# Patient Record
Sex: MALE | Race: WHITE | NOT HISPANIC OR LATINO | Employment: OTHER | URBAN - METROPOLITAN AREA
[De-identification: names, ages, dates, MRNs, and addresses within clinical notes are randomized per-mention and may not be internally consistent; named-entity substitution may affect disease eponyms.]

---

## 2017-02-08 DIAGNOSIS — I25.5 ISCHEMIC CARDIOMYOPATHY: Chronic | ICD-10-CM

## 2017-02-15 ENCOUNTER — HOSPITAL ENCOUNTER (OUTPATIENT)
Dept: LAB | Facility: MEDICAL CENTER | Age: 77
End: 2017-02-15
Attending: NURSE PRACTITIONER
Payer: MEDICARE

## 2017-02-15 ENCOUNTER — HOSPITAL ENCOUNTER (OUTPATIENT)
Facility: MEDICAL CENTER | Age: 77
End: 2017-02-16
Attending: EMERGENCY MEDICINE | Admitting: HOSPITALIST
Payer: MEDICARE

## 2017-02-15 ENCOUNTER — RESOLUTE PROFESSIONAL BILLING HOSPITAL PROF FEE (OUTPATIENT)
Dept: HOSPITALIST | Facility: MEDICAL CENTER | Age: 77
End: 2017-02-15
Payer: MEDICARE

## 2017-02-15 ENCOUNTER — TELEPHONE (OUTPATIENT)
Dept: CARDIOLOGY | Facility: MEDICAL CENTER | Age: 77
End: 2017-02-15

## 2017-02-15 DIAGNOSIS — E87.6 HYPOKALEMIA: ICD-10-CM

## 2017-02-15 DIAGNOSIS — I25.5 ISCHEMIC CARDIOMYOPATHY: Chronic | ICD-10-CM

## 2017-02-15 DIAGNOSIS — N28.9 RENAL INSUFFICIENCY: ICD-10-CM

## 2017-02-15 DIAGNOSIS — Z95.810 BIVENTRICULAR IMPLANTABLE CARDIOVERTER-DEFIBRILLATOR IN SITU: ICD-10-CM

## 2017-02-15 LAB
ALBUMIN SERPL BCP-MCNC: 4.1 G/DL (ref 3.2–4.9)
ALBUMIN SERPL BCP-MCNC: 4.2 G/DL (ref 3.2–4.9)
ALBUMIN/GLOB SERPL: 1.3 G/DL
ALBUMIN/GLOB SERPL: 1.5 G/DL
ALP SERPL-CCNC: 94 U/L (ref 30–99)
ALP SERPL-CCNC: 95 U/L (ref 30–99)
ALT SERPL-CCNC: 22 U/L (ref 2–50)
ALT SERPL-CCNC: 22 U/L (ref 2–50)
ANION GAP SERPL CALC-SCNC: 11 MMOL/L (ref 0–11.9)
ANION GAP SERPL CALC-SCNC: 12 MMOL/L (ref 0–11.9)
AST SERPL-CCNC: 25 U/L (ref 12–45)
AST SERPL-CCNC: 28 U/L (ref 12–45)
BASOPHILS # BLD AUTO: 0.5 % (ref 0–1.8)
BASOPHILS # BLD: 0.04 K/UL (ref 0–0.12)
BILIRUB SERPL-MCNC: 1 MG/DL (ref 0.1–1.5)
BILIRUB SERPL-MCNC: 1.1 MG/DL (ref 0.1–1.5)
BNP SERPL-MCNC: 145 PG/ML (ref 0–100)
BUN SERPL-MCNC: 45 MG/DL (ref 8–22)
BUN SERPL-MCNC: 50 MG/DL (ref 8–22)
CALCIUM SERPL-MCNC: 9.1 MG/DL (ref 8.4–10.2)
CALCIUM SERPL-MCNC: 9.4 MG/DL (ref 8.4–10.2)
CHLORIDE SERPL-SCNC: 88 MMOL/L (ref 96–112)
CHLORIDE SERPL-SCNC: 90 MMOL/L (ref 96–112)
CO2 SERPL-SCNC: 30 MMOL/L (ref 20–33)
CO2 SERPL-SCNC: 32 MMOL/L (ref 20–33)
CREAT SERPL-MCNC: 1.71 MG/DL (ref 0.5–1.4)
CREAT SERPL-MCNC: 1.77 MG/DL (ref 0.5–1.4)
EOSINOPHIL # BLD AUTO: 0.18 K/UL (ref 0–0.51)
EOSINOPHIL NFR BLD: 2.4 % (ref 0–6.9)
ERYTHROCYTE [DISTWIDTH] IN BLOOD BY AUTOMATED COUNT: 52.6 FL (ref 35.9–50)
GFR SERPL CREATININE-BSD FRML MDRD: 38 ML/MIN/1.73 M 2
GFR SERPL CREATININE-BSD FRML MDRD: 39 ML/MIN/1.73 M 2
GLOBULIN SER CALC-MCNC: 2.8 G/DL (ref 1.9–3.5)
GLOBULIN SER CALC-MCNC: 3.3 G/DL (ref 1.9–3.5)
GLUCOSE SERPL-MCNC: 103 MG/DL (ref 65–99)
GLUCOSE SERPL-MCNC: 111 MG/DL (ref 65–99)
HCT VFR BLD AUTO: 38.7 % (ref 42–52)
HGB BLD-MCNC: 13.7 G/DL (ref 14–18)
IMM GRANULOCYTES # BLD AUTO: 0.02 K/UL (ref 0–0.11)
IMM GRANULOCYTES NFR BLD AUTO: 0.3 % (ref 0–0.9)
LYMPHOCYTES # BLD AUTO: 1.87 K/UL (ref 1–4.8)
LYMPHOCYTES NFR BLD: 25.4 % (ref 22–41)
MAGNESIUM SERPL-MCNC: 2 MG/DL (ref 1.5–2.5)
MCH RBC QN AUTO: 33.5 PG (ref 27–33)
MCHC RBC AUTO-ENTMCNC: 35.4 G/DL (ref 33.7–35.3)
MCV RBC AUTO: 94.6 FL (ref 81.4–97.8)
MONOCYTES # BLD AUTO: 0.7 K/UL (ref 0–0.85)
MONOCYTES NFR BLD AUTO: 9.5 % (ref 0–13.4)
NEUTROPHILS # BLD AUTO: 4.54 K/UL (ref 1.82–7.42)
NEUTROPHILS NFR BLD: 61.9 % (ref 44–72)
NRBC # BLD AUTO: 0 K/UL
NRBC BLD AUTO-RTO: 0 /100 WBC
PLATELET # BLD AUTO: 176 K/UL (ref 164–446)
PMV BLD AUTO: 10.2 FL (ref 9–12.9)
POTASSIUM SERPL-SCNC: 2.8 MMOL/L (ref 3.6–5.5)
POTASSIUM SERPL-SCNC: 2.8 MMOL/L (ref 3.6–5.5)
PROT SERPL-MCNC: 6.9 G/DL (ref 6–8.2)
PROT SERPL-MCNC: 7.5 G/DL (ref 6–8.2)
RBC # BLD AUTO: 4.09 M/UL (ref 4.7–6.1)
SODIUM SERPL-SCNC: 131 MMOL/L (ref 135–145)
SODIUM SERPL-SCNC: 132 MMOL/L (ref 135–145)
WBC # BLD AUTO: 7.4 K/UL (ref 4.8–10.8)

## 2017-02-15 PROCEDURE — 700102 HCHG RX REV CODE 250 W/ 637 OVERRIDE(OP): Performed by: EMERGENCY MEDICINE

## 2017-02-15 PROCEDURE — 700101 HCHG RX REV CODE 250: Performed by: EMERGENCY MEDICINE

## 2017-02-15 PROCEDURE — 96365 THER/PROPH/DIAG IV INF INIT: CPT

## 2017-02-15 PROCEDURE — 99220 PR INITIAL OBSERVATION CARE,LEVL III: CPT | Performed by: HOSPITALIST

## 2017-02-15 PROCEDURE — 99285 EMERGENCY DEPT VISIT HI MDM: CPT

## 2017-02-15 PROCEDURE — 80053 COMPREHEN METABOLIC PANEL: CPT | Mod: 91

## 2017-02-15 PROCEDURE — 96375 TX/PRO/DX INJ NEW DRUG ADDON: CPT

## 2017-02-15 PROCEDURE — A9270 NON-COVERED ITEM OR SERVICE: HCPCS | Performed by: EMERGENCY MEDICINE

## 2017-02-15 PROCEDURE — 93005 ELECTROCARDIOGRAM TRACING: CPT | Performed by: EMERGENCY MEDICINE

## 2017-02-15 PROCEDURE — A9270 NON-COVERED ITEM OR SERVICE: HCPCS | Performed by: HOSPITALIST

## 2017-02-15 PROCEDURE — 700102 HCHG RX REV CODE 250 W/ 637 OVERRIDE(OP): Performed by: HOSPITALIST

## 2017-02-15 PROCEDURE — 36415 COLL VENOUS BLD VENIPUNCTURE: CPT

## 2017-02-15 PROCEDURE — 700111 HCHG RX REV CODE 636 W/ 250 OVERRIDE (IP): Performed by: HOSPITALIST

## 2017-02-15 PROCEDURE — 700111 HCHG RX REV CODE 636 W/ 250 OVERRIDE (IP)

## 2017-02-15 PROCEDURE — G0378 HOSPITAL OBSERVATION PER HR: HCPCS

## 2017-02-15 PROCEDURE — 83735 ASSAY OF MAGNESIUM: CPT

## 2017-02-15 PROCEDURE — 85025 COMPLETE CBC W/AUTO DIFF WBC: CPT

## 2017-02-15 RX ORDER — POTASSIUM CHLORIDE 20 MEQ/1
40 TABLET, EXTENDED RELEASE ORAL DAILY
Status: ON HOLD | COMMUNITY
End: 2017-02-16

## 2017-02-15 RX ORDER — SODIUM CHLORIDE AND POTASSIUM CHLORIDE 300; 900 MG/100ML; MG/100ML
INJECTION, SOLUTION INTRAVENOUS ONCE
Status: COMPLETED | OUTPATIENT
Start: 2017-02-15 | End: 2017-02-16

## 2017-02-15 RX ORDER — SODIUM CHLORIDE AND POTASSIUM CHLORIDE 300; 900 MG/100ML; MG/100ML
INJECTION, SOLUTION INTRAVENOUS ONCE
Status: COMPLETED | OUTPATIENT
Start: 2017-02-15 | End: 2017-02-15

## 2017-02-15 RX ORDER — ASPIRIN 81 MG/1
81 TABLET, CHEWABLE ORAL DAILY
Status: DISCONTINUED | OUTPATIENT
Start: 2017-02-15 | End: 2017-02-16

## 2017-02-15 RX ORDER — POTASSIUM CHLORIDE 20 MEQ/1
40 TABLET, EXTENDED RELEASE ORAL ONCE
Status: COMPLETED | OUTPATIENT
Start: 2017-02-15 | End: 2017-02-15

## 2017-02-15 RX ORDER — FUROSEMIDE 40 MG/1
40 TABLET ORAL DAILY
COMMUNITY
End: 2017-05-17 | Stop reason: SDUPTHER

## 2017-02-15 RX ORDER — METOLAZONE 5 MG/1
5 TABLET ORAL PRN
COMMUNITY
End: 2018-03-12 | Stop reason: SDUPTHER

## 2017-02-15 RX ORDER — POTASSIUM CHLORIDE 7.45 MG/ML
10 INJECTION INTRAVENOUS
Status: COMPLETED | OUTPATIENT
Start: 2017-02-15 | End: 2017-02-16

## 2017-02-15 RX ORDER — CARVEDILOL 6.25 MG/1
6.25 TABLET ORAL 2 TIMES DAILY WITH MEALS
COMMUNITY
End: 2017-08-21

## 2017-02-15 RX ORDER — POTASSIUM CHLORIDE 20 MEQ/1
40 TABLET, EXTENDED RELEASE ORAL DAILY
Status: DISCONTINUED | OUTPATIENT
Start: 2017-02-16 | End: 2017-02-16 | Stop reason: HOSPADM

## 2017-02-15 RX ORDER — POTASSIUM CHLORIDE 7.45 MG/ML
INJECTION INTRAVENOUS
Status: COMPLETED
Start: 2017-02-15 | End: 2017-02-15

## 2017-02-15 RX ORDER — DOCUSATE SODIUM 100 MG/1
100 CAPSULE, LIQUID FILLED ORAL 2 TIMES DAILY
Status: DISCONTINUED | OUTPATIENT
Start: 2017-02-15 | End: 2017-02-16 | Stop reason: HOSPADM

## 2017-02-15 RX ORDER — CARVEDILOL 6.25 MG/1
6.25 TABLET ORAL 2 TIMES DAILY WITH MEALS
Status: DISCONTINUED | OUTPATIENT
Start: 2017-02-15 | End: 2017-02-16 | Stop reason: HOSPADM

## 2017-02-15 RX ORDER — FUROSEMIDE 40 MG/1
40 TABLET ORAL DAILY
Status: DISCONTINUED | OUTPATIENT
Start: 2017-02-16 | End: 2017-02-16 | Stop reason: HOSPADM

## 2017-02-15 RX ADMIN — POTASSIUM CHLORIDE 10 MEQ: 10 INJECTION, SOLUTION INTRAVENOUS at 22:00

## 2017-02-15 RX ADMIN — POTASSIUM CHLORIDE AND SODIUM CHLORIDE: 900; 300 INJECTION, SOLUTION INTRAVENOUS at 18:52

## 2017-02-15 RX ADMIN — CARVEDILOL 6.25 MG: 6.25 TABLET, FILM COATED ORAL at 20:17

## 2017-02-15 RX ADMIN — POTASSIUM CHLORIDE AND SODIUM CHLORIDE 1000 ML: 900; 300 INJECTION, SOLUTION INTRAVENOUS at 21:34

## 2017-02-15 RX ADMIN — POTASSIUM CHLORIDE 40 MEQ: 1500 TABLET, EXTENDED RELEASE ORAL at 17:02

## 2017-02-15 RX ADMIN — ASPIRIN 81 MG CHEWABLE TABLET 81 MG: 81 TABLET CHEWABLE at 20:17

## 2017-02-15 RX ADMIN — POTASSIUM CHLORIDE 10 MEQ: 10 INJECTION, SOLUTION INTRAVENOUS at 19:18

## 2017-02-15 RX ADMIN — POTASSIUM CHLORIDE 10 MEQ: 7.46 INJECTION, SOLUTION INTRAVENOUS at 19:18

## 2017-02-15 ASSESSMENT — LIFESTYLE VARIABLES
HAVE YOU EVER FELT YOU SHOULD CUT DOWN ON YOUR DRINKING: NO
CONSUMPTION TOTAL: POSITIVE
TOTAL SCORE: 0
TOTAL SCORE: 0
HOW MANY TIMES IN THE PAST YEAR HAVE YOU HAD 5 OR MORE DRINKS IN A DAY: 0
EVER_SMOKED: YES
EVER HAD A DRINK FIRST THING IN THE MORNING TO STEADY YOUR NERVES TO GET RID OF A HANGOVER: NO
EVER FELT BAD OR GUILTY ABOUT YOUR DRINKING: NO
HAVE PEOPLE ANNOYED YOU BY CRITICIZING YOUR DRINKING: NO
DO YOU DRINK ALCOHOL: YES
AVERAGE NUMBER OF DAYS PER WEEK YOU HAVE A DRINK CONTAINING ALCOHOL: 14
ON A TYPICAL DAY WHEN YOU DRINK ALCOHOL HOW MANY DRINKS DO YOU HAVE: 2
TOTAL SCORE: 0

## 2017-02-15 ASSESSMENT — PAIN SCALES - GENERAL
PAINLEVEL_OUTOF10: 0
PAINLEVEL_OUTOF10: 5

## 2017-02-15 NOTE — IP AVS SNAPSHOT
Topio Access Code: Activation code not generated  Current Topio Status: Active    Pruffihart  A secure, online tool to manage your health information     [facility]’s Topio® is a secure, online tool that connects you to your personalized health information from the privacy of your home -- day or night - making it very easy for you to manage your healthcare. Once the activation process is completed, you can even access your medical information using the Topio bear, which is available for free in the Apple Bear store or Google Play store.     Topio provides the following levels of access (as shown below):   My Chart Features   [facility] Primary Care Doctor [facility]  Specialists [facility]  Urgent  Care Non-[facility]  Primary Care  Doctor   Email your healthcare team securely and privately 24/7 X X X X   Manage appointments: schedule your next appointment; view details of past/upcoming appointments X      Request prescription refills. X      View recent personal medical records, including lab and immunizations X X X X   View health record, including health history, allergies, medications X X X X   Read reports about your outpatient visits, procedures, consult and ER notes X X X X   See your discharge summary, which is a recap of your hospital and/or ER visit that includes your diagnosis, lab results, and care plan. X X       How to register for Topio:  1. Go to  [email or web address].  2. Click on the Sign Up Now box, which takes you to the New Member Sign Up page. You will need to provide the following information:  a. Enter your Topio Access Code exactly as it appears at the top of this page. (You will not need to use this code after you’ve completed the sign-up process. If you do not sign up before the expiration date, you must request a new code.)   b. Enter your date of birth.   c. Enter your home email address.   d. Click Submit, and follow the next screen’s instructions.  3. Create a Topio ID. This will  be your SGB login ID and cannot be changed, so think of one that is secure and easy to remember.  4. Create a SGB password. You can change your password at any time.  5. Enter your Password Reset Question and Answer. This can be used at a later time if you forget your password.   6. Enter your e-mail address. This allows you to receive e-mail notifications when new information is available in SGB.  7. Click Sign Up. You can now view your health information.    For assistance activating your SGB account, call [phone]

## 2017-02-15 NOTE — IP AVS SNAPSHOT
" <p align=\"LEFT\"><IMG SRC=\"//[email or web address]\" alt=\"Image\" WIDTH=\"50%\" HEIGHT=\"200\" BORDER=\"\"></p>                   Name:Isai Fuentes  Medical Record Number:[id number]  CSN: [id number]    YOB: 1940   Age: 76 y.o.  Sex: male  HT:1.803 m (5' 11\") WT: 96.5 kg (212 lb 11.9 oz)          Admit Date: 2/15/2017     Discharge Date:   Today's Date: 2/16/2017  Attending Doctor:  Toy Dan M.D.                  Allergies:  Plavix; Statins; and Ticlid          Your appointments     Mar 08, 2017  3:20 PM   FOLLOW UP with Edd Billings M.D.   [facility] (--)    [address], [address]  [place] [id number] [phone]            Mar 14, 2017 10:00 AM   ADVANCED DIRECTIVE CLASS with JOHN AT [facility]   Events (--)    Please Check Your Invitation   [phone]           Conference Room 25 [place] [address]            Mar 27, 2017 11:20 AM   Established Patient Pul with Ruby Thakur M.D.   [facility] (--)    [address]  [address]  [place] [id number] [phone]              Follow-up Information     1. Follow up with Prashant Shankar M.D.. Schedule an appointment as soon as possible for a visit in 1 week.    Specialty:  Family Medicine    Why:  [facility]ULE [facility] FOLLOW UP APPOINTMENT. THANK YOU    Contact information    [address]  [place] [address] [phone]           Medication List      Take these Medications        Instructions    aspirin 81 MG tablet    Take 81 mg by mouth every day.   Dose:  81 mg       carvedilol 6.25 MG Tabs   Commonly known as:  COREG    Take 6.25 mg by mouth 2 times a day, with meals.   Dose:  6.25 mg       CO Q-10 PO    Take 1 Cap by mouth every day.   Dose:  1 Cap       cyanocobalamin 500 MCG Tabs   Commonly known as:  VITAMIN B-12    Take 1,000 mcg by mouth every day.   Dose:  1000 mcg       ENTRESTO 24-26 MG Tabs tablet   Generic " drug:  sacubitril-valsartan    Take 0.5 Tabs by mouth 2 Times a Day.   Dose:  0.5 Tab       fish oil 1000 MG Caps capsule    Take 1,000 mg by mouth every day.   Dose:  1000 mg       furosemide 40 MG Tabs   Commonly known as:  LASIX    Take 40 mg by mouth every day.   Dose:  40 mg       Methotrexate (PF) 10 MG/0.2ML Soaj    Inject 10 mg as instructed every 7 days. On Mon   Indications: Psoriasis   Dose:  10 mg       metolazone 5 MG Tabs   Commonly known as:  ZAROXOLYN    Take 5 mg by mouth as needed. Indications: Edema   Dose:  5 mg       OCUVITE Tabs    Take 1 Tab by mouth every day.   Dose:  1 Tab       potassium chloride SA 20 MEQ Tbcr   Commonly known as:  Kdur    Take 2 Tabs by mouth every day.   Dose:  40 mEq       spironolactone 25 MG Tabs   Commonly known as:  ALDACTONE    Take 6.25 mg by mouth every day.   Dose:  6.25 mg

## 2017-02-15 NOTE — IP AVS SNAPSHOT
" Home Care Instructions                                                                                                                  Name:Isai Fuentes  Medical Record Number:[id number]  CSN: [id number]    YOB: 1940   Age: 76 y.o.  Sex: male  HT:1.803 m (5' 11\") WT: 96.5 kg (212 lb 11.9 oz)          Admit Date: 2/15/2017     Discharge Date:   Today's Date: 2/16/2017  Attending Doctor:  Toy Dan M.D.                  Allergies:  Plavix; Statins; and Ticlid            Discharge Instructions       Discharge Instructions    Discharged to home by car with relative. Discharged via wheelchair, hospital escort: Yes.  Special equipment needed: Not Applicable    Be sure to schedule a follow-up appointment with your primary care doctor or any specialists as instructed.     Discharge Plan:   Diet Plan: Discussed  Activity Level: Discussed  Confirmed Follow up Appointment: Patient to Call and Schedule Appointment  Confirmed Symptoms Management: Discussed  Medication Reconciliation Updated: Yes  Influenza Vaccine Indication: Not indicated: Previously immunized this influenza season and > 8 years of age    I understand that a diet low in cholesterol, fat, and sodium is recommended for good health. Unless I have been given specific instructions below for another diet, I accept this instruction as my diet prescription.   Other diet: Keep yourself hydrated.      Special Instructions:   Hypokalemia  Hypokalemia means that the amount of potassium in the blood is lower than normal. Potassium is a chemical, called an electrolyte, that helps regulate the amount of fluid in the body. It also stimulates muscle contraction and helps nerves function properly. Most of the body's potassium is inside of cells, and only a very small amount is in the blood. Because the amount in the blood is so small, minor changes can be life-threatening.  CAUSES  · Antibiotics.  · Diarrhea or vomiting.  · Using laxatives too much, which can " cause diarrhea.  · Chronic kidney disease.  · Water pills (diuretics).  · Eating disorders (bulimia).  · Low magnesium level.  · Sweating a lot.  SIGNS AND SYMPTOMS  · Weakness.  · Constipation.  · Fatigue.  · Muscle cramps.  · Mental confusion.  · Skipped heartbeats or irregular heartbeat (palpitations).  · Tingling or numbness.  DIAGNOSIS   Your health care provider can diagnose hypokalemia with blood tests. In addition to checking your potassium level, your health care provider may also check other lab tests.  TREATMENT  Hypokalemia can be treated with potassium supplements taken by mouth or adjustments in your current medicines. If your potassium level is very low, you may need to get potassium through a vein (IV) and be monitored in the hospital. A diet high in potassium is also helpful. Foods high in potassium are:  · Nuts, such as peanuts and pistachios.  · Seeds, such as sunflower seeds and pumpkin seeds.  · Peas, lentils, and lima beans.  · Whole grain and bran cereals and breads.  · Fresh fruit and vegetables, such as apricots, avocado, bananas, cantaloupe, kiwi, oranges, tomatoes, asparagus, and potatoes.  · Orange and tomato juices.  · Red meats.  · Fruit yogurt.  HOME CARE INSTRUCTIONS  · Take all medicines as prescribed by your health care provider.  · Maintain a healthy diet by including nutritious food, such as fruits, vegetables, nuts, whole grains, and lean meats.  · If you are taking a laxative, be sure to follow the directions on the label.  SEEK MEDICAL CARE IF:  · Your weakness gets worse.  · You feel your heart pounding or racing.  · You are vomiting or having diarrhea.  · You are diabetic and having trouble keeping your blood glucose in the normal range.  SEEK IMMEDIATE MEDICAL CARE IF:  · You have chest pain, shortness of breath, or dizziness.  · You are vomiting or having diarrhea for more than 2 days.  · You faint.  MAKE SURE YOU:   · Understand these instructions.  · Will watch your  condition.  · Will get help right away if you are not doing well or get worse.     This information is not intended to replace advice given to you by your health care provider. Make sure you discuss any questions you have with your health care provider.     Document Released: 12/18/2006 Document Revised: 01/08/2016 Document Reviewed: 06/20/2014  [employer] Interactive Patient Education ©2016 [employer]      · Is patient discharged on Warfarin / Coumadin?   No     · Is patient Post Blood Transfusion?  No    Depression / Suicide Risk    As you are discharged from this [facility], it is important to learn how to keep safe from harming yourself.    Recognize the warning signs:  · Abrupt changes in personality, positive or negative- including increase in energy   · Giving away possessions  · Change in eating patterns- significant weight changes-  positive or negative  · Change in sleeping patterns- unable to sleep or sleeping all the time   · Unwillingness or inability to communicate  · Depression  · Unusual sadness, discouragement and loneliness  · Talk of wanting to die  · Neglect of personal appearance   · Rebelliousness- reckless behavior  · Withdrawal from people/activities they love  · Confusion- inability to concentrate     If you or a loved one observes any of these behaviors or has concerns about self-harm, here's what you can do:  · Talk about it- your feelings and reasons for harming yourself  · Remove any means that you might use to hurt yourself (examples: pills, rope, extension cords, firearm)  · Get professional help from the community (Mental Health, Substance Abuse, psychological counseling)  · Do not be alone:Call your Safe Contact- someone whom you trust who will be there for you.  · Call your local CRISIS HOTLINE 396-0739 or 583-274-7870  · Call your local Children's Mobile Crisis Response Team [place] (042) 799-9247 or www.[employer] medic  · Call the toll free National Suicide  Prevention Hotlines   · National Suicide Prevention Lifeline 828-096-TDFK (6968)  · National Hope Line Network 800-SUICIDE (838-0334)        Your appointments     Mar 08, 2017  3:20 PM   FOLLOW UP with Edd Billings M.D.   [facility] (--)    [address], [address]  [place] [address] [phone]            Mar 14, 2017 10:00 AM   ADVANCED DIRECTIVE CLASS with JOHN AT [address] [facility]   Events (--)    Please Check Your Invitation   [phone]           Conference Room [address] [place] [address]            Mar 27, 2017 11:20 AM   Established Patient Pul with Ruby Thakur M.D.   [facility] (--)    [address]  [address]  [place] [id number] [phone]              Follow-up Information     1. Follow up with Prashant Shankar M.D.. Schedule an appointment as soon as possible for a visit in 1 week.    Specialty:  Family Medicine    Why:  [facility]ULE A FOLLOW UP APPOINTMENT. THANK YOU    Contact information    [address] [place] [address]  [phone]           Discharge Medication Instructions:    Below are the medications your physician expects you to take upon discharge:    Review all your home medications and newly ordered medications with your doctor and/or pharmacist. Follow medication instructions as directed by your doctor and/or pharmacist.    Please keep your medication list with you and share with your physician.               Medication List      CONTINUE taking these medications        Instructions    aspirin 81 MG tablet    Take 81 mg by mouth every day.   Dose:  81 mg       carvedilol 6.25 MG Tabs   Last time this was given:  6.25 mg on 2/16/2017 10:03 AM   Commonly known as:  COREG    Take 6.25 mg by mouth 2 times a day, with meals.   Dose:  6.25 mg       CO Q-10 PO    Take 1 Cap by mouth every day.   Dose:  1 Cap       cyanocobalamin 500 MCG Tabs   Commonly known as:  VITAMIN  B-12    Take 1,000 mcg by mouth every day.   Dose:  1000 mcg       ENTRESTO 24-26 MG Tabs tablet   Generic drug:  sacubitril-valsartan    Take 0.5 Tabs by mouth 2 Times a Day.   Dose:  0.5 Tab       fish oil 1000 MG Caps capsule    Take 1,000 mg by mouth every day.   Dose:  1000 mg       furosemide 40 MG Tabs   Last time this was given:  40 mg on 2/16/2017 10:02 AM   Commonly known as:  LASIX    Take 40 mg by mouth every day.   Dose:  40 mg       Methotrexate (PF) 10 MG/0.2ML Soaj    Inject 10 mg as instructed every 7 days. On Mon   Indications: Psoriasis   Dose:  10 mg       metolazone 5 MG Tabs   Commonly known as:  ZAROXOLYN    Take 5 mg by mouth as needed. Indications: Edema   Dose:  5 mg       OCUVITE Tabs    Take 1 Tab by mouth every day.   Dose:  1 Tab       potassium chloride SA 20 MEQ Tbcr   Last time this was given:  40 mEq on 2/16/2017 10:02 AM   Commonly known as:  Kdur    Take 2 Tabs by mouth every day.   Dose:  40 mEq       spironolactone 25 MG Tabs   Commonly known as:  ALDACTONE    Take 6.25 mg by mouth every day.   Dose:  6.25 mg               Instructions           Diet / Nutrition:    Follow any diet instructions given to you by your doctor or the dietician, including how much salt (sodium) you are allowed each day.    If you are overweight, talk to your doctor about a weight reduction plan.    Activity:    Remain physically active following your doctor's instructions about exercise and activity.    Rest often.     Any time you become even a little tired or short of breath, SIT DOWN and rest.    Worsening Symptoms:    Report any of the following signs and symptoms to the doctor's office immediately:    *Pain of jaw, arm, or neck  *Chest pain not relieved by medication                               *Dizziness or loss of consciousness  *Difficulty breathing even when at rest   *More tired than usual                                       *Bleeding drainage or swelling of surgical site  *Swelling of  feet, ankles, legs or stomach                 *Fever (>100ºF)  *Pink or blood tinged sputum  *Weight gain (3lbs/day or 5lbs /week)           *Shock from internal defibrillator (if applicable)  *Palpitations or irregular heartbeats                *Cool and/or numb extremities    Stroke Awareness    Common Risk Factors for Stroke include:    Age  Atrial Fibrillation  Carotid Artery Stenosis  Diabetes Mellitus  Excessive alcohol consumption  High blood pressure  Overweight   Physical inactivity  Smoking    Warning signs and symptoms of a stroke include:    *Sudden numbness or weakness of the face, arm or leg (especially on one side of the body).  *Sudden confusion, trouble speaking or understanding.  *Sudden trouble seeing in one or both eyes.  *Sudden trouble walking, dizziness, loss of balance or coordination.Sudden severe headache with no known cause.    It is very important to get treatment quickly when a stroke occurs. If you experience any of the above warning signs, call 911 immediately.                   Disclaimer         Quit Smoking / Tobacco Use:    I understand the use of any tobacco products increases my chance of suffering from future heart disease or stroke and could cause other illnesses which may shorten my life. Quitting the use of tobacco products is the single most important thing I can do to improve my health. For further information on smoking / tobacco cessation call a Toll Free Quit Line at 1-992.157.9949 (*National [employer] [place]) or 1-101.401.7976 (American Lung Association) or you can access the web based program at www.lungusa.org.    [place] Tobacco Users Help Line:  (916) 723-5444       Toll Free: 1-740.994.6784  Quit Tobacco Program [facility] (566)162-2699    Crisis Hotline:    National Crisis Hotline:  4-202-YFJTTAE or 1-571.127.4829    [place] Crisis Hotline:    1-299.124.3900 or 118-806-9057    Discharge Survey:   [facility] We hope  we did everything we could to make your hospital stay a pleasant one. You may be receiving a phone survey and we would appreciate your time and participation in answering the questions. Your input is very valuable to us in our efforts to improve our service to our patients and their families.        My signature on this form indicates that:    1. I have reviewed and understand the above information.  2. My questions regarding this information have been answered to my satisfaction.  3. I have formulated a plan with my discharge nurse to obtain my prescribed medications for home.                  Disclaimer         __________________________________                     __________       ________                       Patient Signature                                                 Date                    Time

## 2017-02-15 NOTE — IP AVS SNAPSHOT
2/16/2017          Isai Fuentes  [address] [place] [address]    Dear Isai:    [facility] wants to ensure your discharge home is safe and you or your loved ones have had all your questions answered regarding your care after you leave the hospital.    You may receive a telephone call within two days of your discharge.  This call is to make certain you understand your discharge instructions as well as ensure we provided you with the best care possible during your stay with us.     The call will only last approximately 3-5 minutes and will be done by a nurse.    Once again, we want to ensure your discharge home is safe and that you have a clear understanding of any next steps in your care.  If you have any questions or concerns, please do not hesitate to contact us, we are here for you.  Thank you for choosing [facility] for your healthcare needs.    Sincerely,    Meño Lovell    [facility]

## 2017-02-15 NOTE — TELEPHONE ENCOUNTER
----- Message from PRATEEK Gregorio sent at 2/15/2017  2:15 PM PST -----  As per our discussion to ER for reevaluation and IV KCL if warranted.

## 2017-02-16 ENCOUNTER — TELEPHONE (OUTPATIENT)
Dept: CARDIOLOGY | Facility: MEDICAL CENTER | Age: 77
End: 2017-02-16

## 2017-02-16 VITALS
WEIGHT: 212.74 LBS | OXYGEN SATURATION: 93 % | RESPIRATION RATE: 18 BRPM | DIASTOLIC BLOOD PRESSURE: 57 MMHG | BODY MASS INDEX: 29.78 KG/M2 | SYSTOLIC BLOOD PRESSURE: 107 MMHG | HEIGHT: 71 IN | TEMPERATURE: 97.4 F | HEART RATE: 81 BPM

## 2017-02-16 DIAGNOSIS — I25.5 ISCHEMIC CARDIOMYOPATHY: Chronic | ICD-10-CM

## 2017-02-16 PROBLEM — E87.6 HYPOKALEMIA: Status: RESOLVED | Noted: 2017-02-15 | Resolved: 2017-02-16

## 2017-02-16 LAB
ERYTHROCYTE [DISTWIDTH] IN BLOOD BY AUTOMATED COUNT: 52.6 FL (ref 35.9–50)
HCT VFR BLD AUTO: 35.2 % (ref 42–52)
HGB BLD-MCNC: 12.3 G/DL (ref 14–18)
MCH RBC QN AUTO: 33.1 PG (ref 27–33)
MCHC RBC AUTO-ENTMCNC: 34.9 G/DL (ref 33.7–35.3)
MCV RBC AUTO: 94.6 FL (ref 81.4–97.8)
PLATELET # BLD AUTO: 160 K/UL (ref 164–446)
PMV BLD AUTO: 10.4 FL (ref 9–12.9)
POTASSIUM SERPL-SCNC: 3.2 MMOL/L (ref 3.6–5.5)
POTASSIUM SERPL-SCNC: 3.4 MMOL/L (ref 3.6–5.5)
POTASSIUM SERPL-SCNC: 3.4 MMOL/L (ref 3.6–5.5)
RBC # BLD AUTO: 3.72 M/UL (ref 4.7–6.1)
WBC # BLD AUTO: 7.8 K/UL (ref 4.8–10.8)

## 2017-02-16 PROCEDURE — 85027 COMPLETE CBC AUTOMATED: CPT

## 2017-02-16 PROCEDURE — A9270 NON-COVERED ITEM OR SERVICE: HCPCS | Performed by: HOSPITALIST

## 2017-02-16 PROCEDURE — 84132 ASSAY OF SERUM POTASSIUM: CPT

## 2017-02-16 PROCEDURE — 700102 HCHG RX REV CODE 250 W/ 637 OVERRIDE(OP): Performed by: HOSPITALIST

## 2017-02-16 PROCEDURE — 99217 PR OBSERVATION CARE DISCHARGE: CPT | Performed by: INTERNAL MEDICINE

## 2017-02-16 PROCEDURE — 700111 HCHG RX REV CODE 636 W/ 250 OVERRIDE (IP): Performed by: HOSPITALIST

## 2017-02-16 PROCEDURE — G0378 HOSPITAL OBSERVATION PER HR: HCPCS

## 2017-02-16 RX ORDER — ASPIRIN 81 MG/1
81 TABLET, CHEWABLE ORAL
Status: DISCONTINUED | OUTPATIENT
Start: 2017-02-16 | End: 2017-02-16 | Stop reason: HOSPADM

## 2017-02-16 RX ORDER — POTASSIUM CHLORIDE 20 MEQ/1
40 TABLET, EXTENDED RELEASE ORAL DAILY
Qty: 60 TAB | Refills: 11 | Status: SHIPPED | OUTPATIENT
Start: 2017-02-16 | End: 2017-03-21 | Stop reason: SDUPTHER

## 2017-02-16 RX ADMIN — FUROSEMIDE 40 MG: 40 TABLET ORAL at 10:02

## 2017-02-16 RX ADMIN — POTASSIUM CHLORIDE 10 MEQ: 10 INJECTION, SOLUTION INTRAVENOUS at 05:34

## 2017-02-16 RX ADMIN — CARVEDILOL 6.25 MG: 6.25 TABLET, FILM COATED ORAL at 10:03

## 2017-02-16 RX ADMIN — POTASSIUM CHLORIDE 40 MEQ: 1500 TABLET, EXTENDED RELEASE ORAL at 10:02

## 2017-02-16 RX ADMIN — POTASSIUM CHLORIDE 10 MEQ: 10 INJECTION, SOLUTION INTRAVENOUS at 02:29

## 2017-02-16 NOTE — ED NOTES
Pt medicated with Po potassium as ordered, NS with 40kcl not here, left message for central supply, no return call, called house supervisor, states she will bring it down.

## 2017-02-16 NOTE — PROGRESS NOTES
Wife Ivelisse at bedside about 1000 and brought in his home medication, held the medication as his bp was low this morning.  Dr. Dan rounded and discharged pt home.  Potassium at noon showed 3.4.  Wife and pt verbalized understanding of making f/u appt with pcp, cardiology appt that is scheduled, new medications, s/s of hypokalemia and when to return back to ED.  Pt left in a wheelchair with the nursing student.

## 2017-02-16 NOTE — CARE PLAN
Problem: Safety  Goal: Will remain free from falls  Outcome: PROGRESSING AS EXPECTED  SBA. CLIP. Pt calls for assist appropriately. Hourly rounding. Personal belongings within reach. Non-skid socks. Bed locked & in low position.            Problem: Knowledge Deficit  Goal: Knowledge of disease process/condition, treatment plan, diagnostic tests, and medications will improve  Outcome: PROGRESSING AS EXPECTED  POC discussed w/ pt & wife. Understands disease process and treatment plan. Educated on new meds (IV potassium) & procedures/tests (monitoring potassium levels q6h). Questions answered.

## 2017-02-16 NOTE — PROGRESS NOTES
No further c/o heartburn. Second K-rider complete. Pt still c/o pain at peripheral site despite slow rate. Heat packs provided.

## 2017-02-16 NOTE — ED PROVIDER NOTES
"ED Provider Note    CHIEF COMPLAINT  Chief Complaint   Patient presents with   • Abnormal Labs       HPI  Isai Fuentes is a 76 y.o. male who presents for evaluation of abnormal labs.  The patient had routine labs performed today in anticipation of a medical exam performed by Sofiya Trivedi, with cardiology.  The patient was noted to have hypokalemia with a level of 2.8 along with increasing renal insufficiency.  The patient was directed to him our department for evaluation and treatment.  The patient states he feels a little lightheaded but denies any other symptoms.  The patient denies: Fever, URI symptoms, cough, sputum, chest pain, palpitations, syncope, abdominal pain, vomiting, diarrhea, neurological symptoms.  No other acute symptomatology or complaints.    REVIEW OF SYSTEMS  See HPI for further details. All other systems negative.    PAST MEDICAL HISTORY  Past Medical History   Diagnosis Date   • Other drug allergy(995.27) 10/16/2008   • Atrial fibrillation (CMS-HCC) 9/20/2011   • CAD (coronary artery disease) 9/20/2011   • CARDIOMYOPATHY 9/20/2011   • History of cardiac catheterization 9/20/2011   • Fatigue 10/28/2010   • Heartburn 10/23/2008   • HTN (hypertension) 9/20/2011   • Insomnia 7/8/2010   • Ischemic cardiomyopathy 9/20/2011   • Orthostatic hypotension 5/6/2009   • Presence of permanent cardiac pacemaker 9/20/2011   • Pleural effusion 5/18/2009   • History of myocardial infarction      \"many\"   • Prostate cancer (CMS-HCC) 5/13/2011   • Second degree AV block, Mobitz type II 11/10/2010   • Long term (current) use of anticoagulants 5/13/2011   • Congestive heart failure (CMS-HCC)    • Pacemaker      [employer]   • Indigestion    • Other specified disorder of intestines 07-28-15     constipation/diarrhea/ reports some bleeding from rectum w/blood clots. Seeing GI   • Breath shortness    • Snoring    • Pain 07-28-15     \"chronic\", 0/10   • Biventricular implantable cardioverter-defibrillator " "in situ 8/7/2015   • Cancer (CMS-HCC) 2012     prostate   • Myocardial infarct (CMS-HCC) 2007   • WILLA (obstructive sleep apnea)      CPAP   • Stroke (CMS-HCC) 2/3/2009   • COPD (chronic obstructive pulmonary disease) [place]        FAMILY HISTORY  History reviewed. No pertinent family history.    SOCIAL HISTORY  Positive tobacco use the past; she is tobacco; positive alcohol use;    SURGICAL HISTORY  Past Surgical History   Procedure Laterality Date   • Cardiac cath       has 2 stents   • Multiple coronary artery bypass  2007     2 vessel   • Pacemaker insertion  11/24/08     [facility]   • Recovery  7/30/2015     Procedure: CATH LAB  LEAD EXTRACTION, UPGRADE TO BIV PM ST.DRE LRG MAGGIE AQUINO ;  Surgeon: Recoveryonly Surgery;  Location: SURGERY PRE-POST PROC UNIT [facility];  Service:    • Recovery  7/29/2015     Procedure: CATH LAB NEW PM INSERTION DUAL ATRIAL & VENTRICULAR-LV LEAD W/ NEW GENERATOR KENIA ICD9: 414.8;  Surgeon: Recoveryonly Surgery;  Location: SURGERY PRE-POST PROC UNIT [facility];  Service:    • Recovery  8/14/2015     Procedure:  CATH LAB LEAD EXTRACTION AWILDA ST.DRE LRG PAULO AQUINO;  Surgeon: Recoveryonly Surgery;  Location: SURGERY PRE-POST PROC UNIT [facility];  Service:    • Recovery  10/16/2015     Procedure: CATH LAB LEAD REVISION ST.DRE KENIA;  Surgeon: Recoveryonly Surgery;  Location: SURGERY PRE-POST PROC UNIT [facility];  Service:        CURRENT MEDICATIONS  See nurses notes    ALLERGIES  Allergies   Allergen Reactions   • Plavix [Clopidogrel Bisulfate]      RASH   • Statins [Hmg-Coa-R Inhibitors]    • Ticlid [Ticlopidine Hydrochloride]        PHYSICAL EXAM  VITAL SIGNS: BP 98/66 mmHg  Pulse 78  Temp(Src) 36.7 °C (98.1 °F)  Resp 18  Ht 1.803 m (5' 11\")  Wt 96.9 kg (213 lb 10 oz)  BMI 29.81 kg/m2  SpO2 93%   Constitutional: 76-year-old male, awake, oriented ×3, sitting comfortably   HENT: ,Atraumatic, Bilateral external ears normal, tympanic membranes clear, Oropharynx moist, No oral exudates, Nose normal.   Eyes: " PERRL, EOMI, Conjunctiva normal, No discharge.   Neck: Normal range of motion, No tenderness, Supple, No stridor.   Lymphatic: No lymphadenopathy noted.   Cardiovascular: Normal heart rate, Normal rhythm, No murmurs, No rubs, No gallops.   Thorax & Lungs: Normal Equal breath sounds, No respiratory distress, No wheezing, no stridor, no rales. No chest tenderness.   Abdomen: Soft, nontender, nondistended, no organomegaly, positive bowel sounds normal in quality. No guarding or rebound.  Skin: Mildly decreased skin turgor, pink, warm, dry. No rashes, petechiae, purpura. Normal capillary refill.   Back: No tenderness, No CVA tenderness.   Extremities: Intact distal pulses, No edema, No tenderness, No cyanosis, No clubbing. Vascular: Pulses are 2+, symmetric in the upper and lower extremities.  Musculoskeletal: Diffuse arthritic changes. No tenderness to palpation or major deformities noted.   Neurologic: Alert & oriented x 3, Normal motor function, Normal sensory function, No gross focal deficits noted.   Psychiatric: Affect normal, Judgment normal, Mood normal.     EKG  I have interpreted: Rate 70, rhythm atrial sensed, ventricular paced, diffuse ST-T wave changes consistent with paced rhythm, 12-lead EKG, no acute change compared to a tracing of 10/23/15;      COURSE & MEDICAL DECISION MAKING  Pertinent Labs & Imaging studies reviewed. (See chart for details)  1.  Monitor  2.  IV normal saline +40 mEq potassium chloride  3.  Potassium chloride 40 mEq oral    Laboratory studies performed today show: CMP shows sodium 132, potassium 2.8, chloride 88, BUN 45, creatinine 1.71, otherwise within normal; ; repeat laboratory studies were ordered and will be reviewed;    Discussion/consultation: At this time, the patient presents with hypokalemia.  Treatment was initiated as noted above.  I spoke with the hospitalist on call.  The patient will be admitted for further monitoring, treatment, and care.    FINAL  IMPRESSION  1. Hypokalemia    2. Renal insufficiency    3. Ischemic cardiomyopathy    4. Biventricular implantable cardioverter-defibrillator in situ        PLAN  1.  The patient will be admitted for further monitoring, treatment, and care    Electronically signed by: Guy G Gansert, 2/15/2017 4:18 PM

## 2017-02-16 NOTE — PROGRESS NOTES
Report received from marianne Flores assisted to the bathroom.  Last k-rider is infusing as pt states it burning when she tried increasing the rate last night.  VSS this morning.  Pt tolerated his breakfast.  Recent potassium is 3.4 with next draw at 1145.

## 2017-02-16 NOTE — ED NOTES
"Med rec updated and complete  Allergies reviewed  Pt states \"No antibiotics in the last 30 days\".     "

## 2017-02-16 NOTE — PROGRESS NOTES
"Pt continues to c/o that IV potassium \"hurts\". Unable to speed rate up. Heat pack given. Educated importance of getting mediation in timely manner, but pt stated \"I just can't take it.\" Otherwise, no changes.  "

## 2017-02-16 NOTE — ED NOTES
"Reports being sent here by [facility] heart [place] due to \"extremely low potassium\".  "

## 2017-02-16 NOTE — TELEPHONE ENCOUNTER
----- Message from Felisa Grewal sent at 2/16/2017  9:30 AM PST -----  Regarding: patient's wife not sure if he will make his appt today  CONSUELO/Kimberly        Patient's wife Ivelisse called and said he has an appt this morning at 10;40 am, and she doesn't know if he will be able to make the appt unless he gets discharged in time to make the appt. She can be reached on her cell at [phone]

## 2017-02-16 NOTE — H&P
CHIEF COMPLAINT:  The patient was sent by his cardiologist for hypokalemia.    PRIMARY MEDICAL PHYSICIAN:  Prashant Shankar MD    HISTORY OF PRESENT ILLNESS:  This is a very pleasant 76-year-old gentleman who   has significant cardiovascular disease and is typically followed by Laura Trivedi of cardiology as well as the .  He reports that he has been   having issues with hypokalemia for months.  He was followed by his physician   at the  and had been placed on daily potassium, but unfortunately   it does not sound like he has been taking his potassium as prescribed.  There   is some confusion and he initially tells me that he continues to take his   Lasix, but is no longer on his spironolactone.  Later on, he corrects himself   and states that he is taking a low dose spironolactone.  At this point, he has   no chest pain.  He has no shortness of breath.  He has no physical complaints   and has been in his usual state of health.    REVIEW OF SYSTEMS:  A full review of systems was completed and all pertinent   positives and negatives are included in the HPI above.    PAST MEDICAL HISTORY:  1.  Atrial fibrillation.  2.  Ischemic cardiomyopathy and coronary artery disease.  3.  History of MI.  4.  Hypertension.  5.  History of prostate cancer.  6.  AV block status post pacemaker implant.  7.  Chronic systolic congestive heart failure.  8.  Psoriasis.    PAST SURGICAL HISTORY:  1.  Cardiac catheterization.  2.  CABG x2.  3.  Pacemaker implant.    SOCIAL HISTORY:  The patient quit smoking in , but he continues to chew   tobacco.  He has moderate alcohol intake.  He denies any illicit drugs.    FAMILY HISTORY:  Both parents  at advanced age.    ALLERGIES:  PLAVIX, STATINS, TICLOPIDINE.    HOME MEDICATIONS:  1.  Coreg 6.25 mg p.o. b.i.d.  2.  Potassium 40 mEq p.o. daily.  3.  Lasix 40 mg p.o. daily.  4.  Metolazone 5 mg p.r.n. edema.  5.  Entresto 24-26 mg half a tab b.i.d.  6.  Methotrexate  10 mg q. 7 days.  7.  Spironolactone 6.25 mg p.o. daily.  8.  Omega-3 fatty acid.  9.  Multivitamins.  10.  Aspirin 81 mg p.o. daily.  11.  Vitamin B12.  12.  Coenzyme Q.    PHYSICAL EXAMINATION:  VITAL SIGNS:  Temperature 98.1, heart rate 78, respiration 18, blood pressure   is 113/66.  The patient is saturating at 92% on room air.  GENERAL:  This is a well-appearing older white male who is in no acute   distress.  HEENT:  Normocephalic, atraumatic, EOMI, moist mucous membranes.  NECK:  Supple, there is no JVD.  There is no supraclavicular adenopathy.  CARDIOVASCULAR:  Positive S1, S2, regular rate and rhythm.  No murmurs, rubs,   or gallops appreciated.  PULMONARY:  Clear to auscultation bilaterally.  No wheezes, rubs, or rhonchi   heard.  GASTROINTESTINAL:  Soft, nontender, nondistended.  Positive bowel sounds.  No   hepatosplenomegaly.  EXTREMITIES:  Warm, well-perfused:  No clubbing, cyanosis, or edema.  Cap   refill less than 2 seconds.  Distal pulses are intact.  NEUROLOGIC:  A and O x3.  Cranial nerves II-XII grossly intact.    LABORATORY DATA:  WBC 7.4, hemoglobin 13.7, hematocrit 38.7, platelets 172.    Sodium 131, potassium 2.8, chloride 90, CO2 30, glucose 111, BUN 50,   creatinine 1.77.    ASSESSMENT AND PLAN:  This is a pleasant 76-year-old gentleman who has had issues with   hypokalemia for the last several months.  He was seen by his cardiologist   today and it was noted to have a significantly low potassium level and was   sent to the ER for further treatment.  1.  Hypokalemia:  I suspect this is from his Lasix use and lack of regular potassium   supplementation.  Patient has been instructed that he needs to take his oral potassium   regularly while on lasix.   I will check a magnesium level to ensure that this is   greater than 2.  If not, I will correct it.  I will start him on IV potassium   as well as continue his home oral replacement.  He will be monitored closely   on telemetry for any  cardiac dysrhythmias.  Currently, he is asymptomatic.  I   will trend serial potassium levels to ensure correction.  3.  Atrial fibrillation.  Continue home Coreg.  4.  History of coronary artery disease with ischemic cardiomyopathy:  Continue   home aspirin.  5.  History of hypertension, continue home Entresto.  6.  History of systolic congestive heart failure/chronic:  No evidence of   volume overload at this time.  Continue home Lasix and replenish K.    DISPOSITION:  Telemetry.    PROPHYLAXIS:  Sequential compression devices for DVT prophylaxis, no PPI   indicated, stool softeners ordered.    CODE:  Full.       ____________________________________     MEGHAN PATEL MD    DTC / NTS    DD:  02/15/2017 20:51:59  DT:  02/15/2017 23:20:17    D#:  [id number]  Job#:  [id number]

## 2017-02-16 NOTE — PROGRESS NOTES
Tele strip at 2009 shows V-paced w/ HR of 85.     Measurements: - / 0.14 / 0.40    Tele Shift Summary:    Rhythm : V-paced  Rate : 70-80s    Ectopy : Per CCT Madiha, pt had occasional PVC, frequent PAC.     Telemetry monitoring strips placed in pt chart.

## 2017-02-16 NOTE — DISCHARGE INSTRUCTIONS
Discharge Instructions    Discharged to home by car with relative. Discharged via wheelchair, hospital escort: Yes.  Special equipment needed: Not Applicable    Be sure to schedule a follow-up appointment with your primary care doctor or any specialists as instructed.     Discharge Plan:   Diet Plan: Discussed  Activity Level: Discussed  Confirmed Follow up Appointment: Patient to Call and Schedule Appointment  Confirmed Symptoms Management: Discussed  Medication Reconciliation Updated: Yes  Influenza Vaccine Indication: Not indicated: Previously immunized this influenza season and > 8 years of age    I understand that a diet low in cholesterol, fat, and sodium is recommended for good health. Unless I have been given specific instructions below for another diet, I accept this instruction as my diet prescription.   Other diet: Keep yourself hydrated.      Special Instructions:   Hypokalemia  Hypokalemia means that the amount of potassium in the blood is lower than normal. Potassium is a chemical, called an electrolyte, that helps regulate the amount of fluid in the body. It also stimulates muscle contraction and helps nerves function properly. Most of the body's potassium is inside of cells, and only a very small amount is in the blood. Because the amount in the blood is so small, minor changes can be life-threatening.  CAUSES  · Antibiotics.  · Diarrhea or vomiting.  · Using laxatives too much, which can cause diarrhea.  · Chronic kidney disease.  · Water pills (diuretics).  · Eating disorders (bulimia).  · Low magnesium level.  · Sweating a lot.  SIGNS AND SYMPTOMS  · Weakness.  · Constipation.  · Fatigue.  · Muscle cramps.  · Mental confusion.  · Skipped heartbeats or irregular heartbeat (palpitations).  · Tingling or numbness.  DIAGNOSIS   Your health care provider can diagnose hypokalemia with blood tests. In addition to checking your potassium level, your health care provider may also check other lab  tests.  TREATMENT  Hypokalemia can be treated with potassium supplements taken by mouth or adjustments in your current medicines. If your potassium level is very low, you may need to get potassium through a vein (IV) and be monitored in the hospital. A diet high in potassium is also helpful. Foods high in potassium are:  · Nuts, such as peanuts and pistachios.  · Seeds, such as sunflower seeds and pumpkin seeds.  · Peas, lentils, and lima beans.  · Whole grain and bran cereals and breads.  · Fresh fruit and vegetables, such as apricots, avocado, bananas, cantaloupe, kiwi, oranges, tomatoes, asparagus, and potatoes.  · Orange and tomato juices.  · Red meats.  · Fruit yogurt.  HOME CARE INSTRUCTIONS  · Take all medicines as prescribed by your health care provider.  · Maintain a healthy diet by including nutritious food, such as fruits, vegetables, nuts, whole grains, and lean meats.  · If you are taking a laxative, be sure to follow the directions on the label.  SEEK MEDICAL CARE IF:  · Your weakness gets worse.  · You feel your heart pounding or racing.  · You are vomiting or having diarrhea.  · You are diabetic and having trouble keeping your blood glucose in the normal range.  SEEK IMMEDIATE MEDICAL CARE IF:  · You have chest pain, shortness of breath, or dizziness.  · You are vomiting or having diarrhea for more than 2 days.  · You faint.  MAKE SURE YOU:   · Understand these instructions.  · Will watch your condition.  · Will get help right away if you are not doing well or get worse.     This information is not intended to replace advice given to you by your health care provider. Make sure you discuss any questions you have with your health care provider.     Document Released: 12/18/2006 Document Revised: 01/08/2016 Document Reviewed: 06/20/2014  [employer] Patient Education ©2016 [employer]      · Is patient discharged on Warfarin / Coumadin?   No     · Is patient Post Blood  Transfusion?  No    Depression / Suicide Risk    As you are discharged from this [facility] facility, it is important to learn how to keep safe from harming yourself.    Recognize the warning signs:  · Abrupt changes in personality, positive or negative- including increase in energy   · Giving away possessions  · Change in eating patterns- significant weight changes-  positive or negative  · Change in sleeping patterns- unable to sleep or sleeping all the time   · Unwillingness or inability to communicate  · Depression  · Unusual sadness, discouragement and loneliness  · Talk of wanting to die  · Neglect of personal appearance   · Rebelliousness- reckless behavior  · Withdrawal from people/activities they love  · Confusion- inability to concentrate     If you or a loved one observes any of these behaviors or has concerns about self-harm, here's what you can do:  · Talk about it- your feelings and reasons for harming yourself  · Remove any means that you might use to hurt yourself (examples: pills, rope, extension cords, firearm)  · Get professional help from the community (Mental Health, Substance Abuse, psychological counseling)  · Do not be alone:Call your Safe Contact- someone whom you trust who will be there for you.  · Call your local CRISIS HOTLINE 529-4712 or 764-687-0618  · Call your local Children's Mobile Crisis Response Team [place] (209) 502-0094 or www.MessageMe  · Call the toll free National Suicide Prevention Hotlines   · National Suicide Prevention Lifeline 905-362-GRQD (6241)  · National Hope Line Network 800-SUICIDE (469-2593)

## 2017-02-16 NOTE — PROGRESS NOTES
AOx4. Afebrile. C/o PIV site pain from IV k-rider. Admit profile completed. Pt is up to date on flu and PNA vaccines. Assessment per doc flowsheet. PIV intact & patent. POC discussed w/ pt and wife, questions answered. Oriented to bed and room controls. No additional concerns at this time. CLIP. Fall precautions in place. Bed locked & in low position.

## 2017-02-17 NOTE — TELEPHONE ENCOUNTER
Called Ivelisse to advise that pt. Is due for CMP next week. Order mailed to pt. No appointments available with Laura before 3-8-17 FV with [facility], so pt. Will just see Dr. Billings.

## 2017-02-17 NOTE — TELEPHONE ENCOUNTER
----- Message from PRATEEK Gregorio sent at 2/16/2017  4:58 PM PST -----  Regarding: RE: wife wants to know if Sofiya wants to see patient  Needs repeat CMP in 1 week.  If I have any time put him in my schedule before Dr Billings  ----- Message -----     From: Kimberly Flaherty L.P.N.     Sent: 2/16/2017   1:41 PM       To: PRATEEK Gregorio  Subject: FW: wife wants to know if Sofiya wants to see#        ----- Message -----     From: Felisa Grewal     Sent: 2/16/2017   1:26 PM       To: Kimberly Flaherty L.P.N.  Subject: wife wants to know if Sofiya wants to see pat#    PB/Kimberly      Patient's wife called. She wants to know if Sofiya wants to see him before his appt with Dr. Billings on 03/08. She can be reached at [phone]

## 2017-02-18 LAB — EKG IMPRESSION: NORMAL

## 2017-02-25 NOTE — DISCHARGE SUMMARY
CHIEF COMPLAINT ON ADMISSION  Chief Complaint   Patient presents with   • Abnormal Labs       CODE STATUS  Prior    HPI & HOSPITAL COURSE  Please review H&P for details on admission. This is a 76 year old male with a PMHx of atrial fibrillation, CAD, CHF with systolic dysfunction, HTN, prostate cancer who was sent by his cardiologist for hypokalemia. Patient's potassium was found to be low at 2.8. He was other wise asymptomatic and his vitals were stable. Other lab work revealed a Na of 131, Cr 1.77, BUN 50. His BNP was 145. He has been on a regimen of lasix and admitted to not taking his potassium supplements while taking lasix. Patient was counseled on importance of taking all his medications as prescribed. His potassium was corrected via IV replacement. He was placed on cardiac monitoring without any evidence of dysrhythmias. Next day his potassium increased to 3.7 and his vitals remained stable. He was asked to follow up with his cardiologist in 1 week.    Physical Exam on day of discharge    General:  no apparent distress  HEENT:Mucous membranes moist. No pharyngeal exudate.  Neck: Supple with no lymphadenopathy.  Respiratory: Normal effort. Clear to auscultation bilaterally, no wheezes or crackles.  Cardiovascular: Regular rate and rhythm no murmurs audible.  GI/Abdomen: Abdomen is soft, non tender and non distended. Positive bowel sounds in all four quadrants. No masses, organomegaly or hernias.  Extremities:  Radial and Dorsalis Pedis pulses intact. No cyanosis or edema noted.   Neurology: No focal deficits.    Psychiatry: Alert and oriented to time, place and person. Normal mood and effect.  Skin: No rashes or ulcers seen.  Musculoskeletal: No gross deformities or misalignment of the joints of upper and lower extremities. Normal ROM without pain.    Therefore, he is discharged in fair and stable condition with close outpatient follow-up.      DISCHARGE PROBLEM LIST  Active Problems:    Atrial fibrillation  (CMS-[facility]) POA: Yes    CAD (coronary artery disease) (Chronic) POA: Yes    HTN (hypertension) (Chronic) POA: Yes    Ischemic cardiomyopathy (Chronic) POA: Yes  Resolved Problems:    Hypokalemia POA: Yes      FOLLOW UP  Future Appointments  Date Time Provider Department Center   3/8/2017 3:20 PM Edd Billings M.D. [facility] None   3/14/2017 10:00 AM [place] AT [address] EVENT None   3/27/2017 11:20 AM Ruby Thakur M.D. PULM None     Prashant Shankar M.D.  [address]  [place] [address]  [phone]    Schedule an appointment as soon as possible for a visit in 1 week  PAT[facility] DIRECT[facility] TO SCHEDULE A FOLLOW UP APPOINTMENT. THANK YOU      MEDICATIONS ON DISCHARGE   judeIsai   Home Medication Instructions JAYLENE:[id number]    Printed on:02/25/17 9114   Medication Information                      aspirin 81 MG tablet  Take 81 mg by mouth every day.             carvedilol (COREG) 6.25 MG Tab  Take 6.25 mg by mouth 2 times a day, with meals.             Coenzyme Q10 (CO Q-10 PO)  Take 1 Cap by mouth every day.             cyanocobalamin (VITAMIN B-12) 500 MCG TABS  Take 1,000 mcg by mouth every day.             furosemide (LASIX) 40 MG Tab  Take 40 mg by mouth every day.             Methotrexate, PF, 10 MG/0.2ML Solution Auto-injector  Inject 10 mg as instructed every 7 days. On Mon   Indications: Psoriasis             metolazone (ZAROXOLYN) 5 MG Tab  Take 5 mg by mouth as needed. Indications: Edema             Multiple Vitamins-Minerals (OCUVITE) Tab  Take 1 Tab by mouth every day.             Omega-3 Fatty Acids (FISH OIL) 1000 MG Cap capsule  Take 1,000 mg by mouth every day.             potassium chloride SA (KDUR) 20 MEQ Tab CR  Take 2 Tabs by mouth every day.             sacubitril-valsartan (ENTRESTO) 24-26 MG Tab tablet  Take 0.5 Tabs by mouth 2 Times a Day.             spironolactone (ALDACTONE) 25 MG Tab  Take 6.25 mg by mouth every day.                 DIET  No orders of the  defined types were placed in this encounter.       ACTIVITY  As tolerated.      CONSULTATIONS  None    PROCEDURES  None    LABORATORY  Lab Results   Component Value Date/Time    SODIUM 141 02/23/2017 10:27 AM    POTASSIUM 3.7 02/23/2017 10:27 AM    CHLORIDE 94* 02/23/2017 10:27 AM    CO2 28 02/23/2017 10:27 AM    GLUCOSE 61* 02/23/2017 10:27 AM    BUN 33* 02/23/2017 10:27 AM    CREATININE 1.52* 02/23/2017 10:27 AM        Lab Results   Component Value Date/Time    WBC 7.8 02/16/2017 12:47 AM    HEMOGLOBIN 12.3* 02/16/2017 12:47 AM    HEMATOCRIT 35.2* 02/16/2017 12:47 AM    PLATELET COUNT 160* 02/16/2017 12:47 AM       Total time of the discharge process exceeds 32 minutes.

## 2017-02-27 ENCOUNTER — TELEPHONE (OUTPATIENT)
Dept: CARDIOLOGY | Facility: MEDICAL CENTER | Age: 77
End: 2017-02-27

## 2017-02-27 NOTE — TELEPHONE ENCOUNTER
----- Message from PRATEEK Gregorio sent at 2/26/2017  7:54 PM PST -----  If taking 40 mEq of KCL needs to increase to 60 Meq and recheck BMP 2 weeks.

## 2017-02-28 NOTE — TELEPHONE ENCOUNTER
"Called pt. And wife. He decreased Potassium on his own to 20mEq 1 tab daily \"about 3-4 days ago\". Advised him to take CORRECT! Dose.  He noted he had gastric upset with 2 tabs, but he was taking 2 at the same meal. Advised pt. To take 1 tab with breakfast, 1 tab with dinner.  "

## 2017-03-08 ENCOUNTER — OFFICE VISIT (OUTPATIENT)
Dept: CARDIOLOGY | Facility: MEDICAL CENTER | Age: 77
End: 2017-03-08
Payer: MEDICARE

## 2017-03-08 VITALS
DIASTOLIC BLOOD PRESSURE: 70 MMHG | HEART RATE: 80 BPM | SYSTOLIC BLOOD PRESSURE: 100 MMHG | BODY MASS INDEX: 29.4 KG/M2 | WEIGHT: 210 LBS | HEIGHT: 71 IN

## 2017-03-08 DIAGNOSIS — I44.1 SECOND DEGREE AV BLOCK, MOBITZ TYPE II: Chronic | ICD-10-CM

## 2017-03-08 DIAGNOSIS — Z86.79 S/P ABLATION OF ATRIAL FIBRILLATION: ICD-10-CM

## 2017-03-08 DIAGNOSIS — I25.5 ISCHEMIC CARDIOMYOPATHY: Chronic | ICD-10-CM

## 2017-03-08 DIAGNOSIS — I25.10 CORONARY ARTERY DISEASE INVOLVING NATIVE CORONARY ARTERY OF NATIVE HEART WITHOUT ANGINA PECTORIS: Chronic | ICD-10-CM

## 2017-03-08 DIAGNOSIS — Z98.890 S/P ABLATION OF ATRIAL FIBRILLATION: ICD-10-CM

## 2017-03-08 DIAGNOSIS — I48.0 PAROXYSMAL ATRIAL FIBRILLATION (HCC): ICD-10-CM

## 2017-03-08 PROCEDURE — G8598 ASA/ANTIPLAT THER USED: HCPCS | Performed by: INTERNAL MEDICINE

## 2017-03-08 PROCEDURE — 4040F PNEUMOC VAC/ADMIN/RCVD: CPT | Performed by: INTERNAL MEDICINE

## 2017-03-08 PROCEDURE — G8420 CALC BMI NORM PARAMETERS: HCPCS | Performed by: INTERNAL MEDICINE

## 2017-03-08 PROCEDURE — 1036F TOBACCO NON-USER: CPT | Performed by: INTERNAL MEDICINE

## 2017-03-08 PROCEDURE — G8482 FLU IMMUNIZE ORDER/ADMIN: HCPCS | Performed by: INTERNAL MEDICINE

## 2017-03-08 PROCEDURE — 99214 OFFICE O/P EST MOD 30 MIN: CPT | Performed by: INTERNAL MEDICINE

## 2017-03-08 PROCEDURE — G8432 DEP SCR NOT DOC, RNG: HCPCS | Performed by: INTERNAL MEDICINE

## 2017-03-08 PROCEDURE — 1101F PT FALLS ASSESS-DOCD LE1/YR: CPT | Mod: 8P | Performed by: INTERNAL MEDICINE

## 2017-03-08 RX ORDER — SPIRONOLACTONE 25 MG/1
25 TABLET ORAL DAILY
Qty: 30 TAB | Refills: 3 | Status: SHIPPED | OUTPATIENT
Start: 2017-03-08 | End: 2017-10-24 | Stop reason: SDUPTHER

## 2017-03-08 ASSESSMENT — ENCOUNTER SYMPTOMS
SHORTNESS OF BREATH: 1
FEVER: 0
BRUISES/BLEEDS EASILY: 0
BLOOD IN STOOL: 0

## 2017-03-08 NOTE — MR AVS SNAPSHOT
"        Isai Fuentes   3/8/2017 3:20 PM   Office Visit   MRN: [id number]    Department:  [facility]   Dept Phone:  [phone]    Description:  Male : 1940   Provider:  Edd Billings M.D.           Reason for Visit     Follow-Up           Allergies as of 3/8/2017     Allergen Noted Reactions    Plavix [Clopidogrel Bisulfate] 2011   Anaphylaxis    Statins [Hmg-Coa-R Inhibitors] 2011   Unspecified    Muscles aches      Ticlid [Ticlopidine Hydrochloride] 2009   Unspecified    Pt states \"I'm not sure what happens\".        You were diagnosed with     Paroxysmal atrial fibrillation (CMS-HCC)   [293444]       Coronary artery disease involving native coronary artery of native heart without angina pectoris   [2717659]       Ischemic cardiomyopathy   [512257]       Second degree AV block, Mobitz type II   [376288]       S/P ablation of atrial fibrillation   [313085]         Vital Signs     Blood Pressure Pulse Height Weight Body Mass Index Smoking Status    100/70 mmHg 80 1.803 m (5' 11\") 95.255 kg (210 lb) 29.30 kg/m2 Former Smoker      Basic Information     Date Of Birth Sex Race Ethnicity Preferred Language    1940 Male White Non- English      Your appointments     Mar 14, 2017 10:00 AM   ADVANCED DIRECTIVE CLASS with JOHN AT [address]   Events (--)    Please Check Your Invitation   [phone]           Conference Room [address] [employer] [place] [address]            Mar 27, 2017 11:20 AM   Established Patient Pul with [facility] (--)    [address] [place] [address] [address]  [facility] [id number] [phone]              Problem List              ICD-10-CM Priority Class Noted - Resolved    Atrial fibrillation (CMS-HCC) I48.91 High  2011 - Present    CAD (coronary artery disease) (Chronic) I25.10 High  2011 - Present    Heartburn (Chronic) R12 Low  10/23/2008 - Present    Hypercholesteremia (Chronic) E78.00 Medium  " 4/25/2012 - Present    HTN (hypertension) (Chronic) I10 High  9/20/2011 - Present    Insomnia (Chronic) G47.00 Low  7/8/2010 - Present    Ischemic cardiomyopathy (Chronic) I25.5 High  9/20/2011 - Present    Orthostatic hypotension (Chronic) I95.1 Low  5/6/2009 - Present    History of myocardial infarction (Chronic) I25.2 Medium  7/19/2011 - Present    Prostate cancer (CMS-HCC) (Chronic) C61 Low  5/13/2011 - Present    Second degree AV block, Mobitz type II (Chronic) I44.1 High  11/10/2010 - Present    Sleep apnea (Chronic) G47.30 Low  10/28/2010 - Present    Stroke (CMS-HCC) (Chronic) I63.9 Low  2/3/2009 - Present    Portal hypertension (CMS-HCC) K76.6 Low  4/17/2015 - Present    NYHA class 3 acute on chronic systolic heart failure (CMS-HCC) I50.23 Medium  7/7/2015 - Present    S/P ablation of atrial fibrillation Z98.890, Z86.79 Low  7/7/2015 - Present    Biventricular implantable cardioverter-defibrillator in situ Z95.810 Medium  8/7/2015 - Present    Bilateral hearing loss H91.93 Low  9/8/2015 - Present    Alcohol abuse F10.10   6/21/2016 - Present      Health Maintenance        Date Due Completion Dates    COLONOSCOPY 12/29/1990 ---    IMM ZOSTER VACCINE 12/29/2000 ---    IMM DTaP/Tdap/Td Vaccine (1 - Tdap) 5/20/2011 5/19/2011            Current Immunizations     13-VALENT PCV PREVNAR 12/15/2016    Influenza TIV (IM) 11/30/2014    Influenza Vaccine Adult HD 1/1/2017,  Deferred (Patient Schedule), 10/17/2015  8:58 AM    Pneumococcal polysaccharide vaccine (PPSV-23) 11/30/2014    TD Vaccine 5/19/2011  3:30 PM      Below and/or attached are the medications your provider expects you to take. Review all of your home medications and newly ordered medications with your provider and/or pharmacist. Follow medication instructions as directed by your provider and/or pharmacist. Please keep your medication list with you and share with your provider. Update the information when medications are discontinued, doses are changed,  or new medications (including over-the-counter products) are added; and carry medication information at all times in the event of emergency situations     Allergies:  PLAVIX - Anaphylaxis     STATINS - Unspecified     TICLID - Unspecified               Medications  Valid as of: March 08, 2017 -  4:24 PM    Generic Name Brand Name Tablet Size Instructions for use    Aspirin (Tab) aspirin 81 MG Take 81 mg by mouth every day.        Carvedilol (Tab) COREG 6.25 MG Take 6.25 mg by mouth 2 times a day, with meals.        Coenzyme Q10   Take 1 Cap by mouth every day.        Cyanocobalamin (Tab) VITAMIN B-12 500 MCG Take 1,000 mcg by mouth every day.        Furosemide (Tab) LASIX 40 MG Take 40 mg by mouth every day.        Methotrexate (Anti-Rheumatic) (Solution Auto-injector) Methotrexate (PF) 10 MG/0.2ML Inject 10 mg as instructed every 7 days. On Mon   Indications: Psoriasis        MetOLazone (Tab) ZAROXOLYN 5 MG Take 5 mg by mouth as needed. Indications: Edema        Multiple Vitamins-Minerals (Tab) OCUVITE  Take 1 Tab by mouth every day.        Omega-3 Fatty Acids (Cap) fish oil 1000 MG Take 1,000 mg by mouth every day.        Potassium Chloride Maria Teresa CR (Tab CR) Kdur 20 MEQ Take 2 Tabs by mouth every day.        Sacubitril-Valsartan (Tab) ENTRESTO 24-26 MG Take 0.5 Tabs by mouth 2 Times a Day.        Spironolactone (Tab) ALDACTONE 25 MG Take 1 Tab by mouth every day.        .                 Medicines prescribed today were sent to:     [id number] - [place], [place] - [address]    [address] [address] [facility] [place] [address]    Phone: [phone] Fax: [phone]    Open 24 Hours?: No      Medication refill instructions:       If your prescription bottle indicates you have medication refills left, it is not necessary to call your provider’s office. Please contact your pharmacy and they will refill your medication.    If your prescription bottle indicates you do not have any refills left, you may request refills at any  time through one of the following ways: The online [employer] system (except Urgent Care), by calling your provider’s office, or by asking your pharmacy to contact your provider’s office with a refill request. Medication refills are processed only during regular business hours and may not be available until the next business day. Your provider may request additional information or to have a follow-up visit with you prior to refilling your medication.   *Please Note: Medication refills are assigned a new Rx number when refilled electronically. Your pharmacy may indicate that no refills were authorized even though a new prescription for the same medication is available at the pharmacy. Please request the medicine by name with the pharmacy before contacting your provider for a refill.        Your To Do List     Future Labs/Procedures Complete By Expires    BASIC METABOLIC PANEL  3/14/2017 9/6/2017    ECHOCARDIOGRAM COMP W/O CONT  As directed 3/9/2018    THYROID PANEL  As directed 9/6/2017         MyChart Access Code: Activation code not generated  Current [employer] Status: Active

## 2017-03-09 NOTE — PROGRESS NOTES
"Subjective:   Isai Fuentes is a 76 y.o. male who presents today for follow-up of ischemic cardiomyopathy, coronary artery disease, pacemaker, and recent hospitalization for hypokalemia. He was seen in [facility] and admitted overnight after his potassium was found to be 2.8. It turns out he was not taking his potassium as directed. He still complains of fatigue. The patient was started on Entresto at the starting dose but has reduced the dose to half tablet twice a day on his own. He visited this apparently because he thought his blood pressure was too low. The patient continues to complain of low energy and fatigue. He's had no chest pain.  Past Medical History   Diagnosis Date   • Other drug allergy(995.27) 10/16/2008   • Atrial fibrillation (CMS-HCC) 9/20/2011   • CAD (coronary artery disease) 9/20/2011   • CARDIOMYOPATHY 9/20/2011   • History of cardiac catheterization 9/20/2011   • Fatigue 10/28/2010   • Heartburn 10/23/2008   • HTN (hypertension) 9/20/2011   • Insomnia 7/8/2010   • Ischemic cardiomyopathy 9/20/2011   • Orthostatic hypotension 5/6/2009   • Presence of permanent cardiac pacemaker 9/20/2011   • Pleural effusion 5/18/2009   • History of myocardial infarction      \"many\"   • Prostate cancer (CMS-HCC) 5/13/2011   • Second degree AV block, Mobitz type II 11/10/2010   • Long term (current) use of anticoagulants 5/13/2011   • Congestive heart failure (CMS-HCC)    • Pacemaker      [employer]   • Indigestion    • Other specified disorder of intestines 07-28-15     constipation/diarrhea/ reports some bleeding from rectum w/blood clots. Seeing GI   • Breath shortness    • Snoring    • Pain 07-28-15     \"chronic\", 0/10   • Biventricular implantable cardioverter-defibrillator in situ 8/7/2015   • Cancer (CMS-HCC) 2012     prostate   • Myocardial infarct (CMS-HCC) 2007   • WILLA (obstructive sleep apnea)      CPAP   • Stroke (CMS-HCC) 2/3/2009   • COPD (chronic obstructive pulmonary disease) " "(CMS-[facility])      Past Surgical History   Procedure Laterality Date   • Cardiac cath       has 2 stents   • Multiple coronary artery bypass  2007     2 vessel   • Pacemaker insertion  11/24/08     [facility]   • Recovery  7/30/2015     Procedure: CATH LAB  LEAD EXTRACTION, UPGRADE TO BIV PM ST.DRE LRG GRP KENIA ;  Surgeon: Recoveryonly Surgery;  Location: SURGERY PRE-POST PROC UNIT [facility];  Service:    • Recovery  7/29/2015     Procedure: CATH LAB NEW PM INSERTION DUAL ATRIAL & VENTRICULAR-LV LEAD W/ NEW GENERATOR AQUINO ICD9: 414.8;  Surgeon: Recoveryonly Surgery;  Location: SURGERY PRE-POST PROC UNIT [facility];  Service:    • Recovery  8/14/2015     Procedure:  CATH LAB LEAD EXTRACTION AWILDA ST.DRE LRG RP KENIA;  Surgeon: Recoveryonly Surgery;  Location: SURGERY PRE-POST PROC UNIT [facility];  Service:    • Recovery  10/16/2015     Procedure: CATH LAB LEAD REVISION ST.DREJOSI AQUINO;  Surgeon: Recoveryonly Surgery;  Location: SURGERY PRE-POST PROC UNIT [facility];  Service:      No family history on file.  History   Smoking status   • Former Smoker -- 1.50 packs/day for 3 years   • Types: Cigarettes   • Quit date: 01/01/1970   Smokeless tobacco   • Former User   • Types: Chew   • Quit date: 01/01/1972     Allergies   Allergen Reactions   • Plavix [Clopidogrel Bisulfate] Anaphylaxis   • Statins [Hmg-Coa-R Inhibitors] Unspecified     Muscles aches     • Ticlid [Ticlopidine Hydrochloride] Unspecified     Pt states \"I'm not sure what happens\".       Outpatient Encounter Prescriptions as of 3/8/2017   Medication Sig Dispense Refill   • spironolactone (ALDACTONE) 25 MG Tab Take 1 Tab by mouth every day. 30 Tab 3   • potassium chloride SA (KDUR) 20 MEQ Tab CR Take 2 Tabs by mouth every day. 60 Tab 11   • carvedilol (COREG) 6.25 MG Tab Take 6.25 mg by mouth 2 times a day, with meals.     • furosemide (LASIX) 40 MG Tab Take 40 mg by mouth every day.     • metolazone (ZAROXOLYN) 5 MG Tab Take 5 mg by mouth as needed. Indications: Edema     • " "sacubitril-valsartan (ENTRESTO) 24-26 MG Tab tablet Take 0.5 Tabs by mouth 2 Times a Day.     • Methotrexate, PF, 10 MG/0.2ML Solution Auto-injector Inject 10 mg as instructed every 7 days. On Mon   Indications: Psoriasis     • Omega-3 Fatty Acids (FISH OIL) 1000 MG Cap capsule Take 1,000 mg by mouth every day.     • Multiple Vitamins-Minerals (OCUVITE) Tab Take 1 Tab by mouth every day.     • aspirin 81 MG tablet Take 81 mg by mouth every day.     • cyanocobalamin (VITAMIN B-12) 500 MCG TABS Take 1,000 mcg by mouth every day.     • Coenzyme Q10 (CO Q-10 PO) Take 1 Cap by mouth every day.     • [DISCONTINUED] spironolactone (ALDACTONE) 25 MG Tab Take 6.25 mg by mouth every day.       No facility-administered encounter medications on file as of 3/8/2017.     Review of Systems   Constitutional: Positive for malaise/fatigue. Negative for fever.   HENT: Negative.    Respiratory: Positive for shortness of breath.    Gastrointestinal: Negative for blood in stool.   Endo/Heme/Allergies: Does not bruise/bleed easily.        Objective:   /70 mmHg  Pulse 80  Ht 1.803 m (5' 11\")  Wt 95.255 kg (210 lb)  BMI 29.30 kg/m2    Physical Exam   Constitutional: He is oriented to person, place, and time. He appears well-developed and well-nourished. No distress.   HENT:   Head: Atraumatic.   Eyes: Conjunctivae and EOM are normal. Pupils are equal, round, and reactive to light.   Neck: Neck supple. No JVD present.   Cardiovascular: Normal rate, regular rhythm and intact distal pulses.    Murmur heard.   Systolic murmur is present with a grade of 1/6   Pulmonary/Chest: Effort normal. No respiratory distress. He has no wheezes. He has no rales.   Abdominal: Soft. There is no tenderness.   Musculoskeletal: He exhibits no edema.   Neurological: He is alert and oriented to person, place, and time.   Skin: Skin is warm and dry. He is not diaphoretic.       Assessment:     1. Paroxysmal atrial fibrillation (CMS-HCC)  spironolactone " (ALDACTONE) 25 MG Tab    BASIC METABOLIC PANEL    THYROID PANEL   2. Coronary artery disease involving native coronary artery of native heart without angina pectoris  ECHOCARDIOGRAM COMP W/O CONT    spironolactone (ALDACTONE) 25 MG Tab   3. Ischemic cardiomyopathy  ECHOCARDIOGRAM COMP W/O CONT    spironolactone (ALDACTONE) 25 MG Tab    BASIC METABOLIC PANEL    THYROID PANEL   4. Second degree AV block, Mobitz type II  ECHOCARDIOGRAM COMP W/O CONT    spironolactone (ALDACTONE) 25 MG Tab    BASIC METABOLIC PANEL    THYROID PANEL   5. S/P ablation of atrial fibrillation  ECHOCARDIOGRAM COMP W/O CONT    spironolactone (ALDACTONE) 25 MG Tab       Medical Decision Making:  Today's Assessment / Status / Plan:     No evidence of heart failure on exam. Will recheck echo to reevaluate his LV systolic function. Wife questions getting AICD checked today, but in reviewing records it was just checked 3 months ago and is functioning normally.  Discussed the fact the patient should not be changing in his medications on his own. Recommend increasing Entresto to one tablet twice a day as directed. Return in about 2 weeks to determine if he isn't improved on this medication in the interim we'll check echo, repeat BMP for potassium, and also check a thyroid. Again, I'm not sure if his low energy is due entirely to his ischemic heart muscle. He has denied depression in the past.

## 2017-03-13 ENCOUNTER — HOSPITAL ENCOUNTER (OUTPATIENT)
Dept: CARDIOLOGY | Facility: MEDICAL CENTER | Age: 77
End: 2017-03-13
Attending: INTERNAL MEDICINE
Payer: MEDICARE

## 2017-03-13 DIAGNOSIS — I25.10 CORONARY ARTERY DISEASE INVOLVING NATIVE CORONARY ARTERY OF NATIVE HEART WITHOUT ANGINA PECTORIS: Chronic | ICD-10-CM

## 2017-03-13 DIAGNOSIS — Z98.890 S/P ABLATION OF ATRIAL FIBRILLATION: ICD-10-CM

## 2017-03-13 DIAGNOSIS — Z86.79 S/P ABLATION OF ATRIAL FIBRILLATION: ICD-10-CM

## 2017-03-13 DIAGNOSIS — I44.1 SECOND DEGREE AV BLOCK, MOBITZ TYPE II: Chronic | ICD-10-CM

## 2017-03-13 DIAGNOSIS — I25.5 ISCHEMIC CARDIOMYOPATHY: Chronic | ICD-10-CM

## 2017-03-13 PROCEDURE — 93306 TTE W/DOPPLER COMPLETE: CPT

## 2017-03-14 LAB
LV EJECT FRACT  99904: 35
LV EJECT FRACT MOD 2C 99903: 45.28
LV EJECT FRACT MOD 4C 99902: 29.24
LV EJECT FRACT MOD BP 99901: 36.9

## 2017-03-15 ENCOUNTER — TELEPHONE (OUTPATIENT)
Dept: CARDIOLOGY | Facility: MEDICAL CENTER | Age: 77
End: 2017-03-15

## 2017-03-15 NOTE — TELEPHONE ENCOUNTER
----- Message from Jeanette Gross R.N. sent at 3/15/2017 10:23 AM PDT -----      ----- Message -----     From: Edd Billings M.D.     Sent: 3/15/2017   7:33 AM       To: Kimberly Flaherty L.P.N.    Echo reviewed .  Good quality study. EF is reduced at 35 %, Old scar from prior MI is unchanged.Echo is unchanged.

## 2017-03-17 ENCOUNTER — HOSPITAL ENCOUNTER (OUTPATIENT)
Dept: LAB | Facility: MEDICAL CENTER | Age: 77
End: 2017-03-17
Attending: INTERNAL MEDICINE
Payer: MEDICARE

## 2017-03-17 ENCOUNTER — TELEPHONE (OUTPATIENT)
Dept: CARDIOLOGY | Facility: MEDICAL CENTER | Age: 77
End: 2017-03-17

## 2017-03-17 DIAGNOSIS — I48.0 PAROXYSMAL ATRIAL FIBRILLATION (HCC): ICD-10-CM

## 2017-03-17 DIAGNOSIS — I25.5 ISCHEMIC CARDIOMYOPATHY: Chronic | ICD-10-CM

## 2017-03-17 DIAGNOSIS — I44.1 SECOND DEGREE AV BLOCK, MOBITZ TYPE II: Chronic | ICD-10-CM

## 2017-03-17 DIAGNOSIS — E87.6 HYPOKALEMIA: ICD-10-CM

## 2017-03-17 LAB
ANION GAP SERPL CALC-SCNC: 10 MMOL/L (ref 0–11.9)
BUN SERPL-MCNC: 40 MG/DL (ref 8–22)
CALCIUM SERPL-MCNC: 9.4 MG/DL (ref 8.4–10.2)
CHLORIDE SERPL-SCNC: 91 MMOL/L (ref 96–112)
CO2 SERPL-SCNC: 31 MMOL/L (ref 20–33)
CREAT SERPL-MCNC: 2.21 MG/DL (ref 0.5–1.4)
GFR SERPL CREATININE-BSD FRML MDRD: 29 ML/MIN/1.73 M 2
GLUCOSE SERPL-MCNC: 129 MG/DL (ref 65–99)
POTASSIUM SERPL-SCNC: 3.1 MMOL/L (ref 3.6–5.5)
SODIUM SERPL-SCNC: 132 MMOL/L (ref 135–145)
T4 FREE SERPL-MCNC: 1.19 NG/DL (ref 0.58–1.64)
TSH SERPL DL<=0.005 MIU/L-ACNC: 1.65 UIU/ML (ref 0.35–5.5)

## 2017-03-17 PROCEDURE — 80048 BASIC METABOLIC PNL TOTAL CA: CPT

## 2017-03-17 PROCEDURE — 84439 ASSAY OF FREE THYROXINE: CPT

## 2017-03-17 PROCEDURE — 84443 ASSAY THYROID STIM HORMONE: CPT

## 2017-03-17 PROCEDURE — 36415 COLL VENOUS BLD VENIPUNCTURE: CPT

## 2017-03-17 NOTE — TELEPHONE ENCOUNTER
PT'S. POTASSIUM TODAY=3.1. Called wife, Ivelisse ([phone]) to check on his Potassium dose. Pt. IS taking Potassium 20mEq 2 tabs daily.  Per Laura Trivedi's order on previous occasions when Potassium was low, Ivelisse was advised that pt. Should increase Potassium 20mEq to 3 tabs daily and get STAT BMP drawn on Tues. Pt. Has [facility] on Tues. Afternoon with Laura.

## 2017-03-21 ENCOUNTER — HOSPITAL ENCOUNTER (OUTPATIENT)
Dept: LAB | Facility: MEDICAL CENTER | Age: 77
End: 2017-03-21
Attending: NURSE PRACTITIONER
Payer: MEDICARE

## 2017-03-21 ENCOUNTER — OFFICE VISIT (OUTPATIENT)
Dept: CARDIOLOGY | Facility: MEDICAL CENTER | Age: 77
End: 2017-03-21
Payer: MEDICARE

## 2017-03-21 VITALS
OXYGEN SATURATION: 96 % | BODY MASS INDEX: 28.98 KG/M2 | SYSTOLIC BLOOD PRESSURE: 126 MMHG | DIASTOLIC BLOOD PRESSURE: 70 MMHG | WEIGHT: 207 LBS | HEIGHT: 71 IN | HEART RATE: 71 BPM

## 2017-03-21 DIAGNOSIS — I25.5 ISCHEMIC CARDIOMYOPATHY: Chronic | ICD-10-CM

## 2017-03-21 DIAGNOSIS — I95.1 ORTHOSTATIC HYPOTENSION: Chronic | ICD-10-CM

## 2017-03-21 DIAGNOSIS — I48.0 PAROXYSMAL ATRIAL FIBRILLATION (HCC): ICD-10-CM

## 2017-03-21 DIAGNOSIS — I25.10 CORONARY ARTERY DISEASE INVOLVING NATIVE CORONARY ARTERY OF NATIVE HEART WITHOUT ANGINA PECTORIS: Chronic | ICD-10-CM

## 2017-03-21 DIAGNOSIS — E87.6 HYPOKALEMIA: ICD-10-CM

## 2017-03-21 DIAGNOSIS — Z95.810 BIVENTRICULAR IMPLANTABLE CARDIOVERTER-DEFIBRILLATOR IN SITU: ICD-10-CM

## 2017-03-21 DIAGNOSIS — I44.1 SECOND DEGREE AV BLOCK, MOBITZ TYPE II: Chronic | ICD-10-CM

## 2017-03-21 LAB
ANION GAP SERPL CALC-SCNC: 8 MMOL/L (ref 0–11.9)
BUN SERPL-MCNC: 28 MG/DL (ref 8–22)
CALCIUM SERPL-MCNC: 9.3 MG/DL (ref 8.4–10.2)
CHLORIDE SERPL-SCNC: 96 MMOL/L (ref 96–112)
CO2 SERPL-SCNC: 32 MMOL/L (ref 20–33)
CREAT SERPL-MCNC: 1.58 MG/DL (ref 0.5–1.4)
GFR SERPL CREATININE-BSD FRML MDRD: 43 ML/MIN/1.73 M 2
GLUCOSE SERPL-MCNC: 106 MG/DL (ref 65–99)
POTASSIUM SERPL-SCNC: 3.4 MMOL/L (ref 3.6–5.5)
SODIUM SERPL-SCNC: 136 MMOL/L (ref 135–145)

## 2017-03-21 PROCEDURE — 99214 OFFICE O/P EST MOD 30 MIN: CPT | Performed by: NURSE PRACTITIONER

## 2017-03-21 PROCEDURE — G8420 CALC BMI NORM PARAMETERS: HCPCS | Performed by: NURSE PRACTITIONER

## 2017-03-21 PROCEDURE — 80048 BASIC METABOLIC PNL TOTAL CA: CPT

## 2017-03-21 PROCEDURE — 1036F TOBACCO NON-USER: CPT | Performed by: NURSE PRACTITIONER

## 2017-03-21 PROCEDURE — G8482 FLU IMMUNIZE ORDER/ADMIN: HCPCS | Performed by: NURSE PRACTITIONER

## 2017-03-21 PROCEDURE — G8598 ASA/ANTIPLAT THER USED: HCPCS | Performed by: NURSE PRACTITIONER

## 2017-03-21 PROCEDURE — 36415 COLL VENOUS BLD VENIPUNCTURE: CPT

## 2017-03-21 PROCEDURE — G8432 DEP SCR NOT DOC, RNG: HCPCS | Performed by: NURSE PRACTITIONER

## 2017-03-21 PROCEDURE — 4040F PNEUMOC VAC/ADMIN/RCVD: CPT | Performed by: NURSE PRACTITIONER

## 2017-03-21 PROCEDURE — 1101F PT FALLS ASSESS-DOCD LE1/YR: CPT | Mod: 8P | Performed by: NURSE PRACTITIONER

## 2017-03-21 RX ORDER — POTASSIUM CHLORIDE 20 MEQ/1
40 TABLET, EXTENDED RELEASE ORAL 2 TIMES DAILY
Qty: 120 TAB | Refills: 3 | Status: SHIPPED | OUTPATIENT
Start: 2017-03-21 | End: 2017-04-19 | Stop reason: SDUPTHER

## 2017-03-21 ASSESSMENT — ENCOUNTER SYMPTOMS
HEARTBURN: 0
BLURRED VISION: 0
PND: 0
FLANK PAIN: 0
CLAUDICATION: 0
FOCAL WEAKNESS: 0
PSYCHIATRIC NEGATIVE: 1
SPEECH CHANGE: 0
FEVER: 0
PALPITATIONS: 0
SHORTNESS OF BREATH: 0
ORTHOPNEA: 0
HEADACHES: 0
SEIZURES: 0
COUGH: 0
DIZZINESS: 1
ABDOMINAL PAIN: 0
DOUBLE VISION: 0
LOSS OF CONSCIOUSNESS: 0
VOMITING: 0
MUSCULOSKELETAL NEGATIVE: 1
WHEEZING: 0
CHILLS: 0
NAUSEA: 0
SORE THROAT: 0
SPUTUM PRODUCTION: 0
WEIGHT LOSS: 0

## 2017-03-21 NOTE — PROGRESS NOTES
"Subjective:   Isai Fuentes is a 76 y.o. male who presents today for review of his cardiac status. He saw Dr Billings on 3/8/17 for his CAD, ICM, BiV PPM and recent hypokalemia.  His K+ is up to 3.4 from 3.1 on 60 mEq daily. Will therefore go up to 80 mEq with repeat labs in 2 weeks.  His devcie demonstrates good function with 1 NSVT episode which appeared to be SVT not VT with AFL.  He did not tolerate the Entresto at the 24/26mg combination due to orthostatic hypotension with BP's at 90/40mmHg. He states I can't do anything I have no energy when my BP is too low.  159 episodes of AF with longest at 3 minutes on trending today.  Appears well compensated presently.  Device function intact.    Past Medical History   Diagnosis Date   • Other drug allergy(995.27) 10/16/2008   • Atrial fibrillation (CMS-HCC) 9/20/2011   • CAD (coronary artery disease) 9/20/2011   • CARDIOMYOPATHY 9/20/2011   • History of cardiac catheterization 9/20/2011   • Fatigue 10/28/2010   • Heartburn 10/23/2008   • HTN (hypertension) 9/20/2011   • Insomnia 7/8/2010   • Ischemic cardiomyopathy 9/20/2011   • Orthostatic hypotension 5/6/2009   • Presence of permanent cardiac pacemaker 9/20/2011   • Pleural effusion 5/18/2009   • History of myocardial infarction      \"many\"   • Prostate cancer (CMS-HCC) 5/13/2011   • Second degree AV block, Mobitz type II 11/10/2010   • Long term (current) use of anticoagulants 5/13/2011   • Congestive heart failure (CMS-HCC)    • Pacemaker      [employer]   • Indigestion    • Other specified disorder of intestines 07-28-15     constipation/diarrhea/ reports some bleeding from rectum w/blood clots. Seeing GI   • Breath shortness    • Snoring    • Pain 07-28-15     \"chronic\", 0/10   • Biventricular implantable cardioverter-defibrillator in situ 8/7/2015   • Cancer (CMS-HCC) 2012     prostate   • Myocardial infarct (CMS-HCC) 2007   • WILLA (obstructive sleep apnea)      CPAP   • Stroke (CMS-HCC) 2/3/2009   • " "COPD (chronic obstructive pulmonary disease) (CMS-[facility])      Past Surgical History   Procedure Laterality Date   • Cardiac cath       has 2 stents   • Multiple coronary artery bypass  2007     2 vessel   • Pacemaker insertion  11/24/08     St Dre   • Recovery  7/30/2015     Procedure: CATH LAB  LEAD EXTRACTION, UPGRADE TO BIV PM ST.DRE BIPING MAGGIE AQUINO ;  Surgeon: Recoveryonly Surgery;  Location: SURGERY PRE[facility]POST PROC [facility];  Service:    • Recovery  7/29/2015     Procedure: CATH LAB NEW PM INSERTION DUAL ATRIAL & VENTRICULAR-LV LEAD W/ NEW GENERATOR AQUINO ICD9: 414.8;  Surgeon: Recoveryonly Surgery;  Location: SURGERY PRE-POST PROC [facility];  Service:    • Recovery  8/14/2015     Procedure:  CATH LAB LEAD EXTRACTION AWILDA ST.DRE LRG PAULO AQUINO;  Surgeon: Recoveryonly Surgery;  Location: SURGERY PRE-POST PROC [facility];  Service:    • Recovery  10/16/2015     Procedure: CATH LAB LEAD REVISION ST.DREJOSI AQUINO;  Surgeon: Recoveryonly Surgery;  Location: SURGERY PRE-POST PROC UN[facility];  Service:      History reviewed. No pertinent family history.  History   Smoking status   • Former Smoker -- 1.50 packs/day for 3 years   • Types: Cigarettes   • Quit date: 01/01/1970   Smokeless tobacco   • Former User   • Types: Chew   • Quit date: 01/01/1972     Allergies   Allergen Reactions   • Plavix [Clopidogrel Bisulfate] Anaphylaxis   • Statins [Hmg-Coa-R Inhibitors] Unspecified     Muscles aches     • Ticlid [Ticlopidine Hydrochloride] Unspecified     Pt states \"I'm not sure what happens\".       Outpatient Encounter Prescriptions as of 3/21/2017   Medication Sig Dispense Refill   • potassium chloride SA (KDUR) 20 MEQ Tab CR Take 2 Tabs by mouth 2 times a day. 120 Tab 3   • spironolactone (ALDACTONE) 25 MG Tab Take 1 Tab by mouth every day. 30 Tab 3   • carvedilol (COREG) 6.25 MG Tab Take 6.25 mg by mouth 2 times a day, with meals.     • furosemide (LASIX) 40 MG Tab Take 40 mg by mouth every day.     • metolazone (ZAROXOLYN) 5 MG Tab " "Take 5 mg by mouth as needed. Indications: Edema     • sacubitril-valsartan (ENTRESTO) 24-26 MG Tab tablet Take 0.5 Tabs by mouth 2 Times a Day.     • Methotrexate, PF, 10 MG/0.2ML Solution Auto-injector Inject 10 mg as instructed every 7 days. On Mon   Indications: Psoriasis     • Omega-3 Fatty Acids (FISH OIL) 1000 MG Cap capsule Take 1,000 mg by mouth every day.     • Multiple Vitamins-Minerals (OCUVITE) Tab Take 1 Tab by mouth every day.     • aspirin 81 MG tablet Take 81 mg by mouth every day.     • cyanocobalamin (VITAMIN B-12) 500 MCG TABS Take 1,000 mcg by mouth every day.     • Coenzyme Q10 (CO Q-10 PO) Take 1 Cap by mouth every day.     • [DISCONTINUED] potassium chloride SA (KDUR) 20 MEQ Tab CR Take 2 Tabs by mouth every day. (Patient taking differently: Take 40 mEq by mouth 2 times a day.) 60 Tab 11     No facility-administered encounter medications on file as of 3/21/2017.     Review of Systems   Constitutional: Positive for malaise/fatigue. Negative for fever, chills and weight loss.   HENT: Negative for congestion and sore throat.    Eyes: Negative for blurred vision and double vision.   Respiratory: Negative for cough, sputum production, shortness of breath and wheezing.    Cardiovascular: Negative for chest pain, palpitations, orthopnea, claudication, leg swelling and PND.   Gastrointestinal: Negative for heartburn, nausea, vomiting and abdominal pain.   Genitourinary: Negative for dysuria, frequency and flank pain.   Musculoskeletal: Negative.    Skin: Negative.    Neurological: Positive for dizziness (Orthostatic hypotension with increased Entresto). Negative for speech change, focal weakness, seizures, loss of consciousness and headaches.   Endo/Heme/Allergies: Negative.    Psychiatric/Behavioral: Negative.         Objective:   /70 mmHg  Pulse 71  Ht 1.803 m (5' 10.98\")  Wt 93.895 kg (207 lb)  BMI 28.88 kg/m2  SpO2 96%    Physical Exam   Constitutional: He is oriented to person, place, " and time. He appears well-developed and well-nourished.   HENT:   Head: Normocephalic and atraumatic.   Eyes: Pupils are equal, round, and reactive to light.   Neck: Normal range of motion. Neck supple. No thyromegaly present.   Cardiovascular: Normal rate and regular rhythm.  Exam reveals no gallop and no friction rub.    No murmur heard.  Pulmonary/Chest: Effort normal and breath sounds normal. No respiratory distress. He has no wheezes. He has no rales.   Abdominal: Soft. Bowel sounds are normal. He exhibits no distension. There is no tenderness. There is no guarding.   Musculoskeletal: Normal range of motion. He exhibits no edema.   Neurological: He is alert and oriented to person, place, and time.   Skin: Skin is warm and dry.   Psychiatric: He has a normal mood and affect.   Device function intact see flow sheet.    Assessment:     1. Paroxysmal atrial fibrillation (CMS-HCC)     2. Biventricular implantable cardioverter-defibrillator in situ  potassium chloride SA (KDUR) 20 MEQ Tab CR   3. Coronary artery disease involving native coronary artery of native heart without angina pectoris  potassium chloride SA (KDUR) 20 MEQ Tab CR    COMP METABOLIC PANEL   4. Ischemic cardiomyopathy  potassium chloride SA (KDUR) 20 MEQ Tab CR    COMP METABOLIC PANEL   5. Orthostatic hypotension     6. Second degree AV block, Mobitz type II         Medical Decision Making:  Today's Assessment / Status / Plan:     1. PAF recurrent spells no prolonged duration 3 minutes longest.  2. BIV system good thresholds and function.  3. CAD no chest pain  4. ICM EF on echo unchanged 35%  5. Orthostatic hypotension improved on lower dose of Entresto.  6. 2 degree HB pacing appropriately.  Hypokalemia increase from 60 mEq daily to 80 mEq daily  REpeat CMP in 2 weeks.  RTC 3-4 weeks    Collaborating MD Muhammad

## 2017-03-21 NOTE — Clinical Note
"     [facility]   [address], [address]  [place] [address]  Phone: [phone]  Fax: [phone]              Isai Fuentes  1940    Encounter Date: 3/21/2017    PRATEEK Gregorio          PROGRESS NOTE:  Subjective:   Isai Fuentes is a 76 y.o. male who presents today for review of his cardiac status. He saw Dr Billings on 3/8/17 for his CAD, ICM, BiV PPM and recent hypokalemia.  His K+ is up to 3.4 from 3.1 on 60 mEq daily. Will therefore go up to 80 mEq with repeat labs in 2 weeks.  His devcie demonstrates good function with 1 NSVT episode which appeared to be SVT not VT with AFL.  He did not tolerate the Entresto at the 24/26mg combination due to orthostatic hypotension with BP's at 90/40mmHg. He states I can't do anything I have no energy when my BP is too low.  159 episodes of AF with longest at 3 minutes on trending today.  Appears well compensated presently.  Device function intact.    Past Medical History   Diagnosis Date   • Other drug allergy(995.27) 10/16/2008   • Atrial fibrillation (CMS-HCC) 9/20/2011   • CAD (coronary artery disease) 9/20/2011   • CARDIOMYOPATHY 9/20/2011   • History of cardiac catheterization 9/20/2011   • Fatigue 10/28/2010   • Heartburn 10/23/2008   • HTN (hypertension) 9/20/2011   • Insomnia 7/8/2010   • Ischemic cardiomyopathy 9/20/2011   • Orthostatic hypotension 5/6/2009   • Presence of permanent cardiac pacemaker 9/20/2011   • Pleural effusion 5/18/2009   • History of myocardial infarction      \"many\"   • Prostate cancer (CMS-HCC) 5/13/2011   • Second degree AV block, Mobitz type II 11/10/2010   • Long term (current) use of anticoagulants 5/13/2011   • Congestive heart failure (CMS-HCC)    • Pacemaker      [employer]   • Indigestion    • Other specified disorder of intestines 07-28-15     constipation/diarrhea/ reports some bleeding from rectum w/blood clots. Seeing GI   • Breath shortness    • Snoring    " "  • Pain 07-28-15     \"chronic\", 0/10   • Biventricular implantable cardioverter-defibrillator in situ 8/7/2015   • Cancer (CMS-HCC) 2012     prostate   • Myocardial infarct (CMS-HCC) 2007   • WILLA (obstructive sleep apnea)      CPAP   • Stroke (CMS-HCC) 2/3/2009   • COPD (chronic obstructive pulmonary disease) (CMS-HCC)      Past Surgical History   Procedure Laterality Date   • Cardiac cath       has 2 stents   • Multiple coronary artery bypass  2007     2 vessel   • Pacemaker insertion  11/24/08     St Dre   • Recovery  7/30/2015     Procedure: CATH LAB  LEAD EXTRACTION, UPGRADE TO BIV PM ST.DRE LRG MAGGIE AQUINO ;  Surgeon: Recoveryonly Surgery;  Location: SURGERY PRE[facility]POST PROC [facility];  Service:    • Recovery  7/29/2015     Procedure: CATH LAB NEW PM INSERTION DUAL ATRIAL & VENTRICULAR-LV LEAD W/ NEW GENERATOR KENIA ICD9: 414.8;  Surgeon: Recoveryonly Surgery;  Location: SURGERY PRE-POST PROC [facility];  Service:    • Recovery  8/14/2015     Procedure:  CATH LAB LEAD EXTRACTION AWILDA ST.DRE LRG PAULO AQUINO;  Surgeon: Recoveryonly Surgery;  Location: SURGERY PRE-POST PROC [facility];  Service:    • Recovery  10/16/2015     Procedure: CATH LAB LEAD REVISION ST.DRE KENIA;  Surgeon: Recoveryonly Surgery;  Location: SURGERY PRE-POST PROC [facility];  Service:      History reviewed. No pertinent family history.  History   Smoking status   • Former Smoker -- 1.50 packs/day for 3 years   • Types: Cigarettes   • Quit date: 01/01/1970   Smokeless tobacco   • Former User   • Types: Chew   • Quit date: 01/01/1972     Allergies   Allergen Reactions   • Plavix [Clopidogrel Bisulfate] Anaphylaxis   • Statins [Hmg-Coa-R Inhibitors] Unspecified     Muscles aches     • Ticlid [Ticlopidine Hydrochloride] Unspecified     Pt states \"I'm not sure what happens\".       Outpatient Encounter Prescriptions as of 3/21/2017   Medication Sig Dispense Refill   • potassium chloride SA (KDUR) 20 MEQ Tab CR Take 2 Tabs by mouth 2 times a day. 120 Tab 3 "   • spironolactone (ALDACTONE) 25 MG Tab Take 1 Tab by mouth every day. 30 Tab 3   • carvedilol (COREG) 6.25 MG Tab Take 6.25 mg by mouth 2 times a day, with meals.     • furosemide (LASIX) 40 MG Tab Take 40 mg by mouth every day.     • metolazone (ZAROXOLYN) 5 MG Tab Take 5 mg by mouth as needed. Indications: Edema     • sacubitril-valsartan (ENTRESTO) 24-26 MG Tab tablet Take 0.5 Tabs by mouth 2 Times a Day.     • Methotrexate, PF, 10 MG/0.2ML Solution Auto-injector Inject 10 mg as instructed every 7 days. On Mon   Indications: Psoriasis     • Omega-3 Fatty Acids (FISH OIL) 1000 MG Cap capsule Take 1,000 mg by mouth every day.     • Multiple Vitamins-Minerals (OCUVITE) Tab Take 1 Tab by mouth every day.     • aspirin 81 MG tablet Take 81 mg by mouth every day.     • cyanocobalamin (VITAMIN B-12) 500 MCG TABS Take 1,000 mcg by mouth every day.     • Coenzyme Q10 (CO Q-10 PO) Take 1 Cap by mouth every day.     • [DISCONTINUED] potassium chloride SA (KDUR) 20 MEQ Tab CR Take 2 Tabs by mouth every day. (Patient taking differently: Take 40 mEq by mouth 2 times a day.) 60 Tab 11     No facility-administered encounter medications on file as of 3/21/2017.     Review of Systems   Constitutional: Positive for malaise/fatigue. Negative for fever, chills and weight loss.   HENT: Negative for congestion and sore throat.    Eyes: Negative for blurred vision and double vision.   Respiratory: Negative for cough, sputum production, shortness of breath and wheezing.    Cardiovascular: Negative for chest pain, palpitations, orthopnea, claudication, leg swelling and PND.   Gastrointestinal: Negative for heartburn, nausea, vomiting and abdominal pain.   Genitourinary: Negative for dysuria, frequency and flank pain.   Musculoskeletal: Negative.    Skin: Negative.    Neurological: Positive for dizziness (Orthostatic hypotension with increased Entresto). Negative for speech change, focal weakness, seizures, loss of consciousness and  "headaches.   Endo/Heme/Allergies: Negative.    Psychiatric/Behavioral: Negative.         Objective:   /70 mmHg  Pulse 71  Ht 1.803 m (5' 10.98\")  Wt 93.895 kg (207 lb)  BMI 28.88 kg/m2  SpO2 96%    Physical Exam   Constitutional: He is oriented to person, place, and time. He appears well-developed and well-nourished.   HENT:   Head: Normocephalic and atraumatic.   Eyes: Pupils are equal, round, and reactive to light.   Neck: Normal range of motion. Neck supple. No thyromegaly present.   Cardiovascular: Normal rate and regular rhythm.  Exam reveals no gallop and no friction rub.    No murmur heard.  Pulmonary/Chest: Effort normal and breath sounds normal. No respiratory distress. He has no wheezes. He has no rales.   Abdominal: Soft. Bowel sounds are normal. He exhibits no distension. There is no tenderness. There is no guarding.   Musculoskeletal: Normal range of motion. He exhibits no edema.   Neurological: He is alert and oriented to person, place, and time.   Skin: Skin is warm and dry.   Psychiatric: He has a normal mood and affect.   Device function intact see flow sheet.    Assessment:     1. Paroxysmal atrial fibrillation (CMS-HCC)     2. Biventricular implantable cardioverter-defibrillator in situ  potassium chloride SA (KDUR) 20 MEQ Tab CR   3. Coronary artery disease involving native coronary artery of native heart without angina pectoris  potassium chloride SA (KDUR) 20 MEQ Tab CR    COMP METABOLIC PANEL   4. Ischemic cardiomyopathy  potassium chloride SA (KDUR) 20 MEQ Tab CR    COMP METABOLIC PANEL   5. Orthostatic hypotension     6. Second degree AV block, Mobitz type II         Medical Decision Making:  Today's Assessment / Status / Plan:     1. PAF recurrent spells no prolonged duration 3 minutes longest.  2. BIV system good thresholds and function.  3. CAD no chest pain  4. ICM EF on echo unchanged 35%  5. Orthostatic hypotension improved on lower dose of Entresto.  6. 2 degree HB pacing " appropriately.  Hypokalemia increase from 60 mEq daily to 80 mEq daily  REpeat CMP in 2 weeks.  RTC 3-4 weeks    Collaborating MD Sabina Shankar M.D.  [address] [place] [address]  VIA Facsimile: [phone]

## 2017-03-21 NOTE — MR AVS SNAPSHOT
"        Isai Fuentes   3/21/2017 3:20 PM   Office Visit   MRN: [id number]    Department:  [facility]   Dept Phone:  [phone]    Description:  Male : 1940   Provider:  PRATEEK Gregorio           Reason for Visit     Follow-Up           Allergies as of 3/21/2017     Allergen Noted Reactions    Plavix [Clopidogrel Bisulfate] 2011   Anaphylaxis    Statins [Hmg-Coa-R Inhibitors] 2011   Unspecified    Muscles aches      Ticlid [Ticlopidine Hydrochloride] 2009   Unspecified    Pt states \"I'm not sure what happens\".        You were diagnosed with     Paroxysmal atrial fibrillation (CMS-HCC)   [720915]       Biventricular implantable cardioverter-defibrillator in situ   [6526428]       Coronary artery disease involving native coronary artery of native heart without angina pectoris   [1103412]       Ischemic cardiomyopathy   [054035]       Orthostatic hypotension   [458.0.ICD-9-CM]       Second degree AV block, Mobitz type II   [678546]         Vital Signs     Blood Pressure Pulse Height Weight Body Mass Index Oxygen Saturation    126/70 mmHg 71 1.803 m (5' 10.98\") 93.895 kg (207 lb) 28.88 kg/m2 96%    Smoking Status                   Former Smoker           Basic Information     Date Of Birth Sex Race Ethnicity Preferred Language    1940 Male White Non- English      Your appointments     Mar 27, 2017 11:20 AM   Established Patient Pul with [facility] (--)    [address]  [address]  [place] [id number] [phone]            Mar 28, 2017 10:00 AM   ADVANCED DIRECTIVE CLASS with JOHN AT [employer] [address] [employer]   Events (--)    Please Check Your Invitation   [phone]           Conference Room [address] [place] [address]            2017  1:20 PM   FOLLOW UP with MANISHA GregorioPRADHA Teixeira [facility] (--)    [address], [address]  [place] [id number] [phone]      "      Aug 03, 2017  1:20 PM   FOLLOW UP with Edd Billings M.D.   [facility] (--)    [address], [address]  [place] [address] [phone]              Problem List              ICD-10-CM Priority Class Noted - Resolved    Atrial fibrillation (CMS-HCC) I48.91 High  9/20/2011 - Present    CAD (coronary artery disease) (Chronic) I25.10 High  9/20/2011 - Present    Heartburn (Chronic) R12 Low  10/23/2008 - Present    Hypercholesteremia (Chronic) E78.00 Medium  4/25/2012 - Present    HTN (hypertension) (Chronic) I10 High  9/20/2011 - Present    Insomnia (Chronic) G47.00 Low  7/8/2010 - Present    Ischemic cardiomyopathy (Chronic) I25.5 High  9/20/2011 - Present    Orthostatic hypotension (Chronic) I95.1 Low  5/6/2009 - Present    History of myocardial infarction (Chronic) I25.2 Medium  7/19/2011 - Present    Prostate cancer (CMS-HCC) (Chronic) C61 Low  5/13/2011 - Present    Second degree AV block, Mobitz type II (Chronic) I44.1 High  11/10/2010 - Present    Sleep apnea (Chronic) G47.30 Low  10/28/2010 - Present    Stroke (CMS-HCC) (Chronic) I63.9 Low  2/3/2009 - Present    Portal hypertension (CMS-HCC) K76.6 Low  4/17/2015 - Present    NYHA class 3 acute on chronic systolic heart failure (CMS-HCC) I50.23 Medium  7/7/2015 - Present    S/P ablation of atrial fibrillation Z98.890, Z86.79 Low  7/7/2015 - Present    Biventricular implantable cardioverter-defibrillator in situ Z95.810 Medium  8/7/2015 - Present    Bilateral hearing loss H91.93 Low  9/8/2015 - Present    Alcohol abuse F10.10   6/21/2016 - Present      Health Maintenance        Date Due Completion Dates    COLONOSCOPY 12/29/1990 ---    IMM ZOSTER VACCINE 12/29/2000 ---    IMM DTaP/Tdap/Td Vaccine (1 - Tdap) 5/20/2011 5/19/2011            Current Immunizations     13-VALENT PCV PREVNAR 12/15/2016    Influenza TIV (IM) 11/30/2014    Influenza Vaccine Adult HD 1/1/2017,  Deferred (Patient Schedule), 10/17/2015  8:58  AM    Pneumococcal polysaccharide vaccine (PPSV-23) 11/30/2014    TD Vaccine 5/19/2011  3:30 PM      Below and/or attached are the medications your provider expects you to take. Review all of your home medications and newly ordered medications with your provider and/or pharmacist. Follow medication instructions as directed by your provider and/or pharmacist. Please keep your medication list with you and share with your provider. Update the information when medications are discontinued, doses are changed, or new medications (including over-the-counter products) are added; and carry medication information at all times in the event of emergency situations     Allergies:  PLAVIX - Anaphylaxis     STATINS - Unspecified     TICLID - Unspecified               Medications  Valid as of: March 21, 2017 -  4:11 PM    Generic Name Brand Name Tablet Size Instructions for use    Aspirin (Tab) aspirin 81 MG Take 81 mg by mouth every day.        Carvedilol (Tab) COREG 6.25 MG Take 6.25 mg by mouth 2 times a day, with meals.        Coenzyme Q10   Take 1 Cap by mouth every day.        Cyanocobalamin (Tab) VITAMIN B-12 500 MCG Take 1,000 mcg by mouth every day.        Furosemide (Tab) LASIX 40 MG Take 40 mg by mouth every day.        Methotrexate (Anti-Rheumatic) (Solution Auto-injector) Methotrexate (PF) 10 MG/0.2ML Inject 10 mg as instructed every 7 days. On Mon   Indications: Psoriasis        MetOLazone (Tab) ZAROXOLYN 5 MG Take 5 mg by mouth as needed. Indications: Edema        Multiple Vitamins-Minerals (Tab) OCUVITE  Take 1 Tab by mouth every day.        Omega-3 Fatty Acids (Cap) fish oil 1000 MG Take 1,000 mg by mouth every day.        Potassium Chloride Maria Teresa CR (Tab CR) Kdur 20 MEQ Take 2 Tabs by mouth 2 times a day.        Sacubitril-Valsartan (Tab) ENTRESTO 24-26 MG Take 0.5 Tabs by mouth 2 Times a Day.        Spironolactone (Tab) ALDACTONE 25 MG Take 1 Tab by mouth every day.        .                 Medicines prescribed  today were sent to:     [id number] - [place], [place] - [address] [place] [address]    Phone: [phone] Fax: [phone]    Open 24 Hours?: No      Medication refill instructions:       If your prescription bottle indicates you have medication refills left, it is not necessary to call your provider’s office. Please contact your pharmacy and they will refill your medication.    If your prescription bottle indicates you do not have any refills left, you may request refills at any time through one of the following ways: The online [employer] system (except Urgent Care), by calling your provider’s office, or by asking your pharmacy to contact your provider’s office with a refill request. Medication refills are processed only during regular business hours and may not be available until the next business day. Your provider may request additional information or to have a follow-up visit with you prior to refilling your medication.   *Please Note: Medication refills are assigned a new Rx number when refilled electronically. Your pharmacy may indicate that no refills were authorized even though a new prescription for the same medication is available at the pharmacy. Please request the medicine by name with the pharmacy before contacting your provider for a refill.        Your To Do List     Future Labs/Procedures Complete By Expires    COMP METABOLIC PANEL  As directed 3/21/2018         [employer] Access Code: Activation code not generated  Current [employer] Status: Active

## 2017-03-27 ENCOUNTER — OFFICE VISIT (OUTPATIENT)
Dept: PULMONOLOGY | Facility: HOSPICE | Age: 77
End: 2017-03-27
Payer: MEDICARE

## 2017-03-27 VITALS
OXYGEN SATURATION: 96 % | SYSTOLIC BLOOD PRESSURE: 126 MMHG | HEIGHT: 71 IN | RESPIRATION RATE: 18 BRPM | BODY MASS INDEX: 29.65 KG/M2 | WEIGHT: 211.8 LBS | DIASTOLIC BLOOD PRESSURE: 80 MMHG | HEART RATE: 76 BPM | TEMPERATURE: 97 F

## 2017-03-27 DIAGNOSIS — I48.0 PAROXYSMAL ATRIAL FIBRILLATION (HCC): ICD-10-CM

## 2017-03-27 DIAGNOSIS — J44.9 CHRONIC OBSTRUCTIVE PULMONARY DISEASE, UNSPECIFIED COPD TYPE (HCC): ICD-10-CM

## 2017-03-27 DIAGNOSIS — G47.33 OSA ON CPAP: ICD-10-CM

## 2017-03-27 PROCEDURE — 1036F TOBACCO NON-USER: CPT | Performed by: INTERNAL MEDICINE

## 2017-03-27 PROCEDURE — G8419 CALC BMI OUT NRM PARAM NOF/U: HCPCS | Performed by: INTERNAL MEDICINE

## 2017-03-27 PROCEDURE — 1101F PT FALLS ASSESS-DOCD LE1/YR: CPT | Mod: 8P | Performed by: INTERNAL MEDICINE

## 2017-03-27 PROCEDURE — G8598 ASA/ANTIPLAT THER USED: HCPCS | Performed by: INTERNAL MEDICINE

## 2017-03-27 PROCEDURE — G8482 FLU IMMUNIZE ORDER/ADMIN: HCPCS | Performed by: INTERNAL MEDICINE

## 2017-03-27 PROCEDURE — G8432 DEP SCR NOT DOC, RNG: HCPCS | Performed by: INTERNAL MEDICINE

## 2017-03-27 PROCEDURE — 99214 OFFICE O/P EST MOD 30 MIN: CPT | Performed by: INTERNAL MEDICINE

## 2017-03-27 PROCEDURE — 4040F PNEUMOC VAC/ADMIN/RCVD: CPT | Performed by: INTERNAL MEDICINE

## 2017-03-27 NOTE — PROGRESS NOTES
"Chief Complaint   Patient presents with   • Follow-Up     6 month follow up       HPI: This patient is a 76 y.o. Male who returns for follow-up of COPD and WILLA. He has albuterol when necessary which he denies using, for stage I COPD. Last PFTs showed FEV1 2.55 L or 85% predicted. He has paroxysmal atrial fibrillation, with recent adjustment in diuretics which was complicated by hypokalemia. He feels slightly more short of breath with exertion than normal. He denies wheezing, sputum purulence, chest tightness or edema. Denies AECOPD over the past 6 months.  He has severe WILLA, AHI 45 events per hour, on AutoPap 8-11 cm of water. Compliance card confirms 93% usage for 6 hours 24 minutes average nightly, and normal AHI of 1.3. He has difficulty with CPAP headgear and is switching to a new style of CPAP mask with a mouthpiece rather than headgear. Denies daytime hypersomnolence. Weight is up 10 pounds.    Past Medical History   Diagnosis Date   • Other drug allergy(995.27) 10/16/2008   • Atrial fibrillation (CMS-HCC) 9/20/2011   • CAD (coronary artery disease) 9/20/2011   • CARDIOMYOPATHY 9/20/2011   • History of cardiac catheterization 9/20/2011   • Fatigue 10/28/2010   • Heartburn 10/23/2008   • HTN (hypertension) 9/20/2011   • Insomnia 7/8/2010   • Ischemic cardiomyopathy 9/20/2011   • Orthostatic hypotension 5/6/2009   • Presence of permanent cardiac pacemaker 9/20/2011   • Pleural effusion 5/18/2009   • History of myocardial infarction      \"many\"   • Prostate cancer (CMS-HCC) 5/13/2011   • Second degree AV block, Mobitz type II 11/10/2010   • Long term (current) use of anticoagulants 5/13/2011   • Congestive heart failure (CMS-HCC)    • Pacemaker      [employer]   • Indigestion    • Other specified disorder of intestines 07-28-15     constipation/diarrhea/ reports some bleeding from rectum w/blood clots. Seeing GI   • Breath shortness    • Snoring    • Pain 07-28-15     \"chronic\", 0/10   • Biventricular " implantable cardioverter-defibrillator in situ 8/7/2015   • Cancer (CMS-HCC) 2012     prostate   • Myocardial infarct (CMS-HCC) 2007   • WILLA (obstructive sleep apnea)      CPAP   • Stroke (CMS-HCC) 2/3/2009   • COPD (chronic obstructive pulmonary disease) (CMS-HCC)        Social History     Social History   • Marital Status:      Spouse Name: N/A   • Number of Children: N/A   • Years of Education: N/A     Occupational History   • Not on file.     Social History Main Topics   • Smoking status: Former Smoker -- 1.50 packs/day for 3 years     Types: Cigarettes     Quit date: 01/01/1970   • Smokeless tobacco: Former User     Types: Chew     Quit date: 01/01/1972   • Alcohol Use: Yes      Comment: 10-14 per week; scotch & wine   • Drug Use: No   • Sexual Activity: Not on file     Other Topics Concern   • Not on file     Social History Narrative       History reviewed. No pertinent family history.    Current Outpatient Prescriptions on File Prior to Visit   Medication Sig Dispense Refill   • potassium chloride SA (KDUR) 20 MEQ Tab CR Take 2 Tabs by mouth 2 times a day. 120 Tab 3   • spironolactone (ALDACTONE) 25 MG Tab Take 1 Tab by mouth every day. 30 Tab 3   • carvedilol (COREG) 6.25 MG Tab Take 6.25 mg by mouth 2 times a day, with meals.     • furosemide (LASIX) 40 MG Tab Take 40 mg by mouth every day.     • metolazone (ZAROXOLYN) 5 MG Tab Take 5 mg by mouth as needed. Indications: Edema     • sacubitril-valsartan (ENTRESTO) 24-26 MG Tab tablet Take 0.5 Tabs by mouth 2 Times a Day.     • Methotrexate, PF, 10 MG/0.2ML Solution Auto-injector Inject 10 mg as instructed every 7 days. On Mon   Indications: Psoriasis     • Omega-3 Fatty Acids (FISH OIL) 1000 MG Cap capsule Take 1,000 mg by mouth every day.     • Multiple Vitamins-Minerals (OCUVITE) Tab Take 1 Tab by mouth every day.     • aspirin 81 MG tablet Take 81 mg by mouth every day.     • cyanocobalamin (VITAMIN B-12) 500 MCG TABS Take 1,000 mcg by mouth every  "day.     • Coenzyme Q10 (CO Q-10 PO) Take 1 Cap by mouth every day.       No current facility-administered medications on file prior to visit.       Allergies: Plavix; Statins; and Ticlid    ROS:   Constitutional: Denies fevers, chills, night sweats, +fatigue, denies weight loss  Eyes: Denies vision loss, pain, drainage, double vision  Ears, Nose, Throat: Denies earache, difficulty hearing, tinnitus, nasal congestion, hoarseness  Cardiovascular: Denies chest pain, tightness, palpitations, orthopnea or edema  Respiratory: As in history of present illness  Sleep: Denies daytime sleepiness, snoring, apneas, insomnia, morning headaches  GI: Denies heartburn, dysphagia, nausea, abdominal pain, diarrhea or constipation  : Denies frequent urination, hematuria, discharge or painful urination  Musculoskeletal: Denies back pain, painful joints, sore muscles  Neurological: + weakness, denies headaches  Skin: No rashes    Blood pressure 126/80, pulse 76, temperature 36.1 °C (97 °F), resp. rate 18, height 1.803 m (5' 11\"), weight 96.072 kg (211 lb 12.8 oz), SpO2 96 %.  Multi-Ox Readings  Multi Ox #1     O2 sat % at rest     O2 sat % on exertion     O2 sat average on exertion     Multi Ox #2     O2 sat % at rest     O2 sat % on exertion     O2 sat average on exertion       Oxygen Use     Oxygen Frequency     Duration of need     Is the patient mobile within the home?     CPAP Use? yes   BIPAP Use?     Servo Titration         Physical Exam:  Appearance: Well-nourished, well-developed, in no acute distress  HEENT: Normocephalic, atraumatic, white sclera, PERRLA, oropharynx clear  Neck: No adenopathy or masses  Respiratory: no intercostal retractions or accessory muscle use  Lungs auscultation: Clear to auscultation bilaterally  Cardiovascular: Regular rate rhythm. No murmurs, rubs or gallops.  No LE edema  Abdomen: soft, nondistended  Gait: Normal  Digits: No clubbing, cyanosis  Motor: No focal deficits  Orientation: Oriented to " time, person and place    Diagnosis:  1. Chronic obstructive pulmonary disease, unspecified COPD type (CMS-[facility])     2. WILLA on CPAP     3. Paroxysmal atrial fibrillation (CMS-HCC)     4. BMI 29.0-29.9,adult         Plan:  The patient has mild COPD, stable on albuterol when necessary. He has gained weight and has required increased diuretics for atrial fibrillation/CHF, with perhaps mild worsening shortness of breath. He is following closely with cardiology.  He shows excellent compliance and response to CPAP therapy. Continue current pressure settings.  Return in about 6 months (around 9/27/2017).

## 2017-03-27 NOTE — MR AVS SNAPSHOT
"        Isai Fuentes   3/27/2017 11:20 AM   Office Visit   MRN: [id number]    Department:  [facility]   Dept Phone:  [phone]    Description:  Male : 1940   Provider:  Ruby Thakur M.D.           Reason for Visit     Follow-Up 6 month follow up      Allergies as of 3/27/2017     Allergen Noted Reactions    Plavix [Clopidogrel Bisulfate] 2011   Anaphylaxis    Statins [Hmg-Coa-R Inhibitors] 2011   Unspecified    Muscles aches      Ticlid [Ticlopidine Hydrochloride] 2009   Unspecified    Pt states \"I'm not sure what happens\".        You were diagnosed with     Chronic obstructive pulmonary disease, unspecified COPD type (CMS-[facility])   [4806165]       WILLA on CPAP   [227335]       Paroxysmal atrial fibrillation (CMS-[facility])   [501964]       BMI 29.0-29.9,adult   [848642]         Vital Signs     Blood Pressure Pulse Temperature Respirations Height Weight    126/80 mmHg 76 36.1 °C (97 °F) 18 1.803 m (5' 11\") 96.072 kg (211 lb 12.8 oz)    Body Mass Index Oxygen Saturation Smoking Status             29.55 kg/m2 96% Former Smoker         Basic Information     Date Of Birth Sex Race Ethnicity Preferred Language    1940 Male White Non- English      Your appointments     Mar 28, 2017 10:00 AM   ADVANCED DIRECTIVE CLASS with JOHN AT [address] [employer]   Events (--)    Please Check Your Invitation   [phone]           Conference Room [address] [place] [address]            2017  1:20 PM   FOLLOW UP with PRATEEK Gregorio   [facility] (--)    [address] [facility] [address]  [facility] [address]   [phone]            Aug 03, 2017  1:20 PM   FOLLOW UP with Edd Billings M.D.   [facility] (--)    [address] [facility] [address]  [facility] [address]   [phone]            Sep 28, 2017 10:40 AM   Established Patient Pul with A[facility] (--)   "    [address]  [address]  [facility] [address] [phone]              Problem List              ICD-10-CM Priority Class Noted - Resolved    Atrial fibrillation (CMS-HCC) I48.91 High  9/20/2011 - Present    CAD (coronary artery disease) (Chronic) I25.10 High  9/20/2011 - Present    Heartburn (Chronic) R12 Low  10/23/2008 - Present    Hypercholesteremia (Chronic) E78.00 Medium  4/25/2012 - Present    HTN (hypertension) (Chronic) I10 High  9/20/2011 - Present    Insomnia (Chronic) G47.00 Low  7/8/2010 - Present    Ischemic cardiomyopathy (Chronic) I25.5 High  9/20/2011 - Present    Orthostatic hypotension (Chronic) I95.1 Low  5/6/2009 - Present    History of myocardial infarction (Chronic) I25.2 Medium  7/19/2011 - Present    Prostate cancer (CMS-HCC) (Chronic) C61 Low  5/13/2011 - Present    Second degree AV block, Mobitz type II (Chronic) I44.1 High  11/10/2010 - Present    Sleep apnea (Chronic) G47.30 Low  10/28/2010 - Present    Stroke (CMS-HCC) (Chronic) I63.9 Low  2/3/2009 - Present    Portal hypertension (CMS-HCC) K76.6 Low  4/17/2015 - Present    NYHA class 3 acute on chronic systolic heart failure (CMS-HCC) I50.23 Medium  7/7/2015 - Present    S/P ablation of atrial fibrillation Z98.890, Z86.79 Low  7/7/2015 - Present    Biventricular implantable cardioverter-defibrillator in situ Z95.810 Medium  8/7/2015 - Present    Bilateral hearing loss H91.93 Low  9/8/2015 - Present    Alcohol abuse F10.10   6/21/2016 - Present      Health Maintenance        Date Due Completion Dates    COLONOSCOPY 12/29/1990 ---    IMM ZOSTER VACCINE 12/29/2000 ---    IMM DTaP/Tdap/Td Vaccine (1 - Tdap) 5/20/2011 5/19/2011            Current Immunizations     13-VALENT PCV PREVNAR 12/15/2016    Influenza TIV (IM) 11/30/2014    Influenza Vaccine Adult HD 1/1/2017,  Deferred (Patient Schedule), 10/17/2015  8:58 AM    Pneumococcal polysaccharide vaccine (PPSV-23) 11/30/2014    TD Vaccine 5/19/2011  3:30 PM      Below and/or attached are  the medications your provider expects you to take. Review all of your home medications and newly ordered medications with your provider and/or pharmacist. Follow medication instructions as directed by your provider and/or pharmacist. Please keep your medication list with you and share with your provider. Update the information when medications are discontinued, doses are changed, or new medications (including over-the-counter products) are added; and carry medication information at all times in the event of emergency situations     Allergies:  PLAVIX - Anaphylaxis     STATINS - Unspecified     TICLID - Unspecified               Medications  Valid as of: March 27, 2017 - 12:00 PM    Generic Name Brand Name Tablet Size Instructions for use    Aspirin (Tab) aspirin 81 MG Take 81 mg by mouth every day.        Carvedilol (Tab) COREG 6.25 MG Take 6.25 mg by mouth 2 times a day, with meals.        Coenzyme Q10   Take 1 Cap by mouth every day.        Cyanocobalamin (Tab) VITAMIN B-12 500 MCG Take 1,000 mcg by mouth every day.        Furosemide (Tab) LASIX 40 MG Take 40 mg by mouth every day.        Methotrexate (Anti-Rheumatic) (Solution Auto-injector) Methotrexate (PF) 10 MG/0.2ML Inject 10 mg as instructed every 7 days. On Mon   Indications: Psoriasis        MetOLazone (Tab) ZAROXOLYN 5 MG Take 5 mg by mouth as needed. Indications: Edema        Multiple Vitamins-Minerals (Tab) OCUVITE  Take 1 Tab by mouth every day.        Omega-3 Fatty Acids (Cap) fish oil 1000 MG Take 1,000 mg by mouth every day.        Potassium Chloride Maria Teresa CR (Tab CR) Kdur 20 MEQ Take 2 Tabs by mouth 2 times a day.        Sacubitril-Valsartan (Tab) ENTRESTO 24-26 MG Take 0.5 Tabs by mouth 2 Times a Day.        Spironolactone (Tab) ALDACTONE 25 MG Take 1 Tab by mouth every day.        .                 Medicines prescribed today were sent to:     [id number] - [place], [place] - [address] [place] [address]    Phone:  [phone] Fax: [phone]    Open 24 Hours?: No      Medication refill instructions:       If your prescription bottle indicates you have medication refills left, it is not necessary to call your provider’s office. Please contact your pharmacy and they will refill your medication.    If your prescription bottle indicates you do not have any refills left, you may request refills at any time through one of the following ways: The online [facility] system (except Urgent Care), by calling your provider’s office, or by asking your pharmacy to contact your provider’s office with a refill request. Medication refills are processed only during regular business hours and may not be available until the next business day. Your provider may request additional information or to have a follow-up visit with you prior to refilling your medication.   *Please Note: Medication refills are assigned a new Rx number when refilled electronically. Your pharmacy may indicate that no refills were authorized even though a new prescription for the same medication is available at the pharmacy. Please request the medicine by name with the pharmacy before contacting your provider for a refill.           [facility] Access Code: Activation code not generated  Current [facility] Status: Active

## 2017-03-28 ENCOUNTER — APPOINTMENT (OUTPATIENT)
Dept: OTHER | Facility: IMAGING CENTER | Age: 77
End: 2017-03-28

## 2017-04-18 ENCOUNTER — HOSPITAL ENCOUNTER (OUTPATIENT)
Dept: LAB | Facility: MEDICAL CENTER | Age: 77
End: 2017-04-18
Attending: NURSE PRACTITIONER
Payer: MEDICARE

## 2017-04-18 DIAGNOSIS — I25.5 ISCHEMIC CARDIOMYOPATHY: Chronic | ICD-10-CM

## 2017-04-18 DIAGNOSIS — I25.10 CORONARY ARTERY DISEASE INVOLVING NATIVE CORONARY ARTERY OF NATIVE HEART WITHOUT ANGINA PECTORIS: Chronic | ICD-10-CM

## 2017-04-18 LAB
ALBUMIN SERPL BCP-MCNC: 4.1 G/DL (ref 3.2–4.9)
ALBUMIN/GLOB SERPL: 1.3 G/DL
ALP SERPL-CCNC: 89 U/L (ref 30–99)
ALT SERPL-CCNC: 16 U/L (ref 2–50)
ANION GAP SERPL CALC-SCNC: 10 MMOL/L (ref 0–11.9)
AST SERPL-CCNC: 20 U/L (ref 12–45)
BILIRUB SERPL-MCNC: 1.1 MG/DL (ref 0.1–1.5)
BUN SERPL-MCNC: 39 MG/DL (ref 8–22)
CALCIUM SERPL-MCNC: 10 MG/DL (ref 8.5–10.5)
CHLORIDE SERPL-SCNC: 97 MMOL/L (ref 96–112)
CO2 SERPL-SCNC: 30 MMOL/L (ref 20–33)
CREAT SERPL-MCNC: 1.78 MG/DL (ref 0.5–1.4)
GFR SERPL CREATININE-BSD FRML MDRD: 37 ML/MIN/1.73 M 2
GLOBULIN SER CALC-MCNC: 3.2 G/DL (ref 1.9–3.5)
GLUCOSE SERPL-MCNC: 81 MG/DL (ref 65–99)
POTASSIUM SERPL-SCNC: 3.7 MMOL/L (ref 3.6–5.5)
PROT SERPL-MCNC: 7.3 G/DL (ref 6–8.2)
SODIUM SERPL-SCNC: 137 MMOL/L (ref 135–145)

## 2017-04-18 PROCEDURE — 80053 COMPREHEN METABOLIC PANEL: CPT

## 2017-04-18 PROCEDURE — 36415 COLL VENOUS BLD VENIPUNCTURE: CPT

## 2017-04-19 ENCOUNTER — OFFICE VISIT (OUTPATIENT)
Dept: CARDIOLOGY | Facility: MEDICAL CENTER | Age: 77
End: 2017-04-19
Payer: MEDICARE

## 2017-04-19 VITALS
BODY MASS INDEX: 29.26 KG/M2 | OXYGEN SATURATION: 92 % | WEIGHT: 209 LBS | SYSTOLIC BLOOD PRESSURE: 100 MMHG | DIASTOLIC BLOOD PRESSURE: 62 MMHG | HEIGHT: 71 IN | HEART RATE: 68 BPM

## 2017-04-19 DIAGNOSIS — Z95.810 BIVENTRICULAR IMPLANTABLE CARDIOVERTER-DEFIBRILLATOR IN SITU: ICD-10-CM

## 2017-04-19 DIAGNOSIS — I10 ESSENTIAL HYPERTENSION: Chronic | ICD-10-CM

## 2017-04-19 DIAGNOSIS — I48.0 PAROXYSMAL ATRIAL FIBRILLATION (HCC): ICD-10-CM

## 2017-04-19 DIAGNOSIS — I25.10 CORONARY ARTERY DISEASE INVOLVING NATIVE CORONARY ARTERY OF NATIVE HEART WITHOUT ANGINA PECTORIS: Chronic | ICD-10-CM

## 2017-04-19 DIAGNOSIS — Z98.890 S/P ABLATION OF ATRIAL FIBRILLATION: ICD-10-CM

## 2017-04-19 DIAGNOSIS — I25.5 ISCHEMIC CARDIOMYOPATHY: Chronic | ICD-10-CM

## 2017-04-19 DIAGNOSIS — Z86.79 S/P ABLATION OF ATRIAL FIBRILLATION: ICD-10-CM

## 2017-04-19 DIAGNOSIS — E87.6 HYPOKALEMIA: ICD-10-CM

## 2017-04-19 PROCEDURE — G8598 ASA/ANTIPLAT THER USED: HCPCS | Performed by: NURSE PRACTITIONER

## 2017-04-19 PROCEDURE — 4040F PNEUMOC VAC/ADMIN/RCVD: CPT | Performed by: NURSE PRACTITIONER

## 2017-04-19 PROCEDURE — 1036F TOBACCO NON-USER: CPT | Performed by: NURSE PRACTITIONER

## 2017-04-19 PROCEDURE — G8432 DEP SCR NOT DOC, RNG: HCPCS | Performed by: NURSE PRACTITIONER

## 2017-04-19 PROCEDURE — 99214 OFFICE O/P EST MOD 30 MIN: CPT | Performed by: NURSE PRACTITIONER

## 2017-04-19 PROCEDURE — G8419 CALC BMI OUT NRM PARAM NOF/U: HCPCS | Performed by: NURSE PRACTITIONER

## 2017-04-19 PROCEDURE — 1101F PT FALLS ASSESS-DOCD LE1/YR: CPT | Mod: 8P | Performed by: NURSE PRACTITIONER

## 2017-04-19 RX ORDER — POTASSIUM CHLORIDE 20 MEQ/1
40 TABLET, EXTENDED RELEASE ORAL 2 TIMES DAILY
Qty: 480 TAB | Refills: 3 | Status: SHIPPED | OUTPATIENT
Start: 2017-04-19 | End: 2017-12-19

## 2017-04-19 ASSESSMENT — ENCOUNTER SYMPTOMS
COUGH: 0
SPUTUM PRODUCTION: 0
CHILLS: 0
SPEECH CHANGE: 0
SHORTNESS OF BREATH: 0
WHEEZING: 0
VOMITING: 0
HEARTBURN: 0
NAUSEA: 0
FEVER: 0
FOCAL WEAKNESS: 0
ABDOMINAL PAIN: 0
BLURRED VISION: 0
DIZZINESS: 0
PALPITATIONS: 0
DOUBLE VISION: 0
FLANK PAIN: 0
MUSCULOSKELETAL NEGATIVE: 1
SEIZURES: 0
ORTHOPNEA: 0
SORE THROAT: 0
CLAUDICATION: 0
HEADACHES: 0
LOSS OF CONSCIOUSNESS: 0
WEIGHT LOSS: 0
PND: 0
PSYCHIATRIC NEGATIVE: 1

## 2017-04-19 NOTE — Clinical Note
"     [facility]   [address]  [place] [address]  Phone: [phone]  Fax: [phone]              Isai Fuentes  1940    Encounter Date: 4/19/2017    PRATEEK Gregorio          PROGRESS NOTE:  Subjective:   Isai Fuentes is a 76 y.o. male who presents today for review of his hypokalemia, CAD, AF and ICM and BIV ICD>  He continues to experience fatigue with activity. His muscle cramping has improved with replacement of his K+. He has had some non-sustained Af episodes short duration seconds only. He continues on the Coreg and Entresto.  He has had PVI in the remote past, BIV ICD and CABG in 2008.  He continues on low dose ASA but will need to watch for recurrence of sustained PAF.  If this occurs may need to reconsider AC and AA.  Exam today appears to be in good fluid balance. No chest pain.  Device function intact.    Past Medical History   Diagnosis Date   • Other drug allergy(995.27) 10/16/2008   • Atrial fibrillation (CMS-HCC) 9/20/2011   • CAD (coronary artery disease) 9/20/2011   • CARDIOMYOPATHY 9/20/2011   • History of cardiac catheterization 9/20/2011   • Fatigue 10/28/2010   • Heartburn 10/23/2008   • HTN (hypertension) 9/20/2011   • Insomnia 7/8/2010   • Ischemic cardiomyopathy 9/20/2011   • Orthostatic hypotension 5/6/2009   • Presence of permanent cardiac pacemaker 9/20/2011   • Pleural effusion 5/18/2009   • History of myocardial infarction      \"many\"   • Prostate cancer (CMS-HCC) 5/13/2011   • Second degree AV block, Mobitz type II 11/10/2010   • Long term (current) use of anticoagulants 5/13/2011   • Congestive heart failure (CMS-HCC)    • Pacemaker      [employer]   • Indigestion    • Other specified disorder of intestines 07-28-15     constipation/diarrhea/ reports some bleeding from rectum w/blood clots. Seeing GI   • Breath shortness    • Snoring    • Pain 07-28-15     \"chronic\", 0/10   • Biventricular " "implantable cardioverter-defibrillator in situ 8/7/2015   • Cancer (CMS-HCC) 2012     prostate   • Myocardial infarct (CMS-HCC) 2007   • WILLA (obstructive sleep apnea)      CPAP   • Stroke (CMS-HCC) 2/3/2009   • COPD (chronic obstructive pulmonary disease) (CMS-HCC)      Past Surgical History   Procedure Laterality Date   • Cardiac cath       has 2 stents   • Multiple coronary artery bypass  2007     2 vessel   • Pacemaker insertion  11/24/08     [facility]   • Recovery  7/30/2015     Procedure: CATH LAB  LEAD EXTRACTION, UPGRADE TO BIV PM ST.DRE LRG MAGGIE AQUINO ;  Surgeon: Recoveryonly Surgery;  Location: SURGERY PRE-POST PROC UNIT [facility];  Service:    • Recovery  7/29/2015     Procedure: CATH LAB NEW PM INSERTION DUAL ATRIAL & VENTRICULAR-LV LEAD W/ NEW GENERATOR KENIA ICD9: 414.8;  Surgeon: Recoveryonly Surgery;  Location: SURGERY PRE-POST PROC UNIT [facility];  Service:    • Recovery  8/14/2015     Procedure:  CATH LAB LEAD EXTRACTION AWILDA ST.DRE LRG PAULO AQUINO;  Surgeon: Recoveryonly Surgery;  Location: SURGERY PRE-POST PROC UNIT [facility];  Service:    • Recovery  10/16/2015     Procedure: CATH LAB LEAD REVISION ST.DRE KENIA;  Surgeon: Recoveryonly Surgery;  Location: SURGERY PRE-POST PROC UNIT [facility];  Service:      History reviewed. No pertinent family history.  History   Smoking status   • Former Smoker -- 1.50 packs/day for 3 years   • Types: Cigarettes   • Quit date: 01/01/1970   Smokeless tobacco   • Former User   • Types: Chew   • Quit date: 01/01/1972     Allergies   Allergen Reactions   • Plavix [Clopidogrel Bisulfate] Anaphylaxis   • Statins [Hmg-Coa-R Inhibitors] Unspecified     Muscles aches     • Ticlid [Ticlopidine Hydrochloride] Unspecified     Pt states \"I'm not sure what happens\".       Outpatient Encounter Prescriptions as of 4/19/2017   Medication Sig Dispense Refill   • potassium chloride SA (KDUR) 20 MEQ Tab CR Take 2 Tabs by mouth 2 times a day. 480 Tab 3   • spironolactone (ALDACTONE) 25 MG Tab Take 1 Tab by " "mouth every day. 30 Tab 3   • carvedilol (COREG) 6.25 MG Tab Take 6.25 mg by mouth 2 times a day, with meals.     • furosemide (LASIX) 40 MG Tab Take 40 mg by mouth every day.     • metolazone (ZAROXOLYN) 5 MG Tab Take 5 mg by mouth as needed. Indications: Edema     • sacubitril-valsartan (ENTRESTO) 24-26 MG Tab tablet Take 0.5 Tabs by mouth 2 Times a Day.     • Methotrexate, PF, 10 MG/0.2ML Solution Auto-injector Inject 10 mg as instructed every 7 days. On Mon   Indications: Psoriasis     • Omega-3 Fatty Acids (FISH OIL) 1000 MG Cap capsule Take 1,000 mg by mouth every day.     • Multiple Vitamins-Minerals (OCUVITE) Tab Take 1 Tab by mouth every day.     • aspirin 81 MG tablet Take 81 mg by mouth every day.     • cyanocobalamin (VITAMIN B-12) 500 MCG TABS Take 1,000 mcg by mouth every day.     • Coenzyme Q10 (CO Q-10 PO) Take 1 Cap by mouth every day.     • [DISCONTINUED] potassium chloride SA (KDUR) 20 MEQ Tab CR Take 2 Tabs by mouth 2 times a day. 120 Tab 3     No facility-administered encounter medications on file as of 4/19/2017.     Review of Systems   Constitutional: Negative for fever, chills, weight loss and malaise/fatigue.   HENT: Negative for congestion and sore throat.    Eyes: Negative for blurred vision and double vision.   Respiratory: Negative for cough, sputum production, shortness of breath and wheezing.    Cardiovascular: Negative for chest pain, palpitations, orthopnea, claudication, leg swelling and PND.   Gastrointestinal: Negative for heartburn, nausea, vomiting and abdominal pain.   Genitourinary: Negative for dysuria, frequency and flank pain.   Musculoskeletal: Negative.    Skin: Negative.    Neurological: Negative for dizziness, speech change, focal weakness, seizures, loss of consciousness and headaches.   Endo/Heme/Allergies: Negative.    Psychiatric/Behavioral: Negative.         Objective:   /62 mmHg  Pulse 68  Ht 1.803 m (5' 11\")  Wt 94.802 kg (209 lb)  BMI 29.16 kg/m2  " SpO2 92%    Physical Exam   Constitutional: He is oriented to person, place, and time. He appears well-developed and well-nourished.   HENT:   Head: Normocephalic and atraumatic.   Eyes: Pupils are equal, round, and reactive to light.   Neck: Normal range of motion. Neck supple. No thyromegaly present.   Cardiovascular: Normal rate and regular rhythm.  Exam reveals no gallop and no friction rub.    No murmur heard.  Pulmonary/Chest: Effort normal and breath sounds normal. No respiratory distress. He has no wheezes. He has no rales.   Device site left chest is uncomplicated.   Abdominal: Soft. Bowel sounds are normal. He exhibits no distension. There is no tenderness. There is no guarding.   Musculoskeletal: Normal range of motion. He exhibits no edema.   Neurological: He is alert and oriented to person, place, and time.   Skin: Skin is warm and dry.   Psychiatric: He has a normal mood and affect.     BiV ICD in place see flow sheet.  Assessment:     1. Paroxysmal atrial fibrillation (CMS-HCC)     2. Biventricular implantable cardioverter-defibrillator in situ  potassium chloride SA (KDUR) 20 MEQ Tab CR   3. S/P ablation of atrial fibrillation     4. Essential hypertension     5. Ischemic cardiomyopathy  potassium chloride SA (KDUR) 20 MEQ Tab CR   6. Hypokalemia  COMP METABOLIC PANEL   7. Coronary artery disease involving native coronary artery of native heart without angina pectoris  potassium chloride SA (KDUR) 20 MEQ Tab CR       Medical Decision Making:  Today's Assessment / Status / Plan:     1. PAF short, seconds only nonsustained.  2. BiV ICD demonstrating appropriate function.  3. HBP non problematic.  4. ICM euvolemic  5. Hypokalemia K+ recent labs 3.7.  6. CAD no chest pain.  RTC 2 months.    Collaborating MD RHODA Shankar M.D.  [address] [facility] [place] [address]  VIA Facsimile: [phone]

## 2017-04-19 NOTE — PROGRESS NOTES
"Subjective:   Isai Fuentes is a 76 y.o. male who presents today for review of his hypokalemia, CAD, AF and ICM and BIV ICD>  He continues to experience fatigue with activity. His muscle cramping has improved with replacement of his K+. He has had some non-sustained Af episodes short duration seconds only. He continues on the Coreg and Entresto.  He has had PVI in the remote past, BIV ICD and CABG in 2008.  He continues on low dose ASA but will need to watch for recurrence of sustained PAF.  If this occurs may need to reconsider AC and AA.  Exam today appears to be in good fluid balance. No chest pain.  Device function intact.    Past Medical History   Diagnosis Date   • Other drug allergy(995.27) 10/16/2008   • Atrial fibrillation (CMS-HCC) 9/20/2011   • CAD (coronary artery disease) 9/20/2011   • CARDIOMYOPATHY 9/20/2011   • History of cardiac catheterization 9/20/2011   • Fatigue 10/28/2010   • Heartburn 10/23/2008   • HTN (hypertension) 9/20/2011   • Insomnia 7/8/2010   • Ischemic cardiomyopathy 9/20/2011   • Orthostatic hypotension 5/6/2009   • Presence of permanent cardiac pacemaker 9/20/2011   • Pleural effusion 5/18/2009   • History of myocardial infarction      \"many\"   • Prostate cancer (CMS-HCC) 5/13/2011   • Second degree AV block, Mobitz type II 11/10/2010   • Long term (current) use of anticoagulants 5/13/2011   • Congestive heart failure (CMS-HCC)    • Pacemaker      [employer]   • Indigestion    • Other specified disorder of intestines 07-28-15     constipation/diarrhea/ reports some bleeding from rectum w/blood clots. Seeing GI   • Breath shortness    • Snoring    • Pain 07-28-15     \"chronic\", 0/10   • Biventricular implantable cardioverter-defibrillator in situ 8/7/2015   • Cancer (CMS-HCC) 2012     prostate   • Myocardial infarct (CMS-HCC) 2007   • WILLA (obstructive sleep apnea)      CPAP   • Stroke (CMS-HCC) 2/3/2009   • COPD (chronic obstructive pulmonary disease) (CMS-HCC)      Past " "Surgical History   Procedure Laterality Date   • Cardiac cath       has 2 stents   • Multiple coronary artery bypass  2007     2 vessel   • Pacemaker insertion  11/24/08     St Dre   • Recovery  7/30/2015     Procedure: CATH LAB  LEAD EXTRACTION, UPGRADE TO BIV PM ST.DRE BIPING GRP KENIA ;  Surgeon: Recoveryonly Surgery;  Location: SURGERY PRE-POST PROC UNIT [facility];  Service:    • Recovery  7/29/2015     Procedure: CATH LAB NEW PM INSERTION DUAL ATRIAL & VENTRICULAR-LV LEAD W/ NEW GENERATOR AQUINO ICD9: 414.8;  Surgeon: Recoveryonly Surgery;  Location: SURGERY PRE-POST PROC UNIT [facility];  Service:    • Recovery  8/14/2015     Procedure:  CATH LAB LEAD EXTRACTION AWILDA ST.DRE LRG RP KENIA;  Surgeon: Recoveryonly Surgery;  Location: SURGERY PRE-POST PROC UNIT [facility];  Service:    • Recovery  10/16/2015     Procedure: CATH LAB LEAD REVISION .DRE AQUINO;  Surgeon: Recoveryonfreddy Surgery;  Location: SURGERY PRE-POST PROC UNIT [facility];  Service:      History reviewed. No pertinent family history.  History   Smoking status   • Former Smoker -- 1.50 packs/day for 3 years   • Types: Cigarettes   • Quit date: 01/01/1970   Smokeless tobacco   • Former User   • Types: Chew   • Quit date: 01/01/1972     Allergies   Allergen Reactions   • Plavix [Clopidogrel Bisulfate] Anaphylaxis   • Statins [Hmg-Coa-R Inhibitors] Unspecified     Muscles aches     • Ticlid [Ticlopidine Hydrochloride] Unspecified     Pt states \"I'm not sure what happens\".       Outpatient Encounter Prescriptions as of 4/19/2017   Medication Sig Dispense Refill   • potassium chloride SA (KDUR) 20 MEQ Tab CR Take 2 Tabs by mouth 2 times a day. 480 Tab 3   • spironolactone (ALDACTONE) 25 MG Tab Take 1 Tab by mouth every day. 30 Tab 3   • carvedilol (COREG) 6.25 MG Tab Take 6.25 mg by mouth 2 times a day, with meals.     • furosemide (LASIX) 40 MG Tab Take 40 mg by mouth every day.     • metolazone (ZAROXOLYN) 5 MG Tab Take 5 mg by mouth as needed. Indications: Edema     • " "sacubitril-valsartan (ENTRESTO) 24-26 MG Tab tablet Take 0.5 Tabs by mouth 2 Times a Day.     • Methotrexate, PF, 10 MG/0.2ML Solution Auto-injector Inject 10 mg as instructed every 7 days. On Mon   Indications: Psoriasis     • Omega-3 Fatty Acids (FISH OIL) 1000 MG Cap capsule Take 1,000 mg by mouth every day.     • Multiple Vitamins-Minerals (OCUVITE) Tab Take 1 Tab by mouth every day.     • aspirin 81 MG tablet Take 81 mg by mouth every day.     • cyanocobalamin (VITAMIN B-12) 500 MCG TABS Take 1,000 mcg by mouth every day.     • Coenzyme Q10 (CO Q-10 PO) Take 1 Cap by mouth every day.     • [DISCONTINUED] potassium chloride SA (KDUR) 20 MEQ Tab CR Take 2 Tabs by mouth 2 times a day. 120 Tab 3     No facility-administered encounter medications on file as of 4/19/2017.     Review of Systems   Constitutional: Negative for fever, chills, weight loss and malaise/fatigue.   HENT: Negative for congestion and sore throat.    Eyes: Negative for blurred vision and double vision.   Respiratory: Negative for cough, sputum production, shortness of breath and wheezing.    Cardiovascular: Negative for chest pain, palpitations, orthopnea, claudication, leg swelling and PND.   Gastrointestinal: Negative for heartburn, nausea, vomiting and abdominal pain.   Genitourinary: Negative for dysuria, frequency and flank pain.   Musculoskeletal: Negative.    Skin: Negative.    Neurological: Negative for dizziness, speech change, focal weakness, seizures, loss of consciousness and headaches.   Endo/Heme/Allergies: Negative.    Psychiatric/Behavioral: Negative.         Objective:   /62 mmHg  Pulse 68  Ht 1.803 m (5' 11\")  Wt 94.802 kg (209 lb)  BMI 29.16 kg/m2  SpO2 92%    Physical Exam   Constitutional: He is oriented to person, place, and time. He appears well-developed and well-nourished.   HENT:   Head: Normocephalic and atraumatic.   Eyes: Pupils are equal, round, and reactive to light.   Neck: Normal range of motion. Neck " supple. No thyromegaly present.   Cardiovascular: Normal rate and regular rhythm.  Exam reveals no gallop and no friction rub.    No murmur heard.  Pulmonary/Chest: Effort normal and breath sounds normal. No respiratory distress. He has no wheezes. He has no rales.   Device site left chest is uncomplicated.   Abdominal: Soft. Bowel sounds are normal. He exhibits no distension. There is no tenderness. There is no guarding.   Musculoskeletal: Normal range of motion. He exhibits no edema.   Neurological: He is alert and oriented to person, place, and time.   Skin: Skin is warm and dry.   Psychiatric: He has a normal mood and affect.     BiV ICD in place see flow sheet.  Assessment:     1. Paroxysmal atrial fibrillation (CMS-HCC)     2. Biventricular implantable cardioverter-defibrillator in situ  potassium chloride SA (KDUR) 20 MEQ Tab CR   3. S/P ablation of atrial fibrillation     4. Essential hypertension     5. Ischemic cardiomyopathy  potassium chloride SA (KDUR) 20 MEQ Tab CR   6. Hypokalemia  COMP METABOLIC PANEL   7. Coronary artery disease involving native coronary artery of native heart without angina pectoris  potassium chloride SA (KDUR) 20 MEQ Tab CR       Medical Decision Making:  Today's Assessment / Status / Plan:     1. PAF short, seconds only nonsustained.  2. BiV ICD demonstrating appropriate function.  3. HBP non problematic.  4. ICM euvolemic  5. Hypokalemia K+ recent labs 3.7.  6. CAD no chest pain.  RTC 2 months.    Collaborating MD RHODA Billings

## 2017-04-19 NOTE — MR AVS SNAPSHOT
"        Isai Fuentes   2017 1:20 PM   Office Visit   MRN: [id number]    Department:  [facility]   Dept Phone:  [phone]    Description:  Male : 1940   Provider:  PRATEEK Gregorio           Reason for Visit     Follow-Up           Allergies as of 2017     Allergen Noted Reactions    Plavix [Clopidogrel Bisulfate] 2011   Anaphylaxis    Statins [Hmg-Coa-R Inhibitors] 2011   Unspecified    Muscles aches      Ticlid [Ticlopidine Hydrochloride] 2009   Unspecified    Pt states \"I'm not sure what happens\".        You were diagnosed with     Paroxysmal atrial fibrillation (CMS-HCC)   [120459]       Biventricular implantable cardioverter-defibrillator in situ   [8755586]       S/P ablation of atrial fibrillation   [910236]       Essential hypertension   [7198757]       Ischemic cardiomyopathy   [197719]       Hypokalemia   [775321]       Coronary artery disease involving native coronary artery of native heart without angina pectoris   [4840984]         Vital Signs     Blood Pressure Pulse Height Weight Body Mass Index Oxygen Saturation    100/62 mmHg 68 1.803 m (5' 11\") 94.802 kg (209 lb) 29.16 kg/m2 92%    Smoking Status                   Former Smoker           Basic Information     Date Of Birth Sex Race Ethnicity Preferred Language    1940 Male White Non- English      Your appointments     Aug 03, 2017  1:20 PM   FOLLOW UP with Edd Billings M.D.   [facility] [place] (--)    [address], [address]  [place] [id number] [phone]            Sep 28, 2017 10:40 AM   [facility] (--)    [address]  [address]  [place] [id number] [phone]              Problem List              ICD-10-CM Priority Class Noted - Resolved    Atrial fibrillation (CMS-HCC) I48.91 High  2011 - Present    CAD (coronary artery disease) (Chronic) I25.10 " High  9/20/2011 - Present    Heartburn (Chronic) R12 Low  10/23/2008 - Present    Hypercholesteremia (Chronic) E78.00 Medium  4/25/2012 - Present    HTN (hypertension) (Chronic) I10 High  9/20/2011 - Present    Insomnia (Chronic) G47.00 Low  7/8/2010 - Present    Ischemic cardiomyopathy (Chronic) I25.5 High  9/20/2011 - Present    Orthostatic hypotension (Chronic) I95.1 Low  5/6/2009 - Present    History of myocardial infarction (Chronic) I25.2 Medium  7/19/2011 - Present    Prostate cancer (CMS-HCC) (Chronic) C61 Low  5/13/2011 - Present    Second degree AV block, Mobitz type II (Chronic) I44.1 High  11/10/2010 - Present    Sleep apnea (Chronic) G47.30 Low  10/28/2010 - Present    Stroke (CMS-HCC) (Chronic) I63.9 Low  2/3/2009 - Present    Portal hypertension (CMS-HCC) K76.6 Low  4/17/2015 - Present    NYHA class 3 acute on chronic systolic heart failure (CMS-HCC) I50.23 Medium  7/7/2015 - Present    S/P ablation of atrial fibrillation Z98.890, Z86.79 Low  7/7/2015 - Present    Biventricular implantable cardioverter-defibrillator in situ Z95.810 Medium  8/7/2015 - Present    Bilateral hearing loss H91.93 Low  9/8/2015 - Present    Alcohol abuse F10.10   6/21/2016 - Present      Health Maintenance        Date Due Completion Dates    COLONOSCOPY 12/29/1990 ---    IMM ZOSTER VACCINE 12/29/2000 ---    IMM DTaP/Tdap/Td Vaccine (1 - Tdap) 5/20/2011 5/19/2011            Current Immunizations     13-VALENT PCV PREVNAR 12/15/2016    Influenza TIV (IM) 11/30/2014    Influenza Vaccine Adult HD 1/1/2017,  Deferred (Patient Schedule), 10/17/2015  8:58 AM    Pneumococcal polysaccharide vaccine (PPSV-23) 11/30/2014    TD Vaccine 5/19/2011  3:30 PM      Below and/or attached are the medications your provider expects you to take. Review all of your home medications and newly ordered medications with your provider and/or pharmacist. Follow medication instructions as directed by your provider and/or pharmacist. Please keep your  medication list with you and share with your provider. Update the information when medications are discontinued, doses are changed, or new medications (including over-the-counter products) are added; and carry medication information at all times in the event of emergency situations     Allergies:  PLAVIX - Anaphylaxis     STATINS - Unspecified     TICLID - Unspecified               Medications  Valid as of: April 19, 2017 -  2:11 PM    Generic Name Brand Name Tablet Size Instructions for use    Aspirin (Tab) aspirin 81 MG Take 81 mg by mouth every day.        Carvedilol (Tab) COREG 6.25 MG Take 6.25 mg by mouth 2 times a day, with meals.        Coenzyme Q10   Take 1 Cap by mouth every day.        Cyanocobalamin (Tab) VITAMIN B-12 500 MCG Take 1,000 mcg by mouth every day.        Furosemide (Tab) LASIX 40 MG Take 40 mg by mouth every day.        Methotrexate (Anti-Rheumatic) (Solution Auto-injector) Methotrexate (PF) 10 MG/0.2ML Inject 10 mg as instructed every 7 days. On Mon   Indications: Psoriasis        MetOLazone (Tab) ZAROXOLYN 5 MG Take 5 mg by mouth as needed. Indications: Edema        Multiple Vitamins-Minerals (Tab) OCUVITE  Take 1 Tab by mouth every day.        Omega-3 Fatty Acids (Cap) fish oil 1000 MG Take 1,000 mg by mouth every day.        Potassium Chloride Maria Teresa CR (Tab CR) Kdur 20 MEQ Take 2 Tabs by mouth 2 times a day.        Sacubitril-Valsartan (Tab) ENTRESTO 24-26 MG Take 0.5 Tabs by mouth 2 Times a Day.        Spironolactone (Tab) ALDACTONE 25 MG Take 1 Tab by mouth every day.        .                 Medicines prescribed today were sent to:     [id number] - [place], [place] - [address]    [address] [place] [address]    Phone: [phone] Fax: [phone]    Open 24 Hours?: No      Medication refill instructions:       If your prescription bottle indicates you have medication refills left, it is not necessary to call your provider’s office. Please contact your pharmacy and  they will refill your medication.    If your prescription bottle indicates you do not have any refills left, you may request refills at any time through one of the following ways: The online [employer] system (except Urgent Care), by calling your provider’s office, or by asking your pharmacy to contact your provider’s office with a refill request. Medication refills are processed only during regular business hours and may not be available until the next business day. Your provider may request additional information or to have a follow-up visit with you prior to refilling your medication.   *Please Note: Medication refills are assigned a new Rx number when refilled electronically. Your pharmacy may indicate that no refills were authorized even though a new prescription for the same medication is available at the pharmacy. Please request the medicine by name with the pharmacy before contacting your provider for a refill.        Your To Do List     Future Labs/Procedures Complete By Expires    COMP METABOLIC PANEL  As directed 4/19/2018         [employer] Access Code: Activation code not generated  Current [employer] Status: Active

## 2017-05-10 ENCOUNTER — HOSPITAL ENCOUNTER (OUTPATIENT)
Dept: LAB | Facility: MEDICAL CENTER | Age: 77
End: 2017-05-10
Attending: RADIOLOGY
Payer: MEDICARE

## 2017-05-10 LAB — PSA SERPL-MCNC: 0.12 NG/ML (ref 0–4)

## 2017-05-10 PROCEDURE — 84153 ASSAY OF PSA TOTAL: CPT

## 2017-05-10 PROCEDURE — 36415 COLL VENOUS BLD VENIPUNCTURE: CPT

## 2017-05-17 DIAGNOSIS — I10 ESSENTIAL HYPERTENSION: ICD-10-CM

## 2017-05-17 RX ORDER — FUROSEMIDE 40 MG/1
40 TABLET ORAL DAILY
Qty: 30 TAB | Refills: 11 | Status: SHIPPED | OUTPATIENT
Start: 2017-05-17 | End: 2018-04-06 | Stop reason: SDUPTHER

## 2017-06-14 ENCOUNTER — HOSPITAL ENCOUNTER (OUTPATIENT)
Dept: LAB | Facility: MEDICAL CENTER | Age: 77
End: 2017-06-14
Attending: NURSE PRACTITIONER
Payer: MEDICARE

## 2017-06-14 DIAGNOSIS — I25.5 ISCHEMIC CARDIOMYOPATHY: Chronic | ICD-10-CM

## 2017-06-14 LAB
ALBUMIN SERPL BCP-MCNC: 3.9 G/DL (ref 3.2–4.9)
ALBUMIN/GLOB SERPL: 1.2 G/DL
ALP SERPL-CCNC: 90 U/L (ref 30–99)
ALT SERPL-CCNC: 16 U/L (ref 2–50)
ANION GAP SERPL CALC-SCNC: 11 MMOL/L (ref 0–11.9)
AST SERPL-CCNC: 20 U/L (ref 12–45)
BILIRUB SERPL-MCNC: 0.7 MG/DL (ref 0.1–1.5)
BUN SERPL-MCNC: 40 MG/DL (ref 8–22)
CALCIUM SERPL-MCNC: 9.4 MG/DL (ref 8.5–10.5)
CHLORIDE SERPL-SCNC: 98 MMOL/L (ref 96–112)
CO2 SERPL-SCNC: 26 MMOL/L (ref 20–33)
CREAT SERPL-MCNC: 1.86 MG/DL (ref 0.5–1.4)
GFR SERPL CREATININE-BSD FRML MDRD: 35 ML/MIN/1.73 M 2
GLOBULIN SER CALC-MCNC: 3.2 G/DL (ref 1.9–3.5)
GLUCOSE SERPL-MCNC: 109 MG/DL (ref 65–99)
POTASSIUM SERPL-SCNC: 3.6 MMOL/L (ref 3.6–5.5)
PROT SERPL-MCNC: 7.1 G/DL (ref 6–8.2)
SODIUM SERPL-SCNC: 135 MMOL/L (ref 135–145)

## 2017-06-14 PROCEDURE — 36415 COLL VENOUS BLD VENIPUNCTURE: CPT

## 2017-06-14 PROCEDURE — 80053 COMPREHEN METABOLIC PANEL: CPT

## 2017-06-15 ENCOUNTER — OFFICE VISIT (OUTPATIENT)
Dept: CARDIOLOGY | Facility: MEDICAL CENTER | Age: 77
End: 2017-06-15
Payer: MEDICARE

## 2017-06-15 VITALS
DIASTOLIC BLOOD PRESSURE: 76 MMHG | SYSTOLIC BLOOD PRESSURE: 126 MMHG | BODY MASS INDEX: 28.98 KG/M2 | OXYGEN SATURATION: 95 % | HEIGHT: 71 IN | WEIGHT: 207 LBS | HEART RATE: 52 BPM

## 2017-06-15 DIAGNOSIS — Z86.79 S/P ABLATION OF ATRIAL FIBRILLATION: ICD-10-CM

## 2017-06-15 DIAGNOSIS — Z98.890 S/P ABLATION OF ATRIAL FIBRILLATION: ICD-10-CM

## 2017-06-15 DIAGNOSIS — I25.5 ISCHEMIC CARDIOMYOPATHY: Chronic | ICD-10-CM

## 2017-06-15 DIAGNOSIS — Z95.810 BIVENTRICULAR IMPLANTABLE CARDIOVERTER-DEFIBRILLATOR IN SITU: ICD-10-CM

## 2017-06-15 PROCEDURE — 99214 OFFICE O/P EST MOD 30 MIN: CPT | Performed by: NURSE PRACTITIONER

## 2017-06-15 ASSESSMENT — ENCOUNTER SYMPTOMS
BLURRED VISION: 0
COUGH: 0
HEARTBURN: 0
PALPITATIONS: 0
SPUTUM PRODUCTION: 0
VOMITING: 0
WHEEZING: 0
FLANK PAIN: 0
ORTHOPNEA: 0
NAUSEA: 0
CHILLS: 0
FOCAL WEAKNESS: 0
SEIZURES: 0
FEVER: 0
CLAUDICATION: 0
DOUBLE VISION: 0
HEADACHES: 0
SPEECH CHANGE: 0
ABDOMINAL PAIN: 0
SORE THROAT: 0
SHORTNESS OF BREATH: 1
PSYCHIATRIC NEGATIVE: 1
PND: 0
DIZZINESS: 0
LOSS OF CONSCIOUSNESS: 0
MUSCULOSKELETAL NEGATIVE: 1

## 2017-06-15 NOTE — PROGRESS NOTES
"Subjective:   Isai Fuentes is a 76 y.o. male who presents today for review of his BiV ICD, atrial arrhythmias , VT and HF.   He continues to experience fatigue with activity. His muscle cramping has improved with replacement of his K+. He has had some non-sustained SVT episodes > 1 minute in duration  bpm he believes it was when he was walking his dog. He continues on the Coreg and Entresto.  He has had PVI in the remote past, BIV ICD and CABG in 2008.  He continues on low dose ASA no sustained AF episodes.  Exam today appears to be in good fluid balance. No chest pain.  Device function intact. Device site an area on medial surface that capillary refill prolonged.  K+ 3.6      Past Medical History   Diagnosis Date   • Other drug allergy 10/16/2008   • Atrial fibrillation (CMS-HCC) 9/20/2011   • CAD (coronary artery disease) 9/20/2011   • CARDIOMYOPATHY 9/20/2011   • History of cardiac catheterization 9/20/2011   • Fatigue 10/28/2010   • Heartburn 10/23/2008   • HTN (hypertension) 9/20/2011   • Insomnia 7/8/2010   • Ischemic cardiomyopathy 9/20/2011   • Orthostatic hypotension 5/6/2009   • Presence of permanent cardiac pacemaker 9/20/2011   • Pleural effusion 5/18/2009   • History of myocardial infarction      \"many\"   • Prostate cancer (CMS-HCC) 5/13/2011   • Second degree AV block, Mobitz type II 11/10/2010   • Long term (current) use of anticoagulants 5/13/2011   • Congestive heart failure (CMS-HCC)    • Pacemaker      [employer]   • Indigestion    • Other specified disorder of intestines 07-28-15     constipation/diarrhea/ reports some bleeding from rectum w/blood clots. Seeing GI   • Breath shortness    • Snoring    • Pain 07-28-15     \"chronic\", 0/10   • Biventricular implantable cardioverter-defibrillator in situ 8/7/2015   • Cancer (CMS-HCC) 2012     prostate   • Myocardial infarct (CMS-HCC) 2007   • WILLA (obstructive sleep apnea)      CPAP   • Stroke (CMS-HCC) 2/3/2009   • COPD (chronic " "obstructive pulmonary disease) (CMS-[facility])      Past Surgical History   Procedure Laterality Date   • Cardiac cath       has 2 stents   • Multiple coronary artery bypass  2007     2 vessel   • Pacemaker insertion  11/24/08     St Dre   • Recovery  7/30/2015     Procedure: CATH LAB  LEAD EXTRACTION, UPGRADE TO BIV PM ST.DRE BIPING MAGGIE AQUINO ;  Surgeon: Recoveryonfreddy Surgery;  Location: SURGERY PRE-POST PROC UNIT [facility];  Service:    • Recovery  7/29/2015     Procedure: CATH LAB NEW PM INSERTION DUAL ATRIAL & VENTRICULAR-LV LEAD W/ NEW GENERATOR AQUINO ICD9: 414.8;  Surgeon: Recoveryonly Surgery;  Location: SURGERY PRE-POST PROC UNIT [facility];  Service:    • Recovery  8/14/2015     Procedure:  CATH LAB LEAD EXTRACTION AWILDA ST.DRE LRG PAULO AQUINO;  Surgeon: Recoveryonfreddy Surgery;  Location: SURGERY PRE-POST PROC UNIT [facility];  Service:    • Recovery  10/16/2015     Procedure: CATH LAB LEAD REVISION ST.DREJOSI AQUINO;  Surgeon: Recoveryonfreddy Surgery;  Location: SURGERY PRE-POST PROC UNIT [facility];  Service:      History reviewed. No pertinent family history.  History   Smoking status   • Former Smoker -- 1.50 packs/day for 3 years   • Types: Cigarettes   • Quit date: 01/01/1970   Smokeless tobacco   • Former User   • Types: Chew   • Quit date: 01/01/1972     Allergies   Allergen Reactions   • Plavix [Clopidogrel Bisulfate] Anaphylaxis   • Statins [Hmg-Coa-R Inhibitors] Unspecified     Muscles aches     • Ticlid [Ticlopidine Hydrochloride] Unspecified     Pt states \"I'm not sure what happens\".       Outpatient Encounter Prescriptions as of 6/15/2017   Medication Sig Dispense Refill   • furosemide (LASIX) 40 MG Tab Take 1 Tab by mouth every day. 30 Tab 11   • potassium chloride SA (KDUR) 20 MEQ Tab CR Take 2 Tabs by mouth 2 times a day. 480 Tab 3   • spironolactone (ALDACTONE) 25 MG Tab Take 1 Tab by mouth every day. 30 Tab 3   • carvedilol (COREG) 6.25 MG Tab Take 6.25 mg by mouth 2 times a day, with meals.     • metolazone (ZAROXOLYN) 5 MG Tab Take 5 " "mg by mouth as needed. Indications: Edema     • sacubitril-valsartan (ENTRESTO) 24-26 MG Tab tablet Take 0.5 Tabs by mouth 2 Times a Day.     • Methotrexate, PF, 10 MG/0.2ML Solution Auto-injector Inject 10 mg as instructed every 7 days. On Mon   Indications: Psoriasis     • Omega-3 Fatty Acids (FISH OIL) 1000 MG Cap capsule Take 1,000 mg by mouth every day.     • Multiple Vitamins-Minerals (OCUVITE) Tab Take 1 Tab by mouth every day.     • aspirin 81 MG tablet Take 81 mg by mouth every day.     • cyanocobalamin (VITAMIN B-12) 500 MCG TABS Take 1,000 mcg by mouth every day.     • Coenzyme Q10 (CO Q-10 PO) Take 1 Cap by mouth every day.       No facility-administered encounter medications on file as of 6/15/2017.     Review of Systems   Constitutional: Negative for fever, chills and malaise/fatigue. Weight loss: 2#   HENT: Negative for congestion and sore throat.    Eyes: Negative for blurred vision and double vision.   Respiratory: Positive for shortness of breath (with exertion). Negative for cough, sputum production and wheezing.    Cardiovascular: Negative for chest pain, palpitations, orthopnea, claudication, leg swelling and PND.   Gastrointestinal: Negative for heartburn, nausea, vomiting and abdominal pain.   Genitourinary: Negative for dysuria, frequency and flank pain.   Musculoskeletal: Negative.    Skin: Negative.    Neurological: Negative for dizziness, speech change, focal weakness, seizures, loss of consciousness and headaches.   Endo/Heme/Allergies: Negative.    Psychiatric/Behavioral: Negative.         Objective:   /76 mmHg  Pulse 52  Ht 1.803 m (5' 10.98\")  Wt 93.895 kg (207 lb)  BMI 28.88 kg/m2  SpO2 95%    Physical Exam   Constitutional: He is oriented to person, place, and time. He appears well-developed and well-nourished.   HENT:   Head: Normocephalic and atraumatic.   Eyes: Pupils are equal, round, and reactive to light.   Neck: Normal range of motion. Neck supple. No thyromegaly " "present.   Cardiovascular: Normal rate and regular rhythm.  Exam reveals no gallop and no friction rub.    No murmur heard.  Pulmonary/Chest: Effort normal and breath sounds normal. No respiratory distress. He has no wheezes. He has no rales.   Device site medial region area of thinning of scar and question of impending erosion.   Abdominal: Soft. Bowel sounds are normal. He exhibits no distension. There is no tenderness. There is no guarding.   Musculoskeletal: Normal range of motion. He exhibits no edema.   Neurological: He is alert and oriented to person, place, and time.   Skin: Skin is warm and dry.   Psychiatric: He has a normal mood and affect.   Device function intact see flow sheet.    Assessment:     1. Ischemic cardiomyopathy     2. Biventricular implantable cardioverter-defibrillator in situ     3. S/P ablation of atrial fibrillation         Medical Decision Making:  Today's Assessment / Status / Plan:     1. Ischemic CM with limited exertional capacity.  2. BiV ICD with episode of either atrial flutter with  bpm or ST with walking his dog who pulls on the leash and \"stops too much.\"  3. S/P atrial fib ablation  4. Device site concern will have him see Dr Figueredo for his opinion.    Collaborating MD PERICO Ordaz  "

## 2017-06-15 NOTE — MR AVS SNAPSHOT
"        Isai Fuentes   6/15/2017 1:20 PM   Office Visit   MRN: [id number]    Department:  [facility]   Dept Phone:  [phone]    Description:  Male : 1940   Provider:  PRATEEK Gregorio           Reason for Visit     Follow-Up           Allergies as of 6/15/2017     Allergen Noted Reactions    Plavix [Clopidogrel Bisulfate] 2011   Anaphylaxis    Statins [Hmg-Coa-R Inhibitors] 2011   Unspecified    Muscles aches      Ticlid [Ticlopidine Hydrochloride] 2009   Unspecified    Pt states \"I'm not sure what happens\".        Vital Signs     Blood Pressure Pulse Height Weight Body Mass Index Oxygen Saturation    126/76 mmHg 52 1.803 m (5' 10.98\") 93.895 kg (207 lb) 28.88 kg/m2 95%    Smoking Status                   Former Smoker           Basic Information     Date Of Birth Sex Race Ethnicity Preferred Language    1940 Male White Non- English      Your appointments     2017  3:20 PM   FOLLOW UP with Angel Luis Figueredo M.D.   [facility] (--)    [address], [address]  [place] [address]   [phone]            Aug 03, 2017 12:45 PM   PACER CHECK ONLY with PACER CHECK-[facility]   [facility] (--)    [address], [address]  [place] [address]   [phone]            Aug 03, 2017  1:20 PM   FOLLOW UP with Edd Billings M.D.   [facility] (--)    [address], [address]  [place] [address]   [phone]            Sep 28, 2017 10:40 AM   Established Patient Pul with Ruby Thakur M.D.   [facility] (--)    [address]  [address]  [place] [id number] [phone]              Problem List              ICD-10-CM Priority Class Noted - Resolved    Atrial fibrillation (CMS-HCC) I48.91 High  2011 - Present    CAD (coronary artery disease) (Chronic) I25.10 High  2011 - Present    Heartburn (Chronic) R12 Low  " 10/23/2008 - Present    Hypercholesteremia (Chronic) E78.00 Medium  4/25/2012 - Present    HTN (hypertension) (Chronic) I10 High  9/20/2011 - Present    Insomnia (Chronic) G47.00 Low  7/8/2010 - Present    Ischemic cardiomyopathy (Chronic) I25.5 High  9/20/2011 - Present    Orthostatic hypotension (Chronic) I95.1 Low  5/6/2009 - Present    History of myocardial infarction (Chronic) I25.2 Medium  7/19/2011 - Present    Prostate cancer (CMS-HCC) (Chronic) C61 Low  5/13/2011 - Present    Second degree AV block, Mobitz type II (Chronic) I44.1 High  11/10/2010 - Present    Sleep apnea (Chronic) G47.30 Low  10/28/2010 - Present    Stroke (CMS-HCC) (Chronic) I63.9 Low  2/3/2009 - Present    Portal hypertension (CMS-HCC) K76.6 Low  4/17/2015 - Present    NYHA class 3 acute on chronic systolic heart failure (CMS-HCC) I50.23 Medium  7/7/2015 - Present    S/P ablation of atrial fibrillation Z98.890, Z86.79 Low  7/7/2015 - Present    Biventricular implantable cardioverter-defibrillator in situ Z95.810 Medium  8/7/2015 - Present    Bilateral hearing loss H91.93 Low  9/8/2015 - Present    Alcohol abuse F10.10   6/21/2016 - Present      Health Maintenance        Date Due Completion Dates    COLONOSCOPY 12/29/1990 ---    IMM ZOSTER VACCINE 12/29/2000 ---    IMM DTaP/Tdap/Td Vaccine (1 - Tdap) 5/20/2011 5/19/2011            Current Immunizations     13-VALENT PCV PREVNAR 12/15/2016    Influenza TIV (IM) 11/30/2014    Influenza Vaccine Adult HD 1/1/2017,  Deferred (Patient Schedule), 10/17/2015  8:58 AM    Pneumococcal polysaccharide vaccine (PPSV-23) 11/30/2014    TD Vaccine 5/19/2011  3:30 PM      Below and/or attached are the medications your provider expects you to take. Review all of your home medications and newly ordered medications with your provider and/or pharmacist. Follow medication instructions as directed by your provider and/or pharmacist. Please keep your medication list with you and share with your provider. Update  the information when medications are discontinued, doses are changed, or new medications (including over-the-counter products) are added; and carry medication information at all times in the event of emergency situations     Allergies:  PLAVIX - Anaphylaxis     STATINS - Unspecified     TICLID - Unspecified               Medications  Valid as of: Bere 15, 2017 -  1:41 PM    Generic Name Brand Name Tablet Size Instructions for use    Aspirin (Tab) aspirin 81 MG Take 81 mg by mouth every day.        Carvedilol (Tab) COREG 6.25 MG Take 6.25 mg by mouth 2 times a day, with meals.        Coenzyme Q10   Take 1 Cap by mouth every day.        Cyanocobalamin (Tab) VITAMIN B-12 500 MCG Take 1,000 mcg by mouth every day.        Furosemide (Tab) LASIX 40 MG Take 1 Tab by mouth every day.        Methotrexate (Anti-Rheumatic) (Solution Auto-injector) Methotrexate (PF) 10 MG/0.2ML Inject 10 mg as instructed every 7 days. On Mon   Indications: Psoriasis        MetOLazone (Tab) ZAROXOLYN 5 MG Take 5 mg by mouth as needed. Indications: Edema        Multiple Vitamins-Minerals (Tab) OCUVITE  Take 1 Tab by mouth every day.        Omega-3 Fatty Acids (Cap) fish oil 1000 MG Take 1,000 mg by mouth every day.        Potassium Chloride Maria Teresa CR (Tab CR) Kdur 20 MEQ Take 2 Tabs by mouth 2 times a day.        Sacubitril-Valsartan (Tab) ENTRESTO 24-26 MG Take 0.5 Tabs by mouth 2 Times a Day.        Spironolactone (Tab) ALDACTONE 25 MG Take 1 Tab by mouth every day.        .                 Medicines prescribed today were sent to:     [id number]  [place], [place] - [address] [address]    Phone: [phone] Fax: [phone]    Open 24 Hours?: No      Medication refill instructions:       If your prescription bottle indicates you have medication refills left, it is not necessary to call your provider’s office. Please contact your pharmacy and they will refill your medication.    If your prescription bottle  indicates you do not have any refills left, you may request refills at any time through one of the following ways: The online [email or web address] system (except Urgent Care), by calling your provider’s office, or by asking your pharmacy to contact your provider’s office with a refill request. Medication refills are processed only during regular business hours and may not be available until the next business day. Your provider may request additional information or to have a follow-up visit with you prior to refilling your medication.   *Please Note: Medication refills are assigned a new Rx number when refilled electronically. Your pharmacy may indicate that no refills were authorized even though a new prescription for the same medication is available at the pharmacy. Please request the medicine by name with the pharmacy before contacting your provider for a refill.           [email or web address] Access Code: Activation code not generated  Current [email or web address] Status: Active

## 2017-06-15 NOTE — Clinical Note
"     [facility]   [address], [address]  [place] [address]  Phone: [phone]  Fax: [phone]              Isai Fuentes  1940    Encounter Date: 6/15/2017    PRATEEK Gregorio          PROGRESS NOTE:  Subjective:   Isai Fuentes is a 76 y.o. male who presents today for review of his BiV ICD, atrial arrhythmias , VT and HF.   He continues to experience fatigue with activity. His muscle cramping has improved with replacement of his K+. He has had some non-sustained SVT episodes > 1 minute in duration  bpm he believes it was when he was walking his dog. He continues on the Coreg and Entresto.  He has had PVI in the remote past, BIV ICD and CABG in 2008.  He continues on low dose ASA no sustained AF episodes.  Exam today appears to be in good fluid balance. No chest pain.  Device function intact. Device site an area on medial surface that capillary refill prolonged.  K+ 3.6      Past Medical History   Diagnosis Date   • Other drug allergy 10/16/2008   • Atrial fibrillation (CMS-HCC) 9/20/2011   • CAD (coronary artery disease) 9/20/2011   • CARDIOMYOPATHY 9/20/2011   • History of cardiac catheterization 9/20/2011   • Fatigue 10/28/2010   • Heartburn 10/23/2008   • HTN (hypertension) 9/20/2011   • Insomnia 7/8/2010   • Ischemic cardiomyopathy 9/20/2011   • Orthostatic hypotension 5/6/2009   • Presence of permanent cardiac pacemaker 9/20/2011   • Pleural effusion 5/18/2009   • History of myocardial infarction      \"many\"   • Prostate cancer (CMS-HCC) 5/13/2011   • Second degree AV block, Mobitz type II 11/10/2010   • Long term (current) use of anticoagulants 5/13/2011   • Congestive heart failure (CMS-HCC)    • Pacemaker      [employer]   • Indigestion    • Other specified disorder of intestines 07-28-15     constipation/diarrhea/ reports some bleeding from rectum w/blood clots. Seeing GI   • Breath shortness    • Snoring    • Pain " "07-28-15     \"chronic\", 0/10   • Biventricular implantable cardioverter-defibrillator in situ 8/7/2015   • Cancer (CMS-HCC) 2012     prostate   • Myocardial infarct (CMS-HCC) 2007   • WILLA (obstructive sleep apnea)      CPAP   • Stroke (CMS-HCC) 2/3/2009   • COPD (chronic obstructive pulmonary disease) (CMS-HCC)      Past Surgical History   Procedure Laterality Date   • Cardiac cath       has 2 stents   • Multiple coronary artery bypass  2007     2 vessel   • Pacemaker insertion  11/24/08     [facility]   • Recovery  7/30/2015     Procedure: CATH LAB  LEAD EXTRACTION, UPGRADE TO BIV PM ST.DRE LRG MAGGIE AQUINO ;  Surgeon: Recoveryonly Surgery;  Location: SURGERY PRE-POST PROC UNIT [facility];  Service:    • Recovery  7/29/2015     Procedure: CATH LAB NEW PM INSERTION DUAL ATRIAL & VENTRICULAR-LV LEAD W/ NEW GENERATOR KENIA ICD9: 414.8;  Surgeon: Recoveryonly Surgery;  Location: SURGERY PRE-POST PROC UNIT [facility];  Service:    • Recovery  8/14/2015     Procedure:  CATH LAB LEAD EXTRACTION AWILDA ST.DRE LRG PAULO AQUINO;  Surgeon: Recoveryonly Surgery;  Location: SURGERY PRE-POST PROC UNIT [facility];  Service:    • Recovery  10/16/2015     Procedure: CATH LAB LEAD REVISION ST.DREJOSI AQUINO;  Surgeon: Recoveryonly Surgery;  Location: SURGERY PRE-POST PROC UNIT [facility];  Service:      History reviewed. No pertinent family history.  History   Smoking status   • Former Smoker -- 1.50 packs/day for 3 years   • Types: Cigarettes   • Quit date: 01/01/1970   Smokeless tobacco   • Former User   • Types: Chew   • Quit date: 01/01/1972     Allergies   Allergen Reactions   • Plavix [Clopidogrel Bisulfate] Anaphylaxis   • Statins [Hmg-Coa-R Inhibitors] Unspecified     Muscles aches     • Ticlid [Ticlopidine Hydrochloride] Unspecified     Pt states \"I'm not sure what happens\".       Outpatient Encounter Prescriptions as of 6/15/2017   Medication Sig Dispense Refill   • furosemide (LASIX) 40 MG Tab Take 1 Tab by mouth every day. 30 Tab 11   • potassium chloride SA " "(KDUR) 20 MEQ Tab CR Take 2 Tabs by mouth 2 times a day. 480 Tab 3   • spironolactone (ALDACTONE) 25 MG Tab Take 1 Tab by mouth every day. 30 Tab 3   • carvedilol (COREG) 6.25 MG Tab Take 6.25 mg by mouth 2 times a day, with meals.     • metolazone (ZAROXOLYN) 5 MG Tab Take 5 mg by mouth as needed. Indications: Edema     • sacubitril-valsartan (ENTRESTO) 24-26 MG Tab tablet Take 0.5 Tabs by mouth 2 Times a Day.     • Methotrexate, PF, 10 MG/0.2ML Solution Auto-injector Inject 10 mg as instructed every 7 days. On Mon   Indications: Psoriasis     • Omega-3 Fatty Acids (FISH OIL) 1000 MG Cap capsule Take 1,000 mg by mouth every day.     • Multiple Vitamins-Minerals (OCUVITE) Tab Take 1 Tab by mouth every day.     • aspirin 81 MG tablet Take 81 mg by mouth every day.     • cyanocobalamin (VITAMIN B-12) 500 MCG TABS Take 1,000 mcg by mouth every day.     • Coenzyme Q10 (CO Q-10 PO) Take 1 Cap by mouth every day.       No facility-administered encounter medications on file as of 6/15/2017.     Review of Systems   Constitutional: Negative for fever, chills and malaise/fatigue. Weight loss: 2#   HENT: Negative for congestion and sore throat.    Eyes: Negative for blurred vision and double vision.   Respiratory: Positive for shortness of breath (with exertion). Negative for cough, sputum production and wheezing.    Cardiovascular: Negative for chest pain, palpitations, orthopnea, claudication, leg swelling and PND.   Gastrointestinal: Negative for heartburn, nausea, vomiting and abdominal pain.   Genitourinary: Negative for dysuria, frequency and flank pain.   Musculoskeletal: Negative.    Skin: Negative.    Neurological: Negative for dizziness, speech change, focal weakness, seizures, loss of consciousness and headaches.   Endo/Heme/Allergies: Negative.    Psychiatric/Behavioral: Negative.         Objective:   /76 mmHg  Pulse 52  Ht 1.803 m (5' 10.98\")  Wt 93.895 kg (207 lb)  BMI 28.88 kg/m2  SpO2 95%    Physical " "Exam   Constitutional: He is oriented to person, place, and time. He appears well-developed and well-nourished.   HENT:   Head: Normocephalic and atraumatic.   Eyes: Pupils are equal, round, and reactive to light.   Neck: Normal range of motion. Neck supple. No thyromegaly present.   Cardiovascular: Normal rate and regular rhythm.  Exam reveals no gallop and no friction rub.    No murmur heard.  Pulmonary/Chest: Effort normal and breath sounds normal. No respiratory distress. He has no wheezes. He has no rales.   Device site medial region area of thinning of scar and question of impending erosion.   Abdominal: Soft. Bowel sounds are normal. He exhibits no distension. There is no tenderness. There is no guarding.   Musculoskeletal: Normal range of motion. He exhibits no edema.   Neurological: He is alert and oriented to person, place, and time.   Skin: Skin is warm and dry.   Psychiatric: He has a normal mood and affect.   Device function intact see flow sheet.    Assessment:     1. Ischemic cardiomyopathy     2. Biventricular implantable cardioverter-defibrillator in situ     3. S/P ablation of atrial fibrillation         Medical Decision Making:  Today's Assessment / Status / Plan:     1. Ischemic CM with limited exertional capacity.  2. BiV ICD with episode of either atrial flutter with  bpm or ST with walking his dog who pulls on the leash and \"stops too much.\"  3. S/P atrial fib ablation  4. Device site concern will have him see Dr Figueredo for his opinion.    Collaborating MD PERICO Shankar M.D.  [address] [place] [address]  VIA Facsimile: [phone]                   "

## 2017-06-16 ENCOUNTER — OFFICE VISIT (OUTPATIENT)
Dept: CARDIOLOGY | Facility: MEDICAL CENTER | Age: 77
End: 2017-06-16
Payer: MEDICARE

## 2017-06-16 ENCOUNTER — TELEPHONE (OUTPATIENT)
Dept: CARDIOLOGY | Facility: MEDICAL CENTER | Age: 77
End: 2017-06-16

## 2017-06-16 VITALS
HEART RATE: 81 BPM | DIASTOLIC BLOOD PRESSURE: 70 MMHG | WEIGHT: 205 LBS | SYSTOLIC BLOOD PRESSURE: 128 MMHG | BODY MASS INDEX: 28.6 KG/M2 | OXYGEN SATURATION: 93 %

## 2017-06-16 DIAGNOSIS — Z95.810 BIVENTRICULAR IMPLANTABLE CARDIOVERTER-DEFIBRILLATOR IN SITU: ICD-10-CM

## 2017-06-16 DIAGNOSIS — I25.5 ISCHEMIC CARDIOMYOPATHY: Chronic | ICD-10-CM

## 2017-06-16 DIAGNOSIS — I25.10 CORONARY ARTERY DISEASE INVOLVING NATIVE CORONARY ARTERY OF NATIVE HEART WITHOUT ANGINA PECTORIS: Chronic | ICD-10-CM

## 2017-06-16 DIAGNOSIS — I44.1 SECOND DEGREE AV BLOCK, MOBITZ TYPE II: Chronic | ICD-10-CM

## 2017-06-16 DIAGNOSIS — Z98.890 S/P ABLATION OF ATRIAL FIBRILLATION: ICD-10-CM

## 2017-06-16 DIAGNOSIS — Z86.79 S/P ABLATION OF ATRIAL FIBRILLATION: ICD-10-CM

## 2017-06-16 PROCEDURE — 99214 OFFICE O/P EST MOD 30 MIN: CPT | Performed by: INTERNAL MEDICINE

## 2017-06-16 NOTE — TELEPHONE ENCOUNTER
Spoke with Ivelisse to emphasize the importance of keeping appointment today, especially since pt. Is feeling so weak. Pt. Refuses to consider going to ER which would be another option. He will come in to appointment today. Ivelisse will call if he needs a wheelchair to help him get to the office.

## 2017-06-16 NOTE — MR AVS SNAPSHOT
"        Isai Fuentes   2017 3:20 PM   Office Visit   MRN: [id number]    Department:  [facility]   Dept Phone:  [phone]    Description:  Male : 1940   Provider:  Angel Luis Figueredo M.D.           Reason for Visit     Follow-Up           Allergies as of 2017     Allergen Noted Reactions    Plavix [Clopidogrel Bisulfate] 2011   Anaphylaxis    Statins [Hmg-Coa-R Inhibitors] 2011   Unspecified    Muscles aches      Ticlid [Ticlopidine Hydrochloride] 2009   Unspecified    Pt states \"I'm not sure what happens\".        Vital Signs     Blood Pressure Pulse Weight Oxygen Saturation Smoking Status       128/70 mmHg 81 92.987 kg (205 lb) 93% Former Smoker       Basic Information     Date Of Birth Sex Race Ethnicity Preferred Language    1940 Male White Non- English      Your appointments     Aug 03, 2017 12:45 PM   PACER CHECK ONLY with PACER CHECK-[facility]   [facility] (--)    [address], [address]  [place] [address]   [phone]            Aug 03, 2017  1:20 PM   FOLLOW UP with Edd Billings M.D.   [facility] (--)    [address]  [place] [id number] [phone]            Sep 28, 2017 10:40 AM   Established Patient Pul with Ruby Thakur M.D.   [facility] (--)    [address]  [address]  [place] [address] [phone]              Problem List              ICD-10-CM Priority Class Noted - Resolved    Atrial fibrillation (CMS-HCC) I48.91 High  2011 - Present    CAD (coronary artery disease) (Chronic) I25.10 High  2011 - Present    Heartburn (Chronic) R12 Low  10/23/2008 - Present    Hypercholesteremia (Chronic) E78.00 Medium  2012 - Present    HTN (hypertension) (Chronic) I10 High  2011 - Present    Insomnia (Chronic) G47.00 Low  2010 - Present    Ischemic cardiomyopathy (Chronic) I25.5 High  2011 - Present  "    Orthostatic hypotension (Chronic) I95.1 Low  5/6/2009 - Present    History of myocardial infarction (Chronic) I25.2 Medium  7/19/2011 - Present    Prostate cancer (CMS-HCC) (Chronic) C61 Low  5/13/2011 - Present    Second degree AV block, Mobitz type II (Chronic) I44.1 High  11/10/2010 - Present    Sleep apnea (Chronic) G47.30 Low  10/28/2010 - Present    Stroke (CMS-HCC) (Chronic) I63.9 Low  2/3/2009 - Present    Portal hypertension (CMS-HCC) K76.6 Low  4/17/2015 - Present    NYHA class 3 acute on chronic systolic heart failure (CMS-HCC) I50.23 Medium  7/7/2015 - Present    S/P ablation of atrial fibrillation Z98.890, Z86.79 Low  7/7/2015 - Present    Biventricular implantable cardioverter-defibrillator in situ Z95.810 Medium  8/7/2015 - Present    Bilateral hearing loss H91.93 Low  9/8/2015 - Present    Alcohol abuse F10.10   6/21/2016 - Present      Health Maintenance        Date Due Completion Dates    COLONOSCOPY 12/29/1990 ---    IMM ZOSTER VACCINE 12/29/2000 ---    IMM DTaP/Tdap/Td Vaccine (1 - Tdap) 5/20/2011 5/19/2011            Current Immunizations     13-VALENT PCV PREVNAR 12/15/2016    Influenza TIV (IM) 11/30/2014    Influenza Vaccine Adult HD 1/1/2017,  Deferred (Patient Schedule), 10/17/2015  8:58 AM    Pneumococcal polysaccharide vaccine (PPSV-23) 11/30/2014    TD Vaccine 5/19/2011  3:30 PM      Below and/or attached are the medications your provider expects you to take. Review all of your home medications and newly ordered medications with your provider and/or pharmacist. Follow medication instructions as directed by your provider and/or pharmacist. Please keep your medication list with you and share with your provider. Update the information when medications are discontinued, doses are changed, or new medications (including over-the-counter products) are added; and carry medication information at all times in the event of emergency situations     Allergies:  PLAVIX - Anaphylaxis     STATINS -  Unspecified     TICLID - Unspecified               Medications  Valid as of: June 16, 2017 -  4:09 PM    Generic Name Brand Name Tablet Size Instructions for use    Aspirin (Tab) aspirin 81 MG Take 81 mg by mouth every day.        Carvedilol (Tab) COREG 6.25 MG Take 6.25 mg by mouth 2 times a day, with meals.        Coenzyme Q10   Take 1 Cap by mouth every day.        Cyanocobalamin (Tab) VITAMIN B-12 500 MCG Take 1,000 mcg by mouth every day.        Furosemide (Tab) LASIX 40 MG Take 1 Tab by mouth every day.        Methotrexate (Anti-Rheumatic) (Solution Auto-injector) Methotrexate (PF) 10 MG/0.2ML Inject 10 mg as instructed every 7 days. On Mon   Indications: Psoriasis        MetOLazone (Tab) ZAROXOLYN 5 MG Take 5 mg by mouth as needed. Indications: Edema        Multiple Vitamins-Minerals (Tab) OCUVITE  Take 1 Tab by mouth every day.        Omega-3 Fatty Acids (Cap) fish oil 1000 MG Take 1,000 mg by mouth every day.        Potassium Chloride Maria Teresa CR (Tab CR) Kdur 20 MEQ Take 2 Tabs by mouth 2 times a day.        Sacubitril-Valsartan (Tab) ENTRESTO 24-26 MG Take 0.5 Tabs by mouth 2 Times a Day.        Spironolactone (Tab) ALDACTONE 25 MG Take 1 Tab by mouth every day.        .                 Medicines prescribed today were sent to:     [id number] - [place], [place] - [address] [address]    Phone: [phone] Fax: [phone]    Open 24 Hours?: No      Medication refill instructions:       If your prescription bottle indicates you have medication refills left, it is not necessary to call your provider’s office. Please contact your pharmacy and they will refill your medication.    If your prescription bottle indicates you do not have any refills left, you may request refills at any time through one of the following ways: The online [employer] system (except Urgent Care), by calling your provider’s office, or by asking your pharmacy to contact your provider’s office with a refill  request. Medication refills are processed only during regular business hours and may not be available until the next business day. Your provider may request additional information or to have a follow-up visit with you prior to refilling your medication.   *Please Note: Medication refills are assigned a new Rx number when refilled electronically. Your pharmacy may indicate that no refills were authorized even though a new prescription for the same medication is available at the pharmacy. Please request the medicine by name with the pharmacy before contacting your provider for a refill.           [employer] Access Code: Activation code not generated  Current [employer] Status: Active

## 2017-06-16 NOTE — Clinical Note
"     [facility]   [address], [address]  [place] [address]  Phone: [phone]  Fax: [phone]              Isai Fuentes  1940    Encounter Date: 6/16/2017    Angel Luis Figueredo M.D.          PROGRESS NOTE:  Subjective:   Isai Fuentes is a 76 y.o. male who presents today being sen on request of Sofiya secondary to question of AICD site. Occasion AT. No pain at site. Mild thinning unchanged. No impending erosion. Mild fatigue. No recent CHF.    Past Medical History   Diagnosis Date   • Other drug allergy 10/16/2008   • Atrial fibrillation (CMS-HCC) 9/20/2011   • CAD (coronary artery disease) 9/20/2011   • CARDIOMYOPATHY 9/20/2011   • History of cardiac catheterization 9/20/2011   • Fatigue 10/28/2010   • Heartburn 10/23/2008   • HTN (hypertension) 9/20/2011   • Insomnia 7/8/2010   • Ischemic cardiomyopathy 9/20/2011   • Orthostatic hypotension 5/6/2009   • Presence of permanent cardiac pacemaker 9/20/2011   • Pleural effusion 5/18/2009   • History of myocardial infarction      \"many\"   • Prostate cancer (CMS-HCC) 5/13/2011   • Second degree AV block, Mobitz type II 11/10/2010   • Long term (current) use of anticoagulants 5/13/2011   • Congestive heart failure (CMS-HCC)    • Pacemaker      [employer]   • Indigestion    • Other specified disorder of intestines 07-28-15     constipation/diarrhea/ reports some bleeding from rectum w/blood clots. Seeing GI   • Breath shortness    • Snoring    • Pain 07-28-15     \"chronic\", 0/10   • Biventricular implantable cardioverter-defibrillator in situ 8/7/2015   • Cancer (CMS-HCC) 2012     prostate   • Myocardial infarct (CMS-HCC) 2007   • WILLA (obstructive sleep apnea)      CPAP   • Stroke (CMS-HCC) 2/3/2009   • COPD (chronic obstructive pulmonary disease) (CMS-HCC)      Past Surgical History   Procedure Laterality Date   • Cardiac cath       has 2 stents   • Multiple coronary artery bypass  2007     2 vessel   • " "Pacemaker insertion  11/24/08     St Dre   • Recovery  7/30/2015     Procedure: CATH LAB  LEAD EXTRACTION, UPGRADE TO BIV PM ST.DRE BIPING GRP KENIA ;  Surgeon: Recoveryonly Surgery;  Location: SURGERY PRE-POST PROC UNIT [facility];  Service:    • Recovery  7/29/2015     Procedure: CATH LAB NEW PM INSERTION DUAL ATRIAL & VENTRICULAR-LV LEAD W/ NEW GENERATOR KENAI ICD9: 414.8;  Surgeon: Recoveryonly Surgery;  Location: SURGERY PRE-POST PROC UNIT [facility];  Service:    • Recovery  8/14/2015     Procedure:  CATH LAB LEAD EXTRACTION AWILDA ST.DRE LRG RP KENIA;  Surgeon: Recoveryonly Surgery;  Location: SURGERY PRE-POST PROC UNIT [facility];  Service:    • Recovery  10/16/2015     Procedure: CATH LAB LEAD REVISION ST.JUDE AQUINO;  Surgeon: Recoveryonfreddy Surgery;  Location: SURGERY PRE-POST PROC UNIT [facility];  Service:      No family history on file.  History   Smoking status   • Former Smoker -- 1.50 packs/day for 3 years   • Types: Cigarettes   • Quit date: 01/01/1970   Smokeless tobacco   • Former User   • Types: Chew   • Quit date: 01/01/1972     Allergies   Allergen Reactions   • Plavix [Clopidogrel Bisulfate] Anaphylaxis   • Statins [Hmg-Coa-R Inhibitors] Unspecified     Muscles aches     • Ticlid [Ticlopidine Hydrochloride] Unspecified     Pt states \"I'm not sure what happens\".       Outpatient Encounter Prescriptions as of 6/16/2017   Medication Sig Dispense Refill   • furosemide (LASIX) 40 MG Tab Take 1 Tab by mouth every day. 30 Tab 11   • potassium chloride SA (KDUR) 20 MEQ Tab CR Take 2 Tabs by mouth 2 times a day. 480 Tab 3   • spironolactone (ALDACTONE) 25 MG Tab Take 1 Tab by mouth every day. 30 Tab 3   • carvedilol (COREG) 6.25 MG Tab Take 6.25 mg by mouth 2 times a day, with meals.     • metolazone (ZAROXOLYN) 5 MG Tab Take 5 mg by mouth as needed. Indications: Edema     • sacubitril-valsartan (ENTRESTO) 24-26 MG Tab tablet Take 0.5 Tabs by mouth 2 Times a Day.     • Methotrexate, PF, 10 MG/0.2ML Solution Auto-injector Inject 10 mg " as instructed every 7 days. On Mon   Indications: Psoriasis     • Omega-3 Fatty Acids (FISH OIL) 1000 MG Cap capsule Take 1,000 mg by mouth every day.     • Multiple Vitamins-Minerals (OCUVITE) Tab Take 1 Tab by mouth every day.     • aspirin 81 MG tablet Take 81 mg by mouth every day.     • cyanocobalamin (VITAMIN B-12) 500 MCG TABS Take 1,000 mcg by mouth every day.     • Coenzyme Q10 (CO Q-10 PO) Take 1 Cap by mouth every day.       No facility-administered encounter medications on file as of 6/16/2017.     ROS     Objective:   /70 mmHg  Pulse 81  Wt 92.987 kg (205 lb)  SpO2 93%    Physical Exam   Constitutional: He is oriented to person, place, and time. He appears well-developed and well-nourished.   HENT:   Mouth/Throat: Oropharynx is clear and moist.   Eyes: Conjunctivae and EOM are normal.   Neck: No JVD present. No thyroid mass present.   Cardiovascular: Normal rate, regular rhythm, S1 normal, S2 normal and normal pulses.  PMI is not displaced.  Exam reveals no gallop.    No murmur heard.  Pulses:       Carotid pulses are 2+ on the right side, and 2+ on the left side.       Radial pulses are 2+ on the right side, and 2+ on the left side.        Femoral pulses are 2+ on the right side, and 2+ on the left side.       Dorsalis pedis pulses are 2+ on the right side, and 2+ on the left side.   No peripheral edema.   Pulmonary/Chest: Effort normal and breath sounds normal.   Abdominal: Soft. Normal appearance. He exhibits no abdominal bruit. There is no hepatosplenomegaly. There is no tenderness.   Musculoskeletal: Normal range of motion. He exhibits no edema.        Thoracic back: He exhibits no tenderness and no spasm.   Neurological: He is alert and oriented to person, place, and time.   Skin: No rash noted. No cyanosis. Nails show no clubbing.   Psychiatric: He has a normal mood and affect.       Assessment:     1. Ischemic cardiomyopathy     2. Coronary artery disease involving native coronary  artery of native heart without angina pectoris     3. Biventricular implantable cardioverter-defibrillator in situ     4. S/P ablation of atrial fibrillation     5. Second degree AV block, Mobitz type II         Medical Decision Making:  Today's Assessment / Status / Plan:     1. BIV Site appears nl without any infection or impending erosion.  2. Occasional atrial arrhthymias may benefit from anticoagulation.  3. BIV AICD nl function.  4. F/U PAB/RS.      Prashant Shankar M.D.  [address] [facility] [place] [address]  VIA Facsimile: [phone]     AURELIO Gregorio.PRADHA  [address]  [address]  [place] [address]  VIA In Basket

## 2017-06-16 NOTE — TELEPHONE ENCOUNTER
----- Message from Shawna Smith sent at 6/16/2017  2:26 PM PDT -----  Regarding: Pt wife wants call back asap   PB/Kimberly     Pt wife, Ivelisse is calling. States  is very weak, and can't physically make to office today for apt PB recommended with DS. She would please like a call back soon and can be reached at [phone].

## 2017-06-17 NOTE — PROGRESS NOTES
"Subjective:   Isai Fuentes is a 76 y.o. male who presents today being sen on request of Sofiya secondary to question of AICD site. Occasion AT. No pain at site. Mild thinning unchanged. No impending erosion. Mild fatigue. No recent CHF.    Past Medical History   Diagnosis Date   • Other drug allergy 10/16/2008   • Atrial fibrillation (CMS-HCC) 9/20/2011   • CAD (coronary artery disease) 9/20/2011   • CARDIOMYOPATHY 9/20/2011   • History of cardiac catheterization 9/20/2011   • Fatigue 10/28/2010   • Heartburn 10/23/2008   • HTN (hypertension) 9/20/2011   • Insomnia 7/8/2010   • Ischemic cardiomyopathy 9/20/2011   • Orthostatic hypotension 5/6/2009   • Presence of permanent cardiac pacemaker 9/20/2011   • Pleural effusion 5/18/2009   • History of myocardial infarction      \"many\"   • Prostate cancer (CMS-HCC) 5/13/2011   • Second degree AV block, Mobitz type II 11/10/2010   • Long term (current) use of anticoagulants 5/13/2011   • Congestive heart failure (CMS-HCC)    • Pacemaker      [employer]   • Indigestion    • Other specified disorder of intestines 07-28-15     constipation/diarrhea/ reports some bleeding from rectum w/blood clots. Seeing GI   • Breath shortness    • Snoring    • Pain 07-28-15     \"chronic\", 0/10   • Biventricular implantable cardioverter-defibrillator in situ 8/7/2015   • Cancer (CMS-HCC) 2012     prostate   • Myocardial infarct (CMS-HCC) 2007   • WILLA (obstructive sleep apnea)      CPAP   • Stroke (CMS-HCC) 2/3/2009   • COPD (chronic obstructive pulmonary disease) (CMS-HCC)      Past Surgical History   Procedure Laterality Date   • Cardiac cath       has 2 stents   • Multiple coronary artery bypass  2007     2 vessel   • Pacemaker insertion  11/24/08     St Dre   • Recovery  7/30/2015     Procedure: CATH LAB  LEAD EXTRACTION, UPGRADE TO BIV PM ST.DRE JAS AQUINO ;  Surgeon: Amy Surgery;  Location: SURGERY PRE-POST PROC UNIT [facility];  Service:    • Recovery  7/29/2015     " "Procedure: CATH LAB NEW PM INSERTION DUAL ATRIAL & VENTRICULAR-LV LEAD W/ NEW GENERATOR KENIA ICD9: 414.8;  Surgeon: Recoveryonly Surgery;  [place] [facility]  Service:    • Recovery  8/14/2015     Procedure:  CATH LAB LEAD EXTRACTION AWILDA ST.MARIA R LRG PAULO AQUINO;  Surgeon: Recoveryonly Surgery;  Location[place] [facility];  Service:    • Recovery  10/16/2015     Procedure: CATH LAB LEAD REVISION ST.JUDE AQUINO;  Surgeon: Recoveryonly Surgery;  Location[place] [facility];  Service:      No family history on file.  History   Smoking status   • Former Smoker -- 1.50 packs/day for 3 years   • Types: Cigarettes   • Quit date: 01/01/1970   Smokeless tobacco   • Former User   • Types: Chew   • Quit date: 01/01/1972     Allergies   Allergen Reactions   • Plavix [Clopidogrel Bisulfate] Anaphylaxis   • Statins [Hmg-Coa-R Inhibitors] Unspecified     Muscles aches     • Ticlid [Ticlopidine Hydrochloride] Unspecified     Pt states \"I'm not sure what happens\".       Outpatient Encounter Prescriptions as of 6/16/2017   Medication Sig Dispense Refill   • furosemide (LASIX) 40 MG Tab Take 1 Tab by mouth every day. 30 Tab 11   • potassium chloride SA (KDUR) 20 MEQ Tab CR Take 2 Tabs by mouth 2 times a day. 480 Tab 3   • spironolactone (ALDACTONE) 25 MG Tab Take 1 Tab by mouth every day. 30 Tab 3   • carvedilol (COREG) 6.25 MG Tab Take 6.25 mg by mouth 2 times a day, with meals.     • metolazone (ZAROXOLYN) 5 MG Tab Take 5 mg by mouth as needed. Indications: Edema     • sacubitril-valsartan (ENTRESTO) 24-26 MG Tab tablet Take 0.5 Tabs by mouth 2 Times a Day.     • Methotrexate, PF, 10 MG/0.2ML Solution Auto-injector Inject 10 mg as instructed every 7 days. On Mon   Indications: Psoriasis     • Omega-3 Fatty Acids (FISH OIL) 1000 MG Cap capsule Take 1,000 mg by mouth every day.     • Multiple Vitamins-Minerals (OCUVITE) Tab Take 1 Tab by mouth every day.     • aspirin 81 MG tablet Take 81 " mg by mouth every day.     • cyanocobalamin (VITAMIN B-12) 500 MCG TABS Take 1,000 mcg by mouth every day.     • Coenzyme Q10 (CO Q-10 PO) Take 1 Cap by mouth every day.       No facility-administered encounter medications on file as of 6/16/2017.     ROS     Objective:   /70 mmHg  Pulse 81  Wt 92.987 kg (205 lb)  SpO2 93%    Physical Exam   Constitutional: He is oriented to person, place, and time. He appears well-developed and well-nourished.   HENT:   Mouth/Throat: Oropharynx is clear and moist.   Eyes: Conjunctivae and EOM are normal.   Neck: No JVD present. No thyroid mass present.   Cardiovascular: Normal rate, regular rhythm, S1 normal, S2 normal and normal pulses.  PMI is not displaced.  Exam reveals no gallop.    No murmur heard.  Pulses:       Carotid pulses are 2+ on the right side, and 2+ on the left side.       Radial pulses are 2+ on the right side, and 2+ on the left side.        Femoral pulses are 2+ on the right side, and 2+ on the left side.       Dorsalis pedis pulses are 2+ on the right side, and 2+ on the left side.   No peripheral edema.   Pulmonary/Chest: Effort normal and breath sounds normal.   aicd site look uncomplicated and normal   Abdominal: Soft. Normal appearance. He exhibits no abdominal bruit. There is no hepatosplenomegaly. There is no tenderness.   Musculoskeletal: Normal range of motion. He exhibits no edema.        Thoracic back: He exhibits no tenderness and no spasm.   Neurological: He is alert and oriented to person, place, and time.   Skin: No rash noted. No cyanosis. Nails show no clubbing.   Psychiatric: He has a normal mood and affect.       Assessment:     1. Ischemic cardiomyopathy     2. Coronary artery disease involving native coronary artery of native heart without angina pectoris     3. Biventricular implantable cardioverter-defibrillator in situ     4. S/P ablation of atrial fibrillation     5. Second degree AV block, Mobitz type II         Medical  Decision Making:  Today's Assessment / Status / Plan:     1. BIV Site appears nl without any infection or impending erosion.  2. Occasional atrial arrhthymias may benefit from anticoagulation.  3. BIV AICD nl function.  4. F/U PAB/RS.

## 2017-08-03 ENCOUNTER — OFFICE VISIT (OUTPATIENT)
Dept: CARDIOLOGY | Facility: MEDICAL CENTER | Age: 77
End: 2017-08-03
Payer: MEDICARE

## 2017-08-03 ENCOUNTER — NON-PROVIDER VISIT (OUTPATIENT)
Dept: CARDIOLOGY | Facility: MEDICAL CENTER | Age: 77
End: 2017-08-03
Payer: MEDICARE

## 2017-08-03 VITALS
SYSTOLIC BLOOD PRESSURE: 126 MMHG | DIASTOLIC BLOOD PRESSURE: 70 MMHG | HEIGHT: 71 IN | WEIGHT: 206 LBS | BODY MASS INDEX: 28.84 KG/M2 | HEART RATE: 68 BPM

## 2017-08-03 DIAGNOSIS — Z95.810 BIVENTRICULAR IMPLANTABLE CARDIOVERTER-DEFIBRILLATOR IN SITU: ICD-10-CM

## 2017-08-03 DIAGNOSIS — I25.10 CORONARY ARTERY DISEASE INVOLVING NATIVE CORONARY ARTERY OF NATIVE HEART WITHOUT ANGINA PECTORIS: Chronic | ICD-10-CM

## 2017-08-03 DIAGNOSIS — I25.5 ISCHEMIC CARDIOMYOPATHY: Chronic | ICD-10-CM

## 2017-08-03 DIAGNOSIS — I10 ESSENTIAL HYPERTENSION: Chronic | ICD-10-CM

## 2017-08-03 PROCEDURE — 93284 PRGRMG EVAL IMPLANTABLE DFB: CPT | Performed by: INTERNAL MEDICINE

## 2017-08-03 PROCEDURE — 99214 OFFICE O/P EST MOD 30 MIN: CPT | Mod: 25 | Performed by: INTERNAL MEDICINE

## 2017-08-03 ASSESSMENT — ENCOUNTER SYMPTOMS
FEVER: 0
BRUISES/BLEEDS EASILY: 0
BLOOD IN STOOL: 0
SHORTNESS OF BREATH: 1

## 2017-08-03 NOTE — MR AVS SNAPSHOT
"        Isai Fuentes   8/3/2017 1:20 PM   Office Visit   MRN: [id number]    Department:  [facility]   Dept Phone:  [phone]    Description:  Male : 1940   Provider:  Edd Billings M.D.           Reason for Visit     Follow-Up           Allergies as of 8/3/2017     Allergen Noted Reactions    Plavix [Clopidogrel Bisulfate] 2011   Anaphylaxis    Statins [Hmg-Coa-R Inhibitors] 2011   Unspecified    Muscles aches      Ticlid [Ticlopidine Hydrochloride] 2009   Unspecified    Pt states \"I'm not sure what happens\".        You were diagnosed with     Coronary artery disease involving native coronary artery of native heart without angina pectoris   [2189339]       Ischemic cardiomyopathy   [908737]       Biventricular implantable cardioverter-defibrillator in situ   [7667584]       Essential hypertension   [6337122]         Vital Signs     Blood Pressure Pulse Height Weight Body Mass Index Smoking Status    126/70 mmHg 68 1.803 m (5' 10.98\") 93.441 kg (206 lb) 28.74 kg/m2 Former Smoker      Basic Information     Date Of Birth Sex Race Ethnicity Preferred Language    1940 Male White Non- English      Your appointments     Sep 28, 2017 10:40 AM   Established Patient Pul with Ruby Thakur M.D.   [facility] (--)    [address] [facility] [address]  [facility] [id number] [phone]              Problem List              ICD-10-CM Priority Class Noted - Resolved    Atrial fibrillation (CMS-HCC) I48.91 High  2011 - Present    CAD (coronary artery disease) (Chronic) I25.10 High  2011 - Present    Heartburn (Chronic) R12 Low  10/23/2008 - Present    Hypercholesteremia (Chronic) E78.00 Medium  2012 - Present    HTN (hypertension) (Chronic) I10 High  2011 - Present    Insomnia (Chronic) G47.00 Low  2010 - Present    Ischemic cardiomyopathy (Chronic) I25.5 High  2011 - Present    Orthostatic hypotension (Chronic) I95.1 Low  " 5/6/2009 - Present    History of myocardial infarction (Chronic) I25.2 Medium  7/19/2011 - Present    Prostate cancer (CMS-HCC) (Chronic) C61 Low  5/13/2011 - Present    Second degree AV block, Mobitz type II (Chronic) I44.1 High  11/10/2010 - Present    Sleep apnea (Chronic) G47.30 Low  10/28/2010 - Present    Stroke (CMS-HCC) (Chronic) I63.9 Low  2/3/2009 - Present    Portal hypertension (CMS-HCC) K76.6 Low  4/17/2015 - Present    NYHA class 3 acute on chronic systolic heart failure (CMS-HCC) I50.23 Medium  7/7/2015 - Present    S/P ablation of atrial fibrillation Z98.890, Z86.79 Low  7/7/2015 - Present    Biventricular implantable cardioverter-defibrillator in situ Z95.810 Medium  8/7/2015 - Present    Bilateral hearing loss H91.93 Low  9/8/2015 - Present    Alcohol abuse F10.10   6/21/2016 - Present      Health Maintenance        Date Due Completion Dates    COLONOSCOPY 12/29/1990 ---    IMM ZOSTER VACCINE 12/29/2000 ---    IMM DTaP/Tdap/Td Vaccine (1 - Tdap) 5/20/2011 5/19/2011    IMM INFLUENZA (1) 9/1/2017 1/1/2017, 10/17/2015, 11/30/2014            Current Immunizations     13-VALENT PCV PREVNAR 12/15/2016    Influenza TIV (IM) 11/30/2014    Influenza Vaccine Adult HD 1/1/2017,  Deferred (Patient Schedule), 10/17/2015  8:58 AM    Pneumococcal polysaccharide vaccine (PPSV-23) 11/30/2014    TD Vaccine 5/19/2011  3:30 PM      Below and/or attached are the medications your provider expects you to take. Review all of your home medications and newly ordered medications with your provider and/or pharmacist. Follow medication instructions as directed by your provider and/or pharmacist. Please keep your medication list with you and share with your provider. Update the information when medications are discontinued, doses are changed, or new medications (including over-the-counter products) are added; and carry medication information at all times in the event of emergency situations     Allergies:  PLAVIX - Anaphylaxis      STATINS - Unspecified     TICLID - Unspecified               Medications  Valid as of: August 03, 2017 -  1:58 PM    Generic Name Brand Name Tablet Size Instructions for use    Aspirin (Tab) aspirin 81 MG Take 81 mg by mouth every day.        Carvedilol (Tab) COREG 6.25 MG Take 6.25 mg by mouth 2 times a day, with meals.        Coenzyme Q10   Take 1 Cap by mouth every day.        Cyanocobalamin (Tab) VITAMIN B-12 500 MCG Take 1,000 mcg by mouth every day.        Furosemide (Tab) LASIX 40 MG Take 1 Tab by mouth every day.        Methotrexate (Anti-Rheumatic) (Solution Auto-injector) Methotrexate (PF) 10 MG/0.2ML Inject 10 mg as instructed every 7 days. On Mon   Indications: Psoriasis        MetOLazone (Tab) ZAROXOLYN 5 MG Take 5 mg by mouth as needed. Indications: Edema        Multiple Vitamins-Minerals (Tab) OCUVITE  Take 1 Tab by mouth every day.        Omega-3 Fatty Acids (Cap) fish oil 1000 MG Take 1,000 mg by mouth every day.        Potassium Chloride Maria Teresa CR (Tab CR) Kdur 20 MEQ Take 2 Tabs by mouth 2 times a day.        Sacubitril-Valsartan (Tab) ENTRESTO 24-26 MG Take 1 Tab by mouth 2 Times a Day.        Spironolactone (Tab) ALDACTONE 25 MG Take 1 Tab by mouth every day.        .                 Medicines prescribed today were sent to:     [id number] - [place], [place] - [address] [address]    Phone: [phone] Fax: [phone]    Open 24 Hours?: No      Medication refill instructions:       If your prescription bottle indicates you have medication refills left, it is not necessary to call your provider’s office. Please contact your pharmacy and they will refill your medication.    If your prescription bottle indicates you do not have any refills left, you may request refills at any time through one of the following ways: The online [employer] system (except Urgent Care), by calling your provider’s office, or by asking your pharmacy to contact your provider’s office  with a refill request. Medication refills are processed only during regular business hours and may not be available until the next business day. Your provider may request additional information or to have a follow-up visit with you prior to refilling your medication.   *Please Note: Medication refills are assigned a new Rx number when refilled electronically. Your pharmacy may indicate that no refills were authorized even though a new prescription for the same medication is available at the pharmacy. Please request the medicine by name with the pharmacy before contacting your provider for a refill.           [employer] Access Code: Activation code not generated  Current [employer] Status: Active

## 2017-08-03 NOTE — PROGRESS NOTES
"Subjective:   Isai Fuentes is a 76 y.o. male who presents today for follow-up of ischemic cardiopathy, prior CABG, prior anterior wall MI, and biventricular AICD. The patient has chronic complaints of fatigue. In March, he was started on Intresto because of chronic complaints of fatigue. Somehow, he has gotten onto a dose of half tablet once a day. He states that he is not feeling \"too bad\". He was recently [facility] on [facility]. The patient has chronic kidney disease stage III.  Past Medical History   Diagnosis Date   • Other drug allergy 10/16/2008   • Atrial fibrillation (CMS-HCC) 9/20/2011   • CAD (coronary artery disease) 9/20/2011   • CARDIOMYOPATHY 9/20/2011   • History of cardiac catheterization 9/20/2011   • Fatigue 10/28/2010   • Heartburn 10/23/2008   • HTN (hypertension) 9/20/2011   • Insomnia 7/8/2010   • Ischemic cardiomyopathy 9/20/2011   • Orthostatic hypotension 5/6/2009   • Presence of permanent cardiac pacemaker 9/20/2011   • Pleural effusion 5/18/2009   • History of myocardial infarction      \"many\"   • Prostate cancer (CMS-HCC) 5/13/2011   • Second degree AV block, Mobitz type II 11/10/2010   • Long term (current) use of anticoagulants 5/13/2011   • Congestive heart failure (CMS-HCC)    • Pacemaker      [employer]   • Indigestion    • Other specified disorder of intestines 07-28-15     constipation/diarrhea/ reports some bleeding from rectum w/blood clots. Seeing GI   • Breath shortness    • Snoring    • Pain 07-28-15     \"chronic\", 0/10   • Biventricular implantable cardioverter-defibrillator in situ 8/7/2015   • Cancer (CMS-HCC) 2012     prostate   • Myocardial infarct (CMS-HCC) 2007   • WILLA (obstructive sleep apnea)      CPAP   • Stroke (CMS-HCC) 2/3/2009   • COPD (chronic obstructive pulmonary disease) (CMS-HCC)      Past Surgical History   Procedure Laterality Date   • Cardiac cath       has 2 stents   • Multiple coronary artery bypass  2007     2 " "vessel   • Pacemaker insertion  11/24/08     [facility]   • Recovery  7/30/2015     Procedure: CATH LAB  LEAD EXTRACTION, UPGRADE TO BIV PM ST.DRE LRG GRP KENIA ;  Surgeon: Recoveryonly Surgery;  Location: SURGERY PRE-POST PROC UN[facility];  Service:    • Recovery  7/29/2015     Procedure: CATH LAB NEW PM INSERTION DUAL ATRIAL & VENTRICULAR-LV LEAD W/ NEW GENERATOR KENIA ICD9: 414.8;  Surgeon: Recoveryonly Surgery;  Location: SURGERY PRE-POST PROC UN[facility];  Service:    • Recovery  8/14/2015     Procedure:  CATH LAB LEAD EXTRACTION AWILDA ST.DRE LRG RP KENIA;  Surgeon: Recoveryonly Surgery;  Location: SURGERY PRE-POST PROC UN[facility];  Service:    • Recovery  10/16/2015     Procedure: CATH LAB LEAD REVISION ST.DRE AQUINO;  Surgeon: Recoveryonly Surgery;  Location: SURGERY [facility]POST PROC [facility];  Service:      No family history on file.  History   Smoking status   • Former Smoker -- 1.50 packs/day for 3 years   • Types: Cigarettes   • Quit date: 01/01/1970   Smokeless tobacco   • Former User   • Types: Chew   • Quit date: 01/01/1972     Allergies   Allergen Reactions   • Plavix [Clopidogrel Bisulfate] Anaphylaxis   • Statins [Hmg-Coa-R Inhibitors] Unspecified     Muscles aches     • Ticlid [Ticlopidine Hydrochloride] Unspecified     Pt states \"I'm not sure what happens\".       Outpatient Encounter Prescriptions as of 8/3/2017   Medication Sig Dispense Refill   • sacubitril-valsartan (ENTRESTO) 24-26 MG Tab tablet Take 1 Tab by mouth 2 Times a Day. 60 Tab 6   • furosemide (LASIX) 40 MG Tab Take 1 Tab by mouth every day. 30 Tab 11   • potassium chloride SA (KDUR) 20 MEQ Tab CR Take 2 Tabs by mouth 2 times a day. 480 Tab 3   • spironolactone (ALDACTONE) 25 MG Tab Take 1 Tab by mouth every day. 30 Tab 3   • carvedilol (COREG) 6.25 MG Tab Take 6.25 mg by mouth 2 times a day, with meals.     • metolazone (ZAROXOLYN) 5 MG Tab Take 5 mg by mouth as needed. Indications: Edema     • Methotrexate, PF, 10 MG/0.2ML Solution Auto-injector " "Inject 10 mg as instructed every 7 days. On Mon   Indications: Psoriasis     • [DISCONTINUED] sacubitril-valsartan (ENTRESTO) 24-26 MG Tab tablet Take 0.5 Tabs by mouth 2 Times a Day.     • Omega-3 Fatty Acids (FISH OIL) 1000 MG Cap capsule Take 1,000 mg by mouth every day.     • Multiple Vitamins-Minerals (OCUVITE) Tab Take 1 Tab by mouth every day.     • aspirin 81 MG tablet Take 81 mg by mouth every day.     • cyanocobalamin (VITAMIN B-12) 500 MCG TABS Take 1,000 mcg by mouth every day.     • Coenzyme Q10 (CO Q-10 PO) Take 1 Cap by mouth every day.       No facility-administered encounter medications on file as of 8/3/2017.     Review of Systems   Constitutional: Positive for malaise/fatigue. Negative for fever.   HENT: Negative.    Respiratory: Positive for shortness of breath.    Gastrointestinal: Negative for blood in stool.   Endo/Heme/Allergies: Does not bruise/bleed easily.        Objective:   /70 mmHg  Pulse 68  Ht 1.803 m (5' 10.98\")  Wt 93.441 kg (206 lb)  BMI 28.74 kg/m2    Physical Exam   Constitutional: He is oriented to person, place, and time. He appears well-developed and well-nourished. No distress.   HENT:   Head: Atraumatic.   Eyes: Conjunctivae and EOM are normal. Pupils are equal, round, and reactive to light.   Neck: Neck supple. No JVD present.   Cardiovascular: Normal rate, regular rhythm and intact distal pulses.    Murmur heard.   Systolic murmur is present with a grade of 1/6   Pulmonary/Chest: Effort normal. No respiratory distress. He has no wheezes. He has no rales.   Abdominal: Soft. There is no tenderness.   Musculoskeletal: He exhibits no edema.   Neurological: He is alert and oriented to person, place, and time.   Skin: Skin is warm and dry. He is not diaphoretic.       Assessment:     1. Coronary artery disease involving native coronary artery of native heart without angina pectoris  CONSENT FOR CHF   2. Ischemic cardiomyopathy  sacubitril-valsartan (ENTRESTO) 24-26 MG " Tab tablet    CONSENT FOR CHF   3. Biventricular implantable cardioverter-defibrillator in situ  CONSENT FOR CHF   4. Essential hypertension         Medical Decision Making:  Today's Assessment / Status / Plan:     On exam today the patient is not volume overloaded. He has a habit of change in his medications around on his own. By reviewing the chart, it is not clear how he got to the half tablet of Intresto.  He was instructed to go back to full tablet twice daily. AICD interpretation reveals very brief atrial fib lasting 6 minutes. [facility] is 1.7 .  Recommend referral to the heart failure clinic for up titration of his Intresto . I will see him again in 3 months.

## 2017-08-21 ENCOUNTER — OFFICE VISIT (OUTPATIENT)
Dept: CARDIOLOGY | Facility: MEDICAL CENTER | Age: 77
End: 2017-08-21
Payer: MEDICARE

## 2017-08-21 VITALS
HEART RATE: 82 BPM | WEIGHT: 204 LBS | BODY MASS INDEX: 28.56 KG/M2 | OXYGEN SATURATION: 95 % | HEIGHT: 71 IN | DIASTOLIC BLOOD PRESSURE: 70 MMHG | SYSTOLIC BLOOD PRESSURE: 102 MMHG

## 2017-08-21 DIAGNOSIS — I25.10 CORONARY ARTERY DISEASE INVOLVING NATIVE CORONARY ARTERY OF NATIVE HEART WITHOUT ANGINA PECTORIS: Chronic | ICD-10-CM

## 2017-08-21 DIAGNOSIS — I10 ESSENTIAL HYPERTENSION: Chronic | ICD-10-CM

## 2017-08-21 DIAGNOSIS — I63.9 CEREBROVASCULAR ACCIDENT (CVA), UNSPECIFIED MECHANISM (HCC): Chronic | ICD-10-CM

## 2017-08-21 DIAGNOSIS — K76.6 PORTAL HYPERTENSION (HCC): ICD-10-CM

## 2017-08-21 DIAGNOSIS — Z95.810 BIVENTRICULAR IMPLANTABLE CARDIOVERTER-DEFIBRILLATOR IN SITU: ICD-10-CM

## 2017-08-21 DIAGNOSIS — G47.30 SLEEP APNEA, UNSPECIFIED TYPE: Chronic | ICD-10-CM

## 2017-08-21 DIAGNOSIS — I48.0 PAROXYSMAL ATRIAL FIBRILLATION (HCC): ICD-10-CM

## 2017-08-21 DIAGNOSIS — C61 PROSTATE CANCER (HCC): Chronic | ICD-10-CM

## 2017-08-21 DIAGNOSIS — E78.00 HYPERCHOLESTEREMIA: Chronic | ICD-10-CM

## 2017-08-21 DIAGNOSIS — F10.10 ALCOHOL ABUSE: ICD-10-CM

## 2017-08-21 DIAGNOSIS — I25.2 HISTORY OF MYOCARDIAL INFARCTION: Chronic | ICD-10-CM

## 2017-08-21 DIAGNOSIS — I44.1 SECOND DEGREE AV BLOCK, MOBITZ TYPE II: Chronic | ICD-10-CM

## 2017-08-21 DIAGNOSIS — I25.5 ISCHEMIC CARDIOMYOPATHY: Chronic | ICD-10-CM

## 2017-08-21 DIAGNOSIS — I50.23 NYHA CLASS 3 ACUTE ON CHRONIC SYSTOLIC HEART FAILURE (HCC): ICD-10-CM

## 2017-08-21 DIAGNOSIS — I95.1 ORTHOSTATIC HYPOTENSION: Chronic | ICD-10-CM

## 2017-08-21 DIAGNOSIS — N18.30 STAGE 3 CHRONIC KIDNEY DISEASE (HCC): ICD-10-CM

## 2017-08-21 DIAGNOSIS — Z86.79 S/P ABLATION OF ATRIAL FIBRILLATION: ICD-10-CM

## 2017-08-21 DIAGNOSIS — Z98.890 S/P ABLATION OF ATRIAL FIBRILLATION: ICD-10-CM

## 2017-08-21 PROCEDURE — 94620 PR PULMONARY STRESS TESTING,SIMPLE: CPT | Performed by: INTERNAL MEDICINE

## 2017-08-21 PROCEDURE — 99214 OFFICE O/P EST MOD 30 MIN: CPT | Mod: 25 | Performed by: INTERNAL MEDICINE

## 2017-08-21 RX ORDER — METOPROLOL SUCCINATE 25 MG/1
25 TABLET, EXTENDED RELEASE ORAL DAILY
Qty: 30 TAB | Refills: 11 | Status: SHIPPED | OUTPATIENT
Start: 2017-08-21 | End: 2018-05-11

## 2017-08-21 ASSESSMENT — ENCOUNTER SYMPTOMS
WHEEZING: 0
SHORTNESS OF BREATH: 0
COUGH: 0
NEUROLOGICAL NEGATIVE: 1
ORTHOPNEA: 0
SORE THROAT: 0
SPUTUM PRODUCTION: 0
WEAKNESS: 0
HEMOPTYSIS: 0
CARDIOVASCULAR NEGATIVE: 1
EYES NEGATIVE: 1
PALPITATIONS: 0
RESPIRATORY NEGATIVE: 1
FEVER: 0
PND: 0
CHILLS: 0
LOSS OF CONSCIOUSNESS: 0
STRIDOR: 0
GASTROINTESTINAL NEGATIVE: 1
DIZZINESS: 0
BRUISES/BLEEDS EASILY: 0
MUSCULOSKELETAL NEGATIVE: 1
CLAUDICATION: 0

## 2017-08-21 ASSESSMENT — MINNESOTA LIVING WITH HEART FAILURE QUESTIONNAIRE (MLHF)
DIFFICULTY GOING AWAY FROM HOME: 4
TOTAL_SCORE: 68
TIRED, FATIGUED OR LOW ON ENERGY: 4
MAKING YOU FEEL DEPRESSED: 4
SWELLING IN ANKLES OR LEGS: 4
DIFFICULTY WORKING TO EARN A LIVING: 5
WALKING ABOUT OR CLIMBING STAIRS DIFFICULT: 2
MAKING YOU STAY IN A HOSPITAL: 2
COSTING YOU MONEY FOR MEDICAL CARE: 3
LOSS OF SELF CONTROL IN YOUR LIFE: 0
HAVING TO SIT OR LIE DOWN DURING THE DAY: 4
DIFFICULTY SOCIALIZING WITH FAMILY OR FRIENDS: 4
GIVING YOU SIDE EFFECTS FROM TREATMENTS: 0
DIFFICULTY SLEEPING WELL AT NIGHT: 3
DIFFICULTY WITH RECREATIONAL PASTIMES, SPORTS, HOBBIES: 5
MAKING YOU WORRY: 4
FEELING LIKE A BURDEN TO FAMILY AND FRIENDS: 4
DIFFICULTY WITH SEXUAL ACTIVITIES: 4
MAKING YOU SHORT OF BREATH: 3
WORKING AROUND THE HOUSE OR YARD DIFFICULT: 4
EATING LESS FOODS YOU LIKE: 3
DIFFICULTY TO CONCENTRATE OR REMEMBERING THINGS: 2

## 2017-08-21 ASSESSMENT — LIFESTYLE VARIABLES
EVER FELT BAD OR GUILTY ABOUT YOUR DRINKING: YES
TOTAL SCORE: 3
TOTAL SCORE: 3
HAVE PEOPLE ANNOYED YOU BY CRITICIZING YOUR DRINKING: YES
CONSUMPTION TOTAL: INCOMPLETE
HAVE YOU EVER FELT YOU SHOULD CUT DOWN ON YOUR DRINKING: YES
TOTAL SCORE: 3
ALCOHOL_AMOUNT: 2
EVER HAD A DRINK FIRST THING IN THE MORNING TO STEADY YOUR NERVES TO GET RID OF A HANGOVER: NO

## 2017-08-21 ASSESSMENT — NEW YORK HEART ASSOCIATION (NYHA) CLASSIFICATION: NYHA FUNCTIONAL CLASS: CLASS II

## 2017-08-21 ASSESSMENT — 6 MINUTE WALK TEST (6MWT): TOTAL DISTANCE WALKED (METERS): 369

## 2017-08-21 NOTE — MR AVS SNAPSHOT
"        Isai Fuentes   2017 2:45 PM   Office Visit   MRN: [id number]    Department:  [facility]   Dept Phone:  [phone]    Description:  Male : 1940   Provider:  Severo Lee M.D.           Reason for Visit     New Patient           Allergies as of 2017     Allergen Noted Reactions    Plavix [Clopidogrel Bisulfate] 2011   Anaphylaxis    Statins [Hmg-Coa-R Inhibitors] 2011   Unspecified    Muscles aches      Ticlid [Ticlopidine Hydrochloride] 2009   Unspecified    Pt states \"I'm not sure what happens\".        You were diagnosed with     Alcohol abuse   [346504]       Paroxysmal atrial fibrillation (CMS-[facility])   [260426]       Biventricular implantable cardioverter-defibrillator in situ   [5830173]       Coronary artery disease involving native coronary artery of native heart without angina pectoris   [1755830]       History of myocardial infarction   [458893]       Essential hypertension   [6366036]       Hypercholesteremia   [348683]       Ischemic cardiomyopathy   [721577]       NYHA class 3 acute on chronic systolic heart failure (CMS-[facility])   [3113893]       Orthostatic hypotension   [458.0.ICD-9-CM]       Portal hypertension (CMS-[facility])   [572.3.ICD-9-CM]       Prostate cancer (CMS-[facility])   [054925]       S/P ablation of atrial fibrillation   [314032]       Second degree AV block, Mobitz type II   [841765]       Cerebrovascular accident (CVA), unspecified mechanism (CMS-[facility])   [0643908]       Sleep apnea, unspecified type   [9786891]       Stage 3 chronic kidney disease   [3804436]         Vital Signs     Blood Pressure Pulse Height Weight Body Mass Index Oxygen Saturation    102/70 mmHg 82 1.803 m (5' 11\") 92.534 kg (204 lb) 28.46 kg/m2 95%    Smoking Status                   Former Smoker           Basic Information     Date Of Birth Sex Race Ethnicity Preferred Language    1940 Male White Non- English      Your appointments     Aug 28, 2017  2:20 " PM   Heart Failure Established with SURESH Correa   [facility] (--)    [address]  [place] [address]   [phone]            Sep 05, 2017  3:15 PM   Heart Failure Established with Jef Carl M.D.   [facility] (--)    [address]  [place] [address]   [phone]            Sep 18, 2017  1:30 PM   Heart Failure Established with Severo Lee M.D.   [facility] (--)    [address]  [place] [address]   [phone]            Sep 28, 2017 10:40 AM   Established Patient Pul with Ruby Thakur M.D.   [facility] (--)    [address]  [address]  [place] [address]   [phone]            Nov 07, 2017  3:15 PM   PACER CHECK ONLY with PACER CHECK[facility]   [facility] (--)    [address]  [place] [address]   [phone]              Problem List              ICD-10-CM Priority Class Noted - Resolved    Atrial fibrillation (CMS-HCC) I48.91 High  9/20/2011 - Present    CAD (coronary artery disease) (Chronic) I25.10 High  9/20/2011 - Present    Heartburn (Chronic) R12 Low  10/23/2008 - Present    Hypercholesteremia (Chronic) E78.00 Medium  4/25/2012 - Present    HTN (hypertension) (Chronic) I10 High  9/20/2011 - Present    Insomnia (Chronic) G47.00 Low  7/8/2010 - Present    Ischemic cardiomyopathy (Chronic) I25.5 High  9/20/2011 - Present    Orthostatic hypotension (Chronic) I95.1 Low  5/6/2009 - Present    History of myocardial infarction (Chronic) I25.2 Medium  7/19/2011 - Present    Prostate cancer (CMS-HCC) (Chronic) C61 Low  5/13/2011 - Present    Second degree AV block, Mobitz type II (Chronic) I44.1 High  11/10/2010 - Present    Sleep apnea (Chronic) G47.30 Low  10/28/2010 - Present    Stroke (CMS-HCC) (Chronic) I63.9 Low  2/3/2009 - Present    Portal  hypertension (CMS-HCC) K76.6 Low  4/17/2015 - Present    NYHA class 3 acute on chronic systolic heart failure (CMS-HCC) I50.23 Medium  7/7/2015 - Present    S/P ablation of atrial fibrillation Z98.890, Z86.79 Low  7/7/2015 - Present    Biventricular implantable cardioverter-defibrillator in situ Z95.810 Medium  8/7/2015 - Present    Bilateral hearing loss H91.93 Low  9/8/2015 - Present    Alcohol abuse F10.10   6/21/2016 - Present    Stage 3 chronic kidney disease N18.3   8/21/2017 - Present      Health Maintenance        Date Due Completion Dates    COLONOSCOPY 12/29/1990 ---    IMM ZOSTER VACCINE 12/29/2000 ---    IMM DTaP/Tdap/Td Vaccine (1 - Tdap) 5/20/2011 5/19/2011    IMM INFLUENZA (1) 9/1/2017 1/1/2017, 10/17/2015, 11/30/2014            Current Immunizations     13-VALENT PCV PREVNAR 12/15/2016    Influenza TIV (IM) 11/30/2014    Influenza Vaccine Adult HD 1/1/2017,  Deferred (Patient Schedule), 10/17/2015  8:58 AM    Pneumococcal polysaccharide vaccine (PPSV-23) 11/30/2014    TD Vaccine 5/19/2011  3:30 PM      Below and/or attached are the medications your provider expects you to take. Review all of your home medications and newly ordered medications with your provider and/or pharmacist. Follow medication instructions as directed by your provider and/or pharmacist. Please keep your medication list with you and share with your provider. Update the information when medications are discontinued, doses are changed, or new medications (including over-the-counter products) are added; and carry medication information at all times in the event of emergency situations     Allergies:  PLAVIX - Anaphylaxis     STATINS - Unspecified     TICLID - Unspecified               Medications  Valid as of: August 21, 2017 -  4:34 PM    Generic Name Brand Name Tablet Size Instructions for use    Allopurinol   Take  by mouth.        Aspirin (Tab) aspirin 81 MG Take 81 mg by mouth every day.        Coenzyme Q10   Take 1 Cap by mouth  every day.        Cyanocobalamin (Tab) VITAMIN B-12 500 MCG Take 1,000 mcg by mouth every day.        Furosemide (Tab) LASIX 40 MG Take 1 Tab by mouth every day.        Methotrexate (Anti-Rheumatic) (Solution Auto-injector) Methotrexate (PF) 10 MG/0.2ML Inject 10 mg as instructed every 7 days. On Mon   Indications: Psoriasis        MetOLazone (Tab) ZAROXOLYN 5 MG Take 5 mg by mouth as needed. Indications: Edema        Metoprolol Succinate (TABLET SR 24 HR) TOPROL XL 25 MG Take 1 Tab by mouth every day.        Multiple Vitamins-Minerals (Tab) OCUVITE  Take 1 Tab by mouth every day.        Omega-3 Fatty Acids (Cap) fish oil 1000 MG Take 1,000 mg by mouth every day.        Potassium Chloride Maria Teresa CR (Tab CR) Kdur 20 MEQ Take 2 Tabs by mouth 2 times a day.        Sacubitril-Valsartan (Tab) ENTRESTO 24-26 MG Take 1 Tab by mouth 2 Times a Day.        Spironolactone (Tab) ALDACTONE 25 MG Take 1 Tab by mouth every day.        .                 Medicines prescribed today were sent to:     [id number] - [place], [place] - [address] [address]    Phone: [phone] Fax: [phone]    Open 24 Hours?: No      Medication refill instructions:       If your prescription bottle indicates you have medication refills left, it is not necessary to call your provider’s office. Please contact your pharmacy and they will refill your medication.    If your prescription bottle indicates you do not have any refills left, you may request refills at any time through one of the following ways: The online [facility] system (except Urgent Care), by calling your provider’s office, or by asking your pharmacy to contact your provider’s office with a refill request. Medication refills are processed only during regular business hours and may not be available until the next business day. Your provider may request additional information or to have a follow-up visit with you prior to refilling your medication.   *Please  Note: Medication refills are assigned a new Rx number when refilled electronically. Your pharmacy may indicate that no refills were authorized even though a new prescription for the same medication is available at the pharmacy. Please request the medicine by name with the pharmacy before contacting your provider for a refill.           [employer] Access Code: Activation code not generated  Current [facility] Status: Active

## 2017-08-21 NOTE — PROGRESS NOTES
"Subjective:   Isai Fuentes is a 76 y.o. male who presents today as a new consultation for heart failure. He has a long-standing history of ischemic cardio myopathy going back at least 10 years. EF 70 range 35-40%. He has been very well medically managed by my partner. His Entresto was originally at half a pill per day but he has gone back to one pill twice per day. He checks his blood pressure only occasionally and varies between taking in the morning before he is taking his medications as well as taking it in the afternoon or morning after his take his medications. His blood pressures have ranged from 100-138 systolic caliber he has been having days where they've been running in the 90s systolic. His main complaint is fatigue and lack of energy. He notices twins at least some of those days, side to blood pressures in the 90s but he doesn't check it every single day. The ischemic symptoms including chest pain or pressure or shortness of breath.    Past Medical History   Diagnosis Date   • Other drug allergy 10/16/2008   • Atrial fibrillation (CMS-HCC) 9/20/2011   • CAD (coronary artery disease) 9/20/2011   • CARDIOMYOPATHY 9/20/2011   • History of cardiac catheterization 9/20/2011   • Fatigue 10/28/2010   • Heartburn 10/23/2008   • HTN (hypertension) 9/20/2011   • Insomnia 7/8/2010   • Ischemic cardiomyopathy 9/20/2011   • Orthostatic hypotension 5/6/2009   • Presence of permanent cardiac pacemaker 9/20/2011   • Pleural effusion 5/18/2009   • History of myocardial infarction      \"many\"   • Prostate cancer (CMS-HCC) 5/13/2011   • Second degree AV block, Mobitz type II 11/10/2010   • Long term (current) use of anticoagulants 5/13/2011   • Congestive heart failure (CMS-HCC)    • Pacemaker      [employer]   • Indigestion    • Other specified disorder of intestines 07-28-15     constipation/diarrhea/ reports some bleeding from rectum w/blood clots. Seeing GI   • Breath shortness    • Snoring    • Pain " "07-28-15     \"chronic\", 0/10   • Biventricular implantable cardioverter-defibrillator in situ 8/7/2015   • Cancer (CMS-HCC) 2012     prostate   • Myocardial infarct (CMS-HCC) 2007   • WILLA (obstructive sleep apnea)      CPAP   • Stroke (CMS-HCC) 2/3/2009   • COPD (chronic obstructive pulmonary disease) (CMS-HCC)      Past Surgical History   Procedure Laterality Date   • Cardiac cath       has 2 stents   • Multiple coronary artery bypass  2007     2 vessel   • Pacemaker insertion  11/24/08     [facility]   • Recovery  7/30/2015     Procedure: CATH LAB  LEAD EXTRACTION, UPGRADE TO BIV PM ST.DRE LRG MAGGIE AQUINO ;  Surgeon: Recoveryonly Surgery;  Location: SUR[facility]ERY [facility];  Service:    • Recovery  7/29/2015     Procedure: CATH LAB NEW PM INSERTION DUAL ATRIAL & VENTRICULAR-LV LEAD W/ NEW GENERATOR KENIA ICD9: 414.8;  Surgeon: Recoveryonly Surgery;  Location: SURGERY [facility] PROC [facility];  Service:    • Recovery  8/14/2015     Procedure:  CATH LAB LEAD EXTRACTION AWILDA ST.DRE LRG PAULO AQUINO;  Surgeon: Recoveryonly Surgery;  Location: [facility]ERY [facility] PROC [facility];  Service:    • Recovery  10/16/2015     Procedure: CATH LAB LEAD REVISION ST.DREJOSI AQUINO;  Surgeon: Recoveryonly Surgery;  Location: SUR[facility]ERY [facility] PROC [facility];  Service:      Family History   Problem Relation Age of Onset   • Thyroid Sister      History   Smoking status   • Former Smoker -- 1.50 packs/day for 3 years   • Types: Cigarettes   • Quit date: 01/01/1970   Smokeless tobacco   • Former User   • Types: Chew   • Quit date: 01/01/1972     Allergies   Allergen Reactions   • Plavix [Clopidogrel Bisulfate] Anaphylaxis   • Statins [Hmg-Coa-R Inhibitors] Unspecified     Muscles aches     • Ticlid [Ticlopidine Hydrochloride] Unspecified     Pt states \"I'm not sure what happens\".       Outpatient Encounter Prescriptions as of 8/21/2017   Medication Sig Dispense Refill   • ALLOPURINOL PO Take  by mouth.     • metoprolol SR (TOPROL XL) 25 MG TABLET " SR 24 HR Take 1 Tab by mouth every day. 30 Tab 11   • sacubitril-valsartan (ENTRESTO) 24-26 MG Tab tablet Take 1 Tab by mouth 2 Times a Day. 60 Tab 6   • furosemide (LASIX) 40 MG Tab Take 1 Tab by mouth every day. 30 Tab 11   • potassium chloride SA (KDUR) 20 MEQ Tab CR Take 2 Tabs by mouth 2 times a day. 480 Tab 3   • spironolactone (ALDACTONE) 25 MG Tab Take 1 Tab by mouth every day. (Patient taking differently: Take 12.5 mg by mouth every day.) 30 Tab 3   • metolazone (ZAROXOLYN) 5 MG Tab Take 5 mg by mouth as needed. Indications: Edema     • Methotrexate, PF, 10 MG/0.2ML Solution Auto-injector Inject 10 mg as instructed every 7 days. On Mon   Indications: Psoriasis     • Omega-3 Fatty Acids (FISH OIL) 1000 MG Cap capsule Take 1,000 mg by mouth every day.     • Multiple Vitamins-Minerals (OCUVITE) Tab Take 1 Tab by mouth every day.     • aspirin 81 MG tablet Take 81 mg by mouth every day.     • cyanocobalamin (VITAMIN B-12) 500 MCG TABS Take 1,000 mcg by mouth every day.     • Coenzyme Q10 (CO Q-10 PO) Take 1 Cap by mouth every day.     • [DISCONTINUED] carvedilol (COREG) 6.25 MG Tab Take 6.25 mg by mouth 2 times a day, with meals.       No facility-administered encounter medications on file as of 8/21/2017.     Review of Systems   Constitutional: Positive for malaise/fatigue. Negative for fever and chills.   HENT: Negative.  Negative for sore throat.    Eyes: Negative.    Respiratory: Negative.  Negative for cough, hemoptysis, sputum production, shortness of breath, wheezing and stridor.    Cardiovascular: Negative.  Negative for chest pain, palpitations, orthopnea, claudication, leg swelling and PND.   Gastrointestinal: Negative.    Genitourinary: Negative.    Musculoskeletal: Negative.    Skin: Negative.    Neurological: Negative.  Negative for dizziness, loss of consciousness and weakness.   Endo/Heme/Allergies: Negative.  Does not bruise/bleed easily.   All other systems reviewed and are negative.        "Objective:   /70 mmHg  Pulse 82  Ht 1.803 m (5' 11\")  Wt 92.534 kg (204 lb)  BMI 28.46 kg/m2  SpO2 95%    Physical Exam   Constitutional: He is oriented to person, place, and time. He appears well-developed and well-nourished. No distress.   HENT:   Head: Normocephalic.   Mouth/Throat: Oropharynx is clear and moist.   Eyes: EOM are normal. Pupils are equal, round, and reactive to light. Right eye exhibits no discharge. Left eye exhibits no discharge. No scleral icterus.   Neck: Normal range of motion. Neck supple. No JVD present. No tracheal deviation present.   Cardiovascular: Normal rate, regular rhythm, S1 normal, S2 normal, normal heart sounds, intact distal pulses and normal pulses.  Exam reveals no gallop, no S3, no S4 and no friction rub.    No murmur heard.   No systolic murmur is present    No diastolic murmur is present   Pulses:       Carotid pulses are 2+ on the right side, and 2+ on the left side.       Radial pulses are 2+ on the right side, and 2+ on the left side.        Dorsalis pedis pulses are 2+ on the right side, and 2+ on the left side.        Posterior tibial pulses are 2+ on the right side, and 2+ on the left side.   Pulmonary/Chest: Effort normal and breath sounds normal. No respiratory distress. He has no wheezes. He has no rales.   Abdominal: Soft. Bowel sounds are normal. He exhibits no distension and no mass. There is no tenderness. There is no rebound and no guarding.   Musculoskeletal: He exhibits no edema.   Neurological: He is alert and oriented to person, place, and time. No cranial nerve deficit.   Skin: Skin is warm and dry. He is not diaphoretic. No pallor.   Psychiatric: He has a normal mood and affect. His behavior is normal. Judgment and thought content normal.   Nursing note and vitals reviewed.      Assessment:     1. Alcohol abuse     2. Paroxysmal atrial fibrillation (CMS-HCC)     3. Biventricular implantable cardioverter-defibrillator in situ  metoprolol SR " (TOPROL XL) 25 MG TABLET SR 24 HR   4. Coronary artery disease involving native coronary artery of native heart without angina pectoris  metoprolol SR (TOPROL XL) 25 MG TABLET SR 24 HR   5. History of myocardial infarction  metoprolol SR (TOPROL XL) 25 MG TABLET SR 24 HR   6. Essential hypertension  metoprolol SR (TOPROL XL) 25 MG TABLET SR 24 HR   7. Hypercholesteremia     8. Ischemic cardiomyopathy     9. NYHA class 3 acute on chronic systolic heart failure (CMS-HCC)     10. Orthostatic hypotension     11. Portal hypertension (CMS-[facility])     12. Prostate cancer (CMS-[facility])     13. S/P ablation of atrial fibrillation     14. Second degree AV block, Mobitz type II     15. Cerebrovascular accident (CVA), unspecified mechanism (CMS-[facility])     16. Sleep apnea, unspecified type     17. Stage 3 chronic kidney disease         Medical Decision Making:  Today's Assessment / Status / Plan:     76-year-old male with chronic kidney disease in the setting of heart failure with reduced ejection fraction from chronic ischemia. I do wonder if he has somewhat hypotensive most of the time and therefore possibly overmedicated given his complaints of fatigue and lack of energy which do somewhat correlate with a low systolic pressure. I've asked him to check his blood pressure twice per day and come back with a list of these at his next clinic visit. In the meantime of gone ahead and stop the carvedilol and switched him to metoprolol XL 25 at night. This may raise his blood pressure little bit and resolve the fatigue. His chronic kidney disease seems to limit us fromusing digoxin. Additionally his resting heart rate remains high in the 80s and we may not be able to go up on the dose of a beta blocker and therefore he is a good patient to consider using Ivabridine.    1. CHFrEF 35%, NYHA II, Stage C, Hemo Pro A    - stop coreg    - start metop XL 25, titrate as tolerated    - cont ARNI 24/26    - cont pironolactone 12.5    - Hydral/Imdur    -  ICD    - BiV - follow up EP    - consider Ivabridine    2. CKD    - defer    3. ICD    - defer    Thank for you allowing me to take part in your patient's care, please call should you have any questions or would like to discuss this patient.

## 2017-08-21 NOTE — Clinical Note
"     [facility]   [address]  [place] [address]  Phone: [phone]  Fax: [phone]              Isai Fuentes  1940    Encounter Date: 8/21/2017    Severo Lee M.D.          PROGRESS NOTE:  Subjective:   Isai Fuentes is a 76 y.o. male who presents today as a new consultation for heart failure. He has a long-standing history of ischemic cardio myopathy going back at least 10 years. EF 70 range 35-40%. He has been very well medically managed by my partner. His Entresto was originally at half a pill per day but he has gone back to one pill twice per day. He checks his blood pressure only occasionally and varies between taking in the morning before he is taking his medications as well as taking it in the afternoon or morning after his take his medications. His blood pressures have ranged from 100-138 systolic caliber he has been having days where they've been running in the 90s systolic. His main complaint is fatigue and lack of energy. He notices twins at least some of those days, side to blood pressures in the 90s but he doesn't check it every single day. The ischemic symptoms including chest pain or pressure or shortness of breath.    Past Medical History   Diagnosis Date   • Other drug allergy 10/16/2008   • Atrial fibrillation (CMS-HCC) 9/20/2011   • CAD (coronary artery disease) 9/20/2011   • CARDIOMYOPATHY 9/20/2011   • History of cardiac catheterization 9/20/2011   • Fatigue 10/28/2010   • Heartburn 10/23/2008   • HTN (hypertension) 9/20/2011   • Insomnia 7/8/2010   • Ischemic cardiomyopathy 9/20/2011   • Orthostatic hypotension 5/6/2009   • Presence of permanent cardiac pacemaker 9/20/2011   • Pleural effusion 5/18/2009   • History of myocardial infarction      \"many\"   • Prostate cancer (CMS-HCC) 5/13/2011   • Second degree AV block, Mobitz type II 11/10/2010   • Long term (current) use of anticoagulants 5/13/2011   • " "Congestive heart failure (CMS-HCC)    • Pacemaker      [employer]   • Indigestion    • Other specified disorder of intestines 07-28-15     constipation/diarrhea/ reports some bleeding from rectum w/blood clots. Seeing GI   • Breath shortness    • Snoring    • Pain 07-28-15     \"chronic\", 0/10   • Biventricular implantable cardioverter-defibrillator in situ 8/7/2015   • Cancer (CMS-HCC) 2012     prostate   • Myocardial infarct (CMS-HCC) 2007   • WILLA (obstructive sleep apnea)      CPAP   • Stroke (CMS-HCC) 2/3/2009   • COPD (chronic obstructive pulmonary disease) (CMS-HCC)      Past Surgical History   Procedure Laterality Date   • Cardiac cath       has 2 stents   • Multiple coronary artery bypass  2007     2 vessel   • Pacemaker insertion  11/24/08     [facility]   • Recovery  7/30/2015     Procedure: CATH LAB  LEAD EXTRACTION, UPGRADE TO BIV PM ST.DRE JAS AQUINO ;  Surgeon: Recoveryonly Surgery;  Location: SURGERY [facility] PROC [facility];  Service:    • Recovery  7/29/2015     Procedure: CATH LAB NEW PM INSERTION DUAL ATRIAL & VENTRICULAR-LV LEAD W/ NEW GENERATOR KENIA ICD9: 414.8;  Surgeon: Recoveryonly Surgery;  Location: SUR[facility]ERY [facility] PROC [facility];  Service:    • Recovery  8/14/2015     Procedure:  CATH LAB LEAD EXTRACTION AWILDA ST.DRE JAS AQUINO;  Surgeon: Recoveryonly Surgery;  Location: SUR[facility]ERY [facility]POST PROC [facility];  Service:    • Recovery  10/16/2015     Procedure: CATH LAB LEAD REVISION ST.DREJOSI AQUINO;  Surgeon: Recoveryonly Surgery;  Location: SUR[facility]ERY [facility]POST PROC [facility];  Service:      Family History   Problem Relation Age of Onset   • Thyroid Sister      History   Smoking status   • Former Smoker -- 1.50 packs/day for 3 years   • Types: Cigarettes   • Quit date: 01/01/1970   Smokeless tobacco   • Former User   • Types: Chew   • Quit date: 01/01/1972     Allergies   Allergen Reactions   • Plavix [Clopidogrel Bisulfate] Anaphylaxis   • Statins [Hmg-Coa-R Inhibitors] Unspecified     Muscles " "aches     • Ticlid [Ticlopidine Hydrochloride] Unspecified     Pt states \"I'm not sure what happens\".       Outpatient Encounter Prescriptions as of 8/21/2017   Medication Sig Dispense Refill   • ALLOPURINOL PO Take  by mouth.     • metoprolol SR (TOPROL XL) 25 MG TABLET SR 24 HR Take 1 Tab by mouth every day. 30 Tab 11   • sacubitril-valsartan (ENTRESTO) 24-26 MG Tab tablet Take 1 Tab by mouth 2 Times a Day. 60 Tab 6   • furosemide (LASIX) 40 MG Tab Take 1 Tab by mouth every day. 30 Tab 11   • potassium chloride SA (KDUR) 20 MEQ Tab CR Take 2 Tabs by mouth 2 times a day. 480 Tab 3   • spironolactone (ALDACTONE) 25 MG Tab Take 1 Tab by mouth every day. (Patient taking differently: Take 12.5 mg by mouth every day.) 30 Tab 3   • metolazone (ZAROXOLYN) 5 MG Tab Take 5 mg by mouth as needed. Indications: Edema     • Methotrexate, PF, 10 MG/0.2ML Solution Auto-injector Inject 10 mg as instructed every 7 days. On Mon   Indications: Psoriasis     • Omega-3 Fatty Acids (FISH OIL) 1000 MG Cap capsule Take 1,000 mg by mouth every day.     • Multiple Vitamins-Minerals (OCUVITE) Tab Take 1 Tab by mouth every day.     • aspirin 81 MG tablet Take 81 mg by mouth every day.     • cyanocobalamin (VITAMIN B-12) 500 MCG TABS Take 1,000 mcg by mouth every day.     • Coenzyme Q10 (CO Q-10 PO) Take 1 Cap by mouth every day.     • [DISCONTINUED] carvedilol (COREG) 6.25 MG Tab Take 6.25 mg by mouth 2 times a day, with meals.       No facility-administered encounter medications on file as of 8/21/2017.     Review of Systems   Constitutional: Positive for malaise/fatigue. Negative for fever and chills.   HENT: Negative.  Negative for sore throat.    Eyes: Negative.    Respiratory: Negative.  Negative for cough, hemoptysis, sputum production, shortness of breath, wheezing and stridor.    Cardiovascular: Negative.  Negative for chest pain, palpitations, orthopnea, claudication, leg swelling and PND.   Gastrointestinal: Negative.    " "  Genitourinary: Negative.    Musculoskeletal: Negative.    Skin: Negative.    Neurological: Negative.  Negative for dizziness, loss of consciousness and weakness.   Endo/Heme/Allergies: Negative.  Does not bruise/bleed easily.   All other systems reviewed and are negative.       Objective:   /70 mmHg  Pulse 82  Ht 1.803 m (5' 11\")  Wt 92.534 kg (204 lb)  BMI 28.46 kg/m2  SpO2 95%    Physical Exam   Constitutional: He is oriented to person, place, and time. He appears well-developed and well-nourished. No distress.   HENT:   Head: Normocephalic.   Mouth/Throat: Oropharynx is clear and moist.   Eyes: EOM are normal. Pupils are equal, round, and reactive to light. Right eye exhibits no discharge. Left eye exhibits no discharge. No scleral icterus.   Neck: Normal range of motion. Neck supple. No JVD present. No tracheal deviation present.   Cardiovascular: Normal rate, regular rhythm, S1 normal, S2 normal, normal heart sounds, intact distal pulses and normal pulses.  Exam reveals no gallop, no S3, no S4 and no friction rub.    No murmur heard.   No systolic murmur is present    No diastolic murmur is present   Pulses:       Carotid pulses are 2+ on the right side, and 2+ on the left side.       Radial pulses are 2+ on the right side, and 2+ on the left side.        Dorsalis pedis pulses are 2+ on the right side, and 2+ on the left side.        Posterior tibial pulses are 2+ on the right side, and 2+ on the left side.   Pulmonary/Chest: Effort normal and breath sounds normal. No respiratory distress. He has no wheezes. He has no rales.   Abdominal: Soft. Bowel sounds are normal. He exhibits no distension and no mass. There is no tenderness. There is no rebound and no guarding.   Musculoskeletal: He exhibits no edema.   Neurological: He is alert and oriented to person, place, and time. No cranial nerve deficit.   Skin: Skin is warm and dry. He is not diaphoretic. No pallor.   Psychiatric: He has a normal mood " and affect. His behavior is normal. Judgment and thought content normal.   Nursing note and vitals reviewed.      Assessment:     1. Alcohol abuse     2. Paroxysmal atrial fibrillation (CMS-[facility])     3. Biventricular implantable cardioverter-defibrillator in situ  metoprolol SR (TOPROL XL) 25 MG TABLET SR 24 HR   4. Coronary artery disease involving native coronary artery of native heart without angina pectoris  metoprolol SR (TOPROL XL) 25 MG TABLET SR 24 HR   5. History of myocardial infarction  metoprolol SR (TOPROL XL) 25 MG TABLET SR 24 HR   6. Essential hypertension  metoprolol SR (TOPROL XL) 25 MG TABLET SR 24 HR   7. Hypercholesteremia     8. Ischemic cardiomyopathy     9. NYHA class 3 acute on chronic systolic heart failure (CMS-[facility])     10. Orthostatic hypotension     11. Portal hypertension (CMS-[facility])     12. Prostate cancer (CMS-HCC)     13. S/P ablation of atrial fibrillation     14. Second degree AV block, Mobitz type II     15. Cerebrovascular accident (CVA), unspecified mechanism (CMS-HCC)     16. Sleep apnea, unspecified type     17. Stage 3 chronic kidney disease         Medical Decision Making:  Today's Assessment / Status / Plan:     76-year-old male with chronic kidney disease in the setting of heart failure with reduced ejection fraction from chronic ischemia. I do wonder if he has somewhat hypotensive most of the time and therefore possibly overmedicated given his complaints of fatigue and lack of energy which do somewhat correlate with a low systolic pressure. I've asked him to check his blood pressure twice per day and come back with a list of these at his next clinic visit. In the meantime of gone ahead and stop the carvedilol and switched him to metoprolol XL 25 at night. This may raise his blood pressure little bit and resolve the fatigue. His chronic kidney disease seems to limit us fromusing digoxin. Additionally his resting heart rate remains high in the 80s and we may not be able to go  up on the dose of a beta blocker and therefore he is a good patient to consider using Ivabridine.    1. CHFrEF 35%, NYHA II, Stage C, Hemo Pro A    - stop coreg    - start metop XL 25, titrate as tolerated    - cont ARNI 24/26    - cont pironolactone 12.5    - Hydral/Imdur    - ICD    - BiV - follow up EP    - consider Ivabridine    2. CKD    - defer    3. ICD    - defer    Thank for you allowing me to take part in your patient's care, please call should you have any questions or would like to discuss this patient.        Prashant Shankar M.D.  [address] [place] [address]  VIA Facsimile: [phone]

## 2017-08-24 NOTE — PROGRESS NOTES
"Heart Failure New Appointment     6MWT- 369 meters  MLWHF- 68    OP Heart Failure  Vitals  Appointment Type: Heart Failure New  Weight: 92.534 kg (204 lb)  How Weight Obtained: Stand Up Scale  Height: 180.3 cm (5' 11\")  BMI (Calculated): 28.45  Blood Pressure : 102/70 mmHg  Pulse: 82    System Assessment  NYHA Functional Class Assessment: Class II  ACC/AHA HF Stage: C    Smoking Hx  Have you Ever Smoked: Yes  Have you Smoked in the Last 12 Mos: No  Confirm Quit Date: 01/01/72     Alcohol Hx  Do you Drink?: Yes  How Many Alcoholic Drinks Do You Have: 2  Per Day / Week / Month: Day  Time of Last Drink: yesterday  Have You Ever Felt You Should Cut Down on Your Drinking?:  Yes  Have People Annoyed You by Criticizing Your Drinking?:  Yes  Ever Felt Bad or Guilty About Your Drinking?:  Yes  Ever Had a Drink First Thing in the Morning to Steady Your Nerves to Get Rid of a Hangover? (Eye Opener):  No  CAGE Score: 3    Illicit Drug Hx  Illicit Drug History: No    Social Hx  Social History: Lives with Spouse  Level of Support: Good  Advance Directives: Began discussion, Paperwork provided    Education  Symptoms: Verbalizes understanding  Weighing: Verbalizes Understanding  Weight Gain Response: Verbalizes Understanding   Recording Data: Verbalizes understanding  Teach Back Failures: Teach Back Successful  Compliance: Patient is Compliant     Medications  Medication Reconciliation : Complete  Medication Counseling Provided: Verbalizes Understanding  Able to Accurately Identify Medication Indications: Some  Medication Discrepancies: None  Is Patient on an Evidence Based Beta Blocker: Yes  Is Patient on ACE-1 or ARB: Yes    Dietary Assessment  Food Labels: Verbalizes Understanding  Foods High in Sodium: Verbalizes Understanding  Daily Sodium Intake: Verbalizes Understanding  Diet: Verbalizes Understanding  Food Preparation: Verbalizes Understanding  Eating Out Plan: Verbalizes understanding  Healthier Options: Verbalizes " Understanding  Fluid Restriction: Not Applicable    MN Living with Heart Failure  Swelling in Ankles or Legs: 4  Having to Sit or Lie Down During the Day: 4  Walking About or Climbing Stairs Difficult: 2  Working Around the House or Yard Difficult: 4  Difficulty Going Away from Home: 4  Difficulty Sleeping Well at Night: 3  Difficulty Socializing with Family or Friends: 4  Difficulty Working to Earn a Livin  Difficulty with Recreational Pastimes, Sports, Hobbies: 5  Difficulty with Sexual Activities: 4  Eating Less Foods You Like: 3  Making you Short of Breath: 3  Tired, Fatigued or Low on Energy: 4  Making you Stay in a Hospital: 2  Costing you Money for Medical Care?: 3  Giving you Side Effects from Treatments: 0  Feeling like a Krypton to Family and Friends: 4  Loss of Self Control in your Life: 0  Making You Worry: 4  Difficulty to Concentrate or Remembering Things: 2  Making you Feel Depressed: 4  MLHF Total Score : 68    6 Minute Walk Test  Baseline to end of test: 6:00  Total meters walked: 369       Education Narrative  Reviewed anatomy and physiology of heart failure with patient. Went over heart failure worksheet and patient's individual HF diagnosis, EF, risk factors, general medication classes and indications, as well as personal goals.  Goals: Patient's primary goal is to cut down on his drinking an to improve his EF.    Discussed daily weights, sodium restriction, worsening signs and symptoms to report to physician, heart medications, and importance of adherence to medication regimen. Emphasized recommendation from AHA/AAHFN to keep daily sodium intake between 1500mg-2000mg. Wife asked a lot of questions about diet and reducing salt-clarified using teaching materials named below.    Reviewed dietary handouts, advance directive planning handout, and informed patient of HF new appointment follow-up phone call. Discussed dietary considerations and reviewed Seven Day Heart Healthy Meal Plan by [facility].    Patient appears to be determined to improve his EF and per his wife \"exercises too much, drinks too much\" and describes patient as being extreme in some behaviors. She requests clarification on how much he should exercise because she states he overdoes it and then is fatigued for the rest of the day. Patient seems irritated by his wife's comments and asks her to stop being dramatic. Discussed basic parameters of exercising with heart failure and explained to patient it is advisable to not overdo it-to do only what he can tolerate. Patient v/u.     Invited patient and family members/friends to HF support group and encouraged patient to call Heart Failure clinic during normal business hours with any questions.  Heart Failure program card with number given to patient.        Patient and wife state full understanding of all information given.     Yaa HF RN  [phone]  "

## 2017-08-28 ENCOUNTER — OFFICE VISIT (OUTPATIENT)
Dept: CARDIOLOGY | Facility: MEDICAL CENTER | Age: 77
End: 2017-08-28
Payer: MEDICARE

## 2017-08-28 VITALS
BODY MASS INDEX: 28.56 KG/M2 | DIASTOLIC BLOOD PRESSURE: 60 MMHG | SYSTOLIC BLOOD PRESSURE: 98 MMHG | HEART RATE: 62 BPM | HEIGHT: 71 IN | RESPIRATION RATE: 14 BRPM | WEIGHT: 204 LBS | OXYGEN SATURATION: 92 %

## 2017-08-28 DIAGNOSIS — I50.20 NYHA CLASS 3 HEART FAILURE WITH REDUCED EJECTION FRACTION (HCC): ICD-10-CM

## 2017-08-28 DIAGNOSIS — I25.10 CORONARY ARTERY DISEASE INVOLVING NATIVE CORONARY ARTERY OF NATIVE HEART WITHOUT ANGINA PECTORIS: Chronic | ICD-10-CM

## 2017-08-28 DIAGNOSIS — Z95.810 BIVENTRICULAR IMPLANTABLE CARDIOVERTER-DEFIBRILLATOR IN SITU: ICD-10-CM

## 2017-08-28 DIAGNOSIS — I50.20 ACC/AHA STAGE C SYSTOLIC HEART FAILURE (HCC): ICD-10-CM

## 2017-08-28 DIAGNOSIS — I25.5 ISCHEMIC CARDIOMYOPATHY: Chronic | ICD-10-CM

## 2017-08-28 PROCEDURE — 99214 OFFICE O/P EST MOD 30 MIN: CPT | Performed by: NURSE PRACTITIONER

## 2017-08-28 ASSESSMENT — ENCOUNTER SYMPTOMS
ORTHOPNEA: 0
ABDOMINAL PAIN: 0
MYALGIAS: 0
CLAUDICATION: 0
FEVER: 0
COUGH: 0
SHORTNESS OF BREATH: 1
PALPITATIONS: 0
PND: 0
DIZZINESS: 1

## 2017-08-28 NOTE — PROGRESS NOTES
"Subjective:   Isai Fuentes is a 76 y.o. male who presents today For follow-up on his heart failure.    Patient was initially seen in our heart failure clinic on 8/21/17 with Dr. Lee. During that visit, his carvedilol was stopped and he was started on Toprol-XL 25 mg nightly, hoping this would alleviate some of his fatigue and possibly increase his blood pressure little bit. Patient reports in clinic today he continues to have some fatigue. His blood pressure log shows systolic blood pressures from the 100s to 120s most of the time and had one day where his blood pressure was systolic in the 80s. HR range 70-80s.     For his other symptoms, patient continues to have shortness of breath on occasion with his ADLs and with exertion and dizziness if he stands too quickly. Patient denies chest pain, shortness of breath at rest, palpitations, orthopnea, PND or Edema.     His home weights have been ranging from 195-199. Patient states he does take furosemide 40 mg daily and an occasional metolazone 2.5 mg as needed.     Additonally, patient has the following medical problems:    -CAD, hx 2 stents and CABGx2    -PPM and upgrade to BiV ICD in 2015.     -WILLA using CPAP.     -Atrial arrhythmia, VT and ablation      Past Medical History:   Diagnosis Date   • Biventricular implantable cardioverter-defibrillator in situ 8/7/2015   • Other specified disorder of intestines 07-28-15    constipation/diarrhea/ reports some bleeding from rectum w/blood clots. Seeing GI   • Pain 07-28-15    \"chronic\", 0/10   • Cancer (CMS-HCC) 2012    prostate   • Atrial fibrillation (CMS-HCC) 9/20/2011   • CAD (coronary artery disease) 9/20/2011   • CARDIOMYOPATHY 9/20/2011   • History of cardiac catheterization 9/20/2011   • HTN (hypertension) 9/20/2011   • Ischemic cardiomyopathy 9/20/2011   • Presence of permanent cardiac pacemaker 9/20/2011   • Prostate cancer (CMS-HCC) 5/13/2011   • Long term (current) use of anticoagulants 5/13/2011   • " "Second degree AV block, Mobitz type II 11/10/2010   • Fatigue 10/28/2010   • Insomnia 7/8/2010   • Pleural effusion 5/18/2009   • Orthostatic hypotension 5/6/2009   • Stroke (CMS-HCC) 2/3/2009   • Heartburn 10/23/2008   • Other drug allergy 10/16/2008   • Myocardial infarct (CMS-HCC) 2007   • Breath shortness    • Congestive heart failure (CMS-HCC)    • COPD (chronic obstructive pulmonary disease) (CMS-HCC)    • History of myocardial infarction     \"many\"   • Indigestion    • WILLA (obstructive sleep apnea)     CPAP   • Pacemaker     [employer]   • Snoring      Past Surgical History:   Procedure Laterality Date   • RECOVERY  10/16/2015    Procedure: CATH LAB LEAD REVISION [facility];  Surgeon: Recoveryonfreddy Surgery;  Location: SURGERY PRE[facility]POST PROC UN[facility];  Service:    • RECOVERY  8/14/2015    Procedure:  CATH LAB LEAD EXTRACTION AWILDA [facility];  Surgeon: Recoveryonly Surgery;  Location: SURGERY PRE-POST PROC UNIT [facility];  Service:    • RECOVERY  7/30/2015    Procedure: CATH LAB  LEAD EXTRACTION, UPGRADE TO BIV PM [facility] ;  Surgeon: Recoveryonly Surgery;  Location: SURGERY PRE-POST PROC UN[facility];  Service:    • RECOVERY  7/29/2015    Procedure: CATH LAB NEW PM INSERTION DUAL ATRIAL & VENTRICULAR-LV LEAD W/ NEW GENERATOR KENIA ICD9: 414.8;  Surgeon: Recoveryonfreddy Surgery;  Location: SURGERY PRE-POST PROC UNIT [facility];  Service:    • PACEMAKER INSERTION  11/24/08    [facility]   • MULTIPLE CORONARY ARTERY BYPASS  2007    2 vessel   • CARDIAC CATH      has 2 stents     Family History   Problem Relation Age of Onset   • Thyroid Sister      History   Smoking Status   • Former Smoker   • Packs/day: 1.50   • Years: 3.00   • Types: Cigarettes   • Quit date: 1/1/1970   Smokeless Tobacco   • Former User   • Types: Chew   • Quit date: 1/1/1972     Allergies   Allergen Reactions   • Plavix [Clopidogrel Bisulfate] Anaphylaxis   • Statins [Hmg-Coa-R Inhibitors] Unspecified     Muscles aches     • Ticlid " "[Ticlopidine Hydrochloride] Unspecified     Pt states \"I'm not sure what happens\".       Outpatient Encounter Prescriptions as of 8/28/2017   Medication Sig Dispense Refill   • metoprolol SR (TOPROL XL) 25 MG TABLET SR 24 HR Take 1 Tab by mouth every day. 30 Tab 11   • sacubitril-valsartan (ENTRESTO) 24-26 MG Tab tablet Take 1 Tab by mouth 2 Times a Day. 60 Tab 6   • furosemide (LASIX) 40 MG Tab Take 1 Tab by mouth every day. 30 Tab 11   • potassium chloride SA (KDUR) 20 MEQ Tab CR Take 2 Tabs by mouth 2 times a day. 480 Tab 3   • spironolactone (ALDACTONE) 25 MG Tab Take 1 Tab by mouth every day. (Patient taking differently: Take 12.5 mg by mouth every day.) 30 Tab 3   • metolazone (ZAROXOLYN) 5 MG Tab Take 5 mg by mouth as needed. Indications: Edema     • Methotrexate, PF, 10 MG/0.2ML Solution Auto-injector Inject 10 mg as instructed every 7 days. On Mon   Indications: Psoriasis     • Omega-3 Fatty Acids (FISH OIL) 1000 MG Cap capsule Take 1,000 mg by mouth every day.     • Multiple Vitamins-Minerals (OCUVITE) Tab Take 1 Tab by mouth every day.     • aspirin 81 MG tablet Take 81 mg by mouth every day.     • cyanocobalamin (VITAMIN B-12) 500 MCG TABS Take 1,000 mcg by mouth every day.     • Coenzyme Q10 (CO Q-10 PO) Take 1 Cap by mouth every day.     • ALLOPURINOL PO Take  by mouth.       No facility-administered encounter medications on file as of 8/28/2017.      Review of Systems   Constitutional: Positive for malaise/fatigue. Negative for fever.   Respiratory: Positive for shortness of breath. Negative for cough.    Cardiovascular: Negative for chest pain, palpitations, orthopnea, claudication, leg swelling and PND.   Gastrointestinal: Negative for abdominal pain.   Musculoskeletal: Negative for myalgias.   Neurological: Positive for dizziness (when standing too quickly).   All other systems reviewed and are negative.       Objective:   BP (!) 98/60   Pulse 62   Resp 14   Ht 1.803 m (5' 11\")   Wt 92.5 kg " (204 lb)   SpO2 92%   BMI 28.45 kg/m²     Physical Exam   Constitutional: He is oriented to person, place, and time. He appears well-developed and well-nourished.   HENT:   Head: Normocephalic and atraumatic.   Eyes: EOM are normal.   Neck: Normal range of motion. Neck supple. No JVD present.   Cardiovascular: Normal rate, regular rhythm, normal heart sounds and intact distal pulses.    No murmur heard.  Pulmonary/Chest: Effort normal and breath sounds normal. No respiratory distress. He has no wheezes. He has no rales.   Left chest ICD -no erosion, erythema or drainage   Abdominal: Soft. Bowel sounds are normal.   Musculoskeletal: Normal range of motion. He exhibits no edema.   Neurological: He is alert and oriented to person, place, and time.   Skin: Skin is warm and dry.   Psychiatric: He has a normal mood and affect.   Nursing note and vitals reviewed.    Lab Results   Component Value Date/Time    CHOLSTRLTOT 245 (H) 11/10/2016 08:49 AM     (H) 11/10/2016 08:49 AM    HDL 68 11/10/2016 08:49 AM    TRIGLYCERIDE 156 (H) 11/10/2016 08:49 AM       Lab Results   Component Value Date/Time    SODIUM 135 06/14/2017 10:45 AM    POTASSIUM 3.6 06/14/2017 10:45 AM    CHLORIDE 98 06/14/2017 10:45 AM    CO2 26 06/14/2017 10:45 AM    GLUCOSE 109 (H) 06/14/2017 10:45 AM    BUN 40 (H) 06/14/2017 10:45 AM    CREATININE 1.86 (H) 06/14/2017 10:45 AM    BUNCREATRAT 22 02/23/2017 10:27 AM     Lab Results   Component Value Date/Time    ALKPHOSPHAT 90 06/14/2017 10:45 AM    ASTSGOT 20 06/14/2017 10:45 AM    ALTSGPT 16 06/14/2017 10:45 AM    TBILIRUBIN 0.7 06/14/2017 10:45 AM      Transesophageal Echo Report 8/14/15  Intraoperative AWILDA performed by Dr. Oconnor during pacemaker lead   extraction.  Severely decreased LV function with EF = 25%.  Global   hypokinesis.  Post-lead extraction shows preserved function, mild-  moderate TR (unchanged).  Small cast of lead remains in RA.  No   effusion, no evidence of complication post  extraction.  Findings   communicated at the time of exam.      Transthoracic Echo Report 3/13/17  Very technically difficult.   Left ventricular ejection fraction is visually estimated to be 35%.    Global hypokinesis with akinetic anterior wall.  Mild mitral regurgitation due to annular dilation.  Compared with the transesophageal echo done in 2015, this study is of   lesser fidelity.  The LVEF would be better assessed with CMR or surface   echo with contrast.      Assessment:     1. ACC/AHA stage C systolic heart failure (CMS-HCC)     2. NYHA class 3 heart failure with reduced ejection fraction (CMS-HCC)     3. Ischemic cardiomyopathy     4. Coronary artery disease involving native coronary artery of native heart without angina pectoris     5. Biventricular implantable cardioverter-defibrillator in situ         Medical Decision Making:  Today's Assessment / Status / Plan:   1. HFrEF 35%, Stage C, Class 2: Based on physical examination findings, patient is euvolemic. No JVD, lungs are clear to auscultation, no pitting edema in bilateral lower extremities, no ascites.  -Continue metoprolol XL 25 mg nightly (pt to continue to see if fatigue improves over the week)  -Continue Entresto 24-26 mg BID  -Continue spironolactone 12.5 mg daily  -Continue furosemide 40 mg daily and metolazone as needed-discussed with patient trying to decrease his furosemide to see if it helps with his blood pressure. Encourage patient to watch for symptoms and continue to do daily weights.   -Encouraged patient to adequately hydrate himself  -Has BiV ICD-followed by EP  -HR in clinic 60-70s. -consider Ivabridine?  -Reinforced s/sx of worsening heart failure with patient and weight monitoring. Pt verbalizes understanding. Pt to call office or RTC if present.     FU in clinic in 1 week with Dr. Carl. Sooner if needed.    Patient verbalizes understanding and agrees with the plan of care.     Collaborating MD: Severo Lee MD

## 2017-08-28 NOTE — LETTER
"     [facility]   [address]  [place] [address]  Phone: [phone]  Fax: [phone]              Isai Fuentes  1940    Encounter Date: 8/28/2017    SURESH Correa          PROGRESS NOTE:  Subjective:   Isai Fuentes is a 76 y.o. male who presents today For follow-up on his heart failure.    Patient was initially seen in our heart failure clinic on 8/21/17 with Dr. Lee. During that visit, his carvedilol was stopped and he was started on Toprol-XL 25 mg nightly, hoping this would alleviate some of his fatigue and possibly increase his blood pressure little bit. Patient reports in clinic today he continues to have some fatigue. His blood pressure log shows systolic blood pressures from the 100s to 120s most of the time and had one day where his blood pressure was systolic in the 80s. HR range 70-80s.     For his other symptoms, patient continues to have shortness of breath on occasion with his ADLs and with exertion and dizziness if he stands too quickly. Patient denies chest pain, shortness of breath at rest, palpitations, orthopnea, PND or Edema.     His home weights have been ranging from 195-199. Patient states he does take furosemide 40 mg daily and an occasional metolazone 2.5 mg as needed.     Additonally, patient has the following medical problems:    -CAD, hx 2 stents and CABGx2    -PPM and upgrade to BiV ICD in 2015.     -WILLA using CPAP.     -Atrial arrhythmia, VT and ablation      Past Medical History:   Diagnosis Date   • Biventricular implantable cardioverter-defibrillator in situ 8/7/2015   • Other specified disorder of intestines 07-28-15    constipation/diarrhea/ reports some bleeding from rectum w/blood clots. Seeing GI   • Pain 07-28-15    \"chronic\", 0/10   • Cancer (CMS-HCC) 2012    prostate   • Atrial fibrillation (CMS-HCC) 9/20/2011   • CAD (coronary artery disease) 9/20/2011   • CARDIOMYOPATHY 9/20/2011   • " "History of cardiac catheterization 9/20/2011   • HTN (hypertension) 9/20/2011   • Ischemic cardiomyopathy 9/20/2011   • Presence of permanent cardiac pacemaker 9/20/2011   • Prostate cancer (CMS-HCC) 5/13/2011   • Long term (current) use of anticoagulants 5/13/2011   • Second degree AV block, Mobitz type II 11/10/2010   • Fatigue 10/28/2010   • Insomnia 7/8/2010   • Pleural effusion 5/18/2009   • Orthostatic hypotension 5/6/2009   • Stroke (CMS-HCC) 2/3/2009   • Heartburn 10/23/2008   • Other drug allergy 10/16/2008   • Myocardial infarct (CMS-HCC) 2007   • Breath shortness    • Congestive heart failure (CMS-HCC)    • COPD (chronic obstructive pulmonary disease) (CMS-HCC)    • History of myocardial infarction     \"many\"   • Indigestion    • WILLA (obstructive sleep apnea)     CPAP   • Pacemaker     [employer]   • Snoring      Past Surgical History:   Procedure Laterality Date   • RECOVERY  10/16/2015    Procedure: CATH LAB LEAD REVISION [facility];  Surgeon: Recoveryonly Surgery;  Location: SURGERY PRE-POST PROC [facility]IT [facility];  Service:    • RECOVERY  8/14/2015    Procedure:  CATH LAB LEAD EXTRACTION AWILDA [facility] LR[facility] PAUL[facility] AQUINO;  Surgeon: Recoveryonly Surgery;  Location: SURGERY PRE-POST PROC UNIT [facility];  Service:    • RECOVERY  7/30/2015    Procedure: CATH LAB  LEAD EXTRACTION, UPGRADE TO BIV PM [facility] R LRG MAGGIE AQUINO ;  Surgeon: Recoveryonly Surgery;  Location: SURGERY PRE-POST PROC UNIT [facility];  Service:    • RECOVERY  7/29/2015    Procedure: CATH LAB NEW PM INSERTION DUAL ATRIAL & VENTRICULAR-LV LEAD W/ NEW GENERATOR KENIA ICD9: 414.8;  Surgeon: Recoveryonly Surgery;  Location: SURGERY PRE-POST PROC UNIT [facility];  Service:    • PACEMAKER INSERTION  11/24/08    [facility]   • MULTIPLE CORONARY ARTERY BYPASS  2007    2 vessel   • CARDIAC CATH      has 2 stents     Family History   Problem Relation Age of Onset   • Thyroid Sister      History   Smoking Status   • Former Smoker   • Packs/day: 1.50   • Years: 3.00   • Types: " "Cigarettes   • Quit date: 1/1/1970   Smokeless Tobacco   • Former User   • Types: Chew   • Quit date: 1/1/1972     Allergies   Allergen Reactions   • Plavix [Clopidogrel Bisulfate] Anaphylaxis   • Statins [Hmg-Coa-R Inhibitors] Unspecified     Muscles aches     • Ticlid [Ticlopidine Hydrochloride] Unspecified     Pt states \"I'm not sure what happens\".       Outpatient Encounter Prescriptions as of 8/28/2017   Medication Sig Dispense Refill   • metoprolol SR (TOPROL XL) 25 MG TABLET SR 24 HR Take 1 Tab by mouth every day. 30 Tab 11   • sacubitril-valsartan (ENTRESTO) 24-26 MG Tab tablet Take 1 Tab by mouth 2 Times a Day. 60 Tab 6   • furosemide (LASIX) 40 MG Tab Take 1 Tab by mouth every day. 30 Tab 11   • potassium chloride SA (KDUR) 20 MEQ Tab CR Take 2 Tabs by mouth 2 times a day. 480 Tab 3   • spironolactone (ALDACTONE) 25 MG Tab Take 1 Tab by mouth every day. (Patient taking differently: Take 12.5 mg by mouth every day.) 30 Tab 3   • metolazone (ZAROXOLYN) 5 MG Tab Take 5 mg by mouth as needed. Indications: Edema     • Methotrexate, PF, 10 MG/0.2ML Solution Auto-injector Inject 10 mg as instructed every 7 days. On Mon   Indications: Psoriasis     • Omega-3 Fatty Acids (FISH OIL) 1000 MG Cap capsule Take 1,000 mg by mouth every day.     • Multiple Vitamins-Minerals (OCUVITE) Tab Take 1 Tab by mouth every day.     • aspirin 81 MG tablet Take 81 mg by mouth every day.     • cyanocobalamin (VITAMIN B-12) 500 MCG TABS Take 1,000 mcg by mouth every day.     • Coenzyme Q10 (CO Q-10 PO) Take 1 Cap by mouth every day.     • ALLOPURINOL PO Take  by mouth.       No facility-administered encounter medications on file as of 8/28/2017.      Review of Systems   Constitutional: Positive for malaise/fatigue. Negative for fever.   Respiratory: Positive for shortness of breath. Negative for cough.    Cardiovascular: Negative for chest pain, palpitations, orthopnea, claudication, leg swelling and PND.   Gastrointestinal: Negative " "for abdominal pain.   Musculoskeletal: Negative for myalgias.   Neurological: Positive for dizziness (when standing too quickly).   All other systems reviewed and are negative.       Objective:   BP (!) 98/60   Pulse 62   Resp 14   Ht 1.803 m (5' 11\")   Wt 92.5 kg (204 lb)   SpO2 92%   BMI 28.45 kg/m²      Physical Exam   Constitutional: He is oriented to person, place, and time. He appears well-developed and well-nourished.   HENT:   Head: Normocephalic and atraumatic.   Eyes: EOM are normal.   Neck: Normal range of motion. Neck supple. No JVD present.   Cardiovascular: Normal rate, regular rhythm, normal heart sounds and intact distal pulses.    No murmur heard.  Pulmonary/Chest: Effort normal and breath sounds normal. No respiratory distress. He has no wheezes. He has no rales.   Left chest ICD -no erosion, erythema or drainage   Abdominal: Soft. Bowel sounds are normal.   Musculoskeletal: Normal range of motion. He exhibits no edema.   Neurological: He is alert and oriented to person, place, and time.   Skin: Skin is warm and dry.   Psychiatric: He has a normal mood and affect.   Nursing note and vitals reviewed.    Lab Results   Component Value Date/Time    CHOLSTRLTOT 245 (H) 11/10/2016 08:49 AM     (H) 11/10/2016 08:49 AM    HDL 68 11/10/2016 08:49 AM    TRIGLYCERIDE 156 (H) 11/10/2016 08:49 AM       Lab Results   Component Value Date/Time    SODIUM 135 06/14/2017 10:45 AM    POTASSIUM 3.6 06/14/2017 10:45 AM    CHLORIDE 98 06/14/2017 10:45 AM    CO2 26 06/14/2017 10:45 AM    GLUCOSE 109 (H) 06/14/2017 10:45 AM    BUN 40 (H) 06/14/2017 10:45 AM    CREATININE 1.86 (H) 06/14/2017 10:45 AM    BUNCREATRAT 22 02/23/2017 10:27 AM     Lab Results   Component Value Date/Time    ALKPHOSPHAT 90 06/14/2017 10:45 AM    ASTSGOT 20 06/14/2017 10:45 AM    ALTSGPT 16 06/14/2017 10:45 AM    TBILIRUBIN 0.7 06/14/2017 10:45 AM      Transesophageal Echo Report 8/14/15  Intraoperative AWILDA performed by Dr. Oconnor " during pacemaker lead   extraction.  Severely decreased LV function with EF = 25%.  Global   hypokinesis.  Post-lead extraction shows preserved function, mild-  moderate TR (unchanged).  Small cast of lead remains in RA.  No   effusion, no evidence of complication post extraction.  Findings   communicated at the time of exam.      Transthoracic Echo Report 3/13/17  Very technically difficult.   Left ventricular ejection fraction is visually estimated to be 35%.    Global hypokinesis with akinetic anterior wall.  Mild mitral regurgitation due to annular dilation.  Compared with the transesophageal echo done in 2015, this study is of   lesser fidelity.  The LVEF would be better assessed with CMR or surface   echo with contrast.      Assessment:     1. ACC/AHA stage C systolic heart failure (CMS-HCC)     2. NYHA class 3 heart failure with reduced ejection fraction (CMS-HCC)     3. Ischemic cardiomyopathy     4. Coronary artery disease involving native coronary artery of native heart without angina pectoris     5. Biventricular implantable cardioverter-defibrillator in situ         Medical Decision Making:  Today's Assessment / Status / Plan:   1. HFrEF 35%, Stage C, Class 2: Based on physical examination findings, patient is euvolemic. No JVD, lungs are clear to auscultation, no pitting edema in bilateral lower extremities, no ascites.  -Continue metoprolol XL 25 mg nightly (pt to continue to see if fatigue improves over the week)  -Continue Entresto 24-26 mg BID  -Continue spironolactone 12.5 mg daily  -Continue furosemide 40 mg daily and metolazone as needed-discussed with patient trying to decrease his furosemide to see if it helps with his blood pressure. Encourage patient to watch for symptoms and continue to do daily weights.   -Encouraged patient to adequately hydrate himself  -Has BiV ICD-followed by EP  -HR in clinic 60-70s. -consider Ivabridine?  -Reinforced s/sx of worsening heart failure with patient and  weight monitoring. Pt verbalizes understanding. Pt to call office or RTC if present.     FU in clinic in 1 week with Dr. Carl. Sooner if needed.    Patient verbalizes understanding and agrees with the plan of care.     Collaborating MD: MD Prashant Hung M.D.  [address] [place] [address]  VIA Facsimile: [phone]

## 2017-09-05 ENCOUNTER — OFFICE VISIT (OUTPATIENT)
Dept: CARDIOLOGY | Facility: MEDICAL CENTER | Age: 77
End: 2017-09-05
Payer: MEDICARE

## 2017-09-05 VITALS
OXYGEN SATURATION: 93 % | SYSTOLIC BLOOD PRESSURE: 102 MMHG | DIASTOLIC BLOOD PRESSURE: 62 MMHG | BODY MASS INDEX: 28.7 KG/M2 | WEIGHT: 205 LBS | HEIGHT: 71 IN | HEART RATE: 75 BPM

## 2017-09-05 DIAGNOSIS — I50.20 SYSTOLIC HEART FAILURE, ACC/AHA STAGE C (HCC): ICD-10-CM

## 2017-09-05 DIAGNOSIS — F32.A DEPRESSION, UNSPECIFIED DEPRESSION TYPE: ICD-10-CM

## 2017-09-05 DIAGNOSIS — Z98.890 S/P ABLATION OF ATRIAL FIBRILLATION: ICD-10-CM

## 2017-09-05 DIAGNOSIS — I51.89 LEFT VENTRICULAR SYSTOLIC DYSFUNCTION, NYHA CLASS 3: ICD-10-CM

## 2017-09-05 DIAGNOSIS — Z95.810 BIVENTRICULAR IMPLANTABLE CARDIOVERTER-DEFIBRILLATOR IN SITU: ICD-10-CM

## 2017-09-05 DIAGNOSIS — F10.10 ALCOHOL ABUSE: ICD-10-CM

## 2017-09-05 DIAGNOSIS — I25.5 ISCHEMIC CARDIOMYOPATHY: Chronic | ICD-10-CM

## 2017-09-05 DIAGNOSIS — Z86.79 S/P ABLATION OF ATRIAL FIBRILLATION: ICD-10-CM

## 2017-09-05 PROCEDURE — 99214 OFFICE O/P EST MOD 30 MIN: CPT | Performed by: INTERNAL MEDICINE

## 2017-09-05 NOTE — LETTER
"     [facility]   [address]  [place] [address]  Phone: [phone]  Fax: [phone]              Isai Fuentes  1940    Encounter Date: 9/5/2017    Jef Carl M.D.          PROGRESS NOTE:  Subjective:   Isai Fuentes is a 76 y.o. male who presents today For cardiac care and evaluation in our heart failure clinic due to prior history of ischemic cardiomyopathy status post BiVICD, coronary arterial disease, HTN, HLP, atrial fibrillation s/p ablation.    Patient also has history of persistent hypotension for which we cannot titrate up his medication for his cardiomyopathy.    Patient continues to report of exertional dyspnea but also depression.    Past Medical History:   Diagnosis Date   • Biventricular implantable cardioverter-defibrillator in situ 8/7/2015   • Other specified disorder of intestines 07-28-15    constipation/diarrhea/ reports some bleeding from rectum w/blood clots. Seeing GI   • Pain 07-28-15    \"chronic\", 0/10   • Cancer (CMS-HCC) 2012    prostate   • Atrial fibrillation (CMS-HCC) 9/20/2011   • CAD (coronary artery disease) 9/20/2011   • CARDIOMYOPATHY 9/20/2011   • History of cardiac catheterization 9/20/2011   • HTN (hypertension) 9/20/2011   • Ischemic cardiomyopathy 9/20/2011   • Presence of permanent cardiac pacemaker 9/20/2011   • Prostate cancer (CMS-HCC) 5/13/2011   • Long term (current) use of anticoagulants 5/13/2011   • Second degree AV block, Mobitz type II 11/10/2010   • Fatigue 10/28/2010   • Insomnia 7/8/2010   • Pleural effusion 5/18/2009   • Orthostatic hypotension 5/6/2009   • Stroke (CMS-HCC) 2/3/2009   • Heartburn 10/23/2008   • Other drug allergy 10/16/2008   • Myocardial infarct (CMS-HCC) 2007   • Breath shortness    • Congestive heart failure (CMS-HCC)    • COPD (chronic obstructive pulmonary disease) (CMS-HCC)    • History of myocardial infarction     \"many\"   • Indigestion    • WILLA " "(obstructive sleep apnea)     CPAP   • Pacemaker     [employer]   • Snoring      Past Surgical History:   Procedure Laterality Date   • RECOVERY  10/16/2015    Procedure: CATH LAB LEAD REVISION .MARIA R AQUINO;  Surgeon: Recoveryonfreddy Surgery;  Location: SURGERY PRE[facility]POST PROC [facility];  Service:    • RECOVERY  8/14/2015    Procedure:  CATH LAB LEAD EXTRACTION AWILDA [facility] BIP[facility] PAULO AQUINO;  Surgeon: Recoveryonfreddy Surgery;  Location: SURGERY PRE-POST PROC UNIT [facility];  Service:    • RECOVERY  7/30/2015    Procedure: CATH LAB  LEAD EXTRACTION, UPGRADE TO BIV PM [facility] R BIPING GRP AQUINO ;  Surgeon: Recoveryonfreddy Surgery;  Location: SURGERY PRE-POST PROC UNIT [facility];  Service:    • RECOVERY  7/29/2015    Procedure: CATH LAB NEW PM INSERTION DUAL ATRIAL & VENTRICULAR-LV LEAD W/ NEW GENERATOR AQUINO ICD9: 414.8;  Surgeon: Amy Surgery;  Location: SURGERY PRE-POST PROC UNIT [facility];  Service:    • PACEMAKER INSERTION  11/24/08    [facility]   • MULTIPLE CORONARY ARTERY BYPASS  2007    2 vessel   • CARDIAC CATH      has 2 stents     Family History   Problem Relation Age of Onset   • Thyroid Sister      History   Smoking Status   • Former Smoker   • Packs/day: 1.50   • Years: 3.00   • Types: Cigarettes   • Quit date: 1/1/1970   Smokeless Tobacco   • Former User   • Types: Chew   • Quit date: 1/1/1972     Allergies   Allergen Reactions   • Plavix [Clopidogrel Bisulfate] Anaphylaxis   • Statins [Hmg-Coa-R Inhibitors] Unspecified     Muscles aches     • Ticlid [Ticlopidine Hydrochloride] Unspecified     Pt states \"I'm not sure what happens\".       Outpatient Encounter Prescriptions as of 9/5/2017   Medication Sig Dispense Refill   • metoprolol SR (TOPROL XL) 25 MG TABLET SR 24 HR Take 1 Tab by mouth every day. 30 Tab 11   • sacubitril-valsartan (ENTRESTO) 24-26 MG Tab tablet Take 1 Tab by mouth 2 Times a Day. 60 Tab 6   • furosemide (LASIX) 40 MG Tab Take 1 Tab by mouth every day. 30 Tab 11   • potassium chloride SA (KDUR) 20 MEQ Tab CR " "Take 2 Tabs by mouth 2 times a day. 480 Tab 3   • metolazone (ZAROXOLYN) 5 MG Tab Take 5 mg by mouth as needed. Indications: Edema     • Methotrexate, PF, 10 MG/0.2ML Solution Auto-injector Inject 10 mg as instructed every 7 days. On Mon   Indications: Psoriasis     • Omega-3 Fatty Acids (FISH OIL) 1000 MG Cap capsule Take 1,000 mg by mouth every day.     • Multiple Vitamins-Minerals (OCUVITE) Tab Take 1 Tab by mouth every day.     • aspirin 81 MG tablet Take 81 mg by mouth every day.     • cyanocobalamin (VITAMIN B-12) 500 MCG TABS Take 1,000 mcg by mouth every day.     • Coenzyme Q10 (CO Q-10 PO) Take 1 Cap by mouth every day.     • ALLOPURINOL PO Take  by mouth.     • spironolactone (ALDACTONE) 25 MG Tab Take 1 Tab by mouth every day. (Patient taking differently: Take 12.5 mg by mouth every day.) 30 Tab 3     No facility-administered encounter medications on file as of 9/5/2017.      Review of Systems   Constitutional: Negative for chills, fever, malaise/fatigue and weight loss.   HENT: Negative for ear discharge, ear pain, hearing loss and nosebleeds.    Eyes: Negative for blurred vision, double vision, pain and discharge.   Respiratory: Positive for shortness of breath. Negative for cough.    Cardiovascular: Negative for chest pain, palpitations, orthopnea, claudication, leg swelling and PND.   Gastrointestinal: Negative for abdominal pain, blood in stool, melena, nausea and vomiting.   Genitourinary: Negative for dysuria and hematuria.   Musculoskeletal: Negative for falls, joint pain and myalgias.   Skin: Negative for itching and rash.   Neurological: Negative for dizziness, sensory change, speech change, loss of consciousness and headaches.   Endo/Heme/Allergies: Negative for environmental allergies. Does not bruise/bleed easily.   Psychiatric/Behavioral: Positive for depression. Negative for hallucinations and suicidal ideas.        Objective:   /62   Pulse 75   Ht 1.803 m (5' 11\")   Wt 93 kg (205 " lb)   SpO2 93%   BMI 28.59 kg/m²      Physical Exam   Constitutional: He is oriented to person, place, and time. No distress.   HENT:   Head: Normocephalic and atraumatic.   Eyes: EOM are normal.   Neck: Normal range of motion. No JVD present.   Cardiovascular: Normal rate, regular rhythm, normal heart sounds and intact distal pulses.  Exam reveals no gallop and no friction rub.    No murmur heard.  Bilateral femoral pulses are 2+, bilateral dorsalis pedis pulses are 2+, bilateral posterior tibialis pulses are 2+.   Pulmonary/Chest: No respiratory distress. He has no wheezes. He has no rales. He exhibits no tenderness.   Abdominal: Soft. Bowel sounds are normal. There is no tenderness. There is no rebound and no guarding.   The is no presence of abdominal bruits   Musculoskeletal: Normal range of motion.   Neurological: He is alert and oriented to person, place, and time.   Skin: Skin is warm and dry.   Psychiatric: He has a normal mood and affect.   Nursing note and vitals reviewed.      Assessment:     1. Depression, unspecified depression type  REFERRAL TO PSYCHIATRY   2. S/P ablation of atrial fibrillation     3. Systolic heart failure, ACC/AHA stage C (CMS-HCC)     4. Left ventricular systolic dysfunction, NYHA class 3     5. Alcohol abuse     6. Ischemic cardiomyopathy     7. Biventricular implantable cardioverter-defibrillator in situ         Medical Decision Making:  Today's Assessment / Status / Plan:   Today, based on physical examination findings, patient is euvolemic. No JVD, lungs are clear to auscultation, no pitting edema in bilateral lower extremities, no ascites.    I will refer patient to psychiatry for depression.  In the meantime continue current medical therapy without change.  Blood pressure is borderline.    Toprol 25 mg po daily, Entresto 50 mg po bid.  Lasix as needed.      Prashant Shankar M.D.  [address] [place] [address]  VIA Facsimile: [phone]

## 2017-09-05 NOTE — LETTER
"     [facility]   [address]  [place] [address]  Phone: [phone]  Fax: [phone]              Isai Fuentes  1940    Encounter Date: 9/5/2017    Jef Carl M.D.          PROGRESS NOTE:  Subjective:   Isai Fuentes is a 76 y.o. male who presents today For cardiac care and evaluation in our heart failure clinic due to prior history of ischemic cardiomyopathy status post BiVICD, coronary arterial disease, HTN, HLP, atrial fibrillation s/p ablation.    Patient also has history of persistent hypotension for which we cannot titrate up his medication for his cardiomyopathy.    Patient continues to report of exertional dyspnea but also depression.    Past Medical History:   Diagnosis Date   • Biventricular implantable cardioverter-defibrillator in situ 8/7/2015   • Other specified disorder of intestines 07-28-15    constipation/diarrhea/ reports some bleeding from rectum w/blood clots. Seeing GI   • Pain 07-28-15    \"chronic\", 0/10   • Cancer (CMS-HCC) 2012    prostate   • Atrial fibrillation (CMS-HCC) 9/20/2011   • CAD (coronary artery disease) 9/20/2011   • CARDIOMYOPATHY 9/20/2011   • History of cardiac catheterization 9/20/2011   • HTN (hypertension) 9/20/2011   • Ischemic cardiomyopathy 9/20/2011   • Presence of permanent cardiac pacemaker 9/20/2011   • Prostate cancer (CMS-HCC) 5/13/2011   • Long term (current) use of anticoagulants 5/13/2011   • Second degree AV block, Mobitz type II 11/10/2010   • Fatigue 10/28/2010   • Insomnia 7/8/2010   • Pleural effusion 5/18/2009   • Orthostatic hypotension 5/6/2009   • Stroke (CMS-HCC) 2/3/2009   • Heartburn 10/23/2008   • Other drug allergy 10/16/2008   • Myocardial infarct (CMS-HCC) 2007   • Breath shortness    • Congestive heart failure (CMS-HCC)    • COPD (chronic obstructive pulmonary disease) (CMS-HCC)    • History of myocardial infarction     \"many\"   • Indigestion    • WILLA " "(obstructive sleep apnea)     CPAP   • Pacemaker     [employer]   • Snoring      Past Surgical History:   Procedure Laterality Date   • RECOVERY  10/16/2015    Procedure: CATH LAB LEAD REVISION .MARIA R AQUINO;  Surgeon: Recoveryonfreddy Surgery;  Location: SURGERY PRE-POST PROC UNIT [facility];  Service:    • RECOVERY  8/14/2015    Procedure:  CATH LAB LEAD EXTRACTION AWILDA ST.MARIA R BIPING PAULO AQUINO;  Surgeon: Recoveryonfreddy Surgery;  Location: SURGERY PRE-POST PROC UNIT [facility];  Service:    • RECOVERY  7/30/2015    Procedure: CATH LAB  LEAD EXTRACTION, UPGRADE TO BIV PM ST.MARIA R BIPING GRP AQUINO ;  Surgeon: Recoveryonfreddy Surgery;  Location: SURGERY PRE-POST PROC UNIT [facility];  Service:    • RECOVERY  7/29/2015    Procedure: CATH LAB NEW PM INSERTION DUAL ATRIAL & VENTRICULAR-LV LEAD W/ NEW GENERATOR AQUINO ICD9: 414.8;  Surgeon: Amy Surgery;  Location: SURGERY PRE-POST PROC UNIT [facility];  Service:    • PACEMAKER INSERTION  11/24/08    [facility]   • MULTIPLE CORONARY ARTERY BYPASS  2007    2 vessel   • CARDIAC CATH      has 2 stents     Family History   Problem Relation Age of Onset   • Thyroid Sister      History   Smoking Status   • Former Smoker   • Packs/day: 1.50   • Years: 3.00   • Types: Cigarettes   • Quit date: 1/1/1970   Smokeless Tobacco   • Former User   • Types: Chew   • Quit date: 1/1/1972     Allergies   Allergen Reactions   • Plavix [Clopidogrel Bisulfate] Anaphylaxis   • Statins [Hmg-Coa-R Inhibitors] Unspecified     Muscles aches     • Ticlid [Ticlopidine Hydrochloride] Unspecified     Pt states \"I'm not sure what happens\".       Outpatient Encounter Prescriptions as of 9/5/2017   Medication Sig Dispense Refill   • metoprolol SR (TOPROL XL) 25 MG TABLET SR 24 HR Take 1 Tab by mouth every day. 30 Tab 11   • sacubitril-valsartan (ENTRESTO) 24-26 MG Tab tablet Take 1 Tab by mouth 2 Times a Day. 60 Tab 6   • furosemide (LASIX) 40 MG Tab Take 1 Tab by mouth every day. 30 Tab 11   • potassium chloride SA (KDUR) 20 MEQ Tab CR " "Take 2 Tabs by mouth 2 times a day. 480 Tab 3   • metolazone (ZAROXOLYN) 5 MG Tab Take 5 mg by mouth as needed. Indications: Edema     • Methotrexate, PF, 10 MG/0.2ML Solution Auto-injector Inject 10 mg as instructed every 7 days. On Mon   Indications: Psoriasis     • Omega-3 Fatty Acids (FISH OIL) 1000 MG Cap capsule Take 1,000 mg by mouth every day.     • Multiple Vitamins-Minerals (OCUVITE) Tab Take 1 Tab by mouth every day.     • aspirin 81 MG tablet Take 81 mg by mouth every day.     • cyanocobalamin (VITAMIN B-12) 500 MCG TABS Take 1,000 mcg by mouth every day.     • Coenzyme Q10 (CO Q-10 PO) Take 1 Cap by mouth every day.     • ALLOPURINOL PO Take  by mouth.     • spironolactone (ALDACTONE) 25 MG Tab Take 1 Tab by mouth every day. (Patient taking differently: Take 12.5 mg by mouth every day.) 30 Tab 3     No facility-administered encounter medications on file as of 9/5/2017.      Review of Systems   Constitutional: Negative for chills, fever, malaise/fatigue and weight loss.   HENT: Negative for ear discharge, ear pain, hearing loss and nosebleeds.    Eyes: Negative for blurred vision, double vision, pain and discharge.   Respiratory: Positive for shortness of breath. Negative for cough.    Cardiovascular: Negative for chest pain, palpitations, orthopnea, claudication, leg swelling and PND.   Gastrointestinal: Negative for abdominal pain, blood in stool, melena, nausea and vomiting.   Genitourinary: Negative for dysuria and hematuria.   Musculoskeletal: Negative for falls, joint pain and myalgias.   Skin: Negative for itching and rash.   Neurological: Negative for dizziness, sensory change, speech change, loss of consciousness and headaches.   Endo/Heme/Allergies: Negative for environmental allergies. Does not bruise/bleed easily.   Psychiatric/Behavioral: Positive for depression. Negative for hallucinations and suicidal ideas.        Objective:   /62   Pulse 75   Ht 1.803 m (5' 11\")   Wt 93 kg (205 " lb)   SpO2 93%   BMI 28.59 kg/m²      Physical Exam   Constitutional: He is oriented to person, place, and time. No distress.   HENT:   Head: Normocephalic and atraumatic.   Eyes: EOM are normal.   Neck: Normal range of motion. No JVD present.   Cardiovascular: Normal rate, regular rhythm, normal heart sounds and intact distal pulses.  Exam reveals no gallop and no friction rub.    No murmur heard.  Bilateral femoral pulses are 2+, bilateral dorsalis pedis pulses are 2+, bilateral posterior tibialis pulses are 2+.   Pulmonary/Chest: No respiratory distress. He has no wheezes. He has no rales. He exhibits no tenderness.   Abdominal: Soft. Bowel sounds are normal. There is no tenderness. There is no rebound and no guarding.   The is no presence of abdominal bruits   Musculoskeletal: Normal range of motion.   Neurological: He is alert and oriented to person, place, and time.   Skin: Skin is warm and dry.   Psychiatric: He has a normal mood and affect.   Nursing note and vitals reviewed.      Assessment:     1. Depression, unspecified depression type  REFERRAL TO PSYCHIATRY   2. S/P ablation of atrial fibrillation     3. Systolic heart failure, ACC/AHA stage C (CMS-HCC)     4. Left ventricular systolic dysfunction, NYHA class 3     5. Alcohol abuse     6. Ischemic cardiomyopathy     7. Biventricular implantable cardioverter-defibrillator in situ         Medical Decision Making:  Today's Assessment / Status / Plan:   Today, based on physical examination findings, patient is euvolemic. No JVD, lungs are clear to auscultation, no pitting edema in bilateral lower extremities, no ascites.    I will refer patient to psychiatry for depression.  In the meantime continue current medical therapy without change.  Blood pressure is borderline.    Toprol 25 mg po daily, Entresto 50 mg po bid.  Lasix as needed.      Prashant Shankar M.D.  [address] [place] [address]  VIA Facsimile: [phone]

## 2017-09-06 PROBLEM — I50.20 SYSTOLIC HEART FAILURE, ACC/AHA STAGE C (HCC): Status: ACTIVE | Noted: 2017-09-06

## 2017-09-06 PROBLEM — I51.89 LEFT VENTRICULAR SYSTOLIC DYSFUNCTION, NYHA CLASS 3: Status: ACTIVE | Noted: 2017-09-06

## 2017-09-06 ASSESSMENT — ENCOUNTER SYMPTOMS
DOUBLE VISION: 0
BRUISES/BLEEDS EASILY: 0
BLURRED VISION: 0
LOSS OF CONSCIOUSNESS: 0
COUGH: 0
BLOOD IN STOOL: 0
MYALGIAS: 0
DIZZINESS: 0
ORTHOPNEA: 0
FALLS: 0
SPEECH CHANGE: 0
EYE DISCHARGE: 0
ABDOMINAL PAIN: 0
SHORTNESS OF BREATH: 1
HALLUCINATIONS: 0
CHILLS: 0
HEADACHES: 0
DEPRESSION: 1
PND: 0
SENSORY CHANGE: 0
CLAUDICATION: 0
PALPITATIONS: 0
WEIGHT LOSS: 0
VOMITING: 0
FEVER: 0
EYE PAIN: 0
NAUSEA: 0

## 2017-09-06 NOTE — PROGRESS NOTES
"Subjective:   Isai Fuentes is a 76 y.o. male who presents today For cardiac care and evaluation in our heart failure clinic due to prior history of ischemic cardiomyopathy status post BiVICD, coronary arterial disease, HTN, HLP, atrial fibrillation s/p ablation.    Patient also has history of persistent hypotension for which we cannot titrate up his medication for his cardiomyopathy.    Patient continues to report of exertional dyspnea but also depression.    Past Medical History:   Diagnosis Date   • Biventricular implantable cardioverter-defibrillator in situ 8/7/2015   • Other specified disorder of intestines 07-28-15    constipation/diarrhea/ reports some bleeding from rectum w/blood clots. Seeing GI   • Pain 07-28-15    \"chronic\", 0/10   • Cancer (CMS-HCC) 2012    prostate   • Atrial fibrillation (CMS-HCC) 9/20/2011   • CAD (coronary artery disease) 9/20/2011   • CARDIOMYOPATHY 9/20/2011   • History of cardiac catheterization 9/20/2011   • HTN (hypertension) 9/20/2011   • Ischemic cardiomyopathy 9/20/2011   • Presence of permanent cardiac pacemaker 9/20/2011   • Prostate cancer (CMS-HCC) 5/13/2011   • Long term (current) use of anticoagulants 5/13/2011   • Second degree AV block, Mobitz type II 11/10/2010   • Fatigue 10/28/2010   • Insomnia 7/8/2010   • Pleural effusion 5/18/2009   • Orthostatic hypotension 5/6/2009   • Stroke (CMS-HCC) 2/3/2009   • Heartburn 10/23/2008   • Other drug allergy 10/16/2008   • Myocardial infarct (CMS-HCC) 2007   • Breath shortness    • Congestive heart failure (CMS-HCC)    • COPD (chronic obstructive pulmonary disease) (CMS-HCC)    • History of myocardial infarction     \"many\"   • Indigestion    • WILLA (obstructive sleep apnea)     CPAP   • Pacemaker     [employer]   • Snoring      Past Surgical History:   Procedure Laterality Date   • RECOVERY  10/16/2015    Procedure: CATH LAB LEAD REVISION STSIXTO AQUINO;  Surgeon: Amy Surgery;  Location: SURGERY PRE-POST " "PROC UNIT [facility] [place];  Service:    • RECOVERY  8/14/2015    Procedure:  CATH LAB LEAD EXTRACTION AWILDA ST.DRE LRG RP KENIA;  Surgeon: Recoveryonly Surgery;  Location: SURGERY PRE-POST PROC UNIT [facility];  Service:    • RECOVERY  7/30/2015    Procedure: CATH LAB  LEAD EXTRACTION, UPGRADE TO BIV PM ST.DRE LRG GRP AQUINO ;  Surgeon: Recoveryonly Surgery;  Location: SURGERY PRE-POST PROC UNIT [facility];  Service:    • RECOVERY  7/29/2015    Procedure: CATH LAB NEW PM INSERTION DUAL ATRIAL & VENTRICULAR-LV LEAD W/ NEW GENERATOR AQUINO ICD9: 414.8;  Surgeon: Recoveryonly Surgery;  Location: SURGERY PRE-POST PROC UNIT [facility];  Service:    • PACEMAKER INSERTION  11/24/08    [facility]   • MULTIPLE CORONARY ARTERY BYPASS  2007    2 vessel   • CARDIAC CATH      has 2 stents     Family History   Problem Relation Age of Onset   • Thyroid Sister      History   Smoking Status   • Former Smoker   • Packs/day: 1.50   • Years: 3.00   • Types: Cigarettes   • Quit date: 1/1/1970   Smokeless Tobacco   • Former User   • Types: Chew   • Quit date: 1/1/1972     Allergies   Allergen Reactions   • Plavix [Clopidogrel Bisulfate] Anaphylaxis   • Statins [Hmg-Coa-R Inhibitors] Unspecified     Muscles aches     • Ticlid [Ticlopidine Hydrochloride] Unspecified     Pt states \"I'm not sure what happens\".       Outpatient Encounter Prescriptions as of 9/5/2017   Medication Sig Dispense Refill   • metoprolol SR (TOPROL XL) 25 MG TABLET SR 24 HR Take 1 Tab by mouth every day. 30 Tab 11   • sacubitril-valsartan (ENTRESTO) 24-26 MG Tab tablet Take 1 Tab by mouth 2 Times a Day. 60 Tab 6   • furosemide (LASIX) 40 MG Tab Take 1 Tab by mouth every day. 30 Tab 11   • potassium chloride SA (KDUR) 20 MEQ Tab CR Take 2 Tabs by mouth 2 times a day. 480 Tab 3   • metolazone (ZAROXOLYN) 5 MG Tab Take 5 mg by mouth as needed. Indications: Edema     • Methotrexate, PF, 10 MG/0.2ML Solution Auto-injector Inject 10 mg as instructed every 7 days. On Mon   Indications: Psoriasis     • Omega-3 " "Fatty Acids (FISH OIL) 1000 MG Cap capsule Take 1,000 mg by mouth every day.     • Multiple Vitamins-Minerals (OCUVITE) Tab Take 1 Tab by mouth every day.     • aspirin 81 MG tablet Take 81 mg by mouth every day.     • cyanocobalamin (VITAMIN B-12) 500 MCG TABS Take 1,000 mcg by mouth every day.     • Coenzyme Q10 (CO Q-10 PO) Take 1 Cap by mouth every day.     • ALLOPURINOL PO Take  by mouth.     • spironolactone (ALDACTONE) 25 MG Tab Take 1 Tab by mouth every day. (Patient taking differently: Take 12.5 mg by mouth every day.) 30 Tab 3     No facility-administered encounter medications on file as of 9/5/2017.      Review of Systems   Constitutional: Negative for chills, fever, malaise/fatigue and weight loss.   HENT: Negative for ear discharge, ear pain, hearing loss and nosebleeds.    Eyes: Negative for blurred vision, double vision, pain and discharge.   Respiratory: Positive for shortness of breath. Negative for cough.    Cardiovascular: Negative for chest pain, palpitations, orthopnea, claudication, leg swelling and PND.   Gastrointestinal: Negative for abdominal pain, blood in stool, melena, nausea and vomiting.   Genitourinary: Negative for dysuria and hematuria.   Musculoskeletal: Negative for falls, joint pain and myalgias.   Skin: Negative for itching and rash.   Neurological: Negative for dizziness, sensory change, speech change, loss of consciousness and headaches.   Endo/Heme/Allergies: Negative for environmental allergies. Does not bruise/bleed easily.   Psychiatric/Behavioral: Positive for depression. Negative for hallucinations and suicidal ideas.        Objective:   /62   Pulse 75   Ht 1.803 m (5' 11\")   Wt 93 kg (205 lb)   SpO2 93%   BMI 28.59 kg/m²     Physical Exam   Constitutional: He is oriented to person, place, and time. No distress.   HENT:   Head: Normocephalic and atraumatic.   Eyes: EOM are normal.   Neck: Normal range of motion. No JVD present.   Cardiovascular: Normal rate, " regular rhythm, normal heart sounds and intact distal pulses.  Exam reveals no gallop and no friction rub.    No murmur heard.  Bilateral femoral pulses are 2+, bilateral dorsalis pedis pulses are 2+, bilateral posterior tibialis pulses are 2+.   Pulmonary/Chest: No respiratory distress. He has no wheezes. He has no rales. He exhibits no tenderness.   Abdominal: Soft. Bowel sounds are normal. There is no tenderness. There is no rebound and no guarding.   The is no presence of abdominal bruits   Musculoskeletal: Normal range of motion.   Neurological: He is alert and oriented to person, place, and time.   Skin: Skin is warm and dry.   Psychiatric: He has a normal mood and affect.   Nursing note and vitals reviewed.      Assessment:     1. Depression, unspecified depression type  REFERRAL TO PSYCHIATRY   2. S/P ablation of atrial fibrillation     3. Systolic heart failure, ACC/AHA stage C (CMS-HCC)     4. Left ventricular systolic dysfunction, NYHA class 3     5. Alcohol abuse     6. Ischemic cardiomyopathy     7. Biventricular implantable cardioverter-defibrillator in situ         Medical Decision Making:  Today's Assessment / Status / Plan:   Today, based on physical examination findings, patient is euvolemic. No JVD, lungs are clear to auscultation, no pitting edema in bilateral lower extremities, no ascites.    I will refer patient to psychiatry for depression.  In the meantime continue current medical therapy without change.  Blood pressure is borderline.    Toprol 25 mg po daily, Entresto 50 mg po bid.  Lasix as needed.

## 2017-09-18 ENCOUNTER — TELEPHONE (OUTPATIENT)
Dept: CARDIOLOGY | Facility: MEDICAL CENTER | Age: 77
End: 2017-09-18

## 2017-09-18 ENCOUNTER — OFFICE VISIT (OUTPATIENT)
Dept: CARDIOLOGY | Facility: MEDICAL CENTER | Age: 77
End: 2017-09-18
Payer: MEDICARE

## 2017-09-18 VITALS
OXYGEN SATURATION: 92 % | BODY MASS INDEX: 28.7 KG/M2 | WEIGHT: 205 LBS | DIASTOLIC BLOOD PRESSURE: 70 MMHG | HEART RATE: 70 BPM | SYSTOLIC BLOOD PRESSURE: 90 MMHG | HEIGHT: 71 IN

## 2017-09-18 DIAGNOSIS — G47.30 SLEEP APNEA, UNSPECIFIED TYPE: Chronic | ICD-10-CM

## 2017-09-18 DIAGNOSIS — I63.9 CEREBROVASCULAR ACCIDENT (CVA), UNSPECIFIED MECHANISM (HCC): Chronic | ICD-10-CM

## 2017-09-18 DIAGNOSIS — I50.23 NYHA CLASS 3 ACUTE ON CHRONIC SYSTOLIC HEART FAILURE (HCC): ICD-10-CM

## 2017-09-18 DIAGNOSIS — E78.00 HYPERCHOLESTEREMIA: Chronic | ICD-10-CM

## 2017-09-18 DIAGNOSIS — I25.10 CORONARY ARTERY DISEASE INVOLVING NATIVE CORONARY ARTERY OF NATIVE HEART WITHOUT ANGINA PECTORIS: Chronic | ICD-10-CM

## 2017-09-18 DIAGNOSIS — I10 ESSENTIAL HYPERTENSION: Chronic | ICD-10-CM

## 2017-09-18 DIAGNOSIS — I51.89 LEFT VENTRICULAR SYSTOLIC DYSFUNCTION, NYHA CLASS 3: ICD-10-CM

## 2017-09-18 DIAGNOSIS — I44.1 SECOND DEGREE AV BLOCK, MOBITZ TYPE II: Chronic | ICD-10-CM

## 2017-09-18 DIAGNOSIS — N18.30 STAGE 3 CHRONIC KIDNEY DISEASE (HCC): ICD-10-CM

## 2017-09-18 DIAGNOSIS — I50.20 SYSTOLIC HEART FAILURE, ACC/AHA STAGE C (HCC): ICD-10-CM

## 2017-09-18 DIAGNOSIS — I48.0 PAROXYSMAL ATRIAL FIBRILLATION (HCC): ICD-10-CM

## 2017-09-18 DIAGNOSIS — I25.5 ISCHEMIC CARDIOMYOPATHY: Chronic | ICD-10-CM

## 2017-09-18 DIAGNOSIS — I25.2 HISTORY OF MYOCARDIAL INFARCTION: Chronic | ICD-10-CM

## 2017-09-18 PROCEDURE — 99214 OFFICE O/P EST MOD 30 MIN: CPT | Performed by: INTERNAL MEDICINE

## 2017-09-18 ASSESSMENT — ENCOUNTER SYMPTOMS
FEVER: 0
EYES NEGATIVE: 1
LOSS OF CONSCIOUSNESS: 0
PALPITATIONS: 0
WHEEZING: 0
WEAKNESS: 0
NEUROLOGICAL NEGATIVE: 1
COUGH: 0
CLAUDICATION: 0
CARDIOVASCULAR NEGATIVE: 1
STRIDOR: 0
BRUISES/BLEEDS EASILY: 0
RESPIRATORY NEGATIVE: 1
DIZZINESS: 0
ORTHOPNEA: 0
SHORTNESS OF BREATH: 0
SPUTUM PRODUCTION: 0
SORE THROAT: 0
PND: 0
CHILLS: 0
GASTROINTESTINAL NEGATIVE: 1
HEMOPTYSIS: 0
MUSCULOSKELETAL NEGATIVE: 1
CONSTITUTIONAL NEGATIVE: 1

## 2017-09-18 NOTE — LETTER
[facility]   [address]  [place] [address]  Phone: [phone]  Fax: [phone]              Isai Fuentes  1940    Encounter Date: 9/18/2017    Severo Lee M.D.          PROGRESS NOTE:  No notes on file      Prashant Shankar M.D.  [address] [place] [address]  VIA Facsimile: [phone]

## 2017-09-18 NOTE — PROGRESS NOTES
"Subjective:   Isai Fuentes is a 76 y.o. male who presents today physical follow-up for his ischemic cardiomyopathy. His blood pressures at home have been roughly 100-110. He's 90 systolic here in clinic. He is having approximately NYHA class 1-2 symptoms. He's not having any ischemic symptoms including chest pain or palpitations    Past Medical History:   Diagnosis Date   • Biventricular implantable cardioverter-defibrillator in situ 8/7/2015   • Other specified disorder of intestines 07-28-15    constipation/diarrhea/ reports some bleeding from rectum w/blood clots. Seeing GI   • Pain 07-28-15    \"chronic\", 0/10   • Cancer (CMS-HCC) 2012    prostate   • Atrial fibrillation (CMS-HCC) 9/20/2011   • CAD (coronary artery disease) 9/20/2011   • CARDIOMYOPATHY 9/20/2011   • History of cardiac catheterization 9/20/2011   • HTN (hypertension) 9/20/2011   • Ischemic cardiomyopathy 9/20/2011   • Presence of permanent cardiac pacemaker 9/20/2011   • Prostate cancer (CMS-HCC) 5/13/2011   • Long term (current) use of anticoagulants 5/13/2011   • Second degree AV block, Mobitz type II 11/10/2010   • Fatigue 10/28/2010   • Insomnia 7/8/2010   • Pleural effusion 5/18/2009   • Orthostatic hypotension 5/6/2009   • Stroke (CMS-HCC) 2/3/2009   • Heartburn 10/23/2008   • Other drug allergy 10/16/2008   • Myocardial infarct (CMS-HCC) 2007   • Breath shortness    • Congestive heart failure (CMS-HCC)    • COPD (chronic obstructive pulmonary disease) (CMS-HCC)    • History of myocardial infarction     \"many\"   • Indigestion    • WILLA (obstructive sleep apnea)     CPAP   • Pacemaker     [employer]   • Snoring      Past Surgical History:   Procedure Laterality Date   • RECOVERY  10/16/2015    Procedure: CATH LAB LEAD REVISION [facility];  Surgeon: Amy Surgery;  Location: SURG[place] PRE-[place] PROC [place] [facility] [place];  Service:    • RECOVERY  8/14/2015    Procedure:  CATH LAB LEAD EXTRACTION AWILDA [facility] R BIPING PAULO AQUINO;  Surgeon: " "Recoveryonly Surgery;  Location: SURGERY PRE-POST PROC UNIT [facility] [place];  Service:    • RECOVERY  7/30/2015    Procedure: CATH LAB  LEAD EXTRACTION, UPGRADE TO BIV PM [facility] LRG MAGGIE KENIA ;  Surgeon: Recoveryonfreddy Surgery;  Location: SURGERY PRE-POST PROC UNIT [facility] [place];  Service:    • RECOVERY  7/29/2015    Procedure: CATH LAB NEW PM INSERTION DUAL ATRIAL & VENTRICULAR-LV LEAD W/ NEW GENERATOR AQUINO ICD9: 414.8;  Surgeon: Recoveryonfreddy Surgery;  Location: SURGERY PRE-POST PROC UNIT [facility] [place];  Service:    • PACEMAKER INSERTION  11/24/08    [facility]   • MULTIPLE CORONARY ARTERY BYPASS  2007    2 vessel   • CARDIAC CATH      has 2 stents     Family History   Problem Relation Age of Onset   • Thyroid Sister      History   Smoking Status   • Former Smoker   • Packs/day: 1.50   • Years: 3.00   • Types: Cigarettes   • Quit date: 1/1/1970   Smokeless Tobacco   • Former User   • Types: Chew   • Quit date: 1/1/1972     Allergies   Allergen Reactions   • Plavix [Clopidogrel Bisulfate] Anaphylaxis   • Statins [Hmg-Coa-R Inhibitors] Unspecified     Muscles aches     • Ticlid [Ticlopidine Hydrochloride] Unspecified     Pt states \"I'm not sure what happens\".       Outpatient Encounter Prescriptions as of 9/18/2017   Medication Sig Dispense Refill   • metoprolol SR (TOPROL XL) 25 MG TABLET SR 24 HR Take 1 Tab by mouth every day. 30 Tab 11   • sacubitril-valsartan (ENTRESTO) 24-26 MG Tab tablet Take 1 Tab by mouth 2 Times a Day. 60 Tab 6   • furosemide (LASIX) 40 MG Tab Take 1 Tab by mouth every day. 30 Tab 11   • potassium chloride SA (KDUR) 20 MEQ Tab CR Take 2 Tabs by mouth 2 times a day. 480 Tab 3   • spironolactone (ALDACTONE) 25 MG Tab Take 1 Tab by mouth every day. (Patient taking differently: Take 12.5 mg by mouth every day.) 30 Tab 3   • metolazone (ZAROXOLYN) 5 MG Tab Take 5 mg by mouth as needed. Indications: Edema     • Methotrexate, PF, 10 MG/0.2ML Solution Auto-injector Inject 10 mg as instructed every 7 days. On Mon   Indications: " "Psoriasis     • Omega-3 Fatty Acids (FISH OIL) 1000 MG Cap capsule Take 1,000 mg by mouth every day.     • Multiple Vitamins-Minerals (OCUVITE) Tab Take 1 Tab by mouth every day.     • aspirin 81 MG tablet Take 81 mg by mouth every day.     • cyanocobalamin (VITAMIN B-12) 500 MCG TABS Take 1,000 mcg by mouth every day.     • Coenzyme Q10 (CO Q-10 PO) Take 1 Cap by mouth every day.     • ALLOPURINOL PO Take  by mouth.       No facility-administered encounter medications on file as of 9/18/2017.      Review of Systems   Constitutional: Negative.  Negative for chills, fever and malaise/fatigue.   HENT: Negative.  Negative for sore throat.    Eyes: Negative.    Respiratory: Negative.  Negative for cough, hemoptysis, sputum production, shortness of breath, wheezing and stridor.    Cardiovascular: Negative.  Negative for chest pain, palpitations, orthopnea, claudication, leg swelling and PND.   Gastrointestinal: Negative.    Genitourinary: Negative.    Musculoskeletal: Negative.    Skin: Negative.    Neurological: Negative.  Negative for dizziness, loss of consciousness and weakness.   Endo/Heme/Allergies: Negative.  Does not bruise/bleed easily.   All other systems reviewed and are negative.       Objective:   BP (!) 90/70   Pulse 70   Ht 1.803 m (5' 11\")   Wt 93 kg (205 lb)   SpO2 92%   BMI 28.59 kg/m²     Physical Exam   Constitutional: He is oriented to person, place, and time. He appears well-developed and well-nourished. No distress.   HENT:   Head: Normocephalic.   Mouth/Throat: Oropharynx is clear and moist.   Eyes: EOM are normal. Pupils are equal, round, and reactive to light. Right eye exhibits no discharge. Left eye exhibits no discharge. No scleral icterus.   Neck: Normal range of motion. Neck supple. No JVD present. No tracheal deviation present.   Cardiovascular: Normal rate, regular rhythm, S1 normal, S2 normal, normal heart sounds, intact distal pulses and normal pulses.  Exam reveals no gallop, no " S3, no S4 and no friction rub.    No murmur heard.   No systolic murmur is present    No diastolic murmur is present   Pulses:       Carotid pulses are 2+ on the right side, and 2+ on the left side.       Radial pulses are 2+ on the right side, and 2+ on the left side.        Dorsalis pedis pulses are 2+ on the right side, and 2+ on the left side.        Posterior tibial pulses are 2+ on the right side, and 2+ on the left side.   Pulmonary/Chest: Effort normal and breath sounds normal. No respiratory distress. He has no wheezes. He has no rales.   Abdominal: Soft. Bowel sounds are normal. He exhibits no distension and no mass. There is no tenderness. There is no rebound and no guarding.   Musculoskeletal: He exhibits no edema.   Neurological: He is alert and oriented to person, place, and time. No cranial nerve deficit.   Skin: Skin is warm and dry. He is not diaphoretic. No pallor.   Psychiatric: He has a normal mood and affect. His behavior is normal. Judgment and thought content normal.   Nursing note and vitals reviewed.      Assessment:     1. Paroxysmal atrial fibrillation (CMS-[facility])     2. Coronary artery disease involving native coronary artery of native heart without angina pectoris     3. History of myocardial infarction     4. Essential hypertension     5. Hypercholesteremia     6. Ischemic cardiomyopathy  ECHOCARDIOGRAM COMP W/O CONT   7. Left ventricular systolic dysfunction, NYHA class 3  ECHOCARDIOGRAM COMP W/O CONT   8. NYHA class 3 acute on chronic systolic heart failure (CMS-HCC)  ECHOCARDIOGRAM COMP W/O CONT   9. Second degree AV block, Mobitz type II     10. Sleep apnea, unspecified type     11. Cerebrovascular accident (CVA), unspecified mechanism (CMS-HCC)     12. Systolic heart failure, ACC/AHA stage C (CMS-[facility])     13. Stage 3 chronic kidney disease         Medical Decision Making:  Today's Assessment / Status / Plan:     76-year-old male with ischemic cardiomyopathy. We will go ahead and  repeat an echocardiogram in one month. I will see him back shortly thereafter.    1. CHFrEF 35%, NYHA II, Stage C, Hemo Pro A    - cont metop XL 25    - cont ARNI 24/26    - cont spironolactone 25    - Hydral/Imdur    - ICD    - BiV - followed by EP    - allergy to statins    2. HLD    - per above    3. CKD    - defer    Thank for you allowing me to take part in your patient's care, please call should you have any questions or would like to discuss this patient.

## 2017-09-18 NOTE — TELEPHONE ENCOUNTER
Message     The referral to psychiatry has been sent to the office of [facility].   If the patient does not hear from them in a timely manner, they should call [phone]     Patient not home at this time. Ladeugenia took my name and number and will have him call be back to .

## 2017-09-28 ENCOUNTER — OFFICE VISIT (OUTPATIENT)
Dept: PULMONOLOGY | Facility: HOSPICE | Age: 77
End: 2017-09-28
Payer: MEDICARE

## 2017-09-28 VITALS
DIASTOLIC BLOOD PRESSURE: 78 MMHG | WEIGHT: 205.6 LBS | OXYGEN SATURATION: 95 % | SYSTOLIC BLOOD PRESSURE: 122 MMHG | RESPIRATION RATE: 18 BRPM | BODY MASS INDEX: 28.78 KG/M2 | HEART RATE: 94 BPM | HEIGHT: 71 IN | TEMPERATURE: 97.2 F

## 2017-09-28 DIAGNOSIS — I48.0 PAROXYSMAL ATRIAL FIBRILLATION (HCC): ICD-10-CM

## 2017-09-28 DIAGNOSIS — Z23 NEED FOR INFLUENZA VACCINATION: ICD-10-CM

## 2017-09-28 DIAGNOSIS — I25.5 ISCHEMIC CARDIOMYOPATHY: Chronic | ICD-10-CM

## 2017-09-28 DIAGNOSIS — J44.9 CHRONIC OBSTRUCTIVE PULMONARY DISEASE, UNSPECIFIED COPD TYPE (HCC): ICD-10-CM

## 2017-09-28 DIAGNOSIS — G47.33 OSA ON CPAP: ICD-10-CM

## 2017-09-28 PROCEDURE — 99214 OFFICE O/P EST MOD 30 MIN: CPT | Mod: 25 | Performed by: INTERNAL MEDICINE

## 2017-09-28 PROCEDURE — 90662 IIV NO PRSV INCREASED AG IM: CPT | Performed by: INTERNAL MEDICINE

## 2017-09-28 PROCEDURE — G0008 ADMIN INFLUENZA VIRUS VAC: HCPCS | Performed by: INTERNAL MEDICINE

## 2017-09-28 NOTE — PROGRESS NOTES
"Chief Complaint   Patient presents with   • Follow-Up     6 month follow up       HPI: This patient is a 76 y.o. Male who returns for follow-up of COPD and WILLA. He has stage I COPD, on albuterol when necessary although denies any inhaler use.. Last PFTs showed FEV1 2.55 L or 85% predicted. He has paroxysmal atrial fibrillation and ischemic cardiomyopathy which contribute towards exertional dyspnea and fatigue. He denies cough, wheezing, chest tightness or edema. Denies AECOPD over the past year. He continues to walk his dog for 40 minutes daily.  He has severe WILLA, AHI 45 events per hour, on AutoPap 8-11 cm of water. Compliance card confirms 100% % usage for almost 6 hours nightly, and normal AHI of 3. Denies daytime hypersomnolence. Weight has been stable.      Past Medical History:   Diagnosis Date   • Biventricular implantable cardioverter-defibrillator in situ 8/7/2015   • Other specified disorder of intestines 07-28-15    constipation/diarrhea/ reports some bleeding from rectum w/blood clots. Seeing GI   • Pain 07-28-15    \"chronic\", 0/10   • Cancer (CMS-HCC) 2012    prostate   • Atrial fibrillation (CMS-HCC) 9/20/2011   • CAD (coronary artery disease) 9/20/2011   • CARDIOMYOPATHY 9/20/2011   • History of cardiac catheterization 9/20/2011   • HTN (hypertension) 9/20/2011   • Ischemic cardiomyopathy 9/20/2011   • Presence of permanent cardiac pacemaker 9/20/2011   • Prostate cancer (CMS-HCC) 5/13/2011   • Long term (current) use of anticoagulants 5/13/2011   • Second degree AV block, Mobitz type II 11/10/2010   • Fatigue 10/28/2010   • Insomnia 7/8/2010   • Pleural effusion 5/18/2009   • Orthostatic hypotension 5/6/2009   • Stroke (CMS-HCC) 2/3/2009   • Heartburn 10/23/2008   • Other drug allergy 10/16/2008   • Myocardial infarct (CMS-HCC) 2007   • Breath shortness    • Congestive heart failure (CMS-HCC)    • COPD (chronic obstructive pulmonary disease) (CMS-HCC)    • History of myocardial infarction     \"many\" "   • Indigestion    • WILLA (obstructive sleep apnea)     CPAP   • Pacemaker     [employer]   • Snoring        Social History     Social History   • Marital status:      Spouse name: N/A   • Number of children: N/A   • Years of education: N/A     Occupational History   • Not on file.     Social History Main Topics   • Smoking status: Former Smoker     Packs/day: 1.50     Years: 3.00     Types: Cigarettes     Quit date: 1/1/1970   • Smokeless tobacco: Former User     Types: Chew     Quit date: 1/1/1972   • Alcohol use Yes      Comment: 10-14 per week; scotch & wine   • Drug use: No   • Sexual activity: Not on file     Other Topics Concern   • Not on file     Social History Narrative   • No narrative on file       Family History   Problem Relation Age of Onset   • Thyroid Sister        Current Outpatient Prescriptions on File Prior to Visit   Medication Sig Dispense Refill   • metoprolol SR (TOPROL XL) 25 MG TABLET SR 24 HR Take 1 Tab by mouth every day. 30 Tab 11   • sacubitril-valsartan (ENTRESTO) 24-26 MG Tab tablet Take 1 Tab by mouth 2 Times a Day. 60 Tab 6   • furosemide (LASIX) 40 MG Tab Take 1 Tab by mouth every day. 30 Tab 11   • potassium chloride SA (KDUR) 20 MEQ Tab CR Take 2 Tabs by mouth 2 times a day. 480 Tab 3   • spironolactone (ALDACTONE) 25 MG Tab Take 1 Tab by mouth every day. (Patient taking differently: Take 12.5 mg by mouth every day.) 30 Tab 3   • metolazone (ZAROXOLYN) 5 MG Tab Take 5 mg by mouth as needed. Indications: Edema     • Methotrexate, PF, 10 MG/0.2ML Solution Auto-injector Inject 10 mg as instructed every 7 days. On Mon   Indications: Psoriasis     • Omega-3 Fatty Acids (FISH OIL) 1000 MG Cap capsule Take 1,000 mg by mouth every day.     • Multiple Vitamins-Minerals (OCUVITE) Tab Take 1 Tab by mouth every day.     • aspirin 81 MG tablet Take 81 mg by mouth every day.     • cyanocobalamin (VITAMIN B-12) 500 MCG TABS Take 1,000 mcg by mouth every day.     • Coenzyme Q10 (CO  "Q-10 PO) Take 1 Cap by mouth every day.     • ALLOPURINOL PO Take  by mouth.       No current facility-administered medications on file prior to visit.        Allergies: Plavix [clopidogrel bisulfate]; Statins [hmg-coa-r inhibitors]; and Ticlid [ticlopidine hydrochloride]    ROS:   Constitutional: Denies fevers, chills, night sweats,+fatigue, denies weight loss  Eyes: Denies vision loss, pain, drainage, double vision  Ears, Nose, Throat: Denies earache, difficulty hearing, tinnitus, nasal congestion, hoarseness  Cardiovascular: Denies chest pain, tightness, palpitations, orthopnea or edema  Respiratory: As in HPI  Sleep: Denies daytime sleepiness, snoring, apneas, insomnia, morning headaches  GI: Denies heartburn, dysphagia, nausea, abdominal pain, diarrhea or constipation  : Denies frequent urination, hematuria, discharge or painful urination  Musculoskeletal: Denies back pain, painful joints, sore muscles  Neurological: Denies weakness or headaches  Skin: No rashes    Blood pressure 122/78, pulse 94, temperature 36.2 °C (97.2 °F), resp. rate 18, height 1.803 m (5' 11\"), weight 93.3 kg (205 lb 9.6 oz), SpO2 95 %.    Physical Exam:  Appearance: Well-nourished, well-developed, in no acute distress  HEENT: Normocephalic, atraumatic, white sclera, PERRLA, oropharynx clear  Neck: No adenopathy or masses  Respiratory: no intercostal retractions or accessory muscle use  Lungs auscultation: Clear to auscultation bilaterally  Cardiovascular: Regular rate rhythm. No murmurs, rubs or gallops.  No LE edema  Abdomen: soft, nondistended  Gait: Normal  Digits: No clubbing, cyanosis  Motor: No focal deficits  Orientation: Oriented to time, person and place    Diagnosis:  1. Chronic obstructive pulmonary disease, unspecified COPD type (CMS-HCC)     2. WILLA on CPAP     3. Need for influenza vaccination  INFLUENZA VACCINE, HIGH DOSE (65+ ONLY)   4. Ischemic cardiomyopathy     5. Paroxysmal atrial fibrillation (CMS-[facility])   "       Plan:  The patient has mild COPD, clinically stable. Continue albuterol HFA when necessary.  Influenza vaccine provided.  He shows excellent compliance and response to CPAP therapy and is benefiting from CPAP use. Continue AutoPap 8-11 cm of water. Replace CPAP mask nightly 3 months.  Encouraged follow-up with cardiology for his multiple cardiac comorbidities.  Return in about 6 months (around 3/28/2018).

## 2017-10-18 ENCOUNTER — NON-PROVIDER VISIT (OUTPATIENT)
Dept: CARDIOLOGY | Facility: MEDICAL CENTER | Age: 77
End: 2017-10-18
Payer: MEDICARE

## 2017-10-18 ENCOUNTER — OFFICE VISIT (OUTPATIENT)
Dept: CARDIOLOGY | Facility: MEDICAL CENTER | Age: 77
End: 2017-10-18
Payer: MEDICARE

## 2017-10-18 VITALS
WEIGHT: 207 LBS | BODY MASS INDEX: 28.98 KG/M2 | HEIGHT: 71 IN | DIASTOLIC BLOOD PRESSURE: 70 MMHG | SYSTOLIC BLOOD PRESSURE: 122 MMHG | OXYGEN SATURATION: 95 % | HEART RATE: 70 BPM

## 2017-10-18 DIAGNOSIS — I48.0 PAROXYSMAL ATRIAL FIBRILLATION (HCC): ICD-10-CM

## 2017-10-18 DIAGNOSIS — I44.1 SECOND DEGREE AV BLOCK, MOBITZ TYPE II: Chronic | ICD-10-CM

## 2017-10-18 DIAGNOSIS — Z95.810 BIVENTRICULAR IMPLANTABLE CARDIOVERTER-DEFIBRILLATOR IN SITU: ICD-10-CM

## 2017-10-18 DIAGNOSIS — I47.29 VENTRICULAR TACHYCARDIA (PAROXYSMAL) (HCC): ICD-10-CM

## 2017-10-18 DIAGNOSIS — I25.5 ISCHEMIC CARDIOMYOPATHY: Chronic | ICD-10-CM

## 2017-10-18 DIAGNOSIS — I50.20 SYSTOLIC HEART FAILURE, ACC/AHA STAGE C (HCC): ICD-10-CM

## 2017-10-18 PROBLEM — I47.20 VENTRICULAR TACHYCARDIA (PAROXYSMAL) (HCC): Status: ACTIVE | Noted: 2017-10-18

## 2017-10-18 PROCEDURE — 93284 PRGRMG EVAL IMPLANTABLE DFB: CPT | Performed by: INTERNAL MEDICINE

## 2017-10-18 PROCEDURE — 99214 OFFICE O/P EST MOD 30 MIN: CPT | Performed by: NURSE PRACTITIONER

## 2017-10-18 ASSESSMENT — ENCOUNTER SYMPTOMS
WHEEZING: 0
SEIZURES: 0
HEADACHES: 0
NAUSEA: 0
CLAUDICATION: 0
PSYCHIATRIC NEGATIVE: 1
PND: 0
WEAKNESS: 1
PALPITATIONS: 0
HEARTBURN: 0
WEIGHT LOSS: 0
SHORTNESS OF BREATH: 0
CHILLS: 0
DOUBLE VISION: 0
SPEECH CHANGE: 0
FOCAL WEAKNESS: 0
SORE THROAT: 0
FEVER: 0
SPUTUM PRODUCTION: 0
LOSS OF CONSCIOUSNESS: 0
VOMITING: 0
BLURRED VISION: 0
COUGH: 0
ABDOMINAL PAIN: 0
FLANK PAIN: 0
MUSCULOSKELETAL NEGATIVE: 1
ORTHOPNEA: 0
DIZZINESS: 0

## 2017-10-18 NOTE — LETTER
"     [facility], [facility]   [address]  [place] [address]  Phone: [phone]  Fax: [phone]              Isai Fuentes  1940    Encounter Date: 10/18/2017    PRATEEK Gregorio          PROGRESS NOTE:  Subjective:   Isai Fuentes is a 76 y.o. male who presents today for follow-up of ischemic cardiopathy, prior CABG, prior anterior wall MI, and biventricular AICD. The patient has chronic complaints of fatigue. In March, he was started on Entresto because of chronic complaints of fatigue. Somehow, he has gotten onto a dose of half tablet once a day. He states that he is not feeling \"too bad\". He was recently [facility]. The patient has chronic kidney disease stage III.  He continues to experience fatigue with activity. His muscle cramping has improved with replacement of his K+. He has had some non-sustained AF episodes short duration seconds only. He has had episodes in his monitoring zone at the 150 bpm which appear to be ventricular in nature.  He has had PVI in the remote past, BIV ICD and CABG in 2008.  He continues on low dose ASA but will need to watch for recurrence of sustained PAF.  If this occurs may need to reconsider AC and AA.  Exam today appears to be in good fluid balance. No chest pain.  Device function intact.    Past Medical History:   Diagnosis Date   • Biventricular implantable cardioverter-defibrillator in situ 8/7/2015   • Other specified disorder of intestines 07-28-15    constipation/diarrhea/ reports some bleeding from rectum w/blood clots. Seeing GI   • Pain 07-28-15    \"chronic\", 0/10   • Cancer (CMS-HCC) 2012    prostate   • Atrial fibrillation (CMS-HCC) 9/20/2011   • CAD (coronary artery disease) 9/20/2011   • CARDIOMYOPATHY 9/20/2011   • History of cardiac catheterization 9/20/2011   • HTN (hypertension) 9/20/2011   • Ischemic cardiomyopathy 9/20/2011   • Presence of permanent " "cardiac pacemaker 9/20/2011   • Prostate cancer (CMS-HCC) 5/13/2011   • Long term (current) use of anticoagulants 5/13/2011   • Second degree AV block, Mobitz type II 11/10/2010   • Fatigue 10/28/2010   • Insomnia 7/8/2010   • Pleural effusion 5/18/2009   • Orthostatic hypotension 5/6/2009   • Stroke (CMS-HCC) 2/3/2009   • Heartburn 10/23/2008   • Other drug allergy(995.27) 10/16/2008   • Myocardial infarct 2007   • Breath shortness    • Congestive heart failure (CMS-HCC)    • COPD (chronic obstructive pulmonary disease) (CMS-HCC)    • History of myocardial infarction     \"many\"   • Indigestion    • WILLA (obstructive sleep apnea)     CPAP   • Pacemaker     [employer]   • Snoring      Past Surgical History:   Procedure Laterality Date   • RECOVERY  10/16/2015    Procedure: CATH LAB LEAD REVISION ST.MARIA RJOSI AQUINO;  Surgeon: Recoveryonfreddy Surgery;  [place] [facility] [place]:    • RECOVERY  8/14/2015    Procedure:  CATH LAB LEAD EXTRACTION AWILDA [facility] R BIPING PAULO AQUINO;  Surgeon: Recoveryonly Surgery;  Location: SURGERY PRE-POST PROC UNIT [facility];  Service:    • RECOVERY  7/30/2015    Procedure: CATH LAB  LEAD EXTRACTION, UPGRADE TO BIV PM [facility].MARIA R BIPING MAGGIE AQUINO ;  Surgeon: Recoveryonly Surgery;  Location: SURGERY PRE-POST PROC UNIT [facility];  Service:    • RECOVERY  7/29/2015    Procedure: CATH LAB NEW PM INSERTION DUAL ATRIAL & VENTRICULAR-LV LEAD W/ NEW GENERATOR KENIA ICD9: 414.8;  Surgeon: Recoveryonfreddy Surgery;  [place] [facility] [place]  Service:    • PACEMAKER INSERTION  11/24/08    [facility]   • MULTIPLE CORONARY ARTERY BYPASS  2007    2 vessel   • CARDIAC CATH      has 2 stents     Family History   Problem Relation Age of Onset   • Thyroid Sister      History   Smoking Status   • Former Smoker   • Packs/day: 1.50   • Years: 3.00   • Types: Cigarettes   • Quit date: 1/1/1970   Smokeless Tobacco   • Former User   • Types: Chew   • Quit date: 1/1/1972     Allergies   Allergen Reactions   " "  • Plavix [Clopidogrel Bisulfate] Anaphylaxis   • Statins [Hmg-Coa-R Inhibitors] Unspecified     Muscles aches     • Ticlid [Ticlopidine Hydrochloride] Unspecified     Pt states \"I'm not sure what happens\".       Outpatient Encounter Prescriptions as of 10/18/2017   Medication Sig Dispense Refill   • metoprolol SR (TOPROL XL) 25 MG TABLET SR 24 HR Take 1 Tab by mouth every day. 30 Tab 11   • sacubitril-valsartan (ENTRESTO) 24-26 MG Tab tablet Take 1 Tab by mouth 2 Times a Day. 60 Tab 6   • furosemide (LASIX) 40 MG Tab Take 1 Tab by mouth every day. 30 Tab 11   • potassium chloride SA (KDUR) 20 MEQ Tab CR Take 2 Tabs by mouth 2 times a day. 480 Tab 3   • spironolactone (ALDACTONE) 25 MG Tab Take 1 Tab by mouth every day. (Patient taking differently: Take 12.5 mg by mouth every day.) 30 Tab 3   • metolazone (ZAROXOLYN) 5 MG Tab Take 5 mg by mouth as needed. Indications: Edema     • Methotrexate, PF, 10 MG/0.2ML Solution Auto-injector Inject 10 mg as instructed every 7 days. On Mon   Indications: Psoriasis     • Omega-3 Fatty Acids (FISH OIL) 1000 MG Cap capsule Take 1,000 mg by mouth every day.     • Multiple Vitamins-Minerals (OCUVITE) Tab Take 1 Tab by mouth every day.     • aspirin 81 MG tablet Take 81 mg by mouth every day.     • cyanocobalamin (VITAMIN B-12) 500 MCG TABS Take 1,000 mcg by mouth every day.     • Coenzyme Q10 (CO Q-10 PO) Take 1 Cap by mouth every day.     • ALLOPURINOL PO Take  by mouth.       No facility-administered encounter medications on file as of 10/18/2017.      Review of Systems   Constitutional: Positive for malaise/fatigue. Negative for chills, fever and weight loss.   HENT: Negative for congestion and sore throat.    Eyes: Negative for blurred vision and double vision.   Respiratory: Negative for cough, sputum production, shortness of breath and wheezing.    Cardiovascular: Negative for chest pain, palpitations, orthopnea, claudication, leg swelling and PND.   Gastrointestinal: " "Negative for abdominal pain, heartburn, nausea and vomiting.   Genitourinary: Negative for dysuria, flank pain and frequency.   Musculoskeletal: Negative.    Skin: Negative.    Neurological: Positive for weakness. Negative for dizziness, speech change, focal weakness, seizures, loss of consciousness and headaches.   Endo/Heme/Allergies: Negative.    Psychiatric/Behavioral: Negative.         Objective:   /70   Pulse 70   Ht 1.803 m (5' 10.98\")   Wt 93.9 kg (207 lb)   SpO2 95%   BMI 28.88 kg/m²      Physical Exam   Constitutional: He is oriented to person, place, and time. He appears well-developed and well-nourished.   HENT:   Head: Normocephalic and atraumatic.   Eyes: Pupils are equal, round, and reactive to light.   Neck: Normal range of motion. Neck supple. No thyromegaly present.   Cardiovascular: Normal rate and regular rhythm.  Exam reveals no gallop and no friction rub.    No murmur heard.  Pulmonary/Chest: Effort normal and breath sounds normal. No respiratory distress. He has no wheezes. He has no rales.   Device site is uncomplicated.   Abdominal: Soft. Bowel sounds are normal. He exhibits no distension. There is no tenderness. There is no guarding.   Musculoskeletal: Normal range of motion. He exhibits no edema.   Neurological: He is alert and oriented to person, place, and time.   Skin: Skin is warm and dry.   Psychiatric: He has a normal mood and affect.   Device function intact see flow sheet.    Assessment:     1. Paroxysmal atrial fibrillation (CMS-HCC)     2. Biventricular implantable cardioverter-defibrillator in situ     3. Ischemic cardiomyopathy     4. Ventricular tachycardia (paroxysmal) (CMS-HCC)         Medical Decision Making:  Today's Assessment / Status / Plan:     1. PAF short nonsustained episodes seconds in duration.  2. BiV ICD no therapies. Monitoring zone noting NSVT up to 13 seconds in duration.  3. ICM no chest pain fatigue continues to be an issue.  4. VT Nonsustained " in monitoring zone,  RTc 2 months to see Dr Figueredo for review of arrhythmias, settings and medications.    Collaborating MD Awa Shankar M.D.  [id number] [place] [id number]  VIA Facsimile: [phone]

## 2017-10-18 NOTE — PROGRESS NOTES
"Subjective:   Isai Fuentes is a 76 y.o. male who presents today for follow-up of ischemic cardiopathy, prior CABG, prior anterior wall MI, and biventricular AICD. The patient has chronic complaints of fatigue. In March, he was started on Entresto because of chronic complaints of fatigue. Somehow, he has gotten onto a dose of half tablet once a day. He states that he is not feeling \"too bad\". He was recently started on all[facility]. The patient has chronic kidney disease stage III.  He continues to experience fatigue with activity. His muscle cramping has improved with replacement of his K+. He has had some non-sustained AF episodes short duration seconds only. He has had episodes in his monitoring zone at the 150 bpm which appear to be ventricular in nature.  He has had PVI in the remote past, BIV ICD and CABG in 2008.  He continues on low dose ASA but will need to watch for recurrence of sustained PAF.  If this occurs may need to reconsider AC and AA.  Exam today appears to be in good fluid balance. No chest pain.  Device function intact.    Past Medical History:   Diagnosis Date   • Biventricular implantable cardioverter-defibrillator in situ 8/7/2015   • Other specified disorder of intestines 07-28-15    constipation/diarrhea/ reports some bleeding from rectum w/blood clots. Seeing GI   • Pain 07-28-15    \"chronic\", 0/10   • Cancer (CMS-HCC) 2012    prostate   • Atrial fibrillation (CMS-HCC) 9/20/2011   • CAD (coronary artery disease) 9/20/2011   • CARDIOMYOPATHY 9/20/2011   • History of cardiac catheterization 9/20/2011   • HTN (hypertension) 9/20/2011   • Ischemic cardiomyopathy 9/20/2011   • Presence of permanent cardiac pacemaker 9/20/2011   • Prostate cancer (CMS-HCC) 5/13/2011   • Long term (current) use of anticoagulants 5/13/2011   • Second degree AV block, Mobitz type II 11/10/2010   • Fatigue 10/28/2010   • Insomnia 7/8/2010   • Pleural effusion 5/18/2009   • " "Orthostatic hypotension 5/6/2009   • Stroke (CMS-HCC) 2/3/2009   • Heartburn 10/23/2008   • Other drug allergy(995.27) 10/16/2008   • Myocardial infarct 2007   • Breath shortness    • Congestive heart failure (CMS-HCC)    • COPD (chronic obstructive pulmonary disease) (CMS-HCC)    • History of myocardial infarction     \"many\"   • Indigestion    • WILLA (obstructive sleep apnea)     CPAP   • Pacemaker     [employer]   • Snoring      Past Surgical History:   Procedure Laterality Date   • RECOVERY  10/16/2015    Procedure: CATH LAB LEAD REVISION [facility];  Surgeon: Recoveryonly Surgery;  Location: SURGERY PRE-POST PROC UNIT [facility];  Service:    • RECOVERY  8/14/2015    Procedure:  CATH LAB LEAD EXTRACTION AWILDA [facility] PA[facility]INO;  Surgeon: Recoveryonly Surgery;  Location: SURGERY PRE-POST PROC UNIT [facility];  Service:    • RECOVERY  7/30/2015    Procedure: CATH LAB  LEAD EXTRACTION, UPGRADE TO BIV PM [facility] MAGGIE AQUINO ;  Surgeon: Recoveryonly Surgery;  Location: SURGERY PRE-POST PROC UNIT [facility];  Service:    • RECOVERY  7/29/2015    Procedure: CATH LAB NEW PM INSERTION DUAL ATRIAL & VENTRICULAR-LV LEAD W/ NEW GENERATOR AQUINO ICD9: 414.8;  Surgeon: Recoveryonly Surgery;  Location: SURGERY PRE-POST PROC UNIT [facility];  Service:    • PACEMAKER INSERTION  11/24/08    [facility]   • MULTIPLE CORONARY ARTERY BYPASS  2007    2 vessel   • CARDIAC CATH      has 2 stents     Family History   Problem Relation Age of Onset   • Thyroid Sister      History   Smoking Status   • Former Smoker   • Packs/day: 1.50   • Years: 3.00   • Types: Cigarettes   • Quit date: 1/1/1970   Smokeless Tobacco   • Former User   • Types: Chew   • Quit date: 1/1/1972     Allergies   Allergen Reactions   • Plavix [Clopidogrel Bisulfate] Anaphylaxis   • Statins [Hmg-Coa-R Inhibitors] Unspecified     Muscles aches     • Ticlid [Ticlopidine Hydrochloride] Unspecified     Pt states \"I'm not sure what happens\".       Outpatient Encounter Prescriptions as of " 10/18/2017   Medication Sig Dispense Refill   • metoprolol SR (TOPROL XL) 25 MG TABLET SR 24 HR Take 1 Tab by mouth every day. 30 Tab 11   • sacubitril-valsartan (ENTRESTO) 24-26 MG Tab tablet Take 1 Tab by mouth 2 Times a Day. 60 Tab 6   • furosemide (LASIX) 40 MG Tab Take 1 Tab by mouth every day. 30 Tab 11   • potassium chloride SA (KDUR) 20 MEQ Tab CR Take 2 Tabs by mouth 2 times a day. 480 Tab 3   • spironolactone (ALDACTONE) 25 MG Tab Take 1 Tab by mouth every day. (Patient taking differently: Take 12.5 mg by mouth every day.) 30 Tab 3   • metolazone (ZAROXOLYN) 5 MG Tab Take 5 mg by mouth as needed. Indications: Edema     • Methotrexate, PF, 10 MG/0.2ML Solution Auto-injector Inject 10 mg as instructed every 7 days. On Mon   Indications: Psoriasis     • Omega-3 Fatty Acids (FISH OIL) 1000 MG Cap capsule Take 1,000 mg by mouth every day.     • Multiple Vitamins-Minerals (OCUVITE) Tab Take 1 Tab by mouth every day.     • aspirin 81 MG tablet Take 81 mg by mouth every day.     • cyanocobalamin (VITAMIN B-12) 500 MCG TABS Take 1,000 mcg by mouth every day.     • Coenzyme Q10 (CO Q-10 PO) Take 1 Cap by mouth every day.     • ALLOPURINOL PO Take  by mouth.       No facility-administered encounter medications on file as of 10/18/2017.      Review of Systems   Constitutional: Positive for malaise/fatigue. Negative for chills, fever and weight loss.   HENT: Negative for congestion and sore throat.    Eyes: Negative for blurred vision and double vision.   Respiratory: Negative for cough, sputum production, shortness of breath and wheezing.    Cardiovascular: Negative for chest pain, palpitations, orthopnea, claudication, leg swelling and PND.   Gastrointestinal: Negative for abdominal pain, heartburn, nausea and vomiting.   Genitourinary: Negative for dysuria, flank pain and frequency.   Musculoskeletal: Negative.    Skin: Negative.    Neurological: Positive for weakness. Negative for dizziness, speech change, focal  "weakness, seizures, loss of consciousness and headaches.   Endo/Heme/Allergies: Negative.    Psychiatric/Behavioral: Negative.         Objective:   /70   Pulse 70   Ht 1.803 m (5' 10.98\")   Wt 93.9 kg (207 lb)   SpO2 95%   BMI 28.88 kg/m²     Physical Exam   Constitutional: He is oriented to person, place, and time. He appears well-developed and well-nourished.   HENT:   Head: Normocephalic and atraumatic.   Eyes: Pupils are equal, round, and reactive to light.   Neck: Normal range of motion. Neck supple. No thyromegaly present.   Cardiovascular: Normal rate and regular rhythm.  Exam reveals no gallop and no friction rub.    No murmur heard.  Pulmonary/Chest: Effort normal and breath sounds normal. No respiratory distress. He has no wheezes. He has no rales.   Device site is uncomplicated.   Abdominal: Soft. Bowel sounds are normal. He exhibits no distension. There is no tenderness. There is no guarding.   Musculoskeletal: Normal range of motion. He exhibits no edema.   Neurological: He is alert and oriented to person, place, and time.   Skin: Skin is warm and dry.   Psychiatric: He has a normal mood and affect.   Device function intact see flow sheet.    Assessment:     1. Paroxysmal atrial fibrillation (CMS-HCC)     2. Biventricular implantable cardioverter-defibrillator in situ     3. Ischemic cardiomyopathy     4. Ventricular tachycardia (paroxysmal) (CMS-HCC)         Medical Decision Making:  Today's Assessment / Status / Plan:     1. PAF short nonsustained episodes seconds in duration.  2. BiV ICD no therapies. Monitoring zone noting NSVT up to 13 seconds in duration.  3. ICM no chest pain fatigue continues to be an issue.  4. VT Nonsustained in monitoring zone,  RTc 2 months to see Dr Figueredo for review of arrhythmias, settings and medications.    Collaborating MD [place]  "

## 2017-10-19 ENCOUNTER — HOSPITAL ENCOUNTER (OUTPATIENT)
Dept: CARDIOLOGY | Facility: MEDICAL CENTER | Age: 77
End: 2017-10-19
Attending: INTERNAL MEDICINE
Payer: MEDICARE

## 2017-10-19 DIAGNOSIS — I50.23 NYHA CLASS 3 ACUTE ON CHRONIC SYSTOLIC HEART FAILURE (HCC): ICD-10-CM

## 2017-10-19 DIAGNOSIS — I51.89 LEFT VENTRICULAR SYSTOLIC DYSFUNCTION, NYHA CLASS 3: ICD-10-CM

## 2017-10-19 DIAGNOSIS — I25.5 ISCHEMIC CARDIOMYOPATHY: Chronic | ICD-10-CM

## 2017-10-19 PROCEDURE — 93306 TTE W/DOPPLER COMPLETE: CPT

## 2017-10-20 LAB
LV EJECT FRACT  99904: 35
LV EJECT FRACT MOD 4C 99902: 37.38

## 2017-10-24 ENCOUNTER — OFFICE VISIT (OUTPATIENT)
Dept: CARDIOLOGY | Facility: MEDICAL CENTER | Age: 77
End: 2017-10-24
Payer: MEDICARE

## 2017-10-24 VITALS
DIASTOLIC BLOOD PRESSURE: 70 MMHG | HEART RATE: 92 BPM | OXYGEN SATURATION: 94 % | HEIGHT: 71 IN | BODY MASS INDEX: 29.12 KG/M2 | SYSTOLIC BLOOD PRESSURE: 114 MMHG | WEIGHT: 208 LBS

## 2017-10-24 DIAGNOSIS — Z95.810 BIVENTRICULAR IMPLANTABLE CARDIOVERTER-DEFIBRILLATOR IN SITU: ICD-10-CM

## 2017-10-24 DIAGNOSIS — I50.20 SYSTOLIC HEART FAILURE, ACC/AHA STAGE C (HCC): ICD-10-CM

## 2017-10-24 DIAGNOSIS — F10.10 ALCOHOL ABUSE: ICD-10-CM

## 2017-10-24 DIAGNOSIS — I25.5 ISCHEMIC CARDIOMYOPATHY: Chronic | ICD-10-CM

## 2017-10-24 DIAGNOSIS — Z98.890 S/P ABLATION OF ATRIAL FIBRILLATION: ICD-10-CM

## 2017-10-24 DIAGNOSIS — I47.29 VENTRICULAR TACHYCARDIA (PAROXYSMAL) (HCC): ICD-10-CM

## 2017-10-24 DIAGNOSIS — Z86.79 S/P ABLATION OF ATRIAL FIBRILLATION: ICD-10-CM

## 2017-10-24 DIAGNOSIS — I25.10 CORONARY ARTERY DISEASE INVOLVING NATIVE CORONARY ARTERY OF NATIVE HEART WITHOUT ANGINA PECTORIS: Chronic | ICD-10-CM

## 2017-10-24 DIAGNOSIS — I48.0 PAROXYSMAL ATRIAL FIBRILLATION (HCC): ICD-10-CM

## 2017-10-24 DIAGNOSIS — I44.1 SECOND DEGREE AV BLOCK, MOBITZ TYPE II: Chronic | ICD-10-CM

## 2017-10-24 DIAGNOSIS — I10 ESSENTIAL HYPERTENSION: Chronic | ICD-10-CM

## 2017-10-24 DIAGNOSIS — I25.2 HISTORY OF MYOCARDIAL INFARCTION: Chronic | ICD-10-CM

## 2017-10-24 DIAGNOSIS — N18.30 STAGE 3 CHRONIC KIDNEY DISEASE (HCC): ICD-10-CM

## 2017-10-24 PROCEDURE — 99215 OFFICE O/P EST HI 40 MIN: CPT | Performed by: INTERNAL MEDICINE

## 2017-10-24 RX ORDER — SPIRONOLACTONE 25 MG/1
25 TABLET ORAL DAILY
Qty: 30 TAB | Refills: 3
Start: 2017-10-24 | End: 2017-12-27 | Stop reason: SDUPTHER

## 2017-10-24 ASSESSMENT — ENCOUNTER SYMPTOMS
DIZZINESS: 0
HEMOPTYSIS: 0
GASTROINTESTINAL NEGATIVE: 1
LOSS OF CONSCIOUSNESS: 0
CHILLS: 0
SHORTNESS OF BREATH: 0
COUGH: 0
NEUROLOGICAL NEGATIVE: 1
PND: 0
CLAUDICATION: 0
BRUISES/BLEEDS EASILY: 0
PALPITATIONS: 0
WEAKNESS: 0
STRIDOR: 0
RESPIRATORY NEGATIVE: 1
SPUTUM PRODUCTION: 0
SORE THROAT: 0
FEVER: 0
CONSTITUTIONAL NEGATIVE: 1
ORTHOPNEA: 0
EYES NEGATIVE: 1
WHEEZING: 0
CARDIOVASCULAR NEGATIVE: 1
MUSCULOSKELETAL NEGATIVE: 1

## 2017-10-24 NOTE — PROGRESS NOTES
"Subjective:   Isai Fuentes is a 76 y.o. male who presents today his follow-up for severe CAD as well as his chronic kidney disease. Since I last saw him his cholesterol remains rather high. He's been  Intolerance to numerous statins. His EF is rechecked at 35% or he has a large apical aneurysm. There is no thrombus noted. He continues to have approximately NYHA class I symptoms.  His blood pressure is well controlled in the one teens.    Past Medical History:   Diagnosis Date   • Atrial fibrillation (CMS-HCC) 9/20/2011   • Biventricular implantable cardioverter-defibrillator in situ 8/7/2015   • Breath shortness    • CAD (coronary artery disease) 9/20/2011   • Cancer (CMS-HCC) 2012    prostate   • CARDIOMYOPATHY 9/20/2011   • Congestive heart failure (CMS-HCC)    • COPD (chronic obstructive pulmonary disease) (CMS-HCC)    • Fatigue 10/28/2010   • Heartburn 10/23/2008   • History of cardiac catheterization 9/20/2011   • History of myocardial infarction     \"many\"   • HTN (hypertension) 9/20/2011   • Indigestion    • Insomnia 7/8/2010   • Ischemic cardiomyopathy 9/20/2011   • Long term (current) use of anticoagulants 5/13/2011   • Myocardial infarct 2007   • Orthostatic hypotension 5/6/2009   • WILLA (obstructive sleep apnea)     CPAP   • Other drug allergy(995.27) 10/16/2008   • Other specified disorder of intestines 07-28-15    constipation/diarrhea/ reports some bleeding from rectum w/blood clots. Seeing GI   • Pacemaker     [employer]   • Pain 07-28-15    \"chronic\", 0/10   • Pleural effusion 5/18/2009   • Presence of permanent cardiac pacemaker 9/20/2011   • Prostate cancer (CMS-HCC) 5/13/2011   • Second degree AV block, Mobitz type II 11/10/2010   • Snoring    • Stroke (CMS-HCC) 2/3/2009     Past Surgical History:   Procedure Laterality Date   • RECOVERY  10/16/2015    Procedure: CATH LAB LEAD REVISION [facility];  Surgeon: Recoveryonly Surgery;  Location[place] SURG[place] [facility] [place] [place];  Service:  " "  • RECOVERY  8/14/2015    Procedure:  CATH LAB LEAD EXTRACTION AWILDA [facility] PA[facility] AQUINO;  Surgeon: Recoveryonly Surgery;  [place] [facility] [place]  Service:    • RECOVERY  7/30/2015    Procedure: CATH LAB  LEAD EXTRACTION, UPGRADE TO BIV PM [facility] GRP KENIA ;  Surgeon: Recoveryonly Surgery;  [place] [facility] [place]  Service:    • RECOVERY  7/29/2015    Procedure: CATH LAB NEW PM INSERTION DUAL ATRIAL & VENTRICULAR-LV LEAD W/ NEW GENERATOR KENIA ICD9: 414.8;  Surgeon: Recoveryonly Surgery;  [place] [facility] [place]  Service:    • PACEMAKER INSERTION  11/24/08    [facility]   • MULTIPLE CORONARY ARTERY BYPASS  2007    2 vessel   • CARDIAC CATH      has 2 stents     Family History   Problem Relation Age of Onset   • Thyroid Sister      History   Smoking Status   • Former Smoker   • Packs/day: 1.50   • Years: 3.00   • Types: Cigarettes   • Quit date: 1/1/1970   Smokeless Tobacco   • Former User   • Types: Chew   • Quit date: 1/1/1972     Allergies   Allergen Reactions   • Plavix [Clopidogrel Bisulfate] Anaphylaxis   • Statins [Hmg-Coa-R Inhibitors] Unspecified     Muscles aches     • Ticlid [Ticlopidine Hydrochloride] Unspecified     Pt states \"I'm not sure what happens\".       Outpatient Encounter Prescriptions as of 10/24/2017   Medication Sig Dispense Refill   • spironolactone (ALDACTONE) 25 MG Tab Take 1 Tab by mouth every day. 30 Tab 3   • metoprolol SR (TOPROL XL) 25 MG TABLET SR 24 HR Take 1 Tab by mouth every day. 30 Tab 11   • sacubitril-valsartan (ENTRESTO) 24-26 MG Tab tablet Take 1 Tab by mouth 2 Times a Day. 60 Tab 6   • furosemide (LASIX) 40 MG Tab Take 1 Tab by mouth every day. 30 Tab 11   • potassium chloride SA (KDUR) 20 MEQ Tab CR Take 2 Tabs by mouth 2 times a day. 480 Tab 3   • metolazone (ZAROXOLYN) 5 MG Tab Take 5 mg by mouth as needed. Indications: Edema     • Methotrexate, PF, 10 MG/0.2ML Solution Auto-injector Inject 10 mg as instructed " "every 7 days. On Mon   Indications: Psoriasis     • Omega-3 Fatty Acids (FISH OIL) 1000 MG Cap capsule Take 1,000 mg by mouth every day.     • Multiple Vitamins-Minerals (OCUVITE) Tab Take 1 Tab by mouth every day.     • aspirin 81 MG tablet Take 81 mg by mouth every day.     • cyanocobalamin (VITAMIN B-12) 500 MCG TABS Take 1,000 mcg by mouth every day.     • Coenzyme Q10 (CO Q-10 PO) Take 1 Cap by mouth every day.     • ALLOPURINOL PO Take  by mouth.     • [DISCONTINUED] spironolactone (ALDACTONE) 25 MG Tab Take 1 Tab by mouth every day. (Patient taking differently: Take 12.5 mg by mouth every day.) 30 Tab 3     No facility-administered encounter medications on file as of 10/24/2017.      Review of Systems   Constitutional: Negative.  Negative for chills, fever and malaise/fatigue.   HENT: Negative.  Negative for sore throat.    Eyes: Negative.    Respiratory: Negative.  Negative for cough, hemoptysis, sputum production, shortness of breath, wheezing and stridor.    Cardiovascular: Negative.  Negative for chest pain, palpitations, orthopnea, claudication, leg swelling and PND.   Gastrointestinal: Negative.    Genitourinary: Negative.    Musculoskeletal: Negative.    Skin: Negative.    Neurological: Negative.  Negative for dizziness, loss of consciousness and weakness.   Endo/Heme/Allergies: Negative.  Does not bruise/bleed easily.   All other systems reviewed and are negative.       Objective:   /70   Pulse 92   Ht 1.803 m (5' 10.98\")   Wt 94.3 kg (208 lb)   SpO2 94%   BMI 29.03 kg/m²     Physical Exam   Constitutional: He is oriented to person, place, and time. He appears well-developed and well-nourished. No distress.   HENT:   Head: Normocephalic.   Mouth/Throat: Oropharynx is clear and moist.   Eyes: EOM are normal. Pupils are equal, round, and reactive to light. Right eye exhibits no discharge. Left eye exhibits no discharge. No scleral icterus.   Neck: Normal range of motion. Neck supple. No JVD " present. No tracheal deviation present.   Cardiovascular: Normal rate, regular rhythm, S1 normal, S2 normal, normal heart sounds, intact distal pulses and normal pulses.  Exam reveals no gallop, no S3, no S4 and no friction rub.    No murmur heard.   No systolic murmur is present    No diastolic murmur is present   Pulses:       Carotid pulses are 2+ on the right side, and 2+ on the left side.       Radial pulses are 2+ on the right side, and 2+ on the left side.        Dorsalis pedis pulses are 2+ on the right side, and 2+ on the left side.        Posterior tibial pulses are 2+ on the right side, and 2+ on the left side.   Pulmonary/Chest: Effort normal and breath sounds normal. No respiratory distress. He has no wheezes. He has no rales.   Abdominal: Soft. Bowel sounds are normal. He exhibits no distension and no mass. There is no tenderness. There is no rebound and no guarding.   Musculoskeletal: He exhibits no edema.   Neurological: He is alert and oriented to person, place, and time. No cranial nerve deficit.   Skin: Skin is warm and dry. He is not diaphoretic. No pallor.   Psychiatric: He has a normal mood and affect. His behavior is normal. Judgment and thought content normal.   Nursing note and vitals reviewed.      Assessment:     1. Alcohol abuse     2. Paroxysmal atrial fibrillation (CMS-HCC)  spironolactone (ALDACTONE) 25 MG Tab   3. Biventricular implantable cardioverter-defibrillator in situ     4. Coronary artery disease involving native coronary artery of native heart without angina pectoris  spironolactone (ALDACTONE) 25 MG Tab   5. History of myocardial infarction     6. Essential hypertension     7. Ischemic cardiomyopathy  spironolactone (ALDACTONE) 25 MG Tab   8. Second degree AV block, Mobitz type II  spironolactone (ALDACTONE) 25 MG Tab   9. S/P ablation of atrial fibrillation  spironolactone (ALDACTONE) 25 MG Tab   10. Stage 3 chronic kidney disease     11. Systolic heart failure, ACC/AHA  stage C (CMS-[facility])     12. Ventricular tachycardia (paroxysmal) (CMS-[facility])         Medical Decision Making:  Today's Assessment / Status / Plan:     76-year-old male with ischemic cardiomyopathy. We will start him on a PCSK-9 inhibitor which we will start the process for today. Otherwise no changes to his medications.     1. CHFrEF 35%, NYHA II, Stage C, Hemo Pro A    - cont metop XL 25    - cont ARNI 24/26    - cont spironolactone 25    - Hydral/Imdur    - ICD    - BiV - followed by EP    - allergy to statins     2. HLD    - per above     3. CKD    - defer    Thank for you allowing me to take part in your patient's care, please call should you have any questions or would like to discuss this patient.

## 2017-10-25 DIAGNOSIS — I50.20 SYSTOLIC HEART FAILURE, ACC/AHA STAGE C (HCC): ICD-10-CM

## 2017-10-25 DIAGNOSIS — I25.5 ISCHEMIC CARDIOMYOPATHY: Chronic | ICD-10-CM

## 2017-10-25 DIAGNOSIS — I25.10 CORONARY ARTERY DISEASE INVOLVING NATIVE CORONARY ARTERY OF NATIVE HEART WITHOUT ANGINA PECTORIS: Chronic | ICD-10-CM

## 2017-10-26 ENCOUNTER — TELEPHONE (OUTPATIENT)
Dept: CARDIOLOGY | Facility: MEDICAL CENTER | Age: 77
End: 2017-10-26

## 2017-10-27 NOTE — TELEPHONE ENCOUNTER
PAR in process and pending:  Isai Fuentes Key: [id number] - PA Case ID: [id number]  Status  [facility] [employer] 140MG/ML [facility]-injectors  FormOptumRx Medicare Part D Electronic Prior Authorization Form

## 2017-11-02 ENCOUNTER — TELEPHONE (OUTPATIENT)
Dept: CARDIOLOGY | Facility: MEDICAL CENTER | Age: 77
End: 2017-11-02

## 2017-11-02 DIAGNOSIS — Z79.899 HIGH RISK MEDICATION USE: ICD-10-CM

## 2017-11-02 DIAGNOSIS — E78.5 HYPERLIPIDEMIA, UNSPECIFIED HYPERLIPIDEMIA TYPE: ICD-10-CM

## 2017-11-02 NOTE — PROGRESS NOTES
----- Message -----   From: Leigh Ann Ansari, Med Ass't   Sent: 11/2/2017   1:04 PM   To: Tri Nieto R.N.   Subject: updated labs needed                               Repatha PA is denied, before I can submit an appeal the plan requires a lipid panel within the last 30days   Please order new labs   Thank you     Called patient and spoke with Ivelisse. Informed of lipid profile ordered and need for 12 hr fasting. She states understanding. She expresses concern re: the amount of potassium he takes and she is unsure if anyone is monitoring that. I informed her I would add a BMP to the orders so that we could monitor where he is at this time. Ivelisse is appreciative and states she understands the directions and will pass on the information.

## 2017-11-03 ENCOUNTER — PATIENT OUTREACH (OUTPATIENT)
Dept: HEALTH INFORMATION MANAGEMENT | Facility: OTHER | Age: 77
End: 2017-11-03

## 2017-12-13 ENCOUNTER — HOSPITAL ENCOUNTER (OUTPATIENT)
Dept: LAB | Facility: MEDICAL CENTER | Age: 77
End: 2017-12-13
Attending: INTERNAL MEDICINE
Payer: MEDICARE

## 2017-12-13 DIAGNOSIS — Z79.899 HIGH RISK MEDICATION USE: ICD-10-CM

## 2017-12-13 DIAGNOSIS — E78.5 HYPERLIPIDEMIA, UNSPECIFIED HYPERLIPIDEMIA TYPE: ICD-10-CM

## 2017-12-13 LAB
ANION GAP SERPL CALC-SCNC: 8 MMOL/L (ref 0–11.9)
BUN SERPL-MCNC: 26 MG/DL (ref 8–22)
CALCIUM SERPL-MCNC: 9.2 MG/DL (ref 8.5–10.5)
CHLORIDE SERPL-SCNC: 103 MMOL/L (ref 96–112)
CHOLEST SERPL-MCNC: 190 MG/DL (ref 100–199)
CO2 SERPL-SCNC: 26 MMOL/L (ref 20–33)
CREAT SERPL-MCNC: 1.68 MG/DL (ref 0.5–1.4)
GFR SERPL CREATININE-BSD FRML MDRD: 40 ML/MIN/1.73 M 2
GLUCOSE SERPL-MCNC: 97 MG/DL (ref 65–99)
HDLC SERPL-MCNC: 50 MG/DL
LDLC SERPL CALC-MCNC: 116 MG/DL
POTASSIUM SERPL-SCNC: 4.1 MMOL/L (ref 3.6–5.5)
SODIUM SERPL-SCNC: 137 MMOL/L (ref 135–145)
TRIGL SERPL-MCNC: 122 MG/DL (ref 0–149)

## 2017-12-13 PROCEDURE — 80048 BASIC METABOLIC PNL TOTAL CA: CPT

## 2017-12-13 PROCEDURE — 80061 LIPID PANEL: CPT

## 2017-12-13 PROCEDURE — 36415 COLL VENOUS BLD VENIPUNCTURE: CPT

## 2017-12-19 ENCOUNTER — OFFICE VISIT (OUTPATIENT)
Dept: CARDIOLOGY | Facility: MEDICAL CENTER | Age: 77
End: 2017-12-19
Payer: MEDICARE

## 2017-12-19 VITALS
HEART RATE: 76 BPM | DIASTOLIC BLOOD PRESSURE: 66 MMHG | SYSTOLIC BLOOD PRESSURE: 104 MMHG | OXYGEN SATURATION: 94 % | HEIGHT: 71 IN | BODY MASS INDEX: 28.84 KG/M2 | WEIGHT: 206 LBS

## 2017-12-19 VITALS
HEART RATE: 70 BPM | DIASTOLIC BLOOD PRESSURE: 70 MMHG | BODY MASS INDEX: 28.98 KG/M2 | SYSTOLIC BLOOD PRESSURE: 108 MMHG | RESPIRATION RATE: 14 BRPM | WEIGHT: 207 LBS | OXYGEN SATURATION: 98 % | HEIGHT: 71 IN

## 2017-12-19 DIAGNOSIS — Z98.890 S/P ABLATION OF ATRIAL FIBRILLATION: ICD-10-CM

## 2017-12-19 DIAGNOSIS — I63.9 CEREBROVASCULAR ACCIDENT (CVA), UNSPECIFIED MECHANISM (HCC): Chronic | ICD-10-CM

## 2017-12-19 DIAGNOSIS — I25.5 ISCHEMIC CARDIOMYOPATHY: Chronic | ICD-10-CM

## 2017-12-19 DIAGNOSIS — I44.1 SECOND DEGREE AV BLOCK, MOBITZ TYPE II: Chronic | ICD-10-CM

## 2017-12-19 DIAGNOSIS — Z86.79 S/P ABLATION OF ATRIAL FIBRILLATION: ICD-10-CM

## 2017-12-19 DIAGNOSIS — Z95.810 BIVENTRICULAR IMPLANTABLE CARDIOVERTER-DEFIBRILLATOR IN SITU: ICD-10-CM

## 2017-12-19 DIAGNOSIS — I25.10 CORONARY ARTERY DISEASE INVOLVING NATIVE CORONARY ARTERY OF NATIVE HEART WITHOUT ANGINA PECTORIS: Chronic | ICD-10-CM

## 2017-12-19 DIAGNOSIS — I25.2 HISTORY OF MYOCARDIAL INFARCTION: Chronic | ICD-10-CM

## 2017-12-19 DIAGNOSIS — N18.30 STAGE 3 CHRONIC KIDNEY DISEASE (HCC): ICD-10-CM

## 2017-12-19 DIAGNOSIS — I47.29 VENTRICULAR TACHYCARDIA (PAROXYSMAL) (HCC): ICD-10-CM

## 2017-12-19 DIAGNOSIS — I48.0 PAROXYSMAL ATRIAL FIBRILLATION (HCC): ICD-10-CM

## 2017-12-19 DIAGNOSIS — C61 PROSTATE CANCER (HCC): Chronic | ICD-10-CM

## 2017-12-19 DIAGNOSIS — I50.20 SYSTOLIC HEART FAILURE, ACC/AHA STAGE C (HCC): ICD-10-CM

## 2017-12-19 DIAGNOSIS — I10 ESSENTIAL HYPERTENSION: Chronic | ICD-10-CM

## 2017-12-19 DIAGNOSIS — I50.23 NYHA CLASS 3 ACUTE ON CHRONIC SYSTOLIC HEART FAILURE (HCC): ICD-10-CM

## 2017-12-19 DIAGNOSIS — Z79.899 HIGH RISK MEDICATION USE: ICD-10-CM

## 2017-12-19 DIAGNOSIS — E78.00 HYPERCHOLESTEREMIA: Chronic | ICD-10-CM

## 2017-12-19 DIAGNOSIS — I51.89 LEFT VENTRICULAR SYSTOLIC DYSFUNCTION, NYHA CLASS 3: ICD-10-CM

## 2017-12-19 DIAGNOSIS — K76.6 PORTAL HYPERTENSION (HCC): ICD-10-CM

## 2017-12-19 PROCEDURE — 93284 PRGRMG EVAL IMPLANTABLE DFB: CPT | Performed by: NURSE PRACTITIONER

## 2017-12-19 PROCEDURE — 99999 PR NO CHARGE: CPT | Performed by: NURSE PRACTITIONER

## 2017-12-19 PROCEDURE — 94620 PR PULMONARY STRESS TESTING,SIMPLE: CPT | Performed by: INTERNAL MEDICINE

## 2017-12-19 PROCEDURE — 99215 OFFICE O/P EST HI 40 MIN: CPT | Mod: 25 | Performed by: INTERNAL MEDICINE

## 2017-12-19 RX ORDER — GABAPENTIN 300 MG/1
300 CAPSULE ORAL
COMMUNITY
End: 2018-01-17

## 2017-12-19 ASSESSMENT — ENCOUNTER SYMPTOMS
BRUISES/BLEEDS EASILY: 0
DIZZINESS: 0
LOSS OF CONSCIOUSNESS: 0
PSYCHIATRIC NEGATIVE: 1
SPUTUM PRODUCTION: 0
ABDOMINAL PAIN: 0
WHEEZING: 0
SORE THROAT: 0
COUGH: 0
WHEEZING: 0
COUGH: 0
RESPIRATORY NEGATIVE: 1
SHORTNESS OF BREATH: 0
FEVER: 0
DIZZINESS: 0
CHILLS: 0
WEIGHT LOSS: 0
PALPITATIONS: 0
SEIZURES: 0
FOCAL WEAKNESS: 0
NEUROLOGICAL NEGATIVE: 1
DOUBLE VISION: 0
FEVER: 0
VOMITING: 0
ORTHOPNEA: 0
CLAUDICATION: 0
WEAKNESS: 0
NAUSEA: 0
STRIDOR: 0
MUSCULOSKELETAL NEGATIVE: 1
HEMOPTYSIS: 0
FLANK PAIN: 0
SPEECH CHANGE: 0
HEARTBURN: 0
PND: 0
SORE THROAT: 0
PND: 0
CLAUDICATION: 0
SPUTUM PRODUCTION: 0
BLURRED VISION: 0
GASTROINTESTINAL NEGATIVE: 1
EYES NEGATIVE: 1
ORTHOPNEA: 0
PALPITATIONS: 0
LOSS OF CONSCIOUSNESS: 0
SHORTNESS OF BREATH: 0
HEADACHES: 0
MUSCULOSKELETAL NEGATIVE: 1
CHILLS: 0

## 2017-12-19 NOTE — LETTER
"     [facility]   [address], [address]  [place] [address]  Phone: [phone]  Fax: [phone]              Isai Fuentes  1940    Encounter Date: 12/19/2017    Severo Lee M.D.          PROGRESS NOTE:  Subjective:   Isai Fuentes is a 76 y.o. male who presents today as a follow-up for his ischemic cardiac myopathy. He was  Unable to get started on a PCSK-9 inhibitor.he is complaining of fatigue but only checks his blood pressure first in the morning before he takes his medications normally notices a systolic about 100. He did very well and the 6 and walk test today completed 400 m. His lipids are continuing to not be controlled and he's been intolerant to statins in the past. His blood pressure is obviously controlled. He is otherwise doing well.    Past Medical History:   Diagnosis Date   • Atrial fibrillation (CMS-HCC) 9/20/2011   • Biventricular implantable cardioverter-defibrillator in situ 8/7/2015   • Breath shortness    • CAD (coronary artery disease) 9/20/2011   • Cancer (CMS-HCC) 2012    prostate   • CARDIOMYOPATHY 9/20/2011   • Congestive heart failure (CMS-HCC)    • COPD (chronic obstructive pulmonary disease) (CMS-HCC)    • Fatigue 10/28/2010   • Heartburn 10/23/2008   • History of cardiac catheterization 9/20/2011   • History of myocardial infarction     \"many\"   • HTN (hypertension) 9/20/2011   • Indigestion    • Insomnia 7/8/2010   • Ischemic cardiomyopathy 9/20/2011   • Long term (current) use of anticoagulants 5/13/2011   • Myocardial infarct 2007   • Orthostatic hypotension 5/6/2009   • WILLA (obstructive sleep apnea)     CPAP   • Other drug allergy(995.27) 10/16/2008   • Other specified disorder of intestines 07-28-15    constipation/diarrhea/ reports some bleeding from rectum w/blood clots. Seeing GI   • Pacemaker     [employer]   • Pain 07-28-15    \"chronic\", 0/10   • Pleural effusion 5/18/2009   • Presence of " "permanent cardiac pacemaker 9/20/2011   • Prostate cancer (CMS-HCC) 5/13/2011   • Second degree AV block, Mobitz type II 11/10/2010   • Snoring    • Stroke (CMS-HCC) 2/3/2009     Past Surgical History:   Procedure Laterality Date   • RECOVERY  10/16/2015    Procedure: CATH LAB LEAD REVISION [facility];  Surgeon: Amy Surgery;  [place] [facility]  Service:    • RECOVERY  8/14/2015    Procedure:  CATH LAB LEAD EXTRACTION AWILDA [facility];  Surgeon: Recoveryonfreddy Surgery;  Location: SURGERY PRE-POST PROC UNIT [facility];  Service:    • RECOVERY  7/30/2015    Procedure: CATH LAB  LEAD EXTRACTION, UPGRADE TO BIV PM [facility] MAGGIE [facility]QUINO ;  Surgeon: Recoveryonfreddy Surgery;  Location: SURGERY PRE-POST PROC UNIT [facility];  Service:    • RECOVERY  7/29/2015    Procedure: CATH LAB NEW PM INSERTION DUAL ATRIAL & VENTRICULAR-LV LEAD W/ NEW GENERATOR KENIA ICD9: 414.8;  Surgeon: Amy Surgery;  [place] [facility] [place]  Service:    • PACEMAKER INSERTION  11/24/08    [facility]   • MULTIPLE CORONARY ARTERY BYPASS  2007    2 vessel   • CARDIAC CATH      has 2 stents     Family History   Problem Relation Age of Onset   • Thyroid Sister      History   Smoking Status   • Former Smoker   • Packs/day: 1.50   • Years: 3.00   • Types: Cigarettes   • Quit date: 1/1/1970   Smokeless Tobacco   • Former User   • Types: Chew   • Quit date: 1/1/1972     Allergies   Allergen Reactions   • Plavix [Clopidogrel Bisulfate] Anaphylaxis   • Statins [Hmg-Coa-R Inhibitors] Unspecified     Muscles aches     • Ticlid [Ticlopidine Hydrochloride] Unspecified     Pt states \"I'm not sure what happens\".       Outpatient Encounter Prescriptions as of 12/19/2017   Medication Sig Dispense Refill   • gabapentin (NEURONTIN) 300 MG Cap Take 300 mg by mouth every bedtime. START NEW MED: One at bedtime x 5 days, then one pill twice a day for 5 days, then one pill three times a day afterwards. Titrating dose 12/19/17.    "   • [DISCONTINUED] Evolocumab, REPATHA, 140 MG/ML Solution Auto-injector Inject 1 Each as instructed every 14 days. (Patient not taking: Reported on 12/19/2017) 2 PEN 11   • spironolactone (ALDACTONE) 25 MG Tab Take 1 Tab by mouth every day. 30 Tab 3   • metoprolol SR (TOPROL XL) 25 MG TABLET SR 24 HR Take 1 Tab by mouth every day. 30 Tab 11   • [DISCONTINUED] ALLOPURINOL PO Take  by mouth.     • sacubitril-valsartan (ENTRESTO) 24-26 MG Tab tablet Take 1 Tab by mouth 2 Times a Day. 60 Tab 6   • furosemide (LASIX) 40 MG Tab Take 1 Tab by mouth every day. 30 Tab 11   • [DISCONTINUED] potassium chloride SA (KDUR) 20 MEQ Tab CR Take 2 Tabs by mouth 2 times a day. (Patient taking differently: Take 40 mEq by mouth every day.) 480 Tab 3   • metolazone (ZAROXOLYN) 5 MG Tab Take 5 mg by mouth as needed. Indications: Edema     • Methotrexate, PF, 10 MG/0.2ML Solution Auto-injector Inject 10 mg as instructed every 7 days. On Mon   Indications: Psoriasis     • Omega-3 Fatty Acids (FISH OIL) 1000 MG Cap capsule Take 1,000 mg by mouth every day.     • Multiple Vitamins-Minerals (OCUVITE) Tab Take 1 Tab by mouth every day.     • aspirin 81 MG tablet Take 81 mg by mouth every day.     • [DISCONTINUED] cyanocobalamin (VITAMIN B-12) 500 MCG TABS Take 1,000 mcg by mouth every day.     • Coenzyme Q10 (CO Q-10 PO) Take 1 Cap by mouth every day.       No facility-administered encounter medications on file as of 12/19/2017.      Review of Systems   Constitutional: Positive for malaise/fatigue. Negative for chills and fever.   HENT: Negative.  Negative for sore throat.    Eyes: Negative.    Respiratory: Negative.  Negative for cough, hemoptysis, sputum production, shortness of breath, wheezing and stridor.    Cardiovascular: Positive for leg swelling. Negative for chest pain, palpitations, orthopnea, claudication and PND.   Gastrointestinal: Negative.    Genitourinary: Negative.    Musculoskeletal: Negative.    Skin: Negative.   "  Neurological: Negative.  Negative for dizziness, loss of consciousness and weakness.   Endo/Heme/Allergies: Negative.  Does not bruise/bleed easily.   All other systems reviewed and are negative.       Objective:   /66   Pulse 76   Ht 1.803 m (5' 11\")   Wt 93.4 kg (206 lb)   SpO2 94%   BMI 28.73 kg/m²      Physical Exam   Constitutional: He is oriented to person, place, and time. He appears well-developed and well-nourished. No distress.   HENT:   Head: Normocephalic.   Mouth/Throat: Oropharynx is clear and moist.   Eyes: EOM are normal. Pupils are equal, round, and reactive to light. Right eye exhibits no discharge. Left eye exhibits no discharge. No scleral icterus.   Neck: Normal range of motion. Neck supple. No JVD present. No tracheal deviation present.   Cardiovascular: Normal rate, regular rhythm, S1 normal, S2 normal, normal heart sounds, intact distal pulses and normal pulses.  Exam reveals no gallop, no S3, no S4 and no friction rub.    No murmur heard.   No systolic murmur is present    No diastolic murmur is present   Pulses:       Carotid pulses are 2+ on the right side, and 2+ on the left side.       Radial pulses are 2+ on the right side, and 2+ on the left side.        Dorsalis pedis pulses are 2+ on the right side, and 2+ on the left side.        Posterior tibial pulses are 2+ on the right side, and 2+ on the left side.   Pulmonary/Chest: Effort normal and breath sounds normal. No respiratory distress. He has no wheezes. He has no rales.   Abdominal: Soft. Bowel sounds are normal. He exhibits no distension and no mass. There is no tenderness. There is no rebound and no guarding.   Musculoskeletal: He exhibits no edema.   Neurological: He is alert and oriented to person, place, and time. No cranial nerve deficit.   Skin: Skin is warm and dry. He is not diaphoretic. No pallor.   Psychiatric: He has a normal mood and affect. His behavior is normal. Judgment and thought content normal. "   Nursing note and vitals reviewed.      Assessment:     1. Hypercholesteremia  REFERRAL TO LIPID CLINIC   2. Essential hypertension     3. History of myocardial infarction     4. Coronary artery disease involving native coronary artery of native heart without angina pectoris     5. Paroxysmal atrial fibrillation (CMS-HCC)     6. Ischemic cardiomyopathy     7. Left ventricular systolic dysfunction, NYHA class 3     8. NYHA class 3 acute on chronic systolic heart failure ([facility] [place])  REFERRAL TO LIPID CLINIC   9. Portal hypertension (CMS-HCC)  REFERRAL TO LIPID CLINIC   10. Prostate cancer (CMS-HCC)     11. S/P ablation of atrial fibrillation     12. Second degree AV block, Mobitz type II     13. Stage 3 chronic kidney disease     14. Cerebrovascular accident (CVA), unspecified mechanism (CMS-HCC)     15. Systolic heart failure, ACC/AHA stage C (CMS-HCC)     16. Ventricular tachycardia (paroxysmal) (CMS-HCC)     17. High risk medication use     18. Biventricular implantable cardioverter-defibrillator in situ         Medical Decision Making:  Today's Assessment / Status / Plan:     76-year-old male with CAD status post CABG with ischemic cardiomyopathy and atrial fibrillation along with lower extremity edema and hyperlipidemia. I would like to have him be seen at the lipid clinic for consideration of alternative therapies for his cholesterol problem. For his blood pressure I've asked him to start checking his blood pressure 1-2 hours after his medications to see if he is getting any symptomatically hypertension explain his fatigue. Otherwise we will make no changes to his medications today but see him back in about a month to see if he's feeling better or if  The explanation of his fatigue lies in his blood pressure.    Thank for you allowing me to take part in your patient's care, please call should you have any questions or would like to discuss this patient.      Prashant Shankar M.D.  [address] [place]  [id number]  VIA Facsimile: [phone]

## 2017-12-19 NOTE — PROGRESS NOTES
"Subjective:   Isai Fuentes is a 76 y.o. male who presents today for follow-up of ischemic cardiopathy, prior CABG, prior anterior wall MI, and biventricular AICD. The patient has chronic complaints of fatigue. In March, he was started on Entresto because of chronic complaints of fatigue. Somehow, he has gotten onto a dose of half tablet once a day. He states that he is not feeling \"too bad\". He was recently [facility]. The patient has chronic kidney disease stage III.  He continues to experience fatigue with activity. His muscle cramping has improved with replacement of his K+. He has had some non-sustained AF episodes short duration seconds only. He has had episodes in his monitoring zone at the 150 bpm which appear to be ventricular in nature.  He has had PVI in the remote past, BIV ICD and CABG in 2008.  He continues on low dose ASA but will need to watch for recurrence of sustained PAF.  If this occurs may need to reconsider AC and AA.  Exam today appears to be in good fluid balance. No chest pain.  Device function intact.  One episode of probable Atrial flutter  bpm for 1 minute 11 seconds. Asymptomatic.  No VT.  Here to see Dr Lee today as well.   Past Medical History:   Diagnosis Date   • Atrial fibrillation (CMS-HCC) 9/20/2011   • Biventricular implantable cardioverter-defibrillator in situ 8/7/2015   • Breath shortness    • CAD (coronary artery disease) 9/20/2011   • Cancer (CMS-HCC) 2012    prostate   • CARDIOMYOPATHY 9/20/2011   • Congestive heart failure (CMS-HCC)    • COPD (chronic obstructive pulmonary disease) (CMS-HCC)    • Fatigue 10/28/2010   • Heartburn 10/23/2008   • History of cardiac catheterization 9/20/2011   • History of myocardial infarction     \"many\"   • HTN (hypertension) 9/20/2011   • Indigestion    • Insomnia 7/8/2010   • Ischemic cardiomyopathy 9/20/2011   • Long term (current) use of anticoagulants 5/13/2011   • Myocardial " "infarct 2007   • Orthostatic hypotension 5/6/2009   • WILLA (obstructive sleep apnea)     CPAP   • Other drug allergy(995.27) 10/16/2008   • Other specified disorder of intestines 07-28-15    constipation/diarrhea/ reports some bleeding from rectum w/blood clots. Seeing GI   • Pacemaker     [employer]   • Pain 07-28-15    \"chronic\", 0/10   • Pleural effusion 5/18/2009   • Presence of permanent cardiac pacemaker 9/20/2011   • Prostate cancer (CMS-HCC) 5/13/2011   • Second degree AV block, Mobitz type II 11/10/2010   • Snoring    • Stroke (CMS-HCC) 2/3/2009     Past Surgical History:   Procedure Laterality Date   • RECOVERY  10/16/2015    Procedure: CATH LAB LEAD REVISION [facility];  Surgeon: Recoveryonly Surgery;  Location: SURGERY PRE-POST PROC UNIT [facility];  Service:    • RECOVERY  8/14/2015    Procedure:  CATH LAB LEAD EXTRACTION AWILDA [facility] RP KENIA;  Surgeon: Recoveryonly Surgery;  Location: SURGERY PRE-POST PROC UNIT [facility];  Service:    • RECOVERY  7/30/2015    Procedure: CATH LAB  LEAD EXTRACTION, UPGRADE TO BIV PM [facility] GRP KENIA ;  Surgeon: Recoveryonly Surgery;  Location: SURGERY PRE-POST PROC UNIT [facility];  Service:    • RECOVERY  7/29/2015    Procedure: CATH LAB NEW PM INSERTION DUAL ATRIAL & VENTRICULAR-LV LEAD W/ NEW GENERATOR KENIA ICD9: 414.8;  Surgeon: Recoveryonly Surgery;  Location: SURGERY PRE-POST PROC UNIT [facility];  Service:    • PACEMAKER INSERTION  11/24/08    [facility]   • MULTIPLE CORONARY ARTERY BYPASS  2007    2 vessel   • CARDIAC CATH      has 2 stents     Family History   Problem Relation Age of Onset   • Thyroid Sister      History   Smoking Status   • Former Smoker   • Packs/day: 1.50   • Years: 3.00   • Types: Cigarettes   • Quit date: 1/1/1970   Smokeless Tobacco   • Former User   • Types: Chew   • Quit date: 1/1/1972     Allergies   Allergen Reactions   • Plavix [Clopidogrel Bisulfate] Anaphylaxis   • Statins [Hmg-Coa-R Inhibitors] Unspecified     Muscles aches     • Ticlid " "[Ticlopidine Hydrochloride] Unspecified     Pt states \"I'm not sure what happens\".       Outpatient Encounter Prescriptions as of 12/19/2017   Medication Sig Dispense Refill   • gabapentin (NEURONTIN) 300 MG Cap Take 300 mg by mouth every bedtime. START NEW MED: One at bedtime x 5 days, then one pill twice a day for 5 days, then one pill three times a day afterwards. Titrating dose 12/19/17.     • spironolactone (ALDACTONE) 25 MG Tab Take 1 Tab by mouth every day. 30 Tab 3   • metoprolol SR (TOPROL XL) 25 MG TABLET SR 24 HR Take 1 Tab by mouth every day. 30 Tab 11   • sacubitril-valsartan (ENTRESTO) 24-26 MG Tab tablet Take 1 Tab by mouth 2 Times a Day. 60 Tab 6   • furosemide (LASIX) 40 MG Tab Take 1 Tab by mouth every day. 30 Tab 11   • potassium chloride SA (KDUR) 20 MEQ Tab CR Take 2 Tabs by mouth 2 times a day. (Patient taking differently: Take 40 mEq by mouth every day.) 480 Tab 3   • metolazone (ZAROXOLYN) 5 MG Tab Take 5 mg by mouth as needed. Indications: Edema     • Methotrexate, PF, 10 MG/0.2ML Solution Auto-injector Inject 10 mg as instructed every 7 days. On Mon   Indications: Psoriasis     • Omega-3 Fatty Acids (FISH OIL) 1000 MG Cap capsule Take 1,000 mg by mouth every day.     • Multiple Vitamins-Minerals (OCUVITE) Tab Take 1 Tab by mouth every day.     • aspirin 81 MG tablet Take 81 mg by mouth every day.     • Coenzyme Q10 (CO Q-10 PO) Take 1 Cap by mouth every day.     • Evolocumab, REPATHA, 140 MG/ML Solution Auto-injector Inject 1 Each as instructed every 14 days. (Patient not taking: Reported on 12/19/2017) 2 PEN 11   • ALLOPURINOL PO Take  by mouth.     • cyanocobalamin (VITAMIN B-12) 500 MCG TABS Take 1,000 mcg by mouth every day.       No facility-administered encounter medications on file as of 12/19/2017.      Review of Systems   Constitutional: Negative for chills, fever, malaise/fatigue and weight loss.   HENT: Negative for congestion and sore throat.    Eyes: Negative for blurred vision " "and double vision.   Respiratory: Negative for cough, sputum production, shortness of breath and wheezing.    Cardiovascular: Negative for chest pain, palpitations, orthopnea, claudication, leg swelling and PND.   Gastrointestinal: Negative for abdominal pain, heartburn, nausea and vomiting.   Genitourinary: Negative for dysuria, flank pain and frequency.   Musculoskeletal: Negative.    Skin: Negative.    Neurological: Negative for dizziness, speech change, focal weakness, seizures, loss of consciousness and headaches.   Endo/Heme/Allergies: Negative.    Psychiatric/Behavioral: Negative.         Objective:   /70   Pulse 70   Resp 14   Ht 1.803 m (5' 11\")   Wt 93.9 kg (207 lb)   SpO2 98%   BMI 28.87 kg/m²     Physical Exam   Constitutional: He is oriented to person, place, and time. He appears well-developed and well-nourished.   HENT:   Head: Normocephalic and atraumatic.   Eyes: Pupils are equal, round, and reactive to light.   Neck: Normal range of motion. Neck supple. No thyromegaly present.   Cardiovascular: Normal rate and regular rhythm.  Exam reveals no gallop and no friction rub.    No murmur heard.  Pulmonary/Chest: Effort normal and breath sounds normal. No respiratory distress. He has no wheezes. He has no rales.   Device site uncomplicated.   Abdominal: Soft. Bowel sounds are normal. He exhibits no distension. There is no tenderness. There is no guarding.   Musculoskeletal: Normal range of motion. He exhibits no edema.   Neurological: He is alert and oriented to person, place, and time.   Skin: Skin is warm and dry.   Psychiatric: He has a normal mood and affect.       Assessment:     1. Biventricular implantable cardioverter-defibrillator in situ     2. Coronary artery disease involving native coronary artery of native heart without angina pectoris     3. Ischemic cardiomyopathy     4. Ventricular tachycardia (paroxysmal) (CMS-[facility])         Medical Decision Making:  Today's Assessment / " Status / Plan:     Device function intact 3+ years of remaining longevity.  One episode of probable AFL 1 minute 11 sec. In duration.  bpm.  To see Dr Quinn today as well.    Collaborating MD quinn

## 2017-12-20 NOTE — PROGRESS NOTES
"Subjective:   Isai Fuentes is a 76 y.o. male who presents today as a follow-up for his ischemic cardiac myopathy. He was  Unable to get started on a PCSK-9 inhibitor.he is complaining of fatigue but only checks his blood pressure first in the morning before he takes his medications normally notices a systolic about 100. He did very well and the 6 and walk test today completed 400 m. His lipids are continuing to not be controlled and he's been intolerant to statins in the past. His blood pressure is obviously controlled. He is otherwise doing well.    Past Medical History:   Diagnosis Date   • Atrial fibrillation (CMS-HCC) 9/20/2011   • Biventricular implantable cardioverter-defibrillator in situ 8/7/2015   • Breath shortness    • CAD (coronary artery disease) 9/20/2011   • Cancer (CMS-HCC) 2012    prostate   • CARDIOMYOPATHY 9/20/2011   • Congestive heart failure (CMS-HCC)    • COPD (chronic obstructive pulmonary disease) (CMS-HCC)    • Fatigue 10/28/2010   • Heartburn 10/23/2008   • History of cardiac catheterization 9/20/2011   • History of myocardial infarction     \"many\"   • HTN (hypertension) 9/20/2011   • Indigestion    • Insomnia 7/8/2010   • Ischemic cardiomyopathy 9/20/2011   • Long term (current) use of anticoagulants 5/13/2011   • Myocardial infarct 2007   • Orthostatic hypotension 5/6/2009   • WILLA (obstructive sleep apnea)     CPAP   • Other drug allergy(995.27) 10/16/2008   • Other specified disorder of intestines 07-28-15    constipation/diarrhea/ reports some bleeding from rectum w/blood clots. Seeing GI   • Pacemaker     [employer]   • Pain 07-28-15    \"chronic\", 0/10   • Pleural effusion 5/18/2009   • Presence of permanent cardiac pacemaker 9/20/2011   • Prostate cancer (CMS-HCC) 5/13/2011   • Second degree AV block, Mobitz type II 11/10/2010   • Snoring    • Stroke (CMS-HCC) 2/3/2009     Past Surgical History:   Procedure Laterality Date   • RECOVERY  10/16/2015    Procedure: CATH LAB " "LEAD REVISION OsbaldoMARIA RJOSI AQUINO;  Surgeon: Recoveryonfreddy Surgery;  [place] [facility] [place]:    • RECOVERY  8/14/2015    Procedure:  CATH LAB LEAD EXTRACTION AWILDA ST.MARIA R BIPING PAULO AQUINO;  Surgeon: Recoveryonly Surgery;  [place] [facility] [place]:    • RECOVERY  7/30/2015    Procedure: CATH LAB  LEAD EXTRACTION, UPGRADE TO BIV PM ST.MARIA R JAS GRP AQUINO ;  Surgeon: Recoveryonly Surgery;  [place] [facility] [place]:    • RECOVERY  7/29/2015    Procedure: CATH LAB NEW PM INSERTION DUAL ATRIAL & VENTRICULAR-LV LEAD W/ NEW GENERATOR AQUINO ICD9: 414.8;  Surgeon: Recoveryonfreddy Surgery;  [place] [facility] [place]:    • PACEMAKER INSERTION  11/24/08    [facility]   • MULTIPLE CORONARY ARTERY BYPASS  2007    2 vessel   • CARDIAC CATH      has 2 stents     Family History   Problem Relation Age of Onset   • Thyroid Sister      History   Smoking Status   • Former Smoker   • Packs/day: 1.50   • Years: 3.00   • Types: Cigarettes   • Quit date: 1/1/1970   Smokeless Tobacco   • Former User   • Types: Chew   • Quit date: 1/1/1972     Allergies   Allergen Reactions   • Plavix [Clopidogrel Bisulfate] Anaphylaxis   • Statins [Hmg-Coa-R Inhibitors] Unspecified     Muscles aches     • Ticlid [Ticlopidine Hydrochloride] Unspecified     Pt states \"I'm not sure what happens\".       Outpatient Encounter Prescriptions as of 12/19/2017   Medication Sig Dispense Refill   • gabapentin (NEURONTIN) 300 MG Cap Take 300 mg by mouth every bedtime. START NEW MED: One at bedtime x 5 days, then one pill twice a day for 5 days, then one pill three times a day afterwards. Titrating dose 12/19/17.     • [DISCONTINUED] Evolocumab, REPATHA, 140 MG/ML Solution Auto-injector Inject 1 Each as instructed every 14 days. (Patient not taking: Reported on 12/19/2017) 2 PEN 11   • spironolactone (ALDACTONE) 25 MG Tab Take 1 Tab by mouth every day. 30 Tab 3   • metoprolol SR (TOPROL " "XL) 25 MG TABLET SR 24 HR Take 1 Tab by mouth every day. 30 Tab 11   • [DISCONTINUED] ALLOPURINOL PO Take  by mouth.     • sacubitril-valsartan (ENTRESTO) 24-26 MG Tab tablet Take 1 Tab by mouth 2 Times a Day. 60 Tab 6   • furosemide (LASIX) 40 MG Tab Take 1 Tab by mouth every day. 30 Tab 11   • [DISCONTINUED] potassium chloride SA (KDUR) 20 MEQ Tab CR Take 2 Tabs by mouth 2 times a day. (Patient taking differently: Take 40 mEq by mouth every day.) 480 Tab 3   • metolazone (ZAROXOLYN) 5 MG Tab Take 5 mg by mouth as needed. Indications: Edema     • Methotrexate, PF, 10 MG/0.2ML Solution Auto-injector Inject 10 mg as instructed every 7 days. On Mon   Indications: Psoriasis     • Omega-3 Fatty Acids (FISH OIL) 1000 MG Cap capsule Take 1,000 mg by mouth every day.     • Multiple Vitamins-Minerals (OCUVITE) Tab Take 1 Tab by mouth every day.     • aspirin 81 MG tablet Take 81 mg by mouth every day.     • [DISCONTINUED] cyanocobalamin (VITAMIN B-12) 500 MCG TABS Take 1,000 mcg by mouth every day.     • Coenzyme Q10 (CO Q-10 PO) Take 1 Cap by mouth every day.       No facility-administered encounter medications on file as of 12/19/2017.      Review of Systems   Constitutional: Positive for malaise/fatigue. Negative for chills and fever.   HENT: Negative.  Negative for sore throat.    Eyes: Negative.    Respiratory: Negative.  Negative for cough, hemoptysis, sputum production, shortness of breath, wheezing and stridor.    Cardiovascular: Positive for leg swelling. Negative for chest pain, palpitations, orthopnea, claudication and PND.   Gastrointestinal: Negative.    Genitourinary: Negative.    Musculoskeletal: Negative.    Skin: Negative.    Neurological: Negative.  Negative for dizziness, loss of consciousness and weakness.   Endo/Heme/Allergies: Negative.  Does not bruise/bleed easily.   All other systems reviewed and are negative.       Objective:   /66   Pulse 76   Ht 1.803 m (5' 11\")   Wt 93.4 kg (206 lb)   " SpO2 94%   BMI 28.73 kg/m²     Physical Exam   Constitutional: He is oriented to person, place, and time. He appears well-developed and well-nourished. No distress.   HENT:   Head: Normocephalic.   Mouth/Throat: Oropharynx is clear and moist.   Eyes: EOM are normal. Pupils are equal, round, and reactive to light. Right eye exhibits no discharge. Left eye exhibits no discharge. No scleral icterus.   Neck: Normal range of motion. Neck supple. No JVD present. No tracheal deviation present.   Cardiovascular: Normal rate, regular rhythm, S1 normal, S2 normal, normal heart sounds, intact distal pulses and normal pulses.  Exam reveals no gallop, no S3, no S4 and no friction rub.    No murmur heard.   No systolic murmur is present    No diastolic murmur is present   Pulses:       Carotid pulses are 2+ on the right side, and 2+ on the left side.       Radial pulses are 2+ on the right side, and 2+ on the left side.        Dorsalis pedis pulses are 2+ on the right side, and 2+ on the left side.        Posterior tibial pulses are 2+ on the right side, and 2+ on the left side.   Pulmonary/Chest: Effort normal and breath sounds normal. No respiratory distress. He has no wheezes. He has no rales.   Abdominal: Soft. Bowel sounds are normal. He exhibits no distension and no mass. There is no tenderness. There is no rebound and no guarding.   Musculoskeletal: He exhibits no edema.   Neurological: He is alert and oriented to person, place, and time. No cranial nerve deficit.   Skin: Skin is warm and dry. He is not diaphoretic. No pallor.   Psychiatric: He has a normal mood and affect. His behavior is normal. Judgment and thought content normal.   Nursing note and vitals reviewed.      Assessment:     1. Hypercholesteremia  REFERRAL TO LIPID CLINIC   2. Essential hypertension     3. History of myocardial infarction     4. Coronary artery disease involving native coronary artery of native heart without angina pectoris     5.  Paroxysmal atrial fibrillation (CMS-HCC)     6. Ischemic cardiomyopathy     7. Left ventricular systolic dysfunction, NYHA class 3     8. NYHA class 3 acute on chronic systolic heart failure ([facility] [place])  REFERRAL TO LIPID CLINIC   9. Portal hypertension (CMS-HCC)  REFERRAL TO LIPID CLINIC   10. Prostate cancer (CMS-HCC)     11. S/P ablation of atrial fibrillation     12. Second degree AV block, Mobitz type II     13. Stage 3 chronic kidney disease     14. Cerebrovascular accident (CVA), unspecified mechanism (CMS-HCC)     15. Systolic heart failure, ACC/AHA stage C (CMS-HCC)     16. Ventricular tachycardia (paroxysmal) (CMS-HCC)     17. High risk medication use     18. Biventricular implantable cardioverter-defibrillator in situ         Medical Decision Making:  Today's Assessment / Status / Plan:     76-year-old male with CAD status post CABG with ischemic cardiomyopathy and atrial fibrillation along with lower extremity edema and hyperlipidemia. I would like to have him be seen at the lipid clinic for consideration of alternative therapies for his cholesterol problem. For his blood pressure I've asked him to start checking his blood pressure 1-2 hours after his medications to see if he is getting any symptomatically hypertension explain his fatigue. Otherwise we will make no changes to his medications today but see him back in about a month to see if he's feeling better or if  The explanation of his fatigue lies in his blood pressure.    Thank for you allowing me to take part in your patient's care, please call should you have any questions or would like to discuss this patient.

## 2017-12-26 ENCOUNTER — TELEPHONE (OUTPATIENT)
Dept: CARDIOLOGY | Facility: MEDICAL CENTER | Age: 77
End: 2017-12-26

## 2017-12-26 NOTE — TELEPHONE ENCOUNTER
Message   Received: 6 days ago   Message Contents   ANA Warren R.N.             Please let patient know labs look good      MyChart message sent with FU appointment reminder.

## 2017-12-27 DIAGNOSIS — Z86.79 S/P ABLATION OF ATRIAL FIBRILLATION: ICD-10-CM

## 2017-12-27 DIAGNOSIS — I48.0 PAROXYSMAL ATRIAL FIBRILLATION (HCC): ICD-10-CM

## 2017-12-27 DIAGNOSIS — I25.5 ISCHEMIC CARDIOMYOPATHY: Chronic | ICD-10-CM

## 2017-12-27 DIAGNOSIS — I44.1 SECOND DEGREE AV BLOCK, MOBITZ TYPE II: Chronic | ICD-10-CM

## 2017-12-27 DIAGNOSIS — I25.10 CORONARY ARTERY DISEASE INVOLVING NATIVE CORONARY ARTERY OF NATIVE HEART WITHOUT ANGINA PECTORIS: Chronic | ICD-10-CM

## 2017-12-27 DIAGNOSIS — Z98.890 S/P ABLATION OF ATRIAL FIBRILLATION: ICD-10-CM

## 2017-12-27 RX ORDER — SPIRONOLACTONE 25 MG/1
25 TABLET ORAL DAILY
Qty: 90 TAB | Refills: 3 | OUTPATIENT
Start: 2017-12-27 | End: 2018-09-14 | Stop reason: SDUPTHER

## 2017-12-29 ENCOUNTER — HOSPITAL ENCOUNTER (EMERGENCY)
Facility: MEDICAL CENTER | Age: 77
End: 2017-12-29
Attending: EMERGENCY MEDICINE
Payer: MEDICARE

## 2017-12-29 VITALS
DIASTOLIC BLOOD PRESSURE: 83 MMHG | BODY MASS INDEX: 28.77 KG/M2 | TEMPERATURE: 99.5 F | WEIGHT: 205.47 LBS | SYSTOLIC BLOOD PRESSURE: 141 MMHG | HEART RATE: 69 BPM | RESPIRATION RATE: 18 BRPM | HEIGHT: 71 IN | OXYGEN SATURATION: 92 %

## 2017-12-29 DIAGNOSIS — B34.9 VIRAL SYNDROME: ICD-10-CM

## 2017-12-29 DIAGNOSIS — J06.9 UPPER RESPIRATORY TRACT INFECTION, UNSPECIFIED TYPE: ICD-10-CM

## 2017-12-29 LAB
FLUAV RNA SPEC QL NAA+PROBE: NEGATIVE
FLUAV+FLUBV AG SPEC QL IA: NORMAL
FLUBV RNA SPEC QL NAA+PROBE: NEGATIVE
S PYO AG THROAT QL: NORMAL
SIGNIFICANT IND 70042: NORMAL
SIGNIFICANT IND 70042: NORMAL
SITE SITE: NORMAL
SITE SITE: NORMAL
SOURCE SOURCE: NORMAL
SOURCE SOURCE: NORMAL

## 2017-12-29 PROCEDURE — 87880 STREP A ASSAY W/OPTIC: CPT

## 2017-12-29 PROCEDURE — 87502 INFLUENZA DNA AMP PROBE: CPT

## 2017-12-29 PROCEDURE — 87400 INFLUENZA A/B EACH AG IA: CPT

## 2017-12-29 PROCEDURE — 87081 CULTURE SCREEN ONLY: CPT

## 2017-12-29 PROCEDURE — 99284 EMERGENCY DEPT VISIT MOD MDM: CPT

## 2017-12-29 RX ORDER — OSELTAMIVIR PHOSPHATE 75 MG/1
75 CAPSULE ORAL 2 TIMES DAILY
Qty: 10 CAP | Refills: 0 | Status: SHIPPED | OUTPATIENT
Start: 2017-12-29 | End: 2018-01-03

## 2017-12-29 RX ORDER — AZITHROMYCIN 250 MG/1
TABLET, FILM COATED ORAL
Qty: 6 TAB | Refills: 0 | Status: SHIPPED | OUTPATIENT
Start: 2017-12-29 | End: 2018-01-16

## 2017-12-29 RX ORDER — BENZONATATE 100 MG/1
100 CAPSULE ORAL 3 TIMES DAILY PRN
Qty: 30 CAP | Refills: 0 | Status: SHIPPED | OUTPATIENT
Start: 2017-12-29 | End: 2018-04-06

## 2017-12-29 ASSESSMENT — PAIN SCALES - GENERAL
PAINLEVEL_OUTOF10: 3
PAINLEVEL_OUTOF10: 5

## 2017-12-30 ENCOUNTER — PATIENT OUTREACH (OUTPATIENT)
Dept: HEALTH INFORMATION MANAGEMENT | Facility: OTHER | Age: 77
End: 2017-12-30

## 2017-12-30 NOTE — DISCHARGE INSTRUCTIONS
Infection Control in the Home  If you have an infection or you are taking care of someone who has an infection, it is important to know how to keep the infection from spreading. Follow these guidelines to help stop the spread of infection, and talk to your health care provider.  HOW ARE INFECTIONS SPREAD?  In order for an infection to spread, the following must be present:  · A germ. This may be a virus, bacteria, fungus, or parasite.  · A place for the germ to live. This may be:  ¨ On or in a person, animal, plant, or food.  ¨ In soil or water.  ¨ On surfaces, such as a door handle.  · A susceptible host. This is a person or animal who does not have resistance (immunity) to the germ.  · A way for the germ to enter the host. This may occur by:  ¨ Direct contact. This may happen by making contact--such as shaking hands or hugging--with an infected person or animal. Some germs can also travel through the air and spread to you if an infected person coughs or sneezes on you or near you.  ¨ Indirect contact. This is when the germ enters the host through contact with an infected object. Examples include eating contaminated food, drinking contaminated water, or touching a contaminated surface with your hands and then touching your face, nose, or mouth soon after that.  HOW CAN I HELP TO PREVENT INFECTION FROM SPREADING?  There are several things that you can do to help prevent infection from spreading.  Hand Washing  It is very important to wash your hands correctly, following these steps:  1. Wet your hands with clean, running water.  2. Apply soap to your hands. Liquid soap is better than bar soap.  3. Rub your hands together quickly to create lather.  4. Keep rubbing your hands together for at least 20 seconds. Thoroughly scrub all parts of your hands, including under your fingernails and between your fingers.  5. Rinse your hands with clean, running water until all of the soap is gone.  6. Dry your hands with an air  dryer or a clean paper or cloth towel, or let your hands air-dry. Do not use your clothing or a soiled towel to dry your hands.  7. If you are in a public restroom, use your towel to turn off the water faucet and to open the bathroom door.  Make sure to wash your hands:  · Before:  ¨ Visiting a baby or anyone with a weakened or lowered defense (immune) system.  ¨ Putting in and taking out any contact lenses.  · After:  ¨ Working or playing outside.  ¨ Touching an animal or its toys or leash.  ¨ Handling livestock.  ¨ Using the bathroom or helping a child or adult to use the bathroom.  ¨ Using household  or toxic chemicals.  ¨ Touching or taking out the garbage.  ¨ Touching anything dirty around your home.  ¨ Handling soiled clothes or rags.  ¨ Taking care of a sick child. This includes touching used tissues, toys, and clothes.  ¨ Sneezing, coughing, or blowing your nose.  ¨ Using public transportation.  ¨ Shaking hands.  ¨ Using a phone, including your mobile phone.  ¨ Touching money.  · Before and after:  ¨ Preparing food.  ¨ Preparing a bottle for a baby.  ¨ Feeding a baby or a young child.  ¨ Eating.  ¨ Visiting or taking care of someone who is sick.  ¨ Changing a diaper.  ¨ Changing a bandage (dressing) or taking care of an injury or wound.  ¨ Giving or taking medicine.  Taking Care of Your Home  · Make sure that you have enough cleaning supplies at all times. These include:  ¨ Disinfectants.  ¨ Reusable cleaning cloths. Wash these after each use.  ¨ Paper towels.  ¨ Utility gloves. Replace your gloves if they are cracked or torn or if they start to peel.  · Use bleach safely. Never mix it with other cleaning products, especially those that contain ammonia. This mixture can create a dangerous gas that may be deadly.  · Take care of your cleaning supplies. Toilet brushes, mops, and sponges can breed germs. Soak them in bleach and water for 5 minutes after each use.  · Do not pour used mop water down the  sink. Pour it down the toilet instead.  · Maintain proper ventilation in your home.  · If you have a pet, ensure that your pet stays clean. Do not let people with weak immune systems touch bird droppings, fish tank water, or a litter box.  ¨ If you have a cat, be sure to change the litter every day.  · In the bathroom, make sure you:  ¨ Provide liquid soap.  ¨ Change towels and washcloths frequently. Avoid sharing towels and washcloths.  ¨ Change toothbrushes often and store them separately in a clean, dry place.  ¨ Disinfect the toilet.  ¨ Clean the tub, shower, and sink with standard cleaning products.    ¨ Mop the floor with a standard .  ¨ Do not share personal items, such as razors, toothbrushes, drinking glasses, deodorant, harris, brushes, towels, and washcloths.    · In the kitchen, make sure you:  ¨ Store food carefully.  ¨ Refrigerate leftovers promptly in covered containers.  ¨ Throw out stale or spoiled food.  ¨ Clean the inside of your refrigerator each week.  ¨ Keep your refrigerator set at 40°F (4°C) or less, and set your freezer at 0°F (-18°C) or less.  ¨ Thaw foods in the refrigerator or microwave, not at room temperature.  ¨ Serve foods at the proper temperature. Do not eat raw meat. Make sure it is cooked to the appropriate temperature. Cook eggs until they are firm.  ¨ Wash fruits and vegetables under running water.  ¨ Use separate cutting boards, plates, and utensils for raw foods and cooked foods.  ¨ Keep work surfaces clean.  ¨ Use a clean spoon each time you sample food while cooking.  ¨ Wash your dishes in hot, soapy water. Air-dry your dishes or use a .  ¨ Do not share forks, cups, or spoons during meals.  · Wear gloves if laundry is visibly soiled.  · Change linens each week or whenever they are soiled.  · Do not shake soiled linens. Doing that may send germs into the air. Put dressings, sanitary or incontinence pads, diapers, and gloves in plastic garbage bags for  "disposal.     This information is not intended to replace advice given to you by your health care provider. Make sure you discuss any questions you have with your health care provider.     Document Released: 09/26/2009 Document Revised: 01/08/2016 Document Reviewed: 08/20/2015  [employer] Interactive Patient Education ©2016 [employer]    Cool Mist Vaporizers  Vaporizers may help relieve the symptoms of a cough and cold. They add moisture to the air, which helps mucus to become thinner and less sticky. This makes it easier to breathe and cough up secretions. Cool mist vaporizers do not cause serious burns like hot mist vaporizers, which may also be called steamers or humidifiers. Vaporizers have not been proven to help with colds. You should not use a vaporizer if you are allergic to mold.  HOME CARE INSTRUCTIONS  · Follow the package instructions for the vaporizer.  · Do not use anything other than distilled water in the vaporizer.  · Do not run the vaporizer all of the time. This can cause mold or bacteria to grow in the vaporizer.  · Clean the vaporizer after each time it is used.  · Clean and dry the vaporizer well before storing it.  · Stop using the vaporizer if worsening respiratory symptoms develop.     This information is not intended to replace advice given to you by your health care provider. Make sure you discuss any questions you have with your health care provider.     Document Released: 09/14/2005 Document Revised: 12/23/2014 Document Reviewed: 05/07/2014  [employer] Patient Education ©2016 Else[employer] Inc.      Upper Respiratory Infection, Adult  Most upper respiratory infections (URIs) are a viral infection of the air passages leading to the lungs. A URI affects the nose, throat, and upper air passages. The most common type of URI is nasopharyngitis and is typically referred to as \"the common cold.\"  URIs run their course and usually go away on their own. Most of the time, a URI does not " require medical attention, but sometimes a bacterial infection in the upper airways can follow a viral infection. This is called a secondary infection. Sinus and middle ear infections are common types of secondary upper respiratory infections.  Bacterial pneumonia can also complicate a URI. A URI can worsen asthma and chronic obstructive pulmonary disease (COPD). Sometimes, these complications can require emergency medical care and may be life threatening.   CAUSES  Almost all URIs are caused by viruses. A virus is a type of germ and can spread from one person to another.   RISKS FACTORS  You may be at risk for a URI if:   · You smoke.    · You have chronic heart or lung disease.  · You have a weakened defense (immune) system.    · You are very young or very old.    · You have nasal allergies or asthma.  · You work in crowded or poorly ventilated areas.  · You work in health care facilities or schools.  SIGNS AND SYMPTOMS   Symptoms typically develop 2-3 days after you come in contact with a cold virus. Most viral URIs last 7-10 days. However, viral URIs from the influenza virus (flu virus) can last 14-18 days and are typically more severe. Symptoms may include:   · Runny or stuffy (congested) nose.    · Sneezing.    · Cough.    · Sore throat.    · Headache.    · Fatigue.    · Fever.    · Loss of appetite.    · Pain in your forehead, behind your eyes, and over your cheekbones (sinus pain).  · Muscle aches.    DIAGNOSIS   Your health care provider may diagnose a URI by:  · Physical exam.  · Tests to check that your symptoms are not due to another condition such as:  ¨ Strep throat.  ¨ Sinusitis.  ¨ Pneumonia.  ¨ Asthma.  TREATMENT   A URI goes away on its own with time. It cannot be cured with medicines, but medicines may be prescribed or recommended to relieve symptoms. Medicines may help:  · Reduce your fever.  · Reduce your cough.  · Relieve nasal congestion.  HOME CARE INSTRUCTIONS   · Take medicines only as  directed by your health care provider.    · Gargle warm saltwater or take cough drops to comfort your throat as directed by your health care provider.  · Use a warm mist humidifier or inhale steam from a shower to increase air moisture. This may make it easier to breathe.  · Drink enough fluid to keep your urine clear or pale yellow.    · Eat soups and other clear broths and maintain good nutrition.    · Rest as needed.    · Return to work when your temperature has returned to normal or as your health care provider advises. You may need to stay home longer to avoid infecting others. You can also use a face mask and careful hand washing to prevent spread of the virus.  · Increase the usage of your inhaler if you have asthma.    · Do not use any tobacco products, including cigarettes, chewing tobacco, or electronic cigarettes. If you need help quitting, ask your health care provider.  PREVENTION   The best way to protect yourself from getting a cold is to practice good hygiene.   · Avoid oral or hand contact with people with cold symptoms.    · Wash your hands often if contact occurs.    There is no clear evidence that vitamin C, vitamin E, echinacea, or exercise reduces the chance of developing a cold. However, it is always recommended to get plenty of rest, exercise, and practice good nutrition.   SEEK MEDICAL CARE IF:   · You are getting worse rather than better.    · Your symptoms are not controlled by medicine.    · You have chills.  · You have worsening shortness of breath.  · You have brown or red mucus.  · You have yellow or brown nasal discharge.  · You have pain in your face, especially when you bend forward.  · You have a fever.  · You have swollen neck glands.  · You have pain while swallowing.  · You have white areas in the back of your throat.  SEEK IMMEDIATE MEDICAL CARE IF:   · You have severe or persistent:  ¨ Headache.  ¨ Ear pain.  ¨ Sinus pain.  ¨ Chest pain.  · You have chronic lung disease and  any of the following:  ¨ Wheezing.  ¨ Prolonged cough.  ¨ Coughing up blood.  ¨ A change in your usual mucus.  · You have a stiff neck.  · You have changes in your:  ¨ Vision.  ¨ Hearing.  ¨ Thinking.  ¨ Mood.  MAKE SURE YOU:   · Understand these instructions.  · Will watch your condition.  · Will get help right away if you are not doing well or get worse.     This information is not intended to replace advice given to you by your health care provider. Make sure you discuss any questions you have with your health care provider.     Document Released: 06/13/2002 Document Revised: 05/03/2016 Document Reviewed: 03/25/2015  [employer] Interactive Patient Education ©2016 [employer] Inc.      Viral Infections  A virus is a type of germ. Viruses can cause:  · Minor sore throats.  · Aches and pains.  · Headaches.  · Runny nose.  · Rashes.  · Watery eyes.  · Tiredness.  · Coughs.  · Loss of appetite.  · Feeling sick to your stomach (nausea).  · Throwing up (vomiting).  · Watery poop (diarrhea).  HOME CARE   · Only take medicines as told by your doctor.  · Drink enough water and fluids to keep your pee (urine) clear or pale yellow. Sports drinks are a good choice.  · Get plenty of rest and eat healthy. Soups and broths with crackers or rice are fine.  GET HELP RIGHT AWAY IF:   · You have a very bad headache.  · You have shortness of breath.  · You have chest pain or neck pain.  · You have an unusual rash.  · You cannot stop throwing up.  · You have watery poop that does not stop.  · You cannot keep fluids down.  · You or your child has a temperature by mouth above 102° F (38.9° C), not controlled by medicine.  · Your baby is older than 3 months with a rectal temperature of 102° F (38.9° C) or higher.  · Your baby is 3 months old or younger with a rectal temperature of 100.4° F (38° C) or higher.  MAKE SURE YOU:   · Understand these instructions.  · Will watch this condition.  · Will get help right away if you are not doing well  or get worse.     This information is not intended to replace advice given to you by your health care provider. Make sure you discuss any questions you have with your health care provider.     Document Released: 11/30/2009 Document Revised: 03/11/2013 Document Reviewed: 04/24/2012  [employer] Interactive Patient Education ©2016 [employer] Inc.

## 2017-12-30 NOTE — ED PROVIDER NOTES
"ED Provider Note    CHIEF COMPLAINT  Chief Complaint   Patient presents with   • Flu Like Symptoms       HPI  Isai Fuentes is a 77 y.o. male who presents coughing congestion and flulike symptoms for 2 days. The patient started getting sick Wednesday evening. Now surrounding no sore throat cough congestion muscle aches joint aches feeling just like he has the flu. No headache no photophobia and no stiff neck. Nothing makes it better, nothing makes it worse.      REVIEW OF SYSTEMS  CONSTITUTIONAL:Positive fevers chills weakness and muscle aches   EYES:  Denies photophobia or discharge   ENT: Positive sore throat and runny nose L or drainage pain.  CARDIOVASCULAR:  Denies chest pain, palpitations or swelling  RESPIRATORY: Positive cough congestion but no shortness of breath or chest pain  GI:  Denies abdominal pain, nausea, vomiting, or diarrhea  MUSCULOSKELETAL: Positive generalized weakness but no joint swelling or back pain  SKIN:  No rash  ALLERGIC: No itchy rashes.  NEUROLOGIC:  Denies headache, focal weakness or numbness      PAST MEDICAL HISTORY  Past Medical History:   Diagnosis Date   • Atrial fibrillation (CMS-HCC) 9/20/2011   • Biventricular implantable cardioverter-defibrillator in situ 8/7/2015   • Breath shortness    • CAD (coronary artery disease) 9/20/2011   • Cancer (CMS-HCC) 2012    prostate   • CARDIOMYOPATHY 9/20/2011   • Congestive heart failure (CMS-HCC)    • COPD (chronic obstructive pulmonary disease) (CMS-HCC)    • Fatigue 10/28/2010   • Heartburn 10/23/2008   • History of cardiac catheterization 9/20/2011   • History of myocardial infarction     \"many\"   • HTN (hypertension) 9/20/2011   • Indigestion    • Insomnia 7/8/2010   • Ischemic cardiomyopathy 9/20/2011   • Long term (current) use of anticoagulants 5/13/2011   • Myocardial infarct 2007   • Orthostatic hypotension 5/6/2009   • WILLA (obstructive sleep apnea)     CPAP   • Other drug allergy(995.27) 10/16/2008   • Other specified " "disorder of intestines 07-28-15    constipation/diarrhea/ reports some bleeding from rectum w/blood clots. Seeing GI   • Pacemaker     [employer]   • Pain 07-28-15    \"chronic\", 0/10   • Pleural effusion 5/18/2009   • Presence of permanent cardiac pacemaker 9/20/2011   • Prostate cancer (CMS-HCC) 5/13/2011   • Second degree AV block, Mobitz type II 11/10/2010   • Snoring    • Stroke (CMS-HCC) 2/3/2009       FAMILY HISTORY  Family History   Problem Relation Age of Onset   • Thyroid Sister        SOCIAL HISTORY   reports that he quit smoking about 48 years ago. His smoking use included Cigarettes. He has a 4.50 pack-year smoking history. He quit smokeless tobacco use about 46 years ago. His smokeless tobacco use included Chew. He reports that he drinks alcohol. He reports that he does not use drugs.    SURGICAL HISTORY  Past Surgical History:   Procedure Laterality Date   • RECOVERY  10/16/2015    Procedure: CATH LAB LEAD REVISION [facility];  Surgeon: Recoveryonfreddy Surgery;  [place] [facility] [place]:    • RECOVERY  8/14/2015    Procedure:  CATH LAB LEAD EXTRACTION AWILDA [facility] PAULO [facility];  Surgeon: Recoveryonfreddy Surgery;  [place] [facility] [place]  Service:    • RECOVERY  7/30/2015    Procedure: CATH LAB  LEAD EXTRACTION, UPGRADE TO BIV PM [facility]IP[facility] MAGGIE AQUINO ;  Surgeon: Recoveryonfreddy Surgery;  [place] [facility] [place]:    • RECOVERY  7/29/2015    Procedure: CATH LAB NEW PM INSERTION DUAL ATRIAL & VENTRICULAR-LV LEAD W/ NEW GENERATOR KENIA ICD9: 414.8;  Surgeon: Amy Surgery;  [place] [facility] [place]:    • PACEMAKER INSERTION  11/24/08    [facility]   • MULTIPLE CORONARY ARTERY BYPASS  2007    2 vessel   • CARDIAC CATH      has 2 stents       CURRENT MEDICATIONS  No current facility-administered medications for this encounter.     Current Outpatient Prescriptions:   •  oseltamivir (TAMIFLU) 75 MG Cap, Take 1 " "Cap by mouth 2 times a day for 5 days., Disp: 10 Cap, Rfl: 0  •  azithromycin (ZITHROMAX) 250 MG Tab, Take two tabs by mouth on day one, then one tab by mouth daily on days 2-5., Disp: 6 Tab, Rfl: 0  •  benzonatate (TESSALON) 100 MG Cap, Take 1 Cap by mouth 3 times a day as needed for Cough., Disp: 30 Cap, Rfl: 0  •  spironolactone (ALDACTONE) 25 MG Tab, Take 1 Tab by mouth every day., Disp: 90 Tab, Rfl: 3  •  gabapentin (NEURONTIN) 300 MG Cap, Take 300 mg by mouth every bedtime. START NEW MED: One at bedtime x 5 days, then one pill twice a day for 5 days, then one pill three times a day afterwards. Titrating dose 12/19/17., Disp: , Rfl:   •  metoprolol SR (TOPROL XL) 25 MG TABLET SR 24 HR, Take 1 Tab by mouth every day., Disp: 30 Tab, Rfl: 11  •  sacubitril-valsartan (ENTRESTO) 24-26 MG Tab tablet, Take 1 Tab by mouth 2 Times a Day., Disp: 60 Tab, Rfl: 6  •  furosemide (LASIX) 40 MG Tab, Take 1 Tab by mouth every day., Disp: 30 Tab, Rfl: 11  •  metolazone (ZAROXOLYN) 5 MG Tab, Take 5 mg by mouth as needed. Indications: Edema, Disp: , Rfl:   •  Methotrexate, PF, 10 MG/0.2ML Solution Auto-injector, Inject 10 mg as instructed every 7 days. On Mon   Indications: Psoriasis, Disp: , Rfl:   •  Omega-3 Fatty Acids (FISH OIL) 1000 MG Cap capsule, Take 1,000 mg by mouth every day., Disp: , Rfl:   •  Multiple Vitamins-Minerals (OCUVITE) Tab, Take 1 Tab by mouth every day., Disp: , Rfl:   •  aspirin 81 MG tablet, Take 81 mg by mouth every day., Disp: , Rfl:   •  Coenzyme Q10 (CO Q-10 PO), Take 1 Cap by mouth every day., Disp: , Rfl:       ALLERGIES  Allergies   Allergen Reactions   • Plavix [Clopidogrel Bisulfate] Anaphylaxis   • Statins [Hmg-Coa-R Inhibitors] Unspecified     Muscles aches     • Ticlid [Ticlopidine Hydrochloride] Unspecified     Pt states \"I'm not sure what happens\".         PHYSICAL EXAM  VITAL SIGNS: /80   Pulse 81   Temp 37.1 °C (98.8 °F)   Resp 18   Ht 1.803 m (5' 11\")   Wt 93.2 kg (205 lb 7.5 " oz)   SpO2 96%   BMI 28.66 kg/m²      Constitutional: Patient is awake alert person place and time. No acute respiratory distress Well developed, Well nourished, Non-toxic appearance. Speaking in full sentences  HENT: Normocephalic, Atraumatic,  Bilateral external ears normal, Oropharynx pink moist with no exudates, Nose patent. Positive yellow discharge  Eyes:, Sclera and conjunctiva clear, No discharge.   Neck:  Supple no nuchal rigidity, no thyromegaly or mass. Non-tender  Lymphatic: No supraclavicular  Cardiovascular: Heart is regular rate and rhythm no murmur,.   Thorax & Lungs: Chest is symmetrical, with good breath sounds. No wheezing or crackles. No respiratory distress,  Abdomen:  Soft, No tenderness   Skin: Chronic venous stasis changes bilateral legs.  Extremities: No  edema.  Non tender.   Musculoskeletal: Good range of motion of her lower extremities.   Neurologic: Alert & oriented   Strength is symmetrical in upper and lower extremities.       LABS:  Rapid strep negative    Influenza negative          COURSE & MEDICAL DECISION MAKING  Pertinent Labs & Imaging studies reviewed. (See chart for details)  Patient who really sounds like influenza. We are not epidemic of influenza. He has had some heart disease in the past. Although his influenza test is negative I still feel strongly this could be influenza. We will give him Tamiflu. Also place him on oral antibiotics in case the bacterial infection and Tessalon Perles for his cough. He understands he can be safely another couple of weeks. He understands how important it is for close follow-up as an outpatient. No clinical signs of pneumonia sepsis or meningitis    Stable for discharge.    FINAL IMPRESSION  1. Upper respiratory tract infection  2. Possible influenza viral syndrome       PLAN  1. Tessalon Perles/Zithromax/Tamiflu  2. Viral syndrome information sheet/upper respiratory tract information sheet   3. Force juices and liquids  4. Follow up with  his primary care doctors within 7 days 5. Return to the emergency department for increased pains, fevers, vomiting or change in condition.  Electronically signed by: Hans Pierre, 12/29/2017 7:39 PM

## 2017-12-31 LAB
S PYO SPEC QL CULT: NORMAL
SIGNIFICANT IND 70042: NORMAL
SITE SITE: NORMAL
SOURCE SOURCE: NORMAL

## 2018-01-04 ENCOUNTER — OFFICE VISIT (OUTPATIENT)
Dept: PULMONOLOGY | Facility: HOSPICE | Age: 78
End: 2018-01-04
Payer: MEDICARE

## 2018-01-04 VITALS
SYSTOLIC BLOOD PRESSURE: 126 MMHG | WEIGHT: 203 LBS | RESPIRATION RATE: 16 BRPM | TEMPERATURE: 97.3 F | DIASTOLIC BLOOD PRESSURE: 64 MMHG | HEIGHT: 71 IN | OXYGEN SATURATION: 92 % | HEART RATE: 87 BPM | BODY MASS INDEX: 28.42 KG/M2

## 2018-01-04 DIAGNOSIS — I25.5 ISCHEMIC CARDIOMYOPATHY: Chronic | ICD-10-CM

## 2018-01-04 DIAGNOSIS — I48.0 PAROXYSMAL ATRIAL FIBRILLATION (HCC): ICD-10-CM

## 2018-01-04 DIAGNOSIS — G47.33 OSA ON CPAP: ICD-10-CM

## 2018-01-04 DIAGNOSIS — J44.1 CHRONIC OBSTRUCTIVE PULMONARY DISEASE WITH ACUTE EXACERBATION (HCC): ICD-10-CM

## 2018-01-04 PROCEDURE — 99214 OFFICE O/P EST MOD 30 MIN: CPT | Performed by: INTERNAL MEDICINE

## 2018-01-04 RX ORDER — AMOXICILLIN AND CLAVULANATE POTASSIUM 875; 125 MG/1; MG/1
1 TABLET, FILM COATED ORAL 2 TIMES DAILY WITH MEALS
Qty: 20 TAB | Refills: 0 | Status: SHIPPED | OUTPATIENT
Start: 2018-01-04 | End: 2018-01-16

## 2018-01-04 RX ORDER — BENZONATATE 100 MG/1
CAPSULE ORAL
Refills: 0 | COMMUNITY
Start: 2017-12-29 | End: 2018-04-06

## 2018-01-04 RX ORDER — PREDNISONE 10 MG/1
TABLET ORAL
Qty: 18 TAB | Refills: 0 | Status: SHIPPED | OUTPATIENT
Start: 2018-01-04 | End: 2018-01-17

## 2018-01-04 NOTE — PROGRESS NOTES
"Chief Complaint   Patient presents with   • Follow-Up       HPI: This patient is a 77 y.o. Male who presents acutely with cough and chest congestion. He developed URI symptoms around [place], was seen in the emergency room 12/29 and treated with Z-Kota, Tamiflu, without subjective benefit. He has stage I COPD on albuterol when necessary. He denies fevers, chills, headaches or myalgias. His cough is productive of yellow sputum. He denies any significant shortness of breath. He is normally quite physically active and walks 40 minutes daily.  He has a history of severe WILLA, AHI 45 on AutoPap 8-11 cm of water.      Past Medical History:   Diagnosis Date   • Atrial fibrillation (CMS-HCC) 9/20/2011   • Biventricular implantable cardioverter-defibrillator in situ 8/7/2015   • Breath shortness    • CAD (coronary artery disease) 9/20/2011   • Cancer (CMS-HCC) 2012    prostate   • CARDIOMYOPATHY 9/20/2011   • Congestive heart failure (CMS-HCC)    • COPD (chronic obstructive pulmonary disease) (CMS-HCC)    • Fatigue 10/28/2010   • Heartburn 10/23/2008   • History of cardiac catheterization 9/20/2011   • History of myocardial infarction     \"many\"   • HTN (hypertension) 9/20/2011   • Indigestion    • Insomnia 7/8/2010   • Ischemic cardiomyopathy 9/20/2011   • Long term (current) use of anticoagulants 5/13/2011   • Myocardial infarct 2007   • Orthostatic hypotension 5/6/2009   • WILLA (obstructive sleep apnea)     CPAP   • Other drug allergy(995.27) 10/16/2008   • Other specified disorder of intestines 07-28-15    constipation/diarrhea/ reports some bleeding from rectum w/blood clots. Seeing GI   • Pacemaker     [employer]   • Pain 07-28-15    \"chronic\", 0/10   • Pleural effusion 5/18/2009   • Presence of permanent cardiac pacemaker 9/20/2011   • Prostate cancer (CMS-HCC) 5/13/2011   • Second degree AV block, Mobitz type II 11/10/2010   • Snoring    • Stroke (CMS-HCC) 2/3/2009       Social History     Social History   • " Marital status:      Spouse name: N/A   • Number of children: N/A   • Years of education: N/A     Occupational History   • Not on file.     Social History Main Topics   • Smoking status: Former Smoker     Packs/day: 1.50     Years: 3.00     Types: Cigarettes     Quit date: 1/1/1970   • Smokeless tobacco: Former User     Types: Chew     Quit date: 1/1/1972   • Alcohol use No      Comment: 10-14 per week; scotch & wine   • Drug use: No   • Sexual activity: Not on file     Other Topics Concern   • Not on file     Social History Narrative   • No narrative on file       Family History   Problem Relation Age of Onset   • Thyroid Sister        Current Outpatient Prescriptions on File Prior to Visit   Medication Sig Dispense Refill   • azithromycin (ZITHROMAX) 250 MG Tab Take two tabs by mouth on day one, then one tab by mouth daily on days 2-5. 6 Tab 0   • benzonatate (TESSALON) 100 MG Cap Take 1 Cap by mouth 3 times a day as needed for Cough. 30 Cap 0   • spironolactone (ALDACTONE) 25 MG Tab Take 1 Tab by mouth every day. 90 Tab 3   • metoprolol SR (TOPROL XL) 25 MG TABLET SR 24 HR Take 1 Tab by mouth every day. 30 Tab 11   • sacubitril-valsartan (ENTRESTO) 24-26 MG Tab tablet Take 1 Tab by mouth 2 Times a Day. 60 Tab 6   • furosemide (LASIX) 40 MG Tab Take 1 Tab by mouth every day. 30 Tab 11   • metolazone (ZAROXOLYN) 5 MG Tab Take 5 mg by mouth as needed. Indications: Edema     • Methotrexate, PF, 10 MG/0.2ML Solution Auto-injector Inject 10 mg as instructed every 7 days. On Mon   Indications: Psoriasis     • Omega-3 Fatty Acids (FISH OIL) 1000 MG Cap capsule Take 1,000 mg by mouth every day.     • Multiple Vitamins-Minerals (OCUVITE) Tab Take 1 Tab by mouth every day.     • aspirin 81 MG tablet Take 81 mg by mouth every day.     • Coenzyme Q10 (CO Q-10 PO) Take 1 Cap by mouth every day.     • gabapentin (NEURONTIN) 300 MG Cap Take 300 mg by mouth every bedtime. START NEW MED: One at bedtime x 5 days, then one  "pill twice a day for 5 days, then one pill three times a day afterwards. Titrating dose 12/19/17.       No current facility-administered medications on file prior to visit.        Allergies: Plavix [clopidogrel bisulfate]; Statins [hmg-coa-r inhibitors]; and Ticlid [ticlopidine hydrochloride]    ROS:   Constitutional: Denies fevers, chills, night sweats, fatigue or weight loss  Eyes: Denies vision loss, pain, drainage, double vision  Ears, Nose, Throat: Denies earache, difficulty hearing, tinnitus, nasal congestion, hoarseness  Cardiovascular: Denies chest pain, tightness, palpitations, orthopnea or edema  Respiratory:As in history of present illness  Sleep: Denies daytime sleepiness, snoring, apneas, insomnia, morning headaches  GI: Denies heartburn, dysphagia, nausea, abdominal pain, diarrhea or constipation  : Denies frequent urination, hematuria, discharge or painful urination  Musculoskeletal: Denies back pain, painful joints, sore muscles  Neurological: Denies weakness or headaches  Skin: No rashes    Blood pressure 126/64, pulse 87, temperature 36.3 °C (97.3 °F), resp. rate 16, height 1.803 m (5' 11\"), weight 92.1 kg (203 lb), SpO2 92 %.    Physical Exam:  Appearance: Well-nourished, well-developed, in no acute distress  HEENT: Normocephalic, atraumatic, white sclera, PERRLA, oropharynx clear  Neck: No adenopathy or masses  Respiratory: no intercostal retractions or accessory muscle use  Lungs auscultation: Clear to auscultation bilaterally  Cardiovascular: Regular rate rhythm. No murmurs, rubs or gallops.  No LE edema  Abdomen: soft, nondistended  Gait: Normal  Digits: No clubbing, cyanosis  Motor: No focal deficits  Orientation: Oriented to time, person and place    Diagnosis:  1. Chronic obstructive pulmonary disease with acute exacerbation (CMS-HCC)     2. WILLA on CPAP     3. Ischemic cardiomyopathy     4. Paroxysmal atrial fibrillation (CMS-HCC)         Plan:  Duonebs nebulized provided in clinic. " Continue albuterol 0.083% every 4 hours when necessary at home.  Start Augmentin 875 mg twice a day for 10 days with prednisone taper.  Contact us in the event of unresolving symptoms.  No Follow-up on file.

## 2018-01-16 ENCOUNTER — APPOINTMENT (OUTPATIENT)
Dept: RADIOLOGY | Facility: IMAGING CENTER | Age: 78
End: 2018-01-16
Attending: NURSE PRACTITIONER
Payer: MEDICARE

## 2018-01-16 ENCOUNTER — OFFICE VISIT (OUTPATIENT)
Dept: PULMONOLOGY | Facility: HOSPICE | Age: 78
End: 2018-01-16
Payer: MEDICARE

## 2018-01-16 VITALS
DIASTOLIC BLOOD PRESSURE: 72 MMHG | HEART RATE: 74 BPM | WEIGHT: 203 LBS | OXYGEN SATURATION: 94 % | RESPIRATION RATE: 15 BRPM | BODY MASS INDEX: 28.42 KG/M2 | SYSTOLIC BLOOD PRESSURE: 118 MMHG | HEIGHT: 71 IN

## 2018-01-16 DIAGNOSIS — R05.9 COUGH: ICD-10-CM

## 2018-01-16 DIAGNOSIS — J41.0 SIMPLE CHRONIC BRONCHITIS (HCC): ICD-10-CM

## 2018-01-16 DIAGNOSIS — G47.33 OBSTRUCTIVE SLEEP APNEA SYNDROME: Chronic | ICD-10-CM

## 2018-01-16 PROCEDURE — 99214 OFFICE O/P EST MOD 30 MIN: CPT | Performed by: NURSE PRACTITIONER

## 2018-01-16 PROCEDURE — 71046 X-RAY EXAM CHEST 2 VIEWS: CPT | Mod: TC,FY | Performed by: NURSE PRACTITIONER

## 2018-01-16 RX ORDER — PREDNISONE 10 MG/1
TABLET ORAL
Qty: 18 TAB | Refills: 0 | Status: SHIPPED | OUTPATIENT
Start: 2018-01-16 | End: 2018-01-17

## 2018-01-16 RX ORDER — AZITHROMYCIN 250 MG/1
TABLET, FILM COATED ORAL
Qty: 6 TAB | Refills: 0 | Status: SHIPPED | OUTPATIENT
Start: 2018-01-16 | End: 2018-04-06

## 2018-01-16 NOTE — PROGRESS NOTES
"Chief Complaint   Patient presents with   • Follow-Up         HPI: This patient is a 77 y.o. male, who presents for follow-up cough.     To reiterate, He has stage I COPD, he uses albuterol as needed. He is not on routine inhaler. He developed URI symptoms around [place], was seen in the emergency room 12/29 and treated with Z-Kota, Tamiflu, without subjective benefit. He was last seen January 4 with Dr. Thakur complaining of continued symptoms. He was started on prednisone taper and Augmentin. He has completed this. He is feeling better however continues to note cough productive of clear to yellow phlegm. He denies fever, chills, night sweats, hemoptysis, dyspnea or wheeze. Chest x-ray today shows no acute cardiopulmonary process. Patient is up-to-date with influenza, Pneumovax 23 and Prevnar 13 vaccinations.    He has a history of severe WILLA, AHI 45 on AutoPap 8-11 cm of water. He continues to benefit from therapy.    Past Medical History:   Diagnosis Date   • Atrial fibrillation (CMS-HCC) 9/20/2011   • Biventricular implantable cardioverter-defibrillator in situ 8/7/2015   • Breath shortness    • CAD (coronary artery disease) 9/20/2011   • Cancer (CMS-HCC) 2012    prostate   • CARDIOMYOPATHY 9/20/2011   • Congestive heart failure (CMS-HCC)    • COPD (chronic obstructive pulmonary disease) (CMS-HCC)    • Fatigue 10/28/2010   • Heartburn 10/23/2008   • History of cardiac catheterization 9/20/2011   • History of myocardial infarction     \"many\"   • HTN (hypertension) 9/20/2011   • Indigestion    • Insomnia 7/8/2010   • Ischemic cardiomyopathy 9/20/2011   • Long term (current) use of anticoagulants 5/13/2011   • Myocardial infarct 2007   • Orthostatic hypotension 5/6/2009   • WILLA (obstructive sleep apnea)     CPAP   • Other drug allergy(995.27) 10/16/2008   • Other specified disorder of intestines 07-28-15    constipation/diarrhea/ reports some bleeding from rectum w/blood clots. Seeing GI   • Pacemaker     [employer] " "[employer]   • Pain 07-28-15    \"chronic\", 0/10   • Pleural effusion 5/18/2009   • Presence of permanent cardiac pacemaker 9/20/2011   • Prostate cancer (CMS-HCC) 5/13/2011   • Second degree AV block, Mobitz type II 11/10/2010   • Snoring    • Stroke (CMS-HCC) 2/3/2009       Social History   Substance Use Topics   • Smoking status: Former Smoker     Packs/day: 1.50     Years: 3.00     Types: Cigarettes     Quit date: 1/1/1970   • Smokeless tobacco: Former User     Types: Chew     Quit date: 1/1/1972   • Alcohol use No      Comment: 10-14 per week; scotch & wine       Family History   Problem Relation Age of Onset   • Thyroid Sister        Current medications as of today   Current Outpatient Prescriptions   Medication Sig Dispense Refill   • beclomethasone (QVAR) 80 MCG/ACT inhaler Inhale 2 Puffs by mouth 2 times a day. Use spacer. Rinse mouth after each use. 2 Inhaler 0   • predniSONE (DELTASONE) 10 MG Tab Take 30mg x 3 days, then take 20mg x 3 days, then take 10mg x 3 days, with food, then discontinue. 18 Tab 0   • azithromycin (ZITHROMAX) 250 MG Tab Take 2 tablets on day 1, then take 1 tablet a day for 4 days. 6 Tab 0   • benzonatate (TESSALON) 100 MG Cap TAKE 1 CAPSULE BY MOUTH THREE TIMES A DAY  0   • predniSONE (DELTASONE) 10 MG Tab Take 30mg x 3 days, then take 20mg x 3 days, then take 10mg x 3 days, with food, then discontinue. 18 Tab 0   • benzonatate (TESSALON) 100 MG Cap Take 1 Cap by mouth 3 times a day as needed for Cough. 30 Cap 0   • spironolactone (ALDACTONE) 25 MG Tab Take 1 Tab by mouth every day. 90 Tab 3   • metoprolol SR (TOPROL XL) 25 MG TABLET SR 24 HR Take 1 Tab by mouth every day. 30 Tab 11   • sacubitril-valsartan (ENTRESTO) 24-26 MG Tab tablet Take 1 Tab by mouth 2 Times a Day. 60 Tab 6   • furosemide (LASIX) 40 MG Tab Take 1 Tab by mouth every day. 30 Tab 11   • metolazone (ZAROXOLYN) 5 MG Tab Take 5 mg by mouth as needed. Indications: Edema     • Methotrexate, PF, 10 MG/0.2ML Solution " "Auto-injector Inject 10 mg as instructed every 7 days. On Mon   Indications: Psoriasis     • Omega-3 Fatty Acids (FISH OIL) 1000 MG Cap capsule Take 1,000 mg by mouth every day.     • Multiple Vitamins-Minerals (OCUVITE) Tab Take 1 Tab by mouth every day.     • aspirin 81 MG tablet Take 81 mg by mouth every day.     • Coenzyme Q10 (CO Q-10 PO) Take 1 Cap by mouth every day.     • gabapentin (NEURONTIN) 300 MG Cap Take 300 mg by mouth every bedtime. START NEW MED: One at bedtime x 5 days, then one pill twice a day for 5 days, then one pill three times a day afterwards. Titrating dose 12/19/17.       No current facility-administered medications for this visit.        Allergies: Plavix [clopidogrel bisulfate]; Statins [hmg-coa-r inhibitors]; and Ticlid [ticlopidine hydrochloride]    Blood pressure 118/72, pulse 74, resp. rate 15, height 1.803 m (5' 11\"), weight 92.1 kg (203 lb), SpO2 94 %.      ROS:   Constitutional: Denies fevers, chills, night sweats, weight loss or fatigue  Eyes: Denies pain, discharge/drainage  ENT: Denies hearing loss, sinusitis, hoarseness   Allergic: Denies Allergic rhinitis or hayfever  Respiratory: See HPI  Cardiovascular: Denies chest pain, tightness, palpitations, orthopnea or edema  Sleep: Denies snoring, resuscitative gasps, frequent nocturnal awakenings, insomnia  GI/: Denies heartburn, nausea, vomiting, urinary incontinence, hematuria  Musculoskeletal: Denies back pain, painful joints  Neurological: Denies vertigo or headaches  Skin: Denies rashes, lesions  Psychiatric: Denies depression or anxiety      Physical exam:   Constitutional: Well-nourished, well-developed, in no acute distress  Eyes: PERRL  Mouth/Throat: Oropharynx is moist, clear, no lesions  Neck: supple, trachea midline  Respiratory: no intercostal retractions or accessory muscle use   Lungs auscultation: Clear to auscultation bilaterally  Cardiovascular: Regular rate rhythm no murmurs, rubs or gallops  Musculoskeletal:  " no clubbing or cyanosis  Skin: No rashes or lesions noted on exposed skin  Neuro: No focal deficit noted  Psychiatric: Oriented to time, person and place.     Diagnosis:  1. Cough  DX-CHEST-2 VIEWS    predniSONE (DELTASONE) 10 MG Tab    azithromycin (ZITHROMAX) 250 MG Tab   2. Obstructive sleep apnea syndrome     3. Simple chronic bronchitis (CMS-HCC)  beclomethasone (QVAR) 80 MCG/ACT inhaler    predniSONE (DELTASONE) 10 MG Tab    azithromycin (ZITHROMAX) 250 MG Tab       Plan:  1. Cough is likely postinfectious. I do not think he requires further antibiotics or prednisone at this point. Recommend trial of Qvar. Prescription for sample provided.  2. Backup antibiotic and prednisone if he develops worsening symptoms.  3. Follow-up in 6 months, unless symptoms do not resolve with Qvar.  4. Patient is up-to-date with influenza, Pneumovax 23 and Prevnar 13 vaccinations.  5. Continue auto CPAP nightly, clean mask and tubing weekly.

## 2018-01-16 NOTE — PATIENT INSTRUCTIONS
1. Start Qvar 80 µg, 2 puffs, twice a day. Use with spacer. Rinse mouth after use. Please  from sample pharmacy.  2. Continue albuterol neb as needed  3. Backup Z-Kota and prednisone to pharmacy  4. Follow-up in 6 months, sooner if needed

## 2018-01-17 ENCOUNTER — OFFICE VISIT (OUTPATIENT)
Dept: CARDIOLOGY | Facility: MEDICAL CENTER | Age: 78
End: 2018-01-17
Payer: MEDICARE

## 2018-01-17 VITALS
OXYGEN SATURATION: 94 % | WEIGHT: 205 LBS | HEIGHT: 71 IN | HEART RATE: 40 BPM | DIASTOLIC BLOOD PRESSURE: 62 MMHG | BODY MASS INDEX: 28.7 KG/M2 | SYSTOLIC BLOOD PRESSURE: 108 MMHG

## 2018-01-17 DIAGNOSIS — Z86.79 S/P ABLATION OF ATRIAL FIBRILLATION: ICD-10-CM

## 2018-01-17 DIAGNOSIS — K76.6 PORTAL HYPERTENSION (HCC): ICD-10-CM

## 2018-01-17 DIAGNOSIS — I44.1 SECOND DEGREE AV BLOCK, MOBITZ TYPE II: Chronic | ICD-10-CM

## 2018-01-17 DIAGNOSIS — N18.30 STAGE 3 CHRONIC KIDNEY DISEASE (HCC): ICD-10-CM

## 2018-01-17 DIAGNOSIS — I50.23 NYHA CLASS 3 ACUTE ON CHRONIC SYSTOLIC HEART FAILURE (HCC): ICD-10-CM

## 2018-01-17 DIAGNOSIS — I48.0 PAROXYSMAL ATRIAL FIBRILLATION (HCC): ICD-10-CM

## 2018-01-17 DIAGNOSIS — I25.2 HISTORY OF MYOCARDIAL INFARCTION: Chronic | ICD-10-CM

## 2018-01-17 DIAGNOSIS — I50.20 SYSTOLIC HEART FAILURE, ACC/AHA STAGE C (HCC): ICD-10-CM

## 2018-01-17 DIAGNOSIS — E78.00 HYPERCHOLESTEREMIA: Chronic | ICD-10-CM

## 2018-01-17 DIAGNOSIS — I25.5 ISCHEMIC CARDIOMYOPATHY: Chronic | ICD-10-CM

## 2018-01-17 DIAGNOSIS — Z95.810 BIVENTRICULAR IMPLANTABLE CARDIOVERTER-DEFIBRILLATOR IN SITU: ICD-10-CM

## 2018-01-17 DIAGNOSIS — G47.33 OBSTRUCTIVE SLEEP APNEA SYNDROME: Chronic | ICD-10-CM

## 2018-01-17 DIAGNOSIS — I51.89 LEFT VENTRICULAR SYSTOLIC DYSFUNCTION, NYHA CLASS 3: ICD-10-CM

## 2018-01-17 DIAGNOSIS — I10 ESSENTIAL HYPERTENSION: Chronic | ICD-10-CM

## 2018-01-17 DIAGNOSIS — Z79.899 HIGH RISK MEDICATION USE: ICD-10-CM

## 2018-01-17 DIAGNOSIS — I63.9 CEREBROVASCULAR ACCIDENT (CVA), UNSPECIFIED MECHANISM (HCC): Chronic | ICD-10-CM

## 2018-01-17 DIAGNOSIS — Z98.890 S/P ABLATION OF ATRIAL FIBRILLATION: ICD-10-CM

## 2018-01-17 DIAGNOSIS — I95.1 ORTHOSTATIC HYPOTENSION: Chronic | ICD-10-CM

## 2018-01-17 DIAGNOSIS — R00.1 BRADYCARDIA: ICD-10-CM

## 2018-01-17 PROCEDURE — 99215 OFFICE O/P EST HI 40 MIN: CPT | Mod: 25 | Performed by: INTERNAL MEDICINE

## 2018-01-17 PROCEDURE — 93000 ELECTROCARDIOGRAM COMPLETE: CPT | Performed by: INTERNAL MEDICINE

## 2018-01-17 ASSESSMENT — ENCOUNTER SYMPTOMS
EYES NEGATIVE: 1
WEAKNESS: 0
GASTROINTESTINAL NEGATIVE: 1
RESPIRATORY NEGATIVE: 1
PALPITATIONS: 0
CONSTITUTIONAL NEGATIVE: 1
CARDIOVASCULAR NEGATIVE: 1
PND: 0
DIZZINESS: 0
STRIDOR: 0
CHILLS: 0
FEVER: 0
WHEEZING: 0
MUSCULOSKELETAL NEGATIVE: 1
ORTHOPNEA: 0
LOSS OF CONSCIOUSNESS: 0
CLAUDICATION: 0
COUGH: 0
SPUTUM PRODUCTION: 0
BRUISES/BLEEDS EASILY: 0
SORE THROAT: 0
SHORTNESS OF BREATH: 0
NEUROLOGICAL NEGATIVE: 1
HEMOPTYSIS: 0

## 2018-01-17 NOTE — LETTER
"     [facility]   [address], [facility] [address]  [place] [address]  Phone: [phone]  Fax: [phone]              Isai Fuentes  1940    Encounter Date: 1/17/2018    Severo Lee M.D.          PROGRESS NOTE:  Subjective:   Isai Fuentes is a 77 y.o. male who presents today as a follow-up for his heart failure with reduced ejection fraction, hypertension, hyperlipidemia, CAD, A. fib, biventricular pacemaker. Since I last saw him he was sick with an influenza-like syndrome was influenza negative. He is essentially normal from when I last saw him with no chest pain palpitations or PND. His last echocardiogram was about 4 months ago about 5-6 months after his lead revision. He is feeling almost back to baseline with the exception of his cold that he has. His blood pressures controlled. His lipids are controlled. At triage today his heart rate was noted be in the 40s.    Chief Complaint: Heart failure, CAD, pacemaker, COPD, hyperlipidemia    Past Medical History:   Diagnosis Date   • Atrial fibrillation (CMS-HCC) 9/20/2011   • Biventricular implantable cardioverter-defibrillator in situ 8/7/2015   • Breath shortness    • CAD (coronary artery disease) 9/20/2011   • Cancer (CMS-HCC) 2012    prostate   • CARDIOMYOPATHY 9/20/2011   • Congestive heart failure (CMS-HCC)    • COPD (chronic obstructive pulmonary disease) (CMS-HCC)    • Fatigue 10/28/2010   • Heartburn 10/23/2008   • History of cardiac catheterization 9/20/2011   • History of myocardial infarction     \"many\"   • HTN (hypertension) 9/20/2011   • Indigestion    • Insomnia 7/8/2010   • Ischemic cardiomyopathy 9/20/2011   • Long term (current) use of anticoagulants 5/13/2011   • Myocardial infarct 2007   • Orthostatic hypotension 5/6/2009   • WILLA (obstructive sleep apnea)     CPAP   • Other drug allergy(995.27) 10/16/2008   • Other specified disorder of intestines 07-28-15    constipation/diarrhea/ " "reports some bleeding from rectum w/blood clots. Seeing GI   • Pacemaker     [employer]   • Pain 07-28-15    \"chronic\", 0/10   • Pleural effusion 5/18/2009   • Presence of permanent cardiac pacemaker 9/20/2011   • Prostate cancer (CMS-HCC) 5/13/2011   • Second degree AV block, Mobitz type II 11/10/2010   • Snoring    • Stroke (CMS-HCC) 2/3/2009     Past Surgical History:   Procedure Laterality Date   • RECOVERY  10/16/2015    Procedure: CATH LAB LEAD REVISION ST.DRE KNEIA;  Surgeon: Recoveryonly Surgery;  Location: SURGERY [facility];  Service:    • RECOVERY  8/14/2015    Procedure:  CATH LAB LEAD EXTRACTION AWILDA ST.DRE LRG RP KENIA;  Surgeon: Recoveryonly Surgery;  Location: SURGERY PRE[facility]POST PROC [facility];  Service:    • RECOVERY  7/30/2015    Procedure: CATH LAB  LEAD EXTRACTION, UPGRADE TO BIV PM ST.DRE LRG GRP KENIA ;  Surgeon: Recoveryonly Surgery;  Location: SURGERY PRE[facility]POST PROC [facility];  Service:    • RECOVERY  7/29/2015    Procedure: CATH LAB NEW PM INSERTION DUAL ATRIAL & VENTRICULAR-LV LEAD W/ NEW GENERATOR AQUINO ICD9: 414.8;  Surgeon: Recoveryonly Surgery;  Location: SURGERY PRE[facility]POST PROC [facility];  Service:    • PACEMAKER INSERTION  11/24/08    [facility] Dre   • MULTIPLE CORONARY ARTERY BYPASS  2007    2 vessel   • CARDIAC CATH      has 2 stents     Family History   Problem Relation Age of Onset   • Thyroid Sister      History   Smoking Status   • Former Smoker   • Packs/day: 1.50   • Years: 3.00   • Types: Cigarettes   • Quit date: 1/1/1970   Smokeless Tobacco   • Former User   • Types: Chew   • Quit date: 1/1/1972     Allergies   Allergen Reactions   • Plavix [Clopidogrel Bisulfate] Anaphylaxis   • Statins [Hmg-Coa-R Inhibitors] Unspecified     Muscles aches     • Ticlid [Ticlopidine Hydrochloride] Unspecified     Pt states \"I'm not sure what happens\".       Outpatient Encounter Prescriptions as of 1/17/2018   Medication Sig Dispense Refill   • beclomethasone (QVAR) 80 MCG/ACT inhaler " Inhale 2 Puffs by mouth 2 times a day. Use spacer. Rinse mouth after each use. 2 Inhaler 0   • azithromycin (ZITHROMAX) 250 MG Tab Take 2 tablets on day 1, then take 1 tablet a day for 4 days. 6 Tab 0   • benzonatate (TESSALON) 100 MG Cap TAKE 1 CAPSULE BY MOUTH THREE TIMES A DAY  0   • benzonatate (TESSALON) 100 MG Cap Take 1 Cap by mouth 3 times a day as needed for Cough. 30 Cap 0   • spironolactone (ALDACTONE) 25 MG Tab Take 1 Tab by mouth every day. 90 Tab 3   • metoprolol SR (TOPROL XL) 25 MG TABLET SR 24 HR Take 1 Tab by mouth every day. 30 Tab 11   • sacubitril-valsartan (ENTRESTO) 24-26 MG Tab tablet Take 1 Tab by mouth 2 Times a Day. 60 Tab 6   • furosemide (LASIX) 40 MG Tab Take 1 Tab by mouth every day. 30 Tab 11   • metolazone (ZAROXOLYN) 5 MG Tab Take 5 mg by mouth as needed. Indications: Edema     • Methotrexate, PF, 10 MG/0.2ML Solution Auto-injector Inject 10 mg as instructed every 7 days. On Mon   Indications: Psoriasis     • Omega-3 Fatty Acids (FISH OIL) 1000 MG Cap capsule Take 1,000 mg by mouth every day.     • Multiple Vitamins-Minerals (OCUVITE) Tab Take 1 Tab by mouth every day.     • aspirin 81 MG tablet Take 81 mg by mouth every day.     • Coenzyme Q10 (CO Q-10 PO) Take 1 Cap by mouth every day.     • [DISCONTINUED] predniSONE (DELTASONE) 10 MG Tab Take 30mg x 3 days, then take 20mg x 3 days, then take 10mg x 3 days, with food, then discontinue. 18 Tab 0   • [DISCONTINUED] predniSONE (DELTASONE) 10 MG Tab Take 30mg x 3 days, then take 20mg x 3 days, then take 10mg x 3 days, with food, then discontinue. 18 Tab 0   • [DISCONTINUED] gabapentin (NEURONTIN) 300 MG Cap Take 300 mg by mouth every bedtime. START NEW MED: One at bedtime x 5 days, then one pill twice a day for 5 days, then one pill three times a day afterwards. Titrating dose 12/19/17.       No facility-administered encounter medications on file as of 1/17/2018.      Review of Systems   Constitutional: Negative.  Negative for chills,  "fever and malaise/fatigue.   HENT: Negative.  Negative for sore throat.    Eyes: Negative.    Respiratory: Negative.  Negative for cough, hemoptysis, sputum production, shortness of breath, wheezing and stridor.    Cardiovascular: Negative.  Negative for chest pain, palpitations, orthopnea, claudication, leg swelling and PND.   Gastrointestinal: Negative.    Genitourinary: Negative.    Musculoskeletal: Negative.    Skin: Negative.    Neurological: Negative.  Negative for dizziness, loss of consciousness and weakness.   Endo/Heme/Allergies: Negative.  Does not bruise/bleed easily.   All other systems reviewed and are negative.       Objective:   /62   Pulse (!) 40   Ht 1.803 m (5' 11\")   Wt 93 kg (205 lb)   SpO2 94%   BMI 28.59 kg/m²      Physical Exam   Constitutional: He is oriented to person, place, and time. He appears well-developed and well-nourished. No distress.   HENT:   Head: Normocephalic.   Mouth/Throat: Oropharynx is clear and moist.   Eyes: EOM are normal. Pupils are equal, round, and reactive to light. Right eye exhibits no discharge. Left eye exhibits no discharge. No scleral icterus.   Neck: Normal range of motion. Neck supple. No JVD present. No tracheal deviation present.   Cardiovascular: Normal rate, regular rhythm, S1 normal, S2 normal, normal heart sounds, intact distal pulses and normal pulses.  Exam reveals no gallop, no S3, no S4 and no friction rub.    No murmur heard.   No systolic murmur is present    No diastolic murmur is present   Pulses:       Carotid pulses are 2+ on the right side, and 2+ on the left side.       Radial pulses are 2+ on the right side, and 2+ on the left side.        Dorsalis pedis pulses are 2+ on the right side, and 2+ on the left side.        Posterior tibial pulses are 2+ on the right side, and 2+ on the left side.   Pulmonary/Chest: Effort normal and breath sounds normal. No respiratory distress. He has no wheezes. He has no rales.   Abdominal: Soft. " Bowel sounds are normal. He exhibits no distension and no mass. There is no tenderness. There is no rebound and no guarding.   Musculoskeletal: He exhibits no edema.   Neurological: He is alert and oriented to person, place, and time. No cranial nerve deficit.   Skin: Skin is warm and dry. He is not diaphoretic. No pallor.   Psychiatric: He has a normal mood and affect. His behavior is normal. Judgment and thought content normal.   Nursing note and vitals reviewed.      Assessment:     1. Bradycardia  [facility] EKG (Clinic Performed)   2. Biventricular implantable cardioverter-defibrillator in situ     3. Paroxysmal atrial fibrillation (CMS-HCC)     4. High risk medication use     5. History of myocardial infarction     6. Essential hypertension     7. Hypercholesteremia     8. Ischemic cardiomyopathy     9. Left ventricular systolic dysfunction, NYHA class 3     10. NYHA class 3 acute on chronic systolic heart failure (CMS-HCC)  ECHOCARDIOGRAM COMP W/O CONT   11. Orthostatic hypotension     12. Portal hypertension (CMS-HCC)     13. S/P ablation of atrial fibrillation     14. Second degree AV block, Mobitz type II     15. Stage 3 chronic kidney disease     16. Obstructive sleep apnea syndrome     17. Systolic heart failure, ACC/AHA stage C (CMS-HCC)     18. Cerebrovascular accident (CVA), unspecified mechanism (CMS-HCC)         Medical Decision Making:  Today's Assessment / Status / Plan:     77-year-old male with heart failure COPD pacemaker hyperlipidemia and CAD. His ECG showed that he was bi-ventricularly paced at 70 with a heart rate of 70 on exam. Therefore his 40 heart rate is incorrect. I will allow him to get over his flu or cold for a month and we will see him in 4 weeks. I will also repeat an echocardiogram to see what his function has returned to now that we have biventricular pacing.    Thank for you allowing me to take part in your patient's care, please call should you have any questions or would  like to discuss this patient.      Matt Pagan M.D.  [address] [facility] [address] [address]  [address]  [place] [address]  VIA Facsimile: [phone]

## 2018-01-17 NOTE — PROGRESS NOTES
"Subjective:   Isai Fuentes is a 77 y.o. male who presents today as a follow-up for his heart failure with reduced ejection fraction, hypertension, hyperlipidemia, CAD, A. fib, biventricular pacemaker. Since I last saw him he was sick with an influenza-like syndrome was influenza negative. He is essentially normal from when I last saw him with no chest pain palpitations or PND. His last echocardiogram was about 4 months ago about 5-6 months after his lead revision. He is feeling almost back to baseline with the exception of his cold that he has. His blood pressures controlled. His lipids are controlled. At triage today his heart rate was noted be in the 40s.    Chief Complaint: Heart failure, CAD, pacemaker, COPD, hyperlipidemia    Past Medical History:   Diagnosis Date   • Atrial fibrillation (CMS-HCC) 9/20/2011   • Biventricular implantable cardioverter-defibrillator in situ 8/7/2015   • Breath shortness    • CAD (coronary artery disease) 9/20/2011   • Cancer (CMS-HCC) 2012    prostate   • CARDIOMYOPATHY 9/20/2011   • Congestive heart failure (CMS-HCC)    • COPD (chronic obstructive pulmonary disease) (CMS-HCC)    • Fatigue 10/28/2010   • Heartburn 10/23/2008   • History of cardiac catheterization 9/20/2011   • History of myocardial infarction     \"many\"   • HTN (hypertension) 9/20/2011   • Indigestion    • Insomnia 7/8/2010   • Ischemic cardiomyopathy 9/20/2011   • Long term (current) use of anticoagulants 5/13/2011   • Myocardial infarct 2007   • Orthostatic hypotension 5/6/2009   • WILLA (obstructive sleep apnea)     CPAP   • Other drug allergy(995.27) 10/16/2008   • Other specified disorder of intestines 07-28-15    constipation/diarrhea/ reports some bleeding from rectum w/blood clots. Seeing GI   • Pacemaker     [employer]   • Pain 07-28-15    \"chronic\", 0/10   • Pleural effusion 5/18/2009   • Presence of permanent cardiac pacemaker 9/20/2011   • Prostate cancer (CMS-HCC) 5/13/2011   • Second " "degree AV block, Mobitz type II 11/10/2010   • Snoring    • Stroke (CMS-HCC) 2/3/2009     Past Surgical History:   Procedure Laterality Date   • RECOVERY  10/16/2015    Procedure: CATH LAB LEAD REVISION ST.DREJOSI AQUINO;  Surgeon: Recoveryonly Surgery;  Location: [facility]ERY [facility];  Service:    • RECOVERY  8/14/2015    Procedure:  CATH LAB LEAD EXTRACTION AWILDA ST.DRE LRG RP AQUINO;  Surgeon: Recoveryonly Surgery;  Location: [facility] PROC [facility];  Service:    • RECOVERY  7/30/2015    Procedure: CATH LAB  LEAD EXTRACTION, UPGRADE TO BIV PM ST.DRE LRG GRP AQUINO ;  Surgeon: Recoveryonly Surgery;  Location: [facility]ERY [facility] PROC [facility];  Service:    • RECOVERY  7/29/2015    Procedure: CATH LAB NEW PM INSERTION DUAL ATRIAL & VENTRICULAR-LV LEAD W/ NEW GENERATOR AQUINO ICD9: 414.8;  Surgeon: Recoveryonfreddy Surgery;  Location: [facility]ERY [facility] PROC [facility];  Service:    • PACEMAKER INSERTION  11/24/08    [facility]   • MULTIPLE CORONARY ARTERY BYPASS  2007    2 vessel   • CARDIAC CATH      has 2 stents     Family History   Problem Relation Age of Onset   • Thyroid Sister      History   Smoking Status   • Former Smoker   • Packs/day: 1.50   • Years: 3.00   • Types: Cigarettes   • Quit date: 1/1/1970   Smokeless Tobacco   • Former User   • Types: Chew   • Quit date: 1/1/1972     Allergies   Allergen Reactions   • Plavix [Clopidogrel Bisulfate] Anaphylaxis   • Statins [Hmg-Coa-R Inhibitors] Unspecified     Muscles aches     • Ticlid [Ticlopidine Hydrochloride] Unspecified     Pt states \"I'm not sure what happens\".       Outpatient Encounter Prescriptions as of 1/17/2018   Medication Sig Dispense Refill   • beclomethasone (QVAR) 80 MCG/ACT inhaler Inhale 2 Puffs by mouth 2 times a day. Use spacer. Rinse mouth after each use. 2 Inhaler 0   • azithromycin (ZITHROMAX) 250 MG Tab Take 2 tablets on day 1, then take 1 tablet a day for 4 days. 6 Tab 0   • benzonatate (TESSALON) 100 MG Cap TAKE 1 CAPSULE BY MOUTH THREE " TIMES A DAY  0   • benzonatate (TESSALON) 100 MG Cap Take 1 Cap by mouth 3 times a day as needed for Cough. 30 Cap 0   • spironolactone (ALDACTONE) 25 MG Tab Take 1 Tab by mouth every day. 90 Tab 3   • metoprolol SR (TOPROL XL) 25 MG TABLET SR 24 HR Take 1 Tab by mouth every day. 30 Tab 11   • sacubitril-valsartan (ENTRESTO) 24-26 MG Tab tablet Take 1 Tab by mouth 2 Times a Day. 60 Tab 6   • furosemide (LASIX) 40 MG Tab Take 1 Tab by mouth every day. 30 Tab 11   • metolazone (ZAROXOLYN) 5 MG Tab Take 5 mg by mouth as needed. Indications: Edema     • Methotrexate, PF, 10 MG/0.2ML Solution Auto-injector Inject 10 mg as instructed every 7 days. On Mon   Indications: Psoriasis     • Omega-3 Fatty Acids (FISH OIL) 1000 MG Cap capsule Take 1,000 mg by mouth every day.     • Multiple Vitamins-Minerals (OCUVITE) Tab Take 1 Tab by mouth every day.     • aspirin 81 MG tablet Take 81 mg by mouth every day.     • Coenzyme Q10 (CO Q-10 PO) Take 1 Cap by mouth every day.     • [DISCONTINUED] predniSONE (DELTASONE) 10 MG Tab Take 30mg x 3 days, then take 20mg x 3 days, then take 10mg x 3 days, with food, then discontinue. 18 Tab 0   • [DISCONTINUED] predniSONE (DELTASONE) 10 MG Tab Take 30mg x 3 days, then take 20mg x 3 days, then take 10mg x 3 days, with food, then discontinue. 18 Tab 0   • [DISCONTINUED] gabapentin (NEURONTIN) 300 MG Cap Take 300 mg by mouth every bedtime. START NEW MED: One at bedtime x 5 days, then one pill twice a day for 5 days, then one pill three times a day afterwards. Titrating dose 12/19/17.       No facility-administered encounter medications on file as of 1/17/2018.      Review of Systems   Constitutional: Negative.  Negative for chills, fever and malaise/fatigue.   HENT: Negative.  Negative for sore throat.    Eyes: Negative.    Respiratory: Negative.  Negative for cough, hemoptysis, sputum production, shortness of breath, wheezing and stridor.    Cardiovascular: Negative.  Negative for chest pain,  "palpitations, orthopnea, claudication, leg swelling and PND.   Gastrointestinal: Negative.    Genitourinary: Negative.    Musculoskeletal: Negative.    Skin: Negative.    Neurological: Negative.  Negative for dizziness, loss of consciousness and weakness.   Endo/Heme/Allergies: Negative.  Does not bruise/bleed easily.   All other systems reviewed and are negative.       Objective:   /62   Pulse (!) 40   Ht 1.803 m (5' 11\")   Wt 93 kg (205 lb)   SpO2 94%   BMI 28.59 kg/m²     Physical Exam   Constitutional: He is oriented to person, place, and time. He appears well-developed and well-nourished. No distress.   HENT:   Head: Normocephalic.   Mouth/Throat: Oropharynx is clear and moist.   Eyes: EOM are normal. Pupils are equal, round, and reactive to light. Right eye exhibits no discharge. Left eye exhibits no discharge. No scleral icterus.   Neck: Normal range of motion. Neck supple. No JVD present. No tracheal deviation present.   Cardiovascular: Normal rate, regular rhythm, S1 normal, S2 normal, normal heart sounds, intact distal pulses and normal pulses.  Exam reveals no gallop, no S3, no S4 and no friction rub.    No murmur heard.   No systolic murmur is present    No diastolic murmur is present   Pulses:       Carotid pulses are 2+ on the right side, and 2+ on the left side.       Radial pulses are 2+ on the right side, and 2+ on the left side.        Dorsalis pedis pulses are 2+ on the right side, and 2+ on the left side.        Posterior tibial pulses are 2+ on the right side, and 2+ on the left side.   Pulmonary/Chest: Effort normal and breath sounds normal. No respiratory distress. He has no wheezes. He has no rales.   Abdominal: Soft. Bowel sounds are normal. He exhibits no distension and no mass. There is no tenderness. There is no rebound and no guarding.   Musculoskeletal: He exhibits no edema.   Neurological: He is alert and oriented to person, place, and time. No cranial nerve deficit.   Skin: " Skin is warm and dry. He is not diaphoretic. No pallor.   Psychiatric: He has a normal mood and affect. His behavior is normal. Judgment and thought content normal.   Nursing note and vitals reviewed.      Assessment:     1. Bradycardia  [facility] EKG (Clinic Performed)   2. Biventricular implantable cardioverter-defibrillator in situ     3. Paroxysmal atrial fibrillation (CMS-HCC)     4. High risk medication use     5. History of myocardial infarction     6. Essential hypertension     7. Hypercholesteremia     8. Ischemic cardiomyopathy     9. Left ventricular systolic dysfunction, NYHA class 3     10. NYHA class 3 acute on chronic systolic heart failure (CMS-HCC)  ECHOCARDIOGRAM COMP W/O CONT   11. Orthostatic hypotension     12. Portal hypertension (CMS-HCC)     13. S/P ablation of atrial fibrillation     14. Second degree AV block, Mobitz type II     15. Stage 3 chronic kidney disease     16. Obstructive sleep apnea syndrome     17. Systolic heart failure, ACC/AHA stage C (CMS-HCC)     18. Cerebrovascular accident (CVA), unspecified mechanism (CMS-HCC)         Medical Decision Making:  Today's Assessment / Status / Plan:     77-year-old male with heart failure COPD pacemaker hyperlipidemia and CAD. His ECG showed that he was bi-ventricularly paced at 70 with a heart rate of 70 on exam. Therefore his 40 heart rate is incorrect. I will allow him to get over his flu or cold for a month and we will see him in 4 weeks. I will also repeat an echocardiogram to see what his function has returned to now that we have biventricular pacing.    Thank for you allowing me to take part in your patient's care, please call should you have any questions or would like to discuss this patient.

## 2018-01-19 LAB — EKG IMPRESSION: NORMAL

## 2018-01-23 ENCOUNTER — HOSPITAL ENCOUNTER (OUTPATIENT)
Dept: CARDIOLOGY | Facility: MEDICAL CENTER | Age: 78
End: 2018-01-23
Attending: INTERNAL MEDICINE
Payer: MEDICARE

## 2018-01-23 DIAGNOSIS — I50.23 NYHA CLASS 3 ACUTE ON CHRONIC SYSTOLIC HEART FAILURE (HCC): ICD-10-CM

## 2018-01-23 PROCEDURE — 93306 TTE W/DOPPLER COMPLETE: CPT

## 2018-01-23 PROCEDURE — 93306 TTE W/DOPPLER COMPLETE: CPT | Mod: 26 | Performed by: INTERNAL MEDICINE

## 2018-01-24 LAB
LV EJECT FRACT  99904: 35
LV EJECT FRACT MOD 2C 99903: 41.36
LV EJECT FRACT MOD 4C 99902: 40.08
LV EJECT FRACT MOD BP 99901: 39.37

## 2018-02-27 ENCOUNTER — OFFICE VISIT (OUTPATIENT)
Dept: CARDIOLOGY | Facility: MEDICAL CENTER | Age: 78
End: 2018-02-27
Payer: MEDICARE

## 2018-02-27 VITALS
DIASTOLIC BLOOD PRESSURE: 60 MMHG | BODY MASS INDEX: 28.98 KG/M2 | HEART RATE: 70 BPM | WEIGHT: 207 LBS | HEIGHT: 71 IN | SYSTOLIC BLOOD PRESSURE: 110 MMHG | OXYGEN SATURATION: 94 %

## 2018-02-27 DIAGNOSIS — Z95.810 BIVENTRICULAR IMPLANTABLE CARDIOVERTER-DEFIBRILLATOR IN SITU: ICD-10-CM

## 2018-02-27 DIAGNOSIS — R06.09 DYSPNEA ON EXERTION: ICD-10-CM

## 2018-02-27 DIAGNOSIS — E78.00 HYPERCHOLESTEREMIA: ICD-10-CM

## 2018-02-27 DIAGNOSIS — Z79.899 HIGH RISK MEDICATION USE: ICD-10-CM

## 2018-02-27 DIAGNOSIS — I50.20 ACC/AHA STAGE C SYSTOLIC HEART FAILURE (HCC): ICD-10-CM

## 2018-02-27 DIAGNOSIS — I25.5 ISCHEMIC CARDIOMYOPATHY: ICD-10-CM

## 2018-02-27 DIAGNOSIS — I51.89 LEFT VENTRICULAR SYSTOLIC DYSFUNCTION, NYHA CLASS 3: ICD-10-CM

## 2018-02-27 DIAGNOSIS — F10.10 ALCOHOL ABUSE: ICD-10-CM

## 2018-02-27 PROCEDURE — 99215 OFFICE O/P EST HI 40 MIN: CPT | Performed by: INTERNAL MEDICINE

## 2018-02-27 RX ORDER — GABAPENTIN 300 MG/1
300 CAPSULE ORAL 3 TIMES DAILY
COMMUNITY
End: 2018-04-06

## 2018-02-27 RX ORDER — POTASSIUM CHLORIDE 20 MEQ/1
40 TABLET, EXTENDED RELEASE ORAL DAILY
COMMUNITY
End: 2018-04-06 | Stop reason: SDUPTHER

## 2018-02-27 ASSESSMENT — ENCOUNTER SYMPTOMS
SENSORY CHANGE: 0
HEADACHES: 0
ABDOMINAL PAIN: 0
COUGH: 0
BRUISES/BLEEDS EASILY: 0
MEMORY LOSS: 0
DIZZINESS: 0
DIAPHORESIS: 0
MYALGIAS: 0
PALPITATIONS: 0
FALLS: 0
DOUBLE VISION: 0
BLURRED VISION: 0
FEVER: 0
SHORTNESS OF BREATH: 1
DEPRESSION: 0

## 2018-02-27 NOTE — PROGRESS NOTES
"Subjective:   Isai Fuentes is a 76 y.o. male who presents today for cardiac care and evaluation in our heart failure clinic due to ischemic cardiomyopathy status post BiVICD, coronary arterial disease, HTN, HLP, atrial fibrillation s/p ablation.     Patient also had history of persistent hypotension for which we were not able to titrate up his medication for his cardiomyopathy.     Patient continues to report of exertional dyspnea but also depression.     Patient still gets winded with daily living activities and exertion. No symptoms at rest.    Chief Complaint: dyspnea.    Past Medical History:   Diagnosis Date   • Atrial fibrillation (CMS-HCC) 9/20/2011   • Biventricular implantable cardioverter-defibrillator in situ 8/7/2015   • Breath shortness    • CAD (coronary artery disease) 9/20/2011   • Cancer (CMS-HCC) 2012    prostate   • CARDIOMYOPATHY 9/20/2011   • Congestive heart failure (CMS-HCC)    • COPD (chronic obstructive pulmonary disease) (CMS-HCC)    • Fatigue 10/28/2010   • Heartburn 10/23/2008   • History of cardiac catheterization 9/20/2011   • History of myocardial infarction     \"many\"   • HTN (hypertension) 9/20/2011   • Indigestion    • Insomnia 7/8/2010   • Ischemic cardiomyopathy 9/20/2011   • Long term (current) use of anticoagulants 5/13/2011   • Myocardial infarct 2007   • Orthostatic hypotension 5/6/2009   • WILLA (obstructive sleep apnea)     CPAP   • Other drug allergy(995.27) 10/16/2008   • Other specified disorder of intestines 07-28-15    constipation/diarrhea/ reports some bleeding from rectum w/blood clots. Seeing GI   • Pacemaker     [employer]   • Pain 07-28-15    \"chronic\", 0/10   • Pleural effusion 5/18/2009   • Presence of permanent cardiac pacemaker 9/20/2011   • Prostate cancer (CMS-HCC) 5/13/2011   • Second degree AV block, Mobitz type II 11/10/2010   • Snoring    • Stroke (CMS-HCC) 2/3/2009     Past Surgical History:   Procedure Laterality Date   • RECOVERY  " "10/16/2015    Procedure: CATH LAB LEAD REVISION [facility];  Surgeon: Recoveryonly Surgery;  [place] PRE[place] [facility] [place]  Service:    • RECOVERY  8/14/2015    Procedure:  CATH LAB LEAD EXTRACTION AWILDA ST.DRE JAS AQUINO;  Surgeon: Recoveryonfreddy Surgery;  Location: SURG[place] PRE-[place] PROC [place] [facility] [place];  Service:    • RECOVERY  7/30/2015    Procedure: CATH LAB  LEAD EXTRACTION, UPGRADE TO BIV PM ST.DREJOSI AQUINO ;  Surgeon: Recoveryonfreddy Surgery;  Location: SURGERY PRE-[place] PROC [place]IT [facility] [place];  Service:    • RECOVERY  7/29/2015    Procedure: CATH LAB NEW PM INSERTION DUAL ATRIAL & VENTRICULAR-LV LEAD W/ NEW GENERATOR AQUINO ICD9: 414.8;  Surgeon: Recoveryonfreddy Surgery;  Location: SURG[place] PRE[place] PROC [place] [facility] [place];  Service:    • PACEMAKER INSERTION  11/24/08    [facility]   • MULTIPLE CORONARY ARTERY BYPASS  2007    2 vessel   • CARDIAC CATH      has 2 stents     Family History   Problem Relation Age of Onset   • Thyroid Sister      History   Smoking Status   • Former Smoker   • Packs/day: 1.50   • Years: 3.00   • Types: Cigarettes   • Quit date: 1/1/1970   Smokeless Tobacco   • Former User   • Types: Chew   • Quit date: 1/1/1972     Allergies   Allergen Reactions   • Plavix [Clopidogrel Bisulfate] Anaphylaxis   • Statins [Hmg-Coa-R Inhibitors] Unspecified     Muscles aches     • Ticlid [Ticlopidine Hydrochloride] Unspecified     Pt states \"I'm not sure what happens\".       Outpatient Encounter Prescriptions as of 2/27/2018   Medication Sig Dispense Refill   • potassium chloride SA (KDUR) 20 MEQ Tab CR Take 20 mEq by mouth 2 times a day.     • gabapentin (NEURONTIN) 300 MG Cap Take 300 mg by mouth 3 times a day.     • sacubitril-valsartan (ENTRESTO) 49-51 MG Tab tablet Take 1 Tab by mouth 2 Times a Day. 60 Tab 11   • beclomethasone (QVAR) 80 MCG/ACT inhaler Inhale 2 Puffs by mouth 2 times a day. Use spacer. Rinse mouth after each use. 2 Inhaler 0   • spironolactone (ALDACTONE) 25 MG Tab Take 1 Tab by mouth every " day. 90 Tab 3   • metoprolol SR (TOPROL XL) 25 MG TABLET SR 24 HR Take 1 Tab by mouth every day. 30 Tab 11   • furosemide (LASIX) 40 MG Tab Take 1 Tab by mouth every day. 30 Tab 11   • metolazone (ZAROXOLYN) 5 MG Tab Take 5 mg by mouth as needed. Indications: Edema     • Methotrexate, PF, 10 MG/0.2ML Solution Auto-injector Inject 10 mg as instructed every 7 days. On Mon   Indications: Psoriasis     • Omega-3 Fatty Acids (FISH OIL) 1000 MG Cap capsule Take 1,000 mg by mouth every day.     • Multiple Vitamins-Minerals (OCUVITE) Tab Take 1 Tab by mouth every day.     • aspirin 81 MG tablet Take 81 mg by mouth every day.     • Coenzyme Q10 (CO Q-10 PO) Take 1 Cap by mouth every day.     • azithromycin (ZITHROMAX) 250 MG Tab Take 2 tablets on day 1, then take 1 tablet a day for 4 days. (Patient not taking: Reported on 2/27/2018) 6 Tab 0   • benzonatate (TESSALON) 100 MG Cap TAKE 1 CAPSULE BY MOUTH THREE TIMES A DAY  0   • benzonatate (TESSALON) 100 MG Cap Take 1 Cap by mouth 3 times a day as needed for Cough. (Patient not taking: Reported on 2/27/2018) 30 Cap 0   • [DISCONTINUED] sacubitril-valsartan (ENTRESTO) 24-26 MG Tab tablet Take 1 Tab by mouth 2 Times a Day. 60 Tab 6     No facility-administered encounter medications on file as of 2/27/2018.      Review of Systems   Constitutional: Negative for diaphoresis and fever.   HENT: Negative for nosebleeds.    Eyes: Negative for blurred vision and double vision.   Respiratory: Positive for shortness of breath. Negative for cough.    Cardiovascular: Negative for chest pain and palpitations.   Gastrointestinal: Negative for abdominal pain.   Genitourinary: Negative for dysuria and frequency.   Musculoskeletal: Negative for falls and myalgias.   Skin: Negative for rash.   Neurological: Negative for dizziness, sensory change and headaches.   Endo/Heme/Allergies: Does not bruise/bleed easily.   Psychiatric/Behavioral: Negative for depression and memory loss.        Objective:  "  /60   Pulse 70   Ht 1.803 m (5' 11\")   Wt 93.9 kg (207 lb)   SpO2 94%   BMI 28.87 kg/m²     Physical Exam   Constitutional: He is oriented to person, place, and time. No distress.   HENT:   Head: Normocephalic and atraumatic.   Eyes: EOM are normal.   Neck: Normal range of motion. No JVD present.   Cardiovascular: Normal rate, regular rhythm, normal heart sounds and intact distal pulses.  Exam reveals no gallop and no friction rub.    No murmur heard.  Bilateral femoral pulses are 2+, bilateral dorsalis pedis pulses are 2+, bilateral posterior tibialis pulses are 2+.   Pulmonary/Chest: No respiratory distress. He has no wheezes. He has no rales. He exhibits no tenderness.   Abdominal: Soft. Bowel sounds are normal. There is no tenderness. There is no rebound and no guarding.   The is no presence of abdominal bruits   Musculoskeletal: Normal range of motion.   Neurological: He is alert and oriented to person, place, and time.   Skin: Skin is warm and dry.   Psychiatric: He has a normal mood and affect.   Nursing note and vitals reviewed.      Assessment:     1. ACC/AHA stage C systolic heart failure (CMS-HCC)  sacubitril-valsartan (ENTRESTO) 49-51 MG Tab tablet    BASIC METABOLIC PANEL    B TYPE NATRIURETIC   2. Left ventricular systolic dysfunction, NYHA class 3  sacubitril-valsartan (ENTRESTO) 49-51 MG Tab tablet    BASIC METABOLIC PANEL    B TYPE NATRIURETIC   3. Dyspnea on exertion     4. Biventricular implantable cardioverter-defibrillator in situ     5. Ischemic cardiomyopathy     6. High risk medication use     7. Hypercholesteremia     8. Alcohol abuse, quit in 09/2017         Medical Decision Making:  Today's Assessment / Status / Plan:   Today, based on physical examination findings, patient is euvolemic. No JVD, lungs are clear to auscultation, no pitting edema in bilateral lower extremities, no ascites.    Dry weight is 207 lbs.    Will increase Entresto to 100 mg po bid.  Continue Toprol XL " 25 mg po daily, Spironolactone 25 mg po daily.    Will continue to closely monitor for side effects of patient's high risk medication(s) including liver, renal function and electrolytes.    I will see patient back in our Heart Failure Clinic with lab tests and studies results in 4 weeks.    I thank you Dr. Pagan for referring patient to our Heart Failure Clinic today.

## 2018-02-27 NOTE — LETTER
"     [facility]   [address], [address]  [place] [address]  Phone: [phone]  Fax: [phone]              Isai Fuentes  1940    Encounter Date: 2/27/2018    Jef Carl M.D.          PROGRESS NOTE:  Subjective:   Isai Fuentes is a 76 y.o. male who presents today for cardiac care and evaluation in our heart failure clinic due to ischemic cardiomyopathy status post BiVICD, coronary arterial disease, HTN, HLP, atrial fibrillation s/p ablation.     Patient also had history of persistent hypotension for which we were not able to titrate up his medication for his cardiomyopathy.     Patient continues to report of exertional dyspnea but also depression.     Patient still gets winded with daily living activities and exertion. No symptoms at rest.    Chief Complaint: dyspnea.    Past Medical History:   Diagnosis Date   • Atrial fibrillation (CMS-HCC) 9/20/2011   • Biventricular implantable cardioverter-defibrillator in situ 8/7/2015   • Breath shortness    • CAD (coronary artery disease) 9/20/2011   • Cancer (CMS-HCC) 2012    prostate   • CARDIOMYOPATHY 9/20/2011   • Congestive heart failure (CMS-HCC)    • COPD (chronic obstructive pulmonary disease) (CMS-HCC)    • Fatigue 10/28/2010   • Heartburn 10/23/2008   • History of cardiac catheterization 9/20/2011   • History of myocardial infarction     \"many\"   • HTN (hypertension) 9/20/2011   • Indigestion    • Insomnia 7/8/2010   • Ischemic cardiomyopathy 9/20/2011   • Long term (current) use of anticoagulants 5/13/2011   • Myocardial infarct 2007   • Orthostatic hypotension 5/6/2009   • WILLA (obstructive sleep apnea)     CPAP   • Other drug allergy(995.27) 10/16/2008   • Other specified disorder of intestines 07-28-15    constipation/diarrhea/ reports some bleeding from rectum w/blood clots. Seeing GI   • Pacemaker     [employer]   • Pain 07-28-15    \"chronic\", 0/10   • Pleural effusion 5/18/2009  " "  • Presence of permanent cardiac pacemaker 9/20/2011   • Prostate cancer (CMS-HCC) 5/13/2011   • Second degree AV block, Mobitz type II 11/10/2010   • Snoring    • Stroke (CMS-HCC) 2/3/2009     Past Surgical History:   Procedure Laterality Date   • RECOVERY  10/16/2015    Procedure: CATH LAB LEAD REVISION OsbaldoMARIA R AQUINO;  Surgeon: Recoveryonfreddy Surgery;  Location: SURGERY PRE-POST PROC UNIT [facility];  Service:    • RECOVERY  8/14/2015    Procedure:  CATH LAB LEAD EXTRACTION AWILDA [facility]MARIA R LRG PAULO AQUINO;  Surgeon: Recoveryonfreddy Surgery;  Location: SURGERY PRE-POST PROC UNIT [facility];  Service:    • RECOVERY  7/30/2015    Procedure: CATH LAB  LEAD EXTRACTION, UPGRADE TO BIV PM ST.MARIA R LRG MAGGIE AQUINO ;  Surgeon: Amy Surgery;  Location: SURGERY PRE-POST PROC UNIT [facility];  Service:    • RECOVERY  7/29/2015    Procedure: CATH LAB NEW PM INSERTION DUAL ATRIAL & VENTRICULAR-LV LEAD W/ NEW GENERATOR KENIA ICD9: 414.8;  Surgeon: Amy Surgery;  Location: SURGERY PRE-POST PROC UNIT [facility];  Service:    • PACEMAKER INSERTION  11/24/08    [facility]   • MULTIPLE CORONARY ARTERY BYPASS  2007    2 vessel   • CARDIAC CATH      has 2 stents     Family History   Problem Relation Age of Onset   • Thyroid Sister      History   Smoking Status   • Former Smoker   • Packs/day: 1.50   • Years: 3.00   • Types: Cigarettes   • Quit date: 1/1/1970   Smokeless Tobacco   • Former User   • Types: Chew   • Quit date: 1/1/1972     Allergies   Allergen Reactions   • Plavix [Clopidogrel Bisulfate] Anaphylaxis   • Statins [Hmg-Coa-R Inhibitors] Unspecified     Muscles aches     • Ticlid [Ticlopidine Hydrochloride] Unspecified     Pt states \"I'm not sure what happens\".       Outpatient Encounter Prescriptions as of 2/27/2018   Medication Sig Dispense Refill   • potassium chloride SA (KDUR) 20 MEQ Tab CR Take 20 mEq by mouth 2 times a day.     • gabapentin (NEURONTIN) 300 MG Cap Take 300 mg by mouth 3 times a day.     • sacubitril-valsartan (ENTRESTO) 49-51 MG " Tab tablet Take 1 Tab by mouth 2 Times a Day. 60 Tab 11   • beclomethasone (QVAR) 80 MCG/ACT inhaler Inhale 2 Puffs by mouth 2 times a day. Use spacer. Rinse mouth after each use. 2 Inhaler 0   • spironolactone (ALDACTONE) 25 MG Tab Take 1 Tab by mouth every day. 90 Tab 3   • metoprolol SR (TOPROL XL) 25 MG TABLET SR 24 HR Take 1 Tab by mouth every day. 30 Tab 11   • furosemide (LASIX) 40 MG Tab Take 1 Tab by mouth every day. 30 Tab 11   • metolazone (ZAROXOLYN) 5 MG Tab Take 5 mg by mouth as needed. Indications: Edema     • Methotrexate, PF, 10 MG/0.2ML Solution Auto-injector Inject 10 mg as instructed every 7 days. On Mon   Indications: Psoriasis     • Omega-3 Fatty Acids (FISH OIL) 1000 MG Cap capsule Take 1,000 mg by mouth every day.     • Multiple Vitamins-Minerals (OCUVITE) Tab Take 1 Tab by mouth every day.     • aspirin 81 MG tablet Take 81 mg by mouth every day.     • Coenzyme Q10 (CO Q-10 PO) Take 1 Cap by mouth every day.     • azithromycin (ZITHROMAX) 250 MG Tab Take 2 tablets on day 1, then take 1 tablet a day for 4 days. (Patient not taking: Reported on 2/27/2018) 6 Tab 0   • benzonatate (TESSALON) 100 MG Cap TAKE 1 CAPSULE BY MOUTH THREE TIMES A DAY  0   • benzonatate (TESSALON) 100 MG Cap Take 1 Cap by mouth 3 times a day as needed for Cough. (Patient not taking: Reported on 2/27/2018) 30 Cap 0   • [DISCONTINUED] sacubitril-valsartan (ENTRESTO) 24-26 MG Tab tablet Take 1 Tab by mouth 2 Times a Day. 60 Tab 6     No facility-administered encounter medications on file as of 2/27/2018.      Review of Systems   Constitutional: Negative for diaphoresis and fever.   HENT: Negative for nosebleeds.    Eyes: Negative for blurred vision and double vision.   Respiratory: Positive for shortness of breath. Negative for cough.    Cardiovascular: Negative for chest pain and palpitations.   Gastrointestinal: Negative for abdominal pain.   Genitourinary: Negative for dysuria and frequency.   Musculoskeletal: Negative  "for falls and myalgias.   Skin: Negative for rash.   Neurological: Negative for dizziness, sensory change and headaches.   Endo/Heme/Allergies: Does not bruise/bleed easily.   Psychiatric/Behavioral: Negative for depression and memory loss.        Objective:   /60   Pulse 70   Ht 1.803 m (5' 11\")   Wt 93.9 kg (207 lb)   SpO2 94%   BMI 28.87 kg/m²      Physical Exam   Constitutional: He is oriented to person, place, and time. No distress.   HENT:   Head: Normocephalic and atraumatic.   Eyes: EOM are normal.   Neck: Normal range of motion. No JVD present.   Cardiovascular: Normal rate, regular rhythm, normal heart sounds and intact distal pulses.  Exam reveals no gallop and no friction rub.    No murmur heard.  Bilateral femoral pulses are 2+, bilateral dorsalis pedis pulses are 2+, bilateral posterior tibialis pulses are 2+.   Pulmonary/Chest: No respiratory distress. He has no wheezes. He has no rales. He exhibits no tenderness.   Abdominal: Soft. Bowel sounds are normal. There is no tenderness. There is no rebound and no guarding.   The is no presence of abdominal bruits   Musculoskeletal: Normal range of motion.   Neurological: He is alert and oriented to person, place, and time.   Skin: Skin is warm and dry.   Psychiatric: He has a normal mood and affect.   Nursing note and vitals reviewed.      Assessment:     1. ACC/AHA stage C systolic heart failure (CMS-HCC)  sacubitril-valsartan (ENTRESTO) 49-51 MG Tab tablet    BASIC METABOLIC PANEL    B TYPE NATRIURETIC   2. Left ventricular systolic dysfunction, NYHA class 3  sacubitril-valsartan (ENTRESTO) 49-51 MG Tab tablet    BASIC METABOLIC PANEL    B TYPE NATRIURETIC   3. Dyspnea on exertion     4. Biventricular implantable cardioverter-defibrillator in situ     5. Ischemic cardiomyopathy     6. High risk medication use     7. Hypercholesteremia     8. Alcohol abuse, quit in 09/2017         Medical Decision Making:  Today's Assessment / Status / Plan: "   Today, based on physical examination findings, patient is euvolemic. No JVD, lungs are clear to auscultation, no pitting edema in bilateral lower extremities, no ascites.    Dry weight is 207 lbs.    Will increase Entresto to 100 mg po bid.  Continue Toprol XL 25 mg po daily, Spironolactone 25 mg po daily.    Will continue to closely monitor for side effects of patient's high risk medication(s) including liver, renal function and electrolytes.    I will see patient back in our Heart Failure Clinic with lab tests and studies results in 4 weeks.    I thank you Dr. Pagan for referring patient to our Heart Failure Clinic today.            Matt Pagan M.D.  [address] [place] [address] [id number]  [id number]  [place] [address]  VIA Facsimile: [phone]

## 2018-03-12 DIAGNOSIS — I10 ESSENTIAL HYPERTENSION: ICD-10-CM

## 2018-03-12 RX ORDER — METOLAZONE 5 MG/1
5 TABLET ORAL
Qty: 30 TAB | Refills: 3 | Status: SHIPPED | OUTPATIENT
Start: 2018-03-12 | End: 2020-09-28

## 2018-03-19 ENCOUNTER — HOSPITAL ENCOUNTER (OUTPATIENT)
Dept: LAB | Facility: MEDICAL CENTER | Age: 78
End: 2018-03-19
Attending: UROLOGY
Payer: MEDICARE

## 2018-03-19 ENCOUNTER — HOSPITAL ENCOUNTER (OUTPATIENT)
Dept: LAB | Facility: MEDICAL CENTER | Age: 78
End: 2018-03-19
Attending: INTERNAL MEDICINE
Payer: MEDICARE

## 2018-03-19 DIAGNOSIS — I50.20 ACC/AHA STAGE C SYSTOLIC HEART FAILURE (HCC): ICD-10-CM

## 2018-03-19 DIAGNOSIS — I51.89 LEFT VENTRICULAR SYSTOLIC DYSFUNCTION, NYHA CLASS 3: ICD-10-CM

## 2018-03-19 LAB
ANION GAP SERPL CALC-SCNC: 8 MMOL/L (ref 0–11.9)
BNP SERPL-MCNC: 133 PG/ML (ref 0–100)
BUN SERPL-MCNC: 30 MG/DL (ref 8–22)
CALCIUM SERPL-MCNC: 9.5 MG/DL (ref 8.5–10.5)
CHLORIDE SERPL-SCNC: 103 MMOL/L (ref 96–112)
CO2 SERPL-SCNC: 24 MMOL/L (ref 20–33)
CREAT SERPL-MCNC: 1.65 MG/DL (ref 0.5–1.4)
GLUCOSE SERPL-MCNC: 107 MG/DL (ref 65–99)
POTASSIUM SERPL-SCNC: 4.4 MMOL/L (ref 3.6–5.5)
SODIUM SERPL-SCNC: 135 MMOL/L (ref 135–145)

## 2018-03-19 PROCEDURE — 80048 BASIC METABOLIC PNL TOTAL CA: CPT

## 2018-03-19 PROCEDURE — 83880 ASSAY OF NATRIURETIC PEPTIDE: CPT | Mod: GA

## 2018-03-19 PROCEDURE — 84153 ASSAY OF PSA TOTAL: CPT

## 2018-03-19 PROCEDURE — 36415 COLL VENOUS BLD VENIPUNCTURE: CPT

## 2018-03-19 PROCEDURE — 84154 ASSAY OF PSA FREE: CPT

## 2018-03-20 ENCOUNTER — OFFICE VISIT (OUTPATIENT)
Dept: CARDIOLOGY | Facility: MEDICAL CENTER | Age: 78
End: 2018-03-20
Payer: MEDICARE

## 2018-03-20 ENCOUNTER — TELEPHONE (OUTPATIENT)
Dept: CARDIOLOGY | Facility: MEDICAL CENTER | Age: 78
End: 2018-03-20

## 2018-03-20 VITALS
OXYGEN SATURATION: 93 % | HEIGHT: 71 IN | DIASTOLIC BLOOD PRESSURE: 62 MMHG | HEART RATE: 80 BPM | BODY MASS INDEX: 28.42 KG/M2 | WEIGHT: 203 LBS | RESPIRATION RATE: 14 BRPM | SYSTOLIC BLOOD PRESSURE: 100 MMHG

## 2018-03-20 DIAGNOSIS — Z86.79 S/P ABLATION OF ATRIAL FIBRILLATION: ICD-10-CM

## 2018-03-20 DIAGNOSIS — Z98.890 S/P ABLATION OF ATRIAL FIBRILLATION: ICD-10-CM

## 2018-03-20 DIAGNOSIS — I25.5 ISCHEMIC CARDIOMYOPATHY: Chronic | ICD-10-CM

## 2018-03-20 DIAGNOSIS — Z95.810 BIVENTRICULAR IMPLANTABLE CARDIOVERTER-DEFIBRILLATOR IN SITU: ICD-10-CM

## 2018-03-20 LAB
PSA FREE MFR SERPL: <50 %
PSA FREE SERPL-MCNC: <0.1 NG/ML
PSA SERPL-MCNC: 0.2 NG/ML (ref 0–4)

## 2018-03-20 PROCEDURE — 93284 PRGRMG EVAL IMPLANTABLE DFB: CPT | Performed by: NURSE PRACTITIONER

## 2018-03-20 PROCEDURE — 99214 OFFICE O/P EST MOD 30 MIN: CPT | Mod: 25 | Performed by: NURSE PRACTITIONER

## 2018-03-20 ASSESSMENT — ENCOUNTER SYMPTOMS
WEIGHT LOSS: 0
FLANK PAIN: 0
HEADACHES: 0
VOMITING: 0
SORE THROAT: 0
FOCAL WEAKNESS: 0
BLURRED VISION: 0
COUGH: 0
SEIZURES: 0
FEVER: 0
PALPITATIONS: 0
PND: 0
ABDOMINAL PAIN: 0
NAUSEA: 0
WHEEZING: 0
DOUBLE VISION: 0
CLAUDICATION: 0
HEARTBURN: 0
PSYCHIATRIC NEGATIVE: 1
DIZZINESS: 0
SPUTUM PRODUCTION: 0
CHILLS: 0
LOSS OF CONSCIOUSNESS: 0
SHORTNESS OF BREATH: 0
SPEECH CHANGE: 0
ORTHOPNEA: 0
MUSCULOSKELETAL NEGATIVE: 1

## 2018-03-20 NOTE — PROGRESS NOTES
"Chief Complaint   Patient presents with   • Results   BiV ICD  Cardiomyopathy  AF  VT    Subjective:   Isai Fuentes is a 77 y.o. male who presents today for review of his BiV ICD.  He had two runs of what appears to be AVNRT or SVt that was seen in his VT1 zone.  The rate was 175bpm and duration was 1 minute 11 seconds on one burst with what was termed resumption of sinus . It appears it lasted for 15 seconds beyond that and then terminated in the same deflection of QRS with one to one conduction. The rapid SVT appeared to have P on Qrs simultaneously.  He was not aware of the arrhythmia This occurred on December 20th at 2:30 PM. He has had no further episodes since that time. AF seconds to minutes short in duration.  He has had AF ablation in the past.  He is tolerating the higher dose of Entresto and offers no complaints other than some light headedness upon position change from lying or sitting to standing.  He feels he has less edema and may be exercising a bit more.  His last echo demonstrated an increase in his RVSP with EF remaining at 35%.  His lipids remain elevated. He states he had talked with Dr Carl about adding a new agent and will review this with his next visit.    Past Medical History:   Diagnosis Date   • Atrial fibrillation (CMS-HCC) 9/20/2011   • Biventricular implantable cardioverter-defibrillator in situ 8/7/2015   • Breath shortness    • CAD (coronary artery disease) 9/20/2011   • Cancer (CMS-HCC) 2012    prostate   • CARDIOMYOPATHY 9/20/2011   • Congestive heart failure (CMS-HCC)    • COPD (chronic obstructive pulmonary disease) (CMS-HCC)    • Fatigue 10/28/2010   • Heartburn 10/23/2008   • History of cardiac catheterization 9/20/2011   • History of myocardial infarction     \"many\"   • HTN (hypertension) 9/20/2011   • Indigestion    • Insomnia 7/8/2010   • Ischemic cardiomyopathy 9/20/2011   • Long term (current) use of anticoagulants 5/13/2011   • Myocardial infarct 2007   • Orthostatic " "hypotension 5/6/2009   • WILLA (obstructive sleep apnea)     CPAP   • Other drug allergy(995.27) 10/16/2008   • Other specified disorder of intestines 07-28-15    constipation/diarrhea/ reports some bleeding from rectum w/blood clots. Seeing GI   • Pacemaker     [employer]   • Pain 07-28-15    \"chronic\", 0/10   • Pleural effusion 5/18/2009   • Presence of permanent cardiac pacemaker 9/20/2011   • Prostate cancer (CMS-HCC) 5/13/2011   • Second degree AV block, Mobitz type II 11/10/2010   • Snoring    • Stroke (CMS-HCC) 2/3/2009     Past Surgical History:   Procedure Laterality Date   • RECOVERY  10/16/2015    Procedure: CATH LAB LEAD REVISION [facility];  Surgeon: Recoveryonly Surgery;  Location: [facility]ERY [facility];  Service:    • RECOVERY  8/14/2015    Procedure:  CATH LAB LEAD EXTRACTION AWILDA [facility];  Surgeon: Recoveryonly Surgery;  Location: SUR[facility]ERY [facility] PROC [facility];  Service:    • RECOVERY  7/30/2015    Procedure: CATH LAB  LEAD EXTRACTION, UPGRADE TO BIV PM [facility] ;  Surgeon: Recoveryonly Surgery;  Location: SUR[facility]ERY [facility] PROC [facility];  Service:    • RECOVERY  7/29/2015    Procedure: CATH LAB NEW PM INSERTION DUAL ATRIAL & VENTRICULAR-LV LEAD W/ NEW GENERATOR KENIA ICD9: 414.8;  Surgeon: Recoveryonly Surgery;  Location: SUR[facility]ERY [facility];  Service:    • PACEMAKER INSERTION  11/24/08    [facility]   • MULTIPLE CORONARY ARTERY BYPASS  2007    2 vessel   • CARDIAC CATH      has 2 stents     Family History   Problem Relation Age of Onset   • Thyroid Sister      Social History     Social History   • Marital status:      Spouse name: N/A   • Number of children: N/A   • Years of education: N/A     Occupational History   • Not on file.     Social History Main Topics   • Smoking status: Former Smoker     Packs/day: 1.50     Years: 3.00     Types: Cigarettes     Quit date: 1/1/1970   • Smokeless tobacco: Former User     Types: Chew     Quit date: " "1/1/1972   • Alcohol use No      Comment: 10-14 per week; scotch & wine   • Drug use: No   • Sexual activity: Not on file     Other Topics Concern   • Not on file     Social History Narrative   • No narrative on file     Allergies   Allergen Reactions   • Plavix [Clopidogrel Bisulfate] Anaphylaxis   • Statins [Hmg-Coa-R Inhibitors] Unspecified     Muscles aches     • Ticlid [Ticlopidine Hydrochloride] Unspecified     Pt states \"I'm not sure what happens\".       Outpatient Encounter Prescriptions as of 3/20/2018   Medication Sig Dispense Refill   • metOLazone (ZAROXOLYN) 5 MG Tab Take 1 Tab by mouth 1 time daily as needed. 30 Tab 3   • potassium chloride SA (KDUR) 20 MEQ Tab CR Take 40 mEq by mouth every day.     • sacubitril-valsartan (ENTRESTO) 49-51 MG Tab tablet Take 1 Tab by mouth 2 Times a Day. 60 Tab 11   • beclomethasone (QVAR) 80 MCG/ACT inhaler Inhale 2 Puffs by mouth 2 times a day. Use spacer. Rinse mouth after each use. 2 Inhaler 0   • spironolactone (ALDACTONE) 25 MG Tab Take 1 Tab by mouth every day. 90 Tab 3   • metoprolol SR (TOPROL XL) 25 MG TABLET SR 24 HR Take 1 Tab by mouth every day. 30 Tab 11   • furosemide (LASIX) 40 MG Tab Take 1 Tab by mouth every day. 30 Tab 11   • Methotrexate, PF, 10 MG/0.2ML Solution Auto-injector Inject 10 mg as instructed every 7 days. On Mon   Indications: Psoriasis     • Omega-3 Fatty Acids (FISH OIL) 1000 MG Cap capsule Take 1,000 mg by mouth every day.     • Multiple Vitamins-Minerals (OCUVITE) Tab Take 1 Tab by mouth every day.     • aspirin 81 MG tablet Take 81 mg by mouth every day.     • Coenzyme Q10 (CO Q-10 PO) Take 1 Cap by mouth every day.     • gabapentin (NEURONTIN) 300 MG Cap Take 300 mg by mouth 3 times a day.     • azithromycin (ZITHROMAX) 250 MG Tab Take 2 tablets on day 1, then take 1 tablet a day for 4 days. (Patient not taking: Reported on 2/27/2018) 6 Tab 0   • benzonatate (TESSALON) 100 MG Cap TAKE 1 CAPSULE BY MOUTH THREE TIMES A DAY  0   • " "benzonatate (TESSALON) 100 MG Cap Take 1 Cap by mouth 3 times a day as needed for Cough. (Patient not taking: Reported on 2/27/2018) 30 Cap 0     No facility-administered encounter medications on file as of 3/20/2018.      Review of Systems   Constitutional: Negative for chills, fever, malaise/fatigue and weight loss.   HENT: Negative for congestion and sore throat.    Eyes: Negative for blurred vision and double vision.   Respiratory: Negative for cough, sputum production, shortness of breath and wheezing.    Cardiovascular: Negative for chest pain, palpitations, orthopnea, claudication, leg swelling and PND.   Gastrointestinal: Negative for abdominal pain, heartburn, nausea and vomiting.   Genitourinary: Negative for dysuria, flank pain and frequency.   Musculoskeletal: Negative.    Skin: Negative.    Neurological: Negative for dizziness, speech change, focal weakness, seizures, loss of consciousness and headaches.   Endo/Heme/Allergies: Negative.    Psychiatric/Behavioral: Negative.         Objective:   /62   Pulse 80   Resp 14   Ht 1.803 m (5' 11\")   Wt 92.1 kg (203 lb)   SpO2 93%   BMI 28.31 kg/m²     Physical Exam   Constitutional: He is oriented to person, place, and time. He appears well-developed and well-nourished.   HENT:   Head: Normocephalic and atraumatic.   Eyes: Pupils are equal, round, and reactive to light.   Neck: Normal range of motion. Neck supple.   Cardiovascular: Normal rate and regular rhythm.    Pulmonary/Chest: Effort normal and breath sounds normal.   Device superficially prominent, no evidence of erosion.   Abdominal: Soft. Bowel sounds are normal.   Musculoskeletal: Normal range of motion.   Neurological: He is alert and oriented to person, place, and time.   Skin: Skin is warm and dry.   Psychiatric: He has a normal mood and affect.   Device function intact BV 3.4 years     Assessment:     1. Biventricular implantable cardioverter-defibrillator in situ     2. Ischemic " cardiomyopathy     3. S/P ablation of atrial fibrillation         Medical Decision Making:  Today's Assessment / Status / Plan:     1. BiV ICD demonstrating appropriate function.  Rapid rhythm for approximately 1.5 minutes suspicious for SVT (AVNRT).  Continue to watch for recurrence may need to consider ablation for further recurrence.  2. ICM due to see Dr Carl .  Increased Entresto seems to be some improvement.  Lipids to review with Dr. Carl.  3. Ablation of AF in past . Short episodes.  RTC 3 months.    Collaborating MD ROSA ELENA Thompson

## 2018-03-20 NOTE — LETTER
[facility]   [address]  [place] [address]  Phone: [phone]  Fax: [phone]              Isai Fuentes  1940    Encounter Date: 3/20/2018    PRATEEK Gregorio          PROGRESS NOTE:  Chief Complaint   Patient presents with   • Results   BiV ICD  Cardiomyopathy  AF  VT    Subjective:   Isai Fuentes is a 77 y.o. male who presents today for review of his BiV ICD.  He had two runs of what appears to be AVNRT or SVt that was seen in his VT1 zone.  The rate was 175bpm and duration was 1 minute 11 seconds on one burst with what was termed resumption of sinus . It appears it lasted for 15 seconds beyond that and then terminated in the same deflection of QRS with one to one conduction. The rapid SVT appeared to have P on Qrs simultaneously.  He was not aware of the arrhythmia This occurred on December 20th at 2:30 PM. He has had no further episodes since that time. AF seconds to minutes short in duration.  He has had AF ablation in the past.  He is tolerating the higher dose of Entresto and offers no complaints other than some light headedness upon position change from lying or sitting to standing.  He feels he has less edema and may be exercising a bit more.  His last echo demonstrated an increase in his RVSP with EF remaining at 35%.  His lipids remain elevated. He states he had talked with Dr Carl about adding a new agent and will review this with his next visit.    Past Medical History:   Diagnosis Date   • Atrial fibrillation (CMS-HCC) 9/20/2011   • Biventricular implantable cardioverter-defibrillator in situ 8/7/2015   • Breath shortness    • CAD (coronary artery disease) 9/20/2011   • Cancer (CMS-HCC) 2012    prostate   • CARDIOMYOPATHY 9/20/2011   • Congestive heart failure (CMS-HCC)    • COPD (chronic obstructive pulmonary disease) (CMS-HCC)    • Fatigue 10/28/2010   • Heartburn 10/23/2008   • History of cardiac catheterization  "9/20/2011   • History of myocardial infarction     \"many\"   • HTN (hypertension) 9/20/2011   • Indigestion    • Insomnia 7/8/2010   • Ischemic cardiomyopathy 9/20/2011   • Long term (current) use of anticoagulants 5/13/2011   • Myocardial infarct 2007   • Orthostatic hypotension 5/6/2009   • WILLA (obstructive sleep apnea)     CPAP   • Other drug allergy(995.27) 10/16/2008   • Other specified disorder of intestines 07-28-15    constipation/diarrhea/ reports some bleeding from rectum w/blood clots. Seeing GI   • Pacemaker     [employer]   • Pain 07-28-15    \"chronic\", 0/10   • Pleural effusion 5/18/2009   • Presence of permanent cardiac pacemaker 9/20/2011   • Prostate cancer (CMS-HCC) 5/13/2011   • Second degree AV block, Mobitz type II 11/10/2010   • Snoring    • Stroke (CMS-HCC) 2/3/2009     Past Surgical History:   Procedure Laterality Date   • RECOVERY  10/16/2015    Procedure: CATH LAB LEAD REVISION [facility];  Surgeon: Recoveryonly Surgery;  Location: [facility]ERY [facility];  Service:    • RECOVERY  8/14/2015    Procedure:  CATH LAB LEAD EXTRACTION AWILDA [facility];  Surgeon: Recoveryonly Surgery;  Location: [facility]ERY [facility] PROC [facility];  Service:    • RECOVERY  7/30/2015    Procedure: CATH LAB  LEAD EXTRACTION, UPGRADE TO BIV PM [facility] AQIUNO ;  Surgeon: Recoveryonly Surgery;  Location: SUR[facility]ERY [facility] PROC [facility];  Service:    • RECOVERY  7/29/2015    Procedure: CATH LAB NEW PM INSERTION DUAL ATRIAL & VENTRICULAR-LV LEAD W/ NEW GENERATOR KENIA ICD9: 414.8;  Surgeon: [facility]onfreddy Surgery;  Location: SUR[facility]ERY [facility] PROC [facility];  Service:    • PACEMAKER INSERTION  11/24/08    [facility]   • MULTIPLE CORONARY ARTERY BYPASS  2007    2 vessel   • CARDIAC CATH      has 2 stents     Family History   Problem Relation Age of Onset   • Thyroid Sister      Social History     Social History   • Marital status:      Spouse name: N/A   • Number of children: N/A   • Years of " "education: N/A     Occupational History   • Not on file.     Social History Main Topics   • Smoking status: Former Smoker     Packs/day: 1.50     Years: 3.00     Types: Cigarettes     Quit date: 1/1/1970   • Smokeless tobacco: Former User     Types: Chew     Quit date: 1/1/1972   • Alcohol use No      Comment: 10-14 per week; scotch & wine   • Drug use: No   • Sexual activity: Not on file     Other Topics Concern   • Not on file     Social History Narrative   • No narrative on file     Allergies   Allergen Reactions   • Plavix [Clopidogrel Bisulfate] Anaphylaxis   • Statins [Hmg-Coa-R Inhibitors] Unspecified     Muscles aches     • Ticlid [Ticlopidine Hydrochloride] Unspecified     Pt states \"I'm not sure what happens\".       Outpatient Encounter Prescriptions as of 3/20/2018   Medication Sig Dispense Refill   • metOLazone (ZAROXOLYN) 5 MG Tab Take 1 Tab by mouth 1 time daily as needed. 30 Tab 3   • potassium chloride SA (KDUR) 20 MEQ Tab CR Take 40 mEq by mouth every day.     • sacubitril-valsartan (ENTRESTO) 49-51 MG Tab tablet Take 1 Tab by mouth 2 Times a Day. 60 Tab 11   • beclomethasone (QVAR) 80 MCG/ACT inhaler Inhale 2 Puffs by mouth 2 times a day. Use spacer. Rinse mouth after each use. 2 Inhaler 0   • spironolactone (ALDACTONE) 25 MG Tab Take 1 Tab by mouth every day. 90 Tab 3   • metoprolol SR (TOPROL XL) 25 MG TABLET SR 24 HR Take 1 Tab by mouth every day. 30 Tab 11   • furosemide (LASIX) 40 MG Tab Take 1 Tab by mouth every day. 30 Tab 11   • Methotrexate, PF, 10 MG/0.2ML Solution Auto-injector Inject 10 mg as instructed every 7 days. On Mon   Indications: Psoriasis     • Omega-3 Fatty Acids (FISH OIL) 1000 MG Cap capsule Take 1,000 mg by mouth every day.     • Multiple Vitamins-Minerals (OCUVITE) Tab Take 1 Tab by mouth every day.     • aspirin 81 MG tablet Take 81 mg by mouth every day.     • Coenzyme Q10 (CO Q-10 PO) Take 1 Cap by mouth every day.     • gabapentin (NEURONTIN) 300 MG Cap Take 300 mg by " "mouth 3 times a day.     • azithromycin (ZITHROMAX) 250 MG Tab Take 2 tablets on day 1, then take 1 tablet a day for 4 days. (Patient not taking: Reported on 2/27/2018) 6 Tab 0   • benzonatate (TESSALON) 100 MG Cap TAKE 1 CAPSULE BY MOUTH THREE TIMES A DAY  0   • benzonatate (TESSALON) 100 MG Cap Take 1 Cap by mouth 3 times a day as needed for Cough. (Patient not taking: Reported on 2/27/2018) 30 Cap 0     No facility-administered encounter medications on file as of 3/20/2018.      Review of Systems   Constitutional: Negative for chills, fever, malaise/fatigue and weight loss.   HENT: Negative for congestion and sore throat.    Eyes: Negative for blurred vision and double vision.   Respiratory: Negative for cough, sputum production, shortness of breath and wheezing.    Cardiovascular: Negative for chest pain, palpitations, orthopnea, claudication, leg swelling and PND.   Gastrointestinal: Negative for abdominal pain, heartburn, nausea and vomiting.   Genitourinary: Negative for dysuria, flank pain and frequency.   Musculoskeletal: Negative.    Skin: Negative.    Neurological: Negative for dizziness, speech change, focal weakness, seizures, loss of consciousness and headaches.   Endo/Heme/Allergies: Negative.    Psychiatric/Behavioral: Negative.         Objective:   /62   Pulse 80   Resp 14   Ht 1.803 m (5' 11\")   Wt 92.1 kg (203 lb)   SpO2 93%   BMI 28.31 kg/m²      Physical Exam   Constitutional: He is oriented to person, place, and time. He appears well-developed and well-nourished.   HENT:   Head: Normocephalic and atraumatic.   Eyes: Pupils are equal, round, and reactive to light.   Neck: Normal range of motion. Neck supple.   Cardiovascular: Normal rate and regular rhythm.    Pulmonary/Chest: Effort normal and breath sounds normal.   Device superficially prominent, no evidence of erosion.   Abdominal: Soft. Bowel sounds are normal.   Musculoskeletal: Normal range of motion.   Neurological: He is " alert and oriented to person, place, and time.   Skin: Skin is warm and dry.   Psychiatric: He has a normal mood and affect.   Device function intact BV 3.4 years     Assessment:     1. Biventricular implantable cardioverter-defibrillator in situ     2. Ischemic cardiomyopathy     3. S/P ablation of atrial fibrillation         Medical Decision Making:  Today's Assessment / Status / Plan:     1. BiV ICD demonstrating appropriate function.  Rapid rhythm for approximately 1.5 minutes suspicious for SVT (AVNRT).  Continue to watch for recurrence may need to consider ablation for further recurrence.  2. ICM due to see Dr Carl .  Increased Entresto seems to be some improvement.  Lipids to review with Dr. Carl.  3. Ablation of AF in past . Short episodes.  RTC 3 months.    Collaborating MD ROSA ELENA Pagan M.D.  [address] [place] [address] [id number]  [id number]  [place] [id number]  VIA Facsimile: [phone]

## 2018-03-20 NOTE — TELEPHONE ENCOUNTER
----- Message from PRATEEK Gregoiro sent at 3/20/2018  9:55 AM PDT -----  Labs stable stay on current medications.

## 2018-04-03 ENCOUNTER — HOSPITAL ENCOUNTER (OUTPATIENT)
Dept: LAB | Facility: MEDICAL CENTER | Age: 78
End: 2018-04-03
Attending: INTERNAL MEDICINE
Payer: MEDICARE

## 2018-04-03 LAB — BNP SERPL-MCNC: 193 PG/ML (ref 0–100)

## 2018-04-03 PROCEDURE — 36415 COLL VENOUS BLD VENIPUNCTURE: CPT | Mod: GA

## 2018-04-03 PROCEDURE — 83880 ASSAY OF NATRIURETIC PEPTIDE: CPT | Mod: GA

## 2018-04-06 ENCOUNTER — OFFICE VISIT (OUTPATIENT)
Dept: CARDIOLOGY | Facility: MEDICAL CENTER | Age: 78
End: 2018-04-06
Payer: MEDICARE

## 2018-04-06 VITALS
SYSTOLIC BLOOD PRESSURE: 102 MMHG | DIASTOLIC BLOOD PRESSURE: 58 MMHG | OXYGEN SATURATION: 93 % | HEART RATE: 70 BPM | HEIGHT: 71 IN | WEIGHT: 206 LBS | BODY MASS INDEX: 28.84 KG/M2

## 2018-04-06 DIAGNOSIS — Z95.810 BIVENTRICULAR IMPLANTABLE CARDIOVERTER-DEFIBRILLATOR IN SITU: ICD-10-CM

## 2018-04-06 DIAGNOSIS — I10 HTN (HYPERTENSION), MALIGNANT: ICD-10-CM

## 2018-04-06 DIAGNOSIS — R06.09 DYSPNEA ON EXERTION: ICD-10-CM

## 2018-04-06 DIAGNOSIS — I10 ESSENTIAL HYPERTENSION: ICD-10-CM

## 2018-04-06 DIAGNOSIS — Z98.890 S/P ABLATION OF ATRIAL FIBRILLATION: ICD-10-CM

## 2018-04-06 DIAGNOSIS — I51.89 LEFT VENTRICULAR SYSTOLIC DYSFUNCTION, NYHA CLASS 3: ICD-10-CM

## 2018-04-06 DIAGNOSIS — Z79.899 HIGH RISK MEDICATION USE: ICD-10-CM

## 2018-04-06 DIAGNOSIS — I25.5 ISCHEMIC CARDIOMYOPATHY: ICD-10-CM

## 2018-04-06 DIAGNOSIS — Z86.79 S/P ABLATION OF ATRIAL FIBRILLATION: ICD-10-CM

## 2018-04-06 DIAGNOSIS — I50.20 ACC/AHA STAGE C SYSTOLIC HEART FAILURE (HCC): ICD-10-CM

## 2018-04-06 PROCEDURE — 99215 OFFICE O/P EST HI 40 MIN: CPT | Performed by: INTERNAL MEDICINE

## 2018-04-06 RX ORDER — FUROSEMIDE 40 MG/1
40 TABLET ORAL
Qty: 15 TAB | Refills: 3 | Status: SHIPPED | OUTPATIENT
Start: 2018-04-06 | End: 2018-05-11

## 2018-04-06 RX ORDER — POTASSIUM CHLORIDE 20 MEQ/1
40 TABLET, EXTENDED RELEASE ORAL
Qty: 30 TAB | Refills: 3 | Status: SHIPPED | OUTPATIENT
Start: 2018-04-06 | End: 2018-05-11

## 2018-04-06 ASSESSMENT — ENCOUNTER SYMPTOMS
SENSORY CHANGE: 0
DOUBLE VISION: 0
FEVER: 0
BLURRED VISION: 0
BRUISES/BLEEDS EASILY: 0
DIZZINESS: 0
PALPITATIONS: 0
FALLS: 0
HEADACHES: 0
MEMORY LOSS: 0
DIAPHORESIS: 0
SHORTNESS OF BREATH: 1
COUGH: 0
ABDOMINAL PAIN: 0
MYALGIAS: 0
DEPRESSION: 0

## 2018-04-06 NOTE — LETTER
"     [facility]   [address]  [place] [address]  Phone: [phone]  Fax: [phone]              Isai Fuentes  1940    Encounter Date: 4/6/2018    Jef Carl M.D.          PROGRESS NOTE:  Chief Complaint   Patient presents with   • CHF (Systolic)       Subjective:   Isai Fuentes is a 77 y.o. male who presents today for cardiac care and evaluation in our heart failure clinic due to ischemic cardiomyopathy status post BiVICD, coronary arterial disease, HTN, HLP, atrial fibrillation s/p ablation.     Patient also had history of persistent hypotension for which we were not able to titrate up his medication for his cardiomyopathy.     Patient continues to report of exertional dyspnea but also depression.      Patient still gets winded with daily living activities and exertion. No symptoms at rest.    Past Medical History:   Diagnosis Date   • Atrial fibrillation (CMS-HCC) 9/20/2011   • Biventricular implantable cardioverter-defibrillator in situ 8/7/2015   • Breath shortness    • CAD (coronary artery disease) 9/20/2011   • Cancer (CMS-HCC) 2012    prostate   • CARDIOMYOPATHY 9/20/2011   • Congestive heart failure (CMS-HCC)    • COPD (chronic obstructive pulmonary disease) (CMS-HCC)    • Fatigue 10/28/2010   • Heartburn 10/23/2008   • History of cardiac catheterization 9/20/2011   • History of myocardial infarction     \"many\"   • HTN (hypertension) 9/20/2011   • Indigestion    • Insomnia 7/8/2010   • Ischemic cardiomyopathy 9/20/2011   • Long term (current) use of anticoagulants 5/13/2011   • Myocardial infarct 2007   • Orthostatic hypotension 5/6/2009   • WILLA (obstructive sleep apnea)     CPAP   • Other drug allergy(995.27) 10/16/2008   • Other specified disorder of intestines 07-28-15    constipation/diarrhea/ reports some bleeding from rectum w/blood clots. Seeing GI   • Pacemaker     [employer]   • Pain 07-28-15    \"chronic\", " 0/10   • Pleural effusion 5/18/2009   • Presence of permanent cardiac pacemaker 9/20/2011   • Prostate cancer (CMS-HCC) 5/13/2011   • Second degree AV block, Mobitz type II 11/10/2010   • Snoring    • Stroke (CMS-HCC) 2/3/2009     Past Surgical History:   Procedure Laterality Date   • RECOVERY  10/16/2015    Procedure: CATH LAB LEAD REVISION ST.DRE KENIA;  Surgeon: Recoveryonly Surgery;  Location: SURG[facility];  Service:    • RECOVERY  8/14/2015    Procedure:  CATH LAB LEAD EXTRACTION AWILDA ST.DRE LRG RP KENIA;  Surgeon: Recoveryonly Surgery;  Location: SURGERY PRE[facility]POST PROC [facility];  Service:    • RECOVERY  7/30/2015    Procedure: CATH LAB  LEAD EXTRACTION, UPGRADE TO BIV PM ST.DRE LRG GRP KENIA ;  Surgeon: Recoveryonly Surgery;  Location: SURGERY PRE-POST PROC UNIT [facility];  Service:    • RECOVERY  7/29/2015    Procedure: CATH LAB NEW PM INSERTION DUAL ATRIAL & VENTRICULAR-LV LEAD W/ NEW GENERATOR KENIA ICD9: 414.8;  Surgeon: Recoveryonfreddy Surgery;  Location: SURGERY PRE-POST PROC UNIT [facility];  Service:    • PACEMAKER INSERTION  11/24/08    [facility]   • MULTIPLE CORONARY ARTERY BYPASS  2007    2 vessel   • CARDIAC CATH      has 2 stents     Family History   Problem Relation Age of Onset   • Thyroid Sister      Social History     Social History   • Marital status:      Spouse name: N/A   • Number of children: N/A   • Years of education: N/A     Occupational History   • Not on file.     Social History Main Topics   • Smoking status: Former Smoker     Packs/day: 1.50     Years: 3.00     Types: Cigarettes     Quit date: 1/1/1970   • Smokeless tobacco: Former User     Types: Chew     Quit date: 1/1/1972   • Alcohol use No      Comment: 10-14 per week; scotch & wine   • Drug use: No   • Sexual activity: Not on file     Other Topics Concern   • Not on file     Social History Narrative   • No narrative on file     Allergies   Allergen Reactions   • Plavix [Clopidogrel Bisulfate] Anaphylaxis   • Statins  "[Hmg-Coa-R Inhibitors] Unspecified     Muscles aches     • Ticlid [Ticlopidine Hydrochloride] Unspecified     Pt states \"I'm not sure what happens\".       Outpatient Encounter Prescriptions as of 4/6/2018   Medication Sig Dispense Refill   • vitamin D (CHOLECALCIFEROL) 1000 UNIT Tab Take 1,000 Units by mouth every day.     • sacubitril-valsartan (ENTRESTO)  MG Tab tablet Take 1 Tab by mouth 2 Times a Day. 60 Tab 11   • furosemide (LASIX) 40 MG Tab Take 1 Tab by mouth every 48 hours. 15 Tab 3   • potassium chloride SA (KDUR) 20 MEQ Tab CR Take 2 Tabs by mouth every 48 hours. 30 Tab 3   • beclomethasone (QVAR) 80 MCG/ACT inhaler Inhale 2 Puffs by mouth 2 times a day. Use spacer. Rinse mouth after each use. 2 Inhaler 0   • spironolactone (ALDACTONE) 25 MG Tab Take 1 Tab by mouth every day. 90 Tab 3   • metoprolol SR (TOPROL XL) 25 MG TABLET SR 24 HR Take 1 Tab by mouth every day. 30 Tab 11   • Methotrexate, PF, 10 MG/0.2ML Solution Auto-injector Inject 10 mg as instructed every 7 days. On Mon   Indications: Psoriasis     • Omega-3 Fatty Acids (FISH OIL) 1000 MG Cap capsule Take 1,000 mg by mouth every day.     • Multiple Vitamins-Minerals (OCUVITE) Tab Take 1 Tab by mouth every day.     • aspirin 81 MG tablet Take 81 mg by mouth every day.     • Coenzyme Q10 (CO Q-10 PO) Take 1 Cap by mouth every day.     • metOLazone (ZAROXOLYN) 5 MG Tab Take 1 Tab by mouth 1 time daily as needed. 30 Tab 3   • [DISCONTINUED] potassium chloride SA (KDUR) 20 MEQ Tab CR Take 40 mEq by mouth every day.     • [DISCONTINUED] gabapentin (NEURONTIN) 300 MG Cap Take 300 mg by mouth 3 times a day.     • [DISCONTINUED] sacubitril-valsartan (ENTRESTO) 49-51 MG Tab tablet Take 1 Tab by mouth 2 Times a Day. 60 Tab 11   • [DISCONTINUED] azithromycin (ZITHROMAX) 250 MG Tab Take 2 tablets on day 1, then take 1 tablet a day for 4 days. (Patient not taking: Reported on 2/27/2018) 6 Tab 0   • [DISCONTINUED] benzonatate (TESSALON) 100 MG Cap TAKE 1 " "CAPSULE BY MOUTH THREE TIMES A DAY  0   • [DISCONTINUED] benzonatate (TESSALON) 100 MG Cap Take 1 Cap by mouth 3 times a day as needed for Cough. (Patient not taking: Reported on 2/27/2018) 30 Cap 0   • [DISCONTINUED] furosemide (LASIX) 40 MG Tab Take 1 Tab by mouth every day. 30 Tab 11     No facility-administered encounter medications on file as of 4/6/2018.      Review of Systems   Constitutional: Negative for diaphoresis and fever.   HENT: Negative for nosebleeds.    Eyes: Negative for blurred vision and double vision.   Respiratory: Positive for shortness of breath. Negative for cough.    Cardiovascular: Negative for chest pain and palpitations.   Gastrointestinal: Negative for abdominal pain.   Genitourinary: Negative for dysuria and frequency.   Musculoskeletal: Negative for falls and myalgias.   Skin: Negative for rash.   Neurological: Negative for dizziness, sensory change and headaches.   Endo/Heme/Allergies: Does not bruise/bleed easily.   Psychiatric/Behavioral: Negative for depression and memory loss.        Objective:   /58   Pulse 70   Ht 1.803 m (5' 11\")   Wt 93.4 kg (206 lb)   SpO2 93%   BMI 28.73 kg/m²      Physical Exam   Constitutional: He is oriented to person, place, and time. No distress.   HENT:   Head: Normocephalic and atraumatic.   Right Ear: External ear normal.   Left Ear: External ear normal.   Eyes: Right eye exhibits no discharge. Left eye exhibits no discharge.   Neck: No JVD present. No thyromegaly present.   Cardiovascular: Normal rate, regular rhythm, normal heart sounds and intact distal pulses.  Exam reveals no gallop and no friction rub.    No murmur heard.  Pulmonary/Chest: Breath sounds normal. No respiratory distress.   Abdominal: Bowel sounds are normal. He exhibits no distension. There is no tenderness.   Musculoskeletal: He exhibits no edema or tenderness.   Neurological: He is alert and oriented to person, place, and time. No cranial nerve deficit.   Skin: " Skin is warm and dry. He is not diaphoretic.   Psychiatric: He has a normal mood and affect. His behavior is normal.   Nursing note and vitals reviewed.      Assessment:     1. ACC/AHA stage C systolic heart failure (CMS-HCC)  sacubitril-valsartan (ENTRESTO)  MG Tab tablet    furosemide (LASIX) 40 MG Tab    potassium chloride SA (KDUR) 20 MEQ Tab CR    BASIC METABOLIC PANEL   2. Left ventricular systolic dysfunction, NYHA class 3  sacubitril-valsartan (ENTRESTO)  MG Tab tablet    furosemide (LASIX) 40 MG Tab    potassium chloride SA (KDUR) 20 MEQ Tab CR    BASIC METABOLIC PANEL   3. Ischemic cardiomyopathy  sacubitril-valsartan (ENTRESTO)  MG Tab tablet   4. Biventricular implantable cardioverter-defibrillator in situ  sacubitril-valsartan (ENTRESTO)  MG Tab tablet   5. S/P ablation of atrial fibrillation     6. HTN (hypertension), malignant  sacubitril-valsartan (ENTRESTO)  MG Tab tablet   7. High risk medication use     8. Dyspnea on exertion     9. Essential hypertension         Medical Decision Making:  Today's Assessment / Status / Plan:   Today, based on physical examination findings, patient is euvolemic. No JVD, lungs are clear to auscultation, no pitting edema in bilateral lower extremities, no ascites.    Dry weight is 206 lbs.    Will increase Entresto to 200 mg po twice daily.    Will decrease Lasix to 40 mg every other day. Same change for Potassium.    Continue Toprol XL 25 mg po daily (Cannot tolerate higher dose due to fatigue per patient's report).    Continue Spironolactone 25 mg po daily.    Will also calibrate CPAP with pulmonary service soon as well.    Will continue to closely monitor for side effects of patient's high risk medication(s) including liver, renal function and electrolytes.    I will see patient back in our Heart Failure Clinic with lab tests and studies results in 4 weeks.    I thank you Dr. Pagan for referring patient to our Heart Failure Clinic  today.          Matt Pagan M.D.  [address]  [address]  [place] [address]  VIA Facsimile: [phone]

## 2018-04-06 NOTE — PROGRESS NOTES
"Chief Complaint   Patient presents with   • CHF (Systolic)       Subjective:   Isai Fuentes is a 77 y.o. male who presents today for cardiac care and evaluation in our heart failure clinic due to ischemic cardiomyopathy status post BiVICD, coronary arterial disease, HTN, HLP, atrial fibrillation s/p ablation.     Patient also had history of persistent hypotension for which we were not able to titrate up his medication for his cardiomyopathy.     Patient continues to report of exertional dyspnea but also depression.      Patient still gets winded with daily living activities and exertion. No symptoms at rest.    Past Medical History:   Diagnosis Date   • Atrial fibrillation (CMS-HCC) 9/20/2011   • Biventricular implantable cardioverter-defibrillator in situ 8/7/2015   • Breath shortness    • CAD (coronary artery disease) 9/20/2011   • Cancer (CMS-HCC) 2012    prostate   • CARDIOMYOPATHY 9/20/2011   • Congestive heart failure (CMS-HCC)    • COPD (chronic obstructive pulmonary disease) (CMS-HCC)    • Fatigue 10/28/2010   • Heartburn 10/23/2008   • History of cardiac catheterization 9/20/2011   • History of myocardial infarction     \"many\"   • HTN (hypertension) 9/20/2011   • Indigestion    • Insomnia 7/8/2010   • Ischemic cardiomyopathy 9/20/2011   • Long term (current) use of anticoagulants 5/13/2011   • Myocardial infarct 2007   • Orthostatic hypotension 5/6/2009   • WILLA (obstructive sleep apnea)     CPAP   • Other drug allergy(995.27) 10/16/2008   • Other specified disorder of intestines 07-28-15    constipation/diarrhea/ reports some bleeding from rectum w/blood clots. Seeing GI   • Pacemaker     [employer]   • Pain 07-28-15    \"chronic\", 0/10   • Pleural effusion 5/18/2009   • Presence of permanent cardiac pacemaker 9/20/2011   • Prostate cancer (CMS-HCC) 5/13/2011   • Second degree AV block, Mobitz type II 11/10/2010   • Snoring    • Stroke (CMS-HCC) 2/3/2009     Past Surgical History:   Procedure " "Laterality Date   • RECOVERY  10/16/2015    Procedure: CATH LAB LEAD REVISION .DRE AQUINO;  Surgeon: Recoveryonly Surgery;  Location: SURGERY [facility] PROC [facility];  Service:    • RECOVERY  8/14/2015    Procedure:  CATH LAB LEAD EXTRACTION AWILDA ST.DRE JAS AQUINO;  Surgeon: Recoveryonfreddy Surgery;  Location: SURGERY [facility] PROC [facility];  Service:    • RECOVERY  7/30/2015    Procedure: CATH LAB  LEAD EXTRACTION, UPGRADE TO BIV PM ST.DREJOSI AQUINO ;  Surgeon: Recoveryonfreddy Surgery;  Location: SURGERY [facility]POST PROC [facility];  Service:    • RECOVERY  7/29/2015    Procedure: CATH LAB NEW PM INSERTION DUAL ATRIAL & VENTRICULAR-LV LEAD W/ NEW GENERATOR AQUINO ICD9: 414.8;  Surgeon: Recoveryonfreddy Surgery;  Location: SURGERY [facility] PROC [facility];  Service:    • PACEMAKER INSERTION  11/24/08    [facility]   • MULTIPLE CORONARY ARTERY BYPASS  2007    2 vessel   • CARDIAC CATH      has 2 stents     Family History   Problem Relation Age of Onset   • Thyroid Sister      Social History     Social History   • Marital status:      Spouse name: N/A   • Number of children: N/A   • Years of education: N/A     Occupational History   • Not on file.     Social History Main Topics   • Smoking status: Former Smoker     Packs/day: 1.50     Years: 3.00     Types: Cigarettes     Quit date: 1/1/1970   • Smokeless tobacco: Former User     Types: Chew     Quit date: 1/1/1972   • Alcohol use No      Comment: 10-14 per week; scotch & wine   • Drug use: No   • Sexual activity: Not on file     Other Topics Concern   • Not on file     Social History Narrative   • No narrative on file     Allergies   Allergen Reactions   • Plavix [Clopidogrel Bisulfate] Anaphylaxis   • Statins [Hmg-Coa-R Inhibitors] Unspecified     Muscles aches     • Ticlid [Ticlopidine Hydrochloride] Unspecified     Pt states \"I'm not sure what happens\".       Outpatient Encounter Prescriptions as of 4/6/2018   Medication Sig Dispense Refill   • vitamin D " (CHOLECALCIFEROL) 1000 UNIT Tab Take 1,000 Units by mouth every day.     • sacubitril-valsartan (ENTRESTO)  MG Tab tablet Take 1 Tab by mouth 2 Times a Day. 60 Tab 11   • furosemide (LASIX) 40 MG Tab Take 1 Tab by mouth every 48 hours. 15 Tab 3   • potassium chloride SA (KDUR) 20 MEQ Tab CR Take 2 Tabs by mouth every 48 hours. 30 Tab 3   • beclomethasone (QVAR) 80 MCG/ACT inhaler Inhale 2 Puffs by mouth 2 times a day. Use spacer. Rinse mouth after each use. 2 Inhaler 0   • spironolactone (ALDACTONE) 25 MG Tab Take 1 Tab by mouth every day. 90 Tab 3   • metoprolol SR (TOPROL XL) 25 MG TABLET SR 24 HR Take 1 Tab by mouth every day. 30 Tab 11   • Methotrexate, PF, 10 MG/0.2ML Solution Auto-injector Inject 10 mg as instructed every 7 days. On Mon   Indications: Psoriasis     • Omega-3 Fatty Acids (FISH OIL) 1000 MG Cap capsule Take 1,000 mg by mouth every day.     • Multiple Vitamins-Minerals (OCUVITE) Tab Take 1 Tab by mouth every day.     • aspirin 81 MG tablet Take 81 mg by mouth every day.     • Coenzyme Q10 (CO Q-10 PO) Take 1 Cap by mouth every day.     • metOLazone (ZAROXOLYN) 5 MG Tab Take 1 Tab by mouth 1 time daily as needed. 30 Tab 3   • [DISCONTINUED] potassium chloride SA (KDUR) 20 MEQ Tab CR Take 40 mEq by mouth every day.     • [DISCONTINUED] gabapentin (NEURONTIN) 300 MG Cap Take 300 mg by mouth 3 times a day.     • [DISCONTINUED] sacubitril-valsartan (ENTRESTO) 49-51 MG Tab tablet Take 1 Tab by mouth 2 Times a Day. 60 Tab 11   • [DISCONTINUED] azithromycin (ZITHROMAX) 250 MG Tab Take 2 tablets on day 1, then take 1 tablet a day for 4 days. (Patient not taking: Reported on 2/27/2018) 6 Tab 0   • [DISCONTINUED] benzonatate (TESSALON) 100 MG Cap TAKE 1 CAPSULE BY MOUTH THREE TIMES A DAY  0   • [DISCONTINUED] benzonatate (TESSALON) 100 MG Cap Take 1 Cap by mouth 3 times a day as needed for Cough. (Patient not taking: Reported on 2/27/2018) 30 Cap 0   • [DISCONTINUED] furosemide (LASIX) 40 MG Tab Take  "1 Tab by mouth every day. 30 Tab 11     No facility-administered encounter medications on file as of 4/6/2018.      Review of Systems   Constitutional: Negative for diaphoresis and fever.   HENT: Negative for nosebleeds.    Eyes: Negative for blurred vision and double vision.   Respiratory: Positive for shortness of breath. Negative for cough.    Cardiovascular: Negative for chest pain and palpitations.   Gastrointestinal: Negative for abdominal pain.   Genitourinary: Negative for dysuria and frequency.   Musculoskeletal: Negative for falls and myalgias.   Skin: Negative for rash.   Neurological: Negative for dizziness, sensory change and headaches.   Endo/Heme/Allergies: Does not bruise/bleed easily.   Psychiatric/Behavioral: Negative for depression and memory loss.        Objective:   /58   Pulse 70   Ht 1.803 m (5' 11\")   Wt 93.4 kg (206 lb)   SpO2 93%   BMI 28.73 kg/m²     Physical Exam   Constitutional: He is oriented to person, place, and time. No distress.   HENT:   Head: Normocephalic and atraumatic.   Right Ear: External ear normal.   Left Ear: External ear normal.   Eyes: Right eye exhibits no discharge. Left eye exhibits no discharge.   Neck: No JVD present. No thyromegaly present.   Cardiovascular: Normal rate, regular rhythm, normal heart sounds and intact distal pulses.  Exam reveals no gallop and no friction rub.    No murmur heard.  Pulmonary/Chest: Breath sounds normal. No respiratory distress.   Abdominal: Bowel sounds are normal. He exhibits no distension. There is no tenderness.   Musculoskeletal: He exhibits no edema or tenderness.   Neurological: He is alert and oriented to person, place, and time. No cranial nerve deficit.   Skin: Skin is warm and dry. He is not diaphoretic.   Psychiatric: He has a normal mood and affect. His behavior is normal.   Nursing note and vitals reviewed.      Assessment:     1. ACC/AHA stage C systolic heart failure (CMS-HCC)  sacubitril-valsartan " (ENTRESTO)  MG Tab tablet    furosemide (LASIX) 40 MG Tab    potassium chloride SA (KDUR) 20 MEQ Tab CR    BASIC METABOLIC PANEL   2. Left ventricular systolic dysfunction, NYHA class 3  sacubitril-valsartan (ENTRESTO)  MG Tab tablet    furosemide (LASIX) 40 MG Tab    potassium chloride SA (KDUR) 20 MEQ Tab CR    BASIC METABOLIC PANEL   3. Ischemic cardiomyopathy  sacubitril-valsartan (ENTRESTO)  MG Tab tablet   4. Biventricular implantable cardioverter-defibrillator in situ  sacubitril-valsartan (ENTRESTO)  MG Tab tablet   5. S/P ablation of atrial fibrillation     6. HTN (hypertension), malignant  sacubitril-valsartan (ENTRESTO)  MG Tab tablet   7. High risk medication use     8. Dyspnea on exertion     9. Essential hypertension         Medical Decision Making:  Today's Assessment / Status / Plan:   Today, based on physical examination findings, patient is euvolemic. No JVD, lungs are clear to auscultation, no pitting edema in bilateral lower extremities, no ascites.    Dry weight is 206 lbs.    Will increase Entresto to 200 mg po twice daily.    Will decrease Lasix to 40 mg every other day. Same change for Potassium.    Continue Toprol XL 25 mg po daily (Cannot tolerate higher dose due to fatigue per patient's report).    Continue Spironolactone 25 mg po daily.    Will also calibrate CPAP with pulmonary service soon as well.    Will continue to closely monitor for side effects of patient's high risk medication(s) including liver, renal function and electrolytes.    I will see patient back in our Heart Failure Clinic with lab tests and studies results in 4 weeks.    I thank you Dr. Pagan for referring patient to our Heart Failure Clinic today.

## 2018-04-11 ENCOUNTER — TELEPHONE (OUTPATIENT)
Dept: CARDIOLOGY | Facility: MEDICAL CENTER | Age: 78
End: 2018-04-11

## 2018-04-11 NOTE — TELEPHONE ENCOUNTER
"patient fell and thinks its from new medication   Received: Today   Message Contents   Felisa Nieto R.N.             TT/Tir       Patient's wife Ivis said patient's Entresto was increased and he took a fall last night. She said  To told them to call if he had any side effects from the change. She can be reached at [phone].      Returned call. Pt went to get up last evening, fell and hit a glass table on the way to the floor, pt states he was light headed and then legs buckled. Discussed mechanism of fall and what part of body patient hit. Pt doesn't think he feels he injured anything upon falling. Pt states ongoing nausea since fall. Recommended UC, pt refuses. Highly and strongly encouraged a physical exam post fall. Pt states that he drinks approx 32 ozs of water per day. Pt encouraged to drink 64 ozs of water per day. Ivis quite hesitant about this advice. Strongly encouraged to follow this advice secondary to all the medications he is taking and max dose Entresto now. Ivis states understanding. Also re-educated on patient changing position cautiously and slowly while on max dose. She states she has tried to educate him on that before, \"but he doesn't listen\".  "

## 2018-05-07 ENCOUNTER — HOSPITAL ENCOUNTER (OUTPATIENT)
Dept: LAB | Facility: MEDICAL CENTER | Age: 78
End: 2018-05-07
Attending: INTERNAL MEDICINE
Payer: MEDICARE

## 2018-05-07 DIAGNOSIS — I50.20 ACC/AHA STAGE C SYSTOLIC HEART FAILURE (HCC): ICD-10-CM

## 2018-05-07 DIAGNOSIS — I51.89 LEFT VENTRICULAR SYSTOLIC DYSFUNCTION, NYHA CLASS 3: ICD-10-CM

## 2018-05-07 LAB
ANION GAP SERPL CALC-SCNC: 7 MMOL/L (ref 0–11.9)
BUN SERPL-MCNC: 35 MG/DL (ref 8–22)
CALCIUM SERPL-MCNC: 9.4 MG/DL (ref 8.5–10.5)
CHLORIDE SERPL-SCNC: 104 MMOL/L (ref 96–112)
CO2 SERPL-SCNC: 25 MMOL/L (ref 20–33)
CREAT SERPL-MCNC: 1.97 MG/DL (ref 0.5–1.4)
GLUCOSE SERPL-MCNC: 81 MG/DL (ref 65–99)
POTASSIUM SERPL-SCNC: 5 MMOL/L (ref 3.6–5.5)
SODIUM SERPL-SCNC: 136 MMOL/L (ref 135–145)

## 2018-05-07 PROCEDURE — 36415 COLL VENOUS BLD VENIPUNCTURE: CPT

## 2018-05-07 PROCEDURE — 80048 BASIC METABOLIC PNL TOTAL CA: CPT

## 2018-05-11 ENCOUNTER — OFFICE VISIT (OUTPATIENT)
Dept: CARDIOLOGY | Facility: MEDICAL CENTER | Age: 78
End: 2018-05-11
Payer: MEDICARE

## 2018-05-11 VITALS
BODY MASS INDEX: 28.42 KG/M2 | OXYGEN SATURATION: 94 % | SYSTOLIC BLOOD PRESSURE: 104 MMHG | HEIGHT: 71 IN | HEART RATE: 70 BPM | WEIGHT: 203 LBS | DIASTOLIC BLOOD PRESSURE: 64 MMHG

## 2018-05-11 DIAGNOSIS — R06.09 DYSPNEA ON EXERTION: ICD-10-CM

## 2018-05-11 DIAGNOSIS — I10 HTN (HYPERTENSION), MALIGNANT: ICD-10-CM

## 2018-05-11 DIAGNOSIS — Z79.899 HIGH RISK MEDICATION USE: ICD-10-CM

## 2018-05-11 DIAGNOSIS — I51.89 LEFT VENTRICULAR SYSTOLIC DYSFUNCTION, NYHA CLASS 3: ICD-10-CM

## 2018-05-11 DIAGNOSIS — Z95.810 BIVENTRICULAR IMPLANTABLE CARDIOVERTER-DEFIBRILLATOR IN SITU: ICD-10-CM

## 2018-05-11 DIAGNOSIS — I50.20 ACC/AHA STAGE C SYSTOLIC HEART FAILURE (HCC): ICD-10-CM

## 2018-05-11 DIAGNOSIS — Z86.79 S/P ABLATION OF ATRIAL FIBRILLATION: ICD-10-CM

## 2018-05-11 DIAGNOSIS — Z98.890 S/P ABLATION OF ATRIAL FIBRILLATION: ICD-10-CM

## 2018-05-11 DIAGNOSIS — I25.5 ISCHEMIC CARDIOMYOPATHY: ICD-10-CM

## 2018-05-11 DIAGNOSIS — F10.10 ALCOHOL ABUSE: ICD-10-CM

## 2018-05-11 PROCEDURE — 99214 OFFICE O/P EST MOD 30 MIN: CPT | Performed by: INTERNAL MEDICINE

## 2018-05-11 RX ORDER — METOPROLOL SUCCINATE 50 MG/1
50 TABLET, EXTENDED RELEASE ORAL DAILY
Qty: 30 TAB | Refills: 11 | Status: SHIPPED | OUTPATIENT
Start: 2018-05-11 | End: 2018-05-11 | Stop reason: SDUPTHER

## 2018-05-11 RX ORDER — METOPROLOL SUCCINATE 50 MG/1
50 TABLET, EXTENDED RELEASE ORAL DAILY
Qty: 90 TAB | Refills: 3 | Status: SHIPPED | OUTPATIENT
Start: 2018-05-11 | End: 2019-05-24 | Stop reason: SDUPTHER

## 2018-05-11 ASSESSMENT — ENCOUNTER SYMPTOMS
MEMORY LOSS: 0
MYALGIAS: 0
DIAPHORESIS: 0
SHORTNESS OF BREATH: 1
FALLS: 0
SENSORY CHANGE: 0
DEPRESSION: 0
FEVER: 0
DIZZINESS: 0
PALPITATIONS: 0
BLURRED VISION: 0
ABDOMINAL PAIN: 0
DOUBLE VISION: 0
BRUISES/BLEEDS EASILY: 0
HEADACHES: 0
COUGH: 0

## 2018-05-11 NOTE — PROGRESS NOTES
"Chief Complaint   Patient presents with   • CHF (Systolic)     F/V: 4 WEEK       Subjective:   Isai Fuentes is a 77 y.o. male who presents today for cardiac care and evaluation in our heart failure clinic due to ischemic cardiomyopathy status post BiVICD, coronary arterial disease, HTN, HLP, atrial fibrillation s/p ablation.     Patient also had history of persistent hypotension for which we were not able to titrate up his medication for his cardiomyopathy.     Patient continues to report of exertional dyspnea but also depression.      Patient still gets winded with daily living activities and exertion. No symptoms at rest.    Past Medical History:   Diagnosis Date   • Atrial fibrillation ([facility]) 9/20/2011   • Biventricular implantable cardioverter-defibrillator in situ 8/7/2015   • Breath shortness    • CAD (coronary artery disease) 9/20/2011   • Cancer ([facility]) 2012    prostate   • CARDIOMYOPATHY 9/20/2011   • Congestive heart failure ([facility])    • COPD (chronic obstructive pulmonary disease) ([facility])    • Fatigue 10/28/2010   • Heartburn 10/23/2008   • History of cardiac catheterization 9/20/2011   • History of myocardial infarction     \"many\"   • HTN (hypertension) 9/20/2011   • Indigestion    • Insomnia 7/8/2010   • Ischemic cardiomyopathy 9/20/2011   • Long term (current) use of anticoagulants 5/13/2011   • Myocardial infarct ([facility]) 2007   • Orthostatic hypotension 5/6/2009   • WILLA (obstructive sleep apnea)     CPAP   • Other drug allergy(995.27) 10/16/2008   • Other specified disorder of intestines 07-28-15    constipation/diarrhea/ reports some bleeding from rectum w/blood clots. Seeing GI   • Pacemaker     [employer]   • Pain 07-28-15    \"chronic\", 0/10   • Pleural effusion 5/18/2009   • Presence of permanent cardiac pacemaker 9/20/2011   • Prostate cancer ([facility]) 5/13/2011   • Second degree AV block, Mobitz type II 11/10/2010   • Snoring    • Stroke ([facility]) 2/3/2009     Past Surgical History:   Procedure " "Laterality Date   • RECOVERY  10/16/2015    Procedure: CATH LAB LEAD REVISION ST.JUDE AQUINO;  Surgeon: Recoveryonly Surgery;  Location: SURGERY PRE-POST PROC UNIT [facility];  Service:    • RECOVERY  8/14/2015    Procedure:  CATH LAB LEAD EXTRACTION AWILDA ST.DRE JAS AQUINO;  Surgeon: Recoveryonly Surgery;  Location: SURGERY PRE-POST PROC UNIT [facility];  Service:    • RECOVERY  7/30/2015    Procedure: CATH LAB  LEAD EXTRACTION, UPGRADE TO BIV PM ST.DREJOSI AQUINO ;  Surgeon: Recoveryonfreddy Surgery;  Location: SURGERY PRE-POST PROC UNIT [facility];  Service:    • RECOVERY  7/29/2015    Procedure: CATH LAB NEW PM INSERTION DUAL ATRIAL & VENTRICULAR-LV LEAD W/ NEW GENERATOR AQUINO ICD9: 414.8;  Surgeon: Recoveryonfreddy Surgery;  Location: SURGERY PRE-POST PROC UNIT [facility];  Service:    • PACEMAKER INSERTION  11/24/08    [facility]   • MULTIPLE CORONARY ARTERY BYPASS  2007    2 vessel   • CARDIAC CATH      has 2 stents     Family History   Problem Relation Age of Onset   • Thyroid Sister      Social History     Social History   • Marital status:      Spouse name: N/A   • Number of children: N/A   • Years of education: N/A     Occupational History   • Not on file.     Social History Main Topics   • Smoking status: Former Smoker     Packs/day: 1.50     Years: 3.00     Types: Cigarettes     Quit date: 1/1/1970   • Smokeless tobacco: Former User     Types: Chew     Quit date: 1/1/1972   • Alcohol use No      Comment: 10-14 per week; scotch & wine   • Drug use: No   • Sexual activity: Not on file     Other Topics Concern   • Not on file     Social History Narrative   • No narrative on file     Allergies   Allergen Reactions   • Plavix [Clopidogrel Bisulfate] Anaphylaxis   • Statins [Hmg-Coa-R Inhibitors] Unspecified     Muscles aches     • Ticlid [Ticlopidine Hydrochloride] Unspecified     Pt states \"I'm not sure what happens\".       Outpatient Encounter Prescriptions as of 5/11/2018   Medication Sig Dispense Refill   • metoprolol SR (TOPROL " XL) 50 MG TABLET SR 24 HR Take 1 Tab by mouth every day. 90 Tab 3   • vitamin D (CHOLECALCIFEROL) 1000 UNIT Tab Take 1,000 Units by mouth every day.     • sacubitril-valsartan (ENTRESTO)  MG Tab tablet Take 1 Tab by mouth 2 Times a Day. 60 Tab 11   • metOLazone (ZAROXOLYN) 5 MG Tab Take 1 Tab by mouth 1 time daily as needed. 30 Tab 3   • beclomethasone (QVAR) 80 MCG/ACT inhaler Inhale 2 Puffs by mouth 2 times a day. Use spacer. Rinse mouth after each use. 2 Inhaler 0   • spironolactone (ALDACTONE) 25 MG Tab Take 1 Tab by mouth every day. 90 Tab 3   • Methotrexate, PF, 10 MG/0.2ML Solution Auto-injector Inject 10 mg as instructed every 7 days. On Mon   Indications: Psoriasis     • Omega-3 Fatty Acids (FISH OIL) 1000 MG Cap capsule Take 1,000 mg by mouth every day.     • Multiple Vitamins-Minerals (OCUVITE) Tab Take 1 Tab by mouth every day.     • aspirin 81 MG tablet Take 81 mg by mouth every day.     • Coenzyme Q10 (CO Q-10 PO) Take 1 Cap by mouth every day.     • [DISCONTINUED] metoprolol SR (TOPROL XL) 50 MG TABLET SR 24 HR Take 1 Tab by mouth every day. 30 Tab 11   • [DISCONTINUED] furosemide (LASIX) 40 MG Tab Take 1 Tab by mouth every 48 hours. 15 Tab 3   • [DISCONTINUED] potassium chloride SA (KDUR) 20 MEQ Tab CR Take 2 Tabs by mouth every 48 hours. 30 Tab 3   • [DISCONTINUED] metoprolol SR (TOPROL XL) 25 MG TABLET SR 24 HR Take 1 Tab by mouth every day. 30 Tab 11     No facility-administered encounter medications on file as of 5/11/2018.      Review of Systems   Constitutional: Negative for diaphoresis and fever.   HENT: Negative for nosebleeds.    Eyes: Negative for blurred vision and double vision.   Respiratory: Positive for shortness of breath. Negative for cough.    Cardiovascular: Negative for chest pain and palpitations.   Gastrointestinal: Negative for abdominal pain.   Genitourinary: Negative for dysuria and frequency.   Musculoskeletal: Negative for falls and myalgias.   Skin: Negative for  "rash.   Neurological: Negative for dizziness, sensory change and headaches.   Endo/Heme/Allergies: Does not bruise/bleed easily.   Psychiatric/Behavioral: Negative for depression and memory loss.        Objective:   /64   Pulse 70   Ht 1.803 m (5' 11\")   Wt 92.1 kg (203 lb)   SpO2 94%   BMI 28.31 kg/m²     Physical Exam   Constitutional: He is oriented to person, place, and time. No distress.   HENT:   Head: Normocephalic and atraumatic.   Right Ear: External ear normal.   Left Ear: External ear normal.   Eyes: Right eye exhibits no discharge. Left eye exhibits no discharge.   Neck: No JVD present. No thyromegaly present.   Cardiovascular: Normal rate, regular rhythm, normal heart sounds and intact distal pulses.  Exam reveals no gallop and no friction rub.    No murmur heard.  Pulmonary/Chest: Breath sounds normal. No respiratory distress.   Abdominal: Bowel sounds are normal. He exhibits no distension. There is no tenderness.   Musculoskeletal: He exhibits no edema or tenderness.   Neurological: He is alert and oriented to person, place, and time. No cranial nerve deficit.   Skin: Skin is warm and dry. He is not diaphoretic.   Psychiatric: He has a normal mood and affect. His behavior is normal.   Nursing note and vitals reviewed.      Assessment:     1. ACC/AHA stage C systolic heart failure (HCC)  metoprolol SR (TOPROL XL) 50 MG TABLET SR 24 HR    DISCONTINUED: metoprolol SR (TOPROL XL) 50 MG TABLET SR 24 HR   2. Left ventricular systolic dysfunction, NYHA class 3  metoprolol SR (TOPROL XL) 50 MG TABLET SR 24 HR    DISCONTINUED: metoprolol SR (TOPROL XL) 50 MG TABLET SR 24 HR   3. Ischemic cardiomyopathy  metoprolol SR (TOPROL XL) 50 MG TABLET SR 24 HR    DISCONTINUED: metoprolol SR (TOPROL XL) 50 MG TABLET SR 24 HR   4. Biventricular implantable cardioverter-defibrillator in situ     5. S/P ablation of atrial fibrillation     6. HTN (hypertension), malignant     7. High risk medication use     8. " Dyspnea on exertion     9. Alcohol abuse, quit in 09/2017         Medical Decision Making:  Today's Assessment / Status / Plan:   Today, based on physical examination findings, patient is euvolemic. No JVD, lungs are clear to auscultation, no pitting edema in bilateral lower extremities, no ascites.    Dry weight is 203 lbs.    Stop Lasix and Potassium    Will increase Metoprolol SR (Toprol XL) To 50 mg po daily.    Continue Entresto 200 mg bid, Spironolactone 25 mg po daily.    Will continue to closely monitor for side effects of patient's high risk medication(s) including liver, renal function and electrolytes.    I will see patient back in our Heart Failure Clinic with lab tests and studies results in 12 weeks.    I thank you Dr. Pagan for referring patient to our Heart Failure Clinic today.

## 2018-06-19 ENCOUNTER — HOSPITAL ENCOUNTER (OUTPATIENT)
Dept: LAB | Facility: MEDICAL CENTER | Age: 78
End: 2018-06-19
Attending: RADIOLOGY
Payer: MEDICARE

## 2018-06-19 ENCOUNTER — HOSPITAL ENCOUNTER (OUTPATIENT)
Dept: LAB | Facility: MEDICAL CENTER | Age: 78
End: 2018-06-19
Attending: UROLOGY
Payer: MEDICARE

## 2018-06-19 LAB — PSA SERPL-MCNC: 0.22 NG/ML (ref 0–4)

## 2018-06-19 PROCEDURE — 36415 COLL VENOUS BLD VENIPUNCTURE: CPT

## 2018-06-19 PROCEDURE — 84153 ASSAY OF PSA TOTAL: CPT

## 2018-06-20 ENCOUNTER — OFFICE VISIT (OUTPATIENT)
Dept: CARDIOLOGY | Facility: MEDICAL CENTER | Age: 78
End: 2018-06-20
Payer: MEDICARE

## 2018-06-20 VITALS
WEIGHT: 199 LBS | HEART RATE: 72 BPM | OXYGEN SATURATION: 94 % | HEIGHT: 71 IN | SYSTOLIC BLOOD PRESSURE: 110 MMHG | BODY MASS INDEX: 27.86 KG/M2 | DIASTOLIC BLOOD PRESSURE: 60 MMHG

## 2018-06-20 DIAGNOSIS — Z95.810 BIVENTRICULAR IMPLANTABLE CARDIOVERTER-DEFIBRILLATOR IN SITU: ICD-10-CM

## 2018-06-20 DIAGNOSIS — I47.29 VENTRICULAR TACHYCARDIA (PAROXYSMAL) (HCC): ICD-10-CM

## 2018-06-20 DIAGNOSIS — I25.5 ISCHEMIC CARDIOMYOPATHY: Chronic | ICD-10-CM

## 2018-06-20 PROCEDURE — 93284 PRGRMG EVAL IMPLANTABLE DFB: CPT | Performed by: NURSE PRACTITIONER

## 2018-06-20 PROCEDURE — 99214 OFFICE O/P EST MOD 30 MIN: CPT | Mod: 25 | Performed by: NURSE PRACTITIONER

## 2018-06-20 RX ORDER — POTASSIUM CHLORIDE 1.5 G/1.58G
10 POWDER, FOR SOLUTION ORAL DAILY
COMMUNITY
End: 2020-09-28

## 2018-06-20 RX ORDER — FUROSEMIDE 40 MG/1
40 TABLET ORAL DAILY
COMMUNITY
End: 2020-09-28

## 2018-06-20 ASSESSMENT — ENCOUNTER SYMPTOMS
WEIGHT LOSS: 0
BLURRED VISION: 0
HEADACHES: 0
PND: 0
PALPITATIONS: 0
LOSS OF CONSCIOUSNESS: 0
SEIZURES: 0
PSYCHIATRIC NEGATIVE: 1
SPEECH CHANGE: 0
DOUBLE VISION: 0
SPUTUM PRODUCTION: 0
FEVER: 0
CHILLS: 0
DIZZINESS: 0
ABDOMINAL PAIN: 0
FLANK PAIN: 0
VOMITING: 0
HEARTBURN: 0
COUGH: 0
MYALGIAS: 1
SORE THROAT: 0
NAUSEA: 0
FOCAL WEAKNESS: 0
CLAUDICATION: 0
SHORTNESS OF BREATH: 0
ORTHOPNEA: 0
WHEEZING: 0

## 2018-06-20 NOTE — LETTER
[facility]   [address]  [place] [address]  Phone: [phone]  Fax: [phone]              Isai Fuentes  1940    Encounter Date: 6/20/2018    PRATEEK Gregorio          PROGRESS NOTE:  No notes on file      Matt Pagan M.D.  [address] [place] [address] [address]  [address]  [facility] [address]  VIA Facsimile: [phone]

## 2018-06-20 NOTE — PROGRESS NOTES
"Chief Complaint   Patient presents with   • Atrial Fibrillation     3 month follow up       Subjective:   Isai Fuentes is a 77 y.o. male who presents today   For review of his cardiac status.  Luis Armando has a severe ICM and continues despite medication changes to demonstrate an improvement in his Ef and especially his exercise tolerance. He states he is exhausted all the time. He also describes diffuse aching throughout his body.  His medications were recently changed with reduction of his lasix and switch to Aldactone.  Will obtain labs since he has CKD and K+ was at 5.0 before change in meds.  He had 2 minutes of flutter on his device. His BB was recently increased per Dr Carl.  Device function appears intact. With his BiV ICD>  Past Medical History:   Diagnosis Date   • Atrial fibrillation ([facility]) 9/20/2011   • Biventricular implantable cardioverter-defibrillator in situ 8/7/2015   • Breath shortness    • CAD (coronary artery disease) 9/20/2011   • Cancer ([facility]) 2012    prostate   • CARDIOMYOPATHY 9/20/2011   • Congestive heart failure ([facility])    • COPD (chronic obstructive pulmonary disease) ([facility])    • Fatigue 10/28/2010   • Heartburn 10/23/2008   • History of cardiac catheterization 9/20/2011   • History of myocardial infarction     \"many\"   • HTN (hypertension) 9/20/2011   • Indigestion    • Insomnia 7/8/2010   • Ischemic cardiomyopathy 9/20/2011   • Long term (current) use of anticoagulants 5/13/2011   • Myocardial infarct ([facility]) 2007   • Orthostatic hypotension 5/6/2009   • WILLA (obstructive sleep apnea)     CPAP   • Other drug allergy(995.27) 10/16/2008   • Other specified disorder of intestines 07-28-15    constipation/diarrhea/ reports some bleeding from rectum w/blood clots. Seeing GI   • Pacemaker     [employer]   • Pain 07-28-15    \"chronic\", 0/10   • Pleural effusion 5/18/2009   • Presence of permanent cardiac pacemaker 9/20/2011   • Prostate cancer ([facility]) 5/13/2011   • Second degree AV block, Mobitz " "type II 11/10/2010   • Snoring    • Stroke (HCC) 2/3/2009     Past Surgical History:   Procedure Laterality Date   • RECOVERY  10/16/2015    Procedure: CATH LAB LEAD REVISION .DRE AQUINO;  Surgeon: Recoveryonly Surgery;  Location: SURGERY PRE-POST PROC UNIT [facility];  Service:    • RECOVERY  8/14/2015    Procedure:  CATH LAB LEAD EXTRACTION AWILDA ST.DRE BIPING RP KENIA;  Surgeon: Recoveryonly Surgery;  Location: SURGERY PRE-POST PROC UNIT [facility];  Service:    • RECOVERY  7/30/2015    Procedure: CATH LAB  LEAD EXTRACTION, UPGRADE TO BIV PM ST.DRE BIPING GRP KENIA ;  Surgeon: Recoveryonly Surgery;  Location: SURGERY PRE-POST PROC UNIT [facility];  Service:    • RECOVERY  7/29/2015    Procedure: CATH LAB NEW PM INSERTION DUAL ATRIAL & VENTRICULAR-LV LEAD W/ NEW GENERATOR AQUINO ICD9: 414.8;  Surgeon: Recoveryonfreddy Surgery;  Location: SURGERY PRE-POST PROC UNIT [facility];  Service:    • PACEMAKER INSERTION  11/24/08    [facility]   • MULTIPLE CORONARY ARTERY BYPASS  2007    2 vessel   • CARDIAC CATH      has 2 stents     Family History   Problem Relation Age of Onset   • Thyroid Sister      Social History     Social History   • Marital status:      Spouse name: N/A   • Number of children: N/A   • Years of education: N/A     Occupational History   • Not on file.     Social History Main Topics   • Smoking status: Former Smoker     Packs/day: 1.50     Years: 3.00     Types: Cigarettes     Quit date: 1/1/1970   • Smokeless tobacco: Former User     Types: Chew     Quit date: 1/1/1972   • Alcohol use No      Comment: 10-14 per week; scotch & wine   • Drug use: No   • Sexual activity: Not on file     Other Topics Concern   • Not on file     Social History Narrative   • No narrative on file     Allergies   Allergen Reactions   • Plavix [Clopidogrel Bisulfate] Anaphylaxis   • Statins [Hmg-Coa-R Inhibitors] Unspecified     Muscles aches     • Ticlid [Ticlopidine Hydrochloride] Unspecified     Pt states \"I'm not sure what happens\".       Outpatient " Encounter Prescriptions as of 6/20/2018   Medication Sig Dispense Refill   • potassium chloride (KLOR-CON) 20 MEQ Pack Take 20 mEq by mouth 2 times a day.     • furosemide (LASIX) 40 MG Tab Take 40 mg by mouth every day.     • metoprolol SR (TOPROL XL) 50 MG TABLET SR 24 HR Take 1 Tab by mouth every day. 90 Tab 3   • vitamin D (CHOLECALCIFEROL) 1000 UNIT Tab Take 1,000 Units by mouth every day.     • sacubitril-valsartan (ENTRESTO)  MG Tab tablet Take 1 Tab by mouth 2 Times a Day. 60 Tab 11   • metOLazone (ZAROXOLYN) 5 MG Tab Take 1 Tab by mouth 1 time daily as needed. 30 Tab 3   • beclomethasone (QVAR) 80 MCG/ACT inhaler Inhale 2 Puffs by mouth 2 times a day. Use spacer. Rinse mouth after each use. 2 Inhaler 0   • spironolactone (ALDACTONE) 25 MG Tab Take 1 Tab by mouth every day. 90 Tab 3   • Methotrexate, PF, 10 MG/0.2ML Solution Auto-injector Inject 10 mg as instructed every 7 days. On Mon   Indications: Psoriasis     • Omega-3 Fatty Acids (FISH OIL) 1000 MG Cap capsule Take 1,000 mg by mouth every day.     • Multiple Vitamins-Minerals (OCUVITE) Tab Take 1 Tab by mouth every day.     • aspirin 81 MG tablet Take 81 mg by mouth every day.     • Coenzyme Q10 (CO Q-10 PO) Take 1 Cap by mouth every day.       No facility-administered encounter medications on file as of 6/20/2018.      Review of Systems   Constitutional: Positive for malaise/fatigue. Negative for chills, fever and weight loss.   HENT: Negative for congestion and sore throat.    Eyes: Negative for blurred vision and double vision.   Respiratory: Negative for cough, sputum production, shortness of breath and wheezing.    Cardiovascular: Negative for chest pain, palpitations, orthopnea, claudication, leg swelling and PND.   Gastrointestinal: Negative for abdominal pain, heartburn, nausea and vomiting.   Genitourinary: Negative for dysuria, flank pain and frequency.   Musculoskeletal: Positive for myalgias.   Skin: Negative.    Neurological:  "Negative for dizziness, speech change, focal weakness, seizures, loss of consciousness and headaches.   Endo/Heme/Allergies: Negative.    Psychiatric/Behavioral: Negative.         Objective:   /60   Pulse 72   Ht 1.803 m (5' 11\")   Wt 90.3 kg (199 lb)   SpO2 94%   BMI 27.75 kg/m²     Physical Exam   Constitutional: He is oriented to person, place, and time. He appears well-developed and well-nourished.   HENT:   Head: Normocephalic and atraumatic.   Eyes: Pupils are equal, round, and reactive to light.   Neck: Normal range of motion. Neck supple.   Cardiovascular: Normal rate and regular rhythm.    Murmur heard.  Pulmonary/Chest: Effort normal and breath sounds normal.   Device site uncomplicated.   Abdominal: Soft. Bowel sounds are normal.   Musculoskeletal: Normal range of motion.   Neurological: He is alert and oriented to person, place, and time.   Skin: Skin is warm and dry.   Psychiatric: He has a normal mood and affect.   Device function intact see flow sheet.    Assessment:     1. Biventricular implantable cardioverter-defibrillator in situ     2. Ischemic cardiomyopathy  BASIC METABOLIC PANEL   3. Ventricular tachycardia (paroxysmal) (HCC)  BASIC METABOLIC PANEL       Medical Decision Making:  Today's Assessment / Status / Plan:     1. BiV ICD demonstrating appropriate function. BV 3.2 Years.  2 minutes of AFL with VRR at 150 bpm. BB has recently been increased will continue to follow.  2. ICM no chest pain but diffuse aching thru all muscles.  3. VT no episodes at present check.  RTc 3 months    Collaborating MD Luna  "

## 2018-07-19 ENCOUNTER — OFFICE VISIT (OUTPATIENT)
Dept: PULMONOLOGY | Facility: HOSPICE | Age: 78
End: 2018-07-19
Payer: MEDICARE

## 2018-07-19 VITALS
HEART RATE: 68 BPM | DIASTOLIC BLOOD PRESSURE: 72 MMHG | OXYGEN SATURATION: 98 % | BODY MASS INDEX: 27.3 KG/M2 | RESPIRATION RATE: 16 BRPM | SYSTOLIC BLOOD PRESSURE: 110 MMHG | TEMPERATURE: 98.3 F | HEIGHT: 71 IN | WEIGHT: 195 LBS

## 2018-07-19 DIAGNOSIS — J44.9 CHRONIC OBSTRUCTIVE PULMONARY DISEASE, UNSPECIFIED COPD TYPE (HCC): ICD-10-CM

## 2018-07-19 DIAGNOSIS — G47.33 OSA (OBSTRUCTIVE SLEEP APNEA): ICD-10-CM

## 2018-07-19 DIAGNOSIS — I25.5 ISCHEMIC CARDIOMYOPATHY: Chronic | ICD-10-CM

## 2018-07-19 PROCEDURE — 99214 OFFICE O/P EST MOD 30 MIN: CPT | Performed by: INTERNAL MEDICINE

## 2018-07-19 RX ORDER — HYDROCODONE BITARTRATE AND ACETAMINOPHEN 5; 325 MG/1; MG/1
TABLET ORAL
Refills: 0 | COMMUNITY
Start: 2018-06-30 | End: 2020-09-28

## 2018-07-19 RX ORDER — POTASSIUM CHLORIDE 20 MEQ/1
TABLET, EXTENDED RELEASE ORAL
Refills: 3 | COMMUNITY
Start: 2018-06-14 | End: 2018-08-28

## 2018-07-19 NOTE — PROGRESS NOTES
"Chief Complaint   Patient presents with   • Follow-Up     WILLA with compliance cardownload       HPI: This patient is a 77 y.o. Male who returns for follow-up of mild COPD as well as WILLA. He has stage I COPD, on albuterol when necessary. He denies any significant inhaler use.. Last PFTs showed FEV1 2.55 L or 85% predicted. He has paroxysmal atrial fibrillation and ischemic cardiomyopathy which cause some exertional dyspnea and fatigue. He denies cough, wheezing, chest tightness or edema. Denies AECOPD over the past year. He continues to walk his dog 40 minutes daily.  He has severe WILLA, AHI 45 events per hour, on AutoPap 8-11 cm of water. Compliance card confirms 77% % usage for almost 5 hours nightly, and normal AHI of 3. Denies daytime hypersomnolence.    Past Medical History:   Diagnosis Date   • Atrial fibrillation ([facility]) 9/20/2011   • Biventricular implantable cardioverter-defibrillator in situ 8/7/2015   • Breath shortness    • CAD (coronary artery disease) 9/20/2011   • Cancer ([facility]) 2012    prostate   • CARDIOMYOPATHY 9/20/2011   • Congestive heart failure ([facility])    • COPD (chronic obstructive pulmonary disease) ([facility])    • Fatigue 10/28/2010   • Heartburn 10/23/2008   • History of cardiac catheterization 9/20/2011   • History of myocardial infarction     \"many\"   • HTN (hypertension) 9/20/2011   • Indigestion    • Insomnia 7/8/2010   • Ischemic cardiomyopathy 9/20/2011   • Long term (current) use of anticoagulants 5/13/2011   • Myocardial infarct ([facility]) 2007   • Orthostatic hypotension 5/6/2009   • WILLA (obstructive sleep apnea)     CPAP   • Other drug allergy(995.27) 10/16/2008   • Other specified disorder of intestines 07-28-15    constipation/diarrhea/ reports some bleeding from rectum w/blood clots. Seeing GI   • Pacemaker     [employer]   • Pain 07-28-15    \"chronic\", 0/10   • Pleural effusion 5/18/2009   • Presence of permanent cardiac pacemaker 9/20/2011   • Prostate cancer ([facility]) 5/13/2011   • Second " degree AV block, Mobitz type II 11/10/2010   • Snoring    • Stroke (HCC) 2/3/2009       Social History     Social History   • Marital status:      Spouse name: N/A   • Number of children: N/A   • Years of education: N/A     Occupational History   • Not on file.     Social History Main Topics   • Smoking status: Former Smoker     Packs/day: 1.50     Years: 3.00     Types: Cigarettes     Quit date: 1/1/1970   • Smokeless tobacco: Former User     Types: Chew     Quit date: 1/1/1972   • Alcohol use No      Comment: 10-14 per week; scotch & wine   • Drug use: No   • Sexual activity: Not on file     Other Topics Concern   • Not on file     Social History Narrative   • No narrative on file       Family History   Problem Relation Age of Onset   • Thyroid Sister        Current Outpatient Prescriptions on File Prior to Visit   Medication Sig Dispense Refill   • potassium chloride (KLOR-CON) 20 MEQ Pack Take 20 mEq by mouth 2 times a day.     • furosemide (LASIX) 40 MG Tab Take 40 mg by mouth every day.     • metoprolol SR (TOPROL XL) 50 MG TABLET SR 24 HR Take 1 Tab by mouth every day. 90 Tab 3   • vitamin D (CHOLECALCIFEROL) 1000 UNIT Tab Take 1,000 Units by mouth every day.     • metOLazone (ZAROXOLYN) 5 MG Tab Take 1 Tab by mouth 1 time daily as needed. 30 Tab 3   • spironolactone (ALDACTONE) 25 MG Tab Take 1 Tab by mouth every day. 90 Tab 3   • Methotrexate, PF, 10 MG/0.2ML Solution Auto-injector Inject 10 mg as instructed every 7 days. On Mon   Indications: Psoriasis     • Omega-3 Fatty Acids (FISH OIL) 1000 MG Cap capsule Take 1,000 mg by mouth every day.     • Multiple Vitamins-Minerals (OCUVITE) Tab Take 1 Tab by mouth every day.     • aspirin 81 MG tablet Take 81 mg by mouth every day.     • Coenzyme Q10 (CO Q-10 PO) Take 1 Cap by mouth every day.     • sacubitril-valsartan (ENTRESTO)  MG Tab tablet Take 1 Tab by mouth 2 Times a Day. 60 Tab 11   • beclomethasone (QVAR) 80 MCG/ACT inhaler Inhale 2 Puffs  "by mouth 2 times a day. Use spacer. Rinse mouth after each use. 2 Inhaler 0     No current facility-administered medications on file prior to visit.        Allergies: Plavix [clopidogrel bisulfate]; Statins [hmg-coa-r inhibitors]; and Ticlid [ticlopidine hydrochloride]    ROS:   Constitutional: Denies fevers, chills, night sweats, +fatigue, denies weight loss  Eyes: Denies vision loss, pain, drainage, double vision  Ears, Nose, Throat: Denies earache, difficulty hearing, tinnitus, nasal congestion, hoarseness  Cardiovascular: Denies chest pain, tightness, palpitations, orthopnea or edema  Respiratory: Flores in HPI  Sleep: Denies daytime sleepiness, snoring, apneas, insomnia, morning headaches  GI: Denies heartburn, dysphagia, nausea, abdominal pain, diarrhea or constipation  : Denies frequent urination, hematuria, discharge or painful urination  Musculoskeletal: Denies back pain, painful joints, sore muscles  Neurological: Denies weakness or headaches  Skin: No rashes    Blood pressure 110/72, pulse 68, temperature 36.8 °C (98.3 °F), resp. rate 16, height 1.803 m (5' 11\"), weight 88.5 kg (195 lb), SpO2 98 %.    Physical Exam:  Appearance: Well-nourished, well-developed, in no acute distress  HEENT: Normocephalic, atraumatic, white sclera, PERRLA, oropharynx clear  Neck: No adenopathy or masses  Respiratory: no intercostal retractions or accessory muscle use  Lungs auscultation: Clear to auscultation bilaterally  Cardiovascular: Regular rate rhythm. No murmurs, rubs or gallops.  No LE edema  Abdomen: soft, nondistended  Gait: Normal  Digits: No clubbing, cyanosis  Motor: No focal deficits  Orientation: Oriented to time, person and place    Diagnosis:  1. WILLA (obstructive sleep apnea)     2. Chronic obstructive pulmonary disease, unspecified COPD type (HCC)     3. Ischemic cardiomyopathy         Plan:  The patient's COPD is mild, clinically and stable.  His WILLA is well controlled on CPAP.  His chronic fatigue is in " part secondary to his cardiomyopathy.  We discussed trying albuterol inhaler prophylactically before activities such as walking the dog.  Continue AutoPap: 8-11 cm of water nightly.  Replace CPAP mask every 3 months.  Return in about 1 year (around 7/19/2019).

## 2018-08-27 ENCOUNTER — TELEPHONE (OUTPATIENT)
Dept: CARDIOLOGY | Facility: MEDICAL CENTER | Age: 78
End: 2018-08-27

## 2018-08-27 NOTE — TELEPHONE ENCOUNTER
Spoke with pts wife. Confirmed Dr. Lee's and Sofiya Trivedi's appt. The BMP ordered by PB was instructed for the pt to do a week before they come in to see her on 10/3/18.

## 2018-08-28 ENCOUNTER — OFFICE VISIT (OUTPATIENT)
Dept: CARDIOLOGY | Facility: MEDICAL CENTER | Age: 78
End: 2018-08-28
Payer: MEDICARE

## 2018-08-28 VITALS
OXYGEN SATURATION: 94 % | BODY MASS INDEX: 28.28 KG/M2 | HEART RATE: 92 BPM | HEIGHT: 71 IN | DIASTOLIC BLOOD PRESSURE: 70 MMHG | WEIGHT: 202 LBS | SYSTOLIC BLOOD PRESSURE: 106 MMHG

## 2018-08-28 DIAGNOSIS — Z79.899 HIGH RISK MEDICATION USE: ICD-10-CM

## 2018-08-28 DIAGNOSIS — I25.5 ISCHEMIC CARDIOMYOPATHY: Chronic | ICD-10-CM

## 2018-08-28 DIAGNOSIS — I95.1 ORTHOSTATIC HYPOTENSION: Chronic | ICD-10-CM

## 2018-08-28 DIAGNOSIS — I44.1 SECOND DEGREE AV BLOCK, MOBITZ TYPE II: Chronic | ICD-10-CM

## 2018-08-28 DIAGNOSIS — N18.30 STAGE 3 CHRONIC KIDNEY DISEASE (HCC): ICD-10-CM

## 2018-08-28 DIAGNOSIS — F10.10 ALCOHOL ABUSE: ICD-10-CM

## 2018-08-28 DIAGNOSIS — I50.20 SYSTOLIC HEART FAILURE, ACC/AHA STAGE C (HCC): ICD-10-CM

## 2018-08-28 DIAGNOSIS — I48.0 PAROXYSMAL ATRIAL FIBRILLATION (HCC): ICD-10-CM

## 2018-08-28 DIAGNOSIS — E78.00 HYPERCHOLESTEREMIA: Chronic | ICD-10-CM

## 2018-08-28 DIAGNOSIS — I10 ESSENTIAL HYPERTENSION: Chronic | ICD-10-CM

## 2018-08-28 DIAGNOSIS — Z86.79 S/P ABLATION OF ATRIAL FIBRILLATION: ICD-10-CM

## 2018-08-28 DIAGNOSIS — I50.23 NYHA CLASS 3 ACUTE ON CHRONIC SYSTOLIC HEART FAILURE (HCC): ICD-10-CM

## 2018-08-28 DIAGNOSIS — I25.10 CORONARY ARTERY DISEASE INVOLVING NATIVE CORONARY ARTERY OF NATIVE HEART WITHOUT ANGINA PECTORIS: Chronic | ICD-10-CM

## 2018-08-28 DIAGNOSIS — I25.2 HISTORY OF MYOCARDIAL INFARCTION: Chronic | ICD-10-CM

## 2018-08-28 DIAGNOSIS — G47.33 OBSTRUCTIVE SLEEP APNEA SYNDROME: Chronic | ICD-10-CM

## 2018-08-28 DIAGNOSIS — Z95.810 BIVENTRICULAR IMPLANTABLE CARDIOVERTER-DEFIBRILLATOR IN SITU: ICD-10-CM

## 2018-08-28 DIAGNOSIS — Z98.890 S/P ABLATION OF ATRIAL FIBRILLATION: ICD-10-CM

## 2018-08-28 DIAGNOSIS — I51.89 LEFT VENTRICULAR SYSTOLIC DYSFUNCTION, NYHA CLASS 3: ICD-10-CM

## 2018-08-28 PROCEDURE — 99214 OFFICE O/P EST MOD 30 MIN: CPT | Mod: 25 | Performed by: INTERNAL MEDICINE

## 2018-08-28 PROCEDURE — 94618 PULMONARY STRESS TESTING: CPT | Performed by: INTERNAL MEDICINE

## 2018-08-28 ASSESSMENT — MINNESOTA LIVING WITH HEART FAILURE QUESTIONNAIRE (MLHF)
DIFFICULTY WITH SEXUAL ACTIVITIES: 5
MAKING YOU STAY IN A HOSPITAL: 2
SWELLING IN ANKLES OR LEGS: 2
DIFFICULTY TO CONCENTRATE OR REMEMBERING THINGS: 3
DIFFICULTY WORKING TO EARN A LIVING: 5
COSTING YOU MONEY FOR MEDICAL CARE: 2
DIFFICULTY SLEEPING WELL AT NIGHT: 4
LOSS OF SELF CONTROL IN YOUR LIFE: 4
WORKING AROUND THE HOUSE OR YARD DIFFICULT: 4
DIFFICULTY WITH RECREATIONAL PASTIMES, SPORTS, HOBBIES: 5
EATING LESS FOODS YOU LIKE: 3
DIFFICULTY GOING AWAY FROM HOME: 4
MAKING YOU SHORT OF BREATH: 3
DIFFICULTY SOCIALIZING WITH FAMILY OR FRIENDS: 5
MAKING YOU FEEL DEPRESSED: 3
HAVING TO SIT OR LIE DOWN DURING THE DAY: 4
TIRED, FATIGUED OR LOW ON ENERGY: 5
GIVING YOU SIDE EFFECTS FROM TREATMENTS: 4
TOTAL_SCORE: 77
FEELING LIKE A BURDEN TO FAMILY AND FRIENDS: 4
MAKING YOU WORRY: 4
WALKING ABOUT OR CLIMBING STAIRS DIFFICULT: 2

## 2018-08-28 ASSESSMENT — ENCOUNTER SYMPTOMS
COUGH: 0
WEAKNESS: 0
HEMOPTYSIS: 0
LOSS OF CONSCIOUSNESS: 0
NEUROLOGICAL NEGATIVE: 1
PND: 0
FEVER: 0
BRUISES/BLEEDS EASILY: 0
CONSTITUTIONAL NEGATIVE: 1
CLAUDICATION: 0
SHORTNESS OF BREATH: 0
STRIDOR: 0
WHEEZING: 0
GASTROINTESTINAL NEGATIVE: 1
CARDIOVASCULAR NEGATIVE: 1
EYES NEGATIVE: 1
CHILLS: 0
MUSCULOSKELETAL NEGATIVE: 1
DIZZINESS: 0
SPUTUM PRODUCTION: 0
RESPIRATORY NEGATIVE: 1
PALPITATIONS: 0
ORTHOPNEA: 0
SORE THROAT: 0

## 2018-08-28 ASSESSMENT — 6 MINUTE WALK TEST (6MWT): TOTAL DISTANCE WALKED (METERS): 413

## 2018-08-28 NOTE — LETTER
"     [facility]   [address]  [place] [address]  Phone: [phone]  Fax: [phone]              Isai Fuentes  1940    Encounter Date: 8/28/2018    Severo Lee M.D.          PROGRESS NOTE:  Chief Complaint   Patient presents with   • Bradycardia     F/V: 3 MO       Subjective:   Isai Fuentes is a 77 y.o. male who presents today as a follow-up for his heart failure.  Since he was last seen he continues to do well.  His LDL runs elevated.  He has had numerous intolerances to statins in the past.  His EF is stable in the 30s.  He has had no palpitations PND orthopnea or syncope.  His kidney function is been stable.    Past Medical History:   Diagnosis Date   • Atrial fibrillation ([facility]) 9/20/2011   • Biventricular implantable cardioverter-defibrillator in situ 8/7/2015   • Breath shortness    • CAD (coronary artery disease) 9/20/2011   • Cancer ([facility]) 2012    prostate   • CARDIOMYOPATHY 9/20/2011   • Congestive heart failure ([facility])    • COPD (chronic obstructive pulmonary disease) ([facility])    • Fatigue 10/28/2010   • Heartburn 10/23/2008   • History of cardiac catheterization 9/20/2011   • History of myocardial infarction     \"many\"   • HTN (hypertension) 9/20/2011   • Indigestion    • Insomnia 7/8/2010   • Ischemic cardiomyopathy 9/20/2011   • Long term (current) use of anticoagulants 5/13/2011   • Myocardial infarct ([facility]) 2007   • Orthostatic hypotension 5/6/2009   • WILLA (obstructive sleep apnea)     CPAP   • Other drug allergy(995.27) 10/16/2008   • Other specified disorder of intestines 07-28-15    constipation/diarrhea/ reports some bleeding from rectum w/blood clots. Seeing GI   • Pacemaker     [employer]   • Pain 07-28-15    \"chronic\", 0/10   • Pleural effusion 5/18/2009   • Presence of permanent cardiac pacemaker 9/20/2011   • Prostate cancer ([facility]) 5/13/2011   • Second degree AV block, Mobitz type II 11/10/2010   • Snoring   " "  • Stroke (HCC) 2/3/2009     Past Surgical History:   Procedure Laterality Date   • RECOVERY  10/16/2015    Procedure: CATH LAB LEAD REVISION [facility] A[facility];  Surgeon: Recoveryonly Surgery;  Location: SURGERY PRE[facility]POST PROC [facility];  Service:    • RECOVERY  8/14/2015    Procedure:  CATH LAB LEAD EXTRACTION AWILDA [facility] LRG PAULO AQUINO;  Surgeon: Recoveryonly Surgery;  Location: SURGERY PRE-POST PROC [facility];  Service:    • RECOVERY  7/30/2015    Procedure: CATH LAB  LEAD EXTRACTION, UPGRADE TO BIV PM [facility] R LR[facility] MAGGIE AQUINO ;  Surgeon: Recoveryonly Surgery;  Location: SURGERY PRE-POST PROC [facility];  Service:    • RECOVERY  7/29/2015    Procedure: CATH LAB NEW PM INSERTION DUAL ATRIAL & VENTRICULAR-LV LEAD W/ NEW GENERATOR AQUINO ICD9: 414.8;  Surgeon: Recoveryonfreddy Surgery;  Location: SURGERY PRE-POST PROC UN[facility];  Service:    • PACEMAKER INSERTION  11/24/08    [facility]   • MULTIPLE CORONARY ARTERY BYPASS  2007    2 vessel   • CARDIAC CATH      has 2 stents     Family History   Problem Relation Age of Onset   • Thyroid Sister      Social History     Social History   • Marital status:      Spouse name: N/A   • Number of children: N/A   • Years of education: N/A     Occupational History   • Not on file.     Social History Main Topics   • Smoking status: Former Smoker     Packs/day: 1.50     Years: 3.00     Types: Cigarettes     Quit date: 1/1/1970   • Smokeless tobacco: Former User     Types: Chew     Quit date: 1/1/1972   • Alcohol use No      Comment: 10-14 per week; scotch & wine   • Drug use: No   • Sexual activity: Not on file     Other Topics Concern   • Not on file     Social History Narrative   • No narrative on file     Allergies   Allergen Reactions   • Plavix [Clopidogrel Bisulfate] Anaphylaxis   • Statins [Hmg-Coa-R Inhibitors] Unspecified     Muscles aches     • Ticlid [Ticlopidine Hydrochloride] Unspecified     Pt states \"I'm not sure what happens\".       Outpatient Encounter Prescriptions as of " 8/28/2018   Medication Sig Dispense Refill   • HYDROcodone-acetaminophen (NORCO) 5-325 MG Tab per tablet TAKE 1 TAB BY MOUTH EVERY 4 HOURS AS NEEDED  0   • potassium chloride (KLOR-CON) 20 MEQ Pack Take 40 mEq by mouth 2 times a day.     • furosemide (LASIX) 40 MG Tab Take 40 mg by mouth every day.     • metoprolol SR (TOPROL XL) 50 MG TABLET SR 24 HR Take 1 Tab by mouth every day. 90 Tab 3   • vitamin D (CHOLECALCIFEROL) 1000 UNIT Tab Take 1,000 Units by mouth every day.     • sacubitril-valsartan (ENTRESTO)  MG Tab tablet Take 1 Tab by mouth 2 Times a Day. 60 Tab 11   • metOLazone (ZAROXOLYN) 5 MG Tab Take 1 Tab by mouth 1 time daily as needed. 30 Tab 3   • beclomethasone (QVAR) 80 MCG/ACT inhaler Inhale 2 Puffs by mouth 2 times a day. Use spacer. Rinse mouth after each use. 2 Inhaler 0   • spironolactone (ALDACTONE) 25 MG Tab Take 1 Tab by mouth every day. 90 Tab 3   • Methotrexate, PF, 10 MG/0.2ML Solution Auto-injector Inject 10 mg as instructed every 7 days. On Mon   Indications: Psoriasis     • Omega-3 Fatty Acids (FISH OIL) 1000 MG Cap capsule Take 1,000 mg by mouth every day.     • Multiple Vitamins-Minerals (OCUVITE) Tab Take 1 Tab by mouth every day.     • aspirin 81 MG tablet Take 81 mg by mouth every day.     • Coenzyme Q10 (CO Q-10 PO) Take 1 Cap by mouth every day.     • [DISCONTINUED] potassium chloride SA (KDUR) 20 MEQ Tab CR TAKE 2 TABS BY MOUTH EVERY 48 HOURS.  3     No facility-administered encounter medications on file as of 8/28/2018.      Review of Systems   Constitutional: Negative.  Negative for chills, fever and malaise/fatigue.   HENT: Negative.  Negative for sore throat.    Eyes: Negative.    Respiratory: Negative.  Negative for cough, hemoptysis, sputum production, shortness of breath, wheezing and stridor.    Cardiovascular: Negative.  Negative for chest pain, palpitations, orthopnea, claudication, leg swelling and PND.   Gastrointestinal: Negative.    Genitourinary: Negative.     "  Musculoskeletal: Negative.    Skin: Negative.    Neurological: Negative.  Negative for dizziness, loss of consciousness and weakness.   Endo/Heme/Allergies: Negative.  Does not bruise/bleed easily.   All other systems reviewed and are negative.       Objective:   /70   Pulse 92   Ht 1.803 m (5' 11\")   Wt 91.6 kg (202 lb)   SpO2 94%   BMI 28.17 kg/m²      Physical Exam   Constitutional: He appears well-developed and well-nourished. No distress.   HENT:   Head: Normocephalic and atraumatic.   Right Ear: External ear normal.   Left Ear: External ear normal.   Nose: Nose normal.   Mouth/Throat: No oropharyngeal exudate.   Eyes: Pupils are equal, round, and reactive to light. Conjunctivae and EOM are normal. Right eye exhibits no discharge. Left eye exhibits no discharge. No scleral icterus.   Neck: Neck supple. No JVD present.   Cardiovascular: Normal rate, regular rhythm and intact distal pulses.  Exam reveals no gallop and no friction rub.    No murmur heard.  Pulmonary/Chest: Effort normal. No stridor. No respiratory distress. He has no wheezes. He has no rales. He exhibits no tenderness.   Abdominal: Soft. He exhibits no distension. There is no guarding.   Musculoskeletal: Normal range of motion. He exhibits no edema, tenderness or deformity.   Neurological: He is alert. He has normal reflexes. He displays normal reflexes. No cranial nerve deficit. He exhibits normal muscle tone. Coordination normal.   Skin: Skin is warm and dry. No rash noted. He is not diaphoretic. No erythema. No pallor.   Psychiatric: He has a normal mood and affect. His behavior is normal. Judgment and thought content normal.   Nursing note and vitals reviewed.      Assessment:     1. Alcohol abuse     2. Paroxysmal atrial fibrillation (HCC)     3. Coronary artery disease involving native coronary artery of native heart without angina pectoris     4. Biventricular implantable cardioverter-defibrillator in situ     5. High risk " medication use     6. History of myocardial infarction     7. Essential hypertension     8. Hypercholesteremia     9. Ischemic cardiomyopathy     10. Left ventricular systolic dysfunction, NYHA class 3     11. NYHA class 3 acute on chronic systolic heart failure (HCC)     12. Orthostatic hypotension     13. S/P ablation of atrial fibrillation     14. Second degree AV block, Mobitz type II     15. Obstructive sleep apnea syndrome     16. Stage 3 chronic kidney disease     17. Systolic heart failure, ACC/AHA stage C (HCC)         Medical Decision Making:  Today's Assessment / Status / Plan:     77-year-old male with CAD status post infarct with chronic kidney disease and heart failure with reduced ejection fraction.  Because of his numerous intolerances to statins I will start him on a PCSK-9 inhibitor.  Otherwise no changes to his medical therapy.  We will see him back in 3-6 months.    Thank for you allowing me to take part in your patient's care, please call should you have any questions or would like to discuss this patient.      Matt Pagan M.D.  [id number]  [address]  [place] [address]  VIA Facsimile: [phone]

## 2018-09-07 ENCOUNTER — TELEPHONE (OUTPATIENT)
Dept: CARDIOLOGY | Facility: MEDICAL CENTER | Age: 78
End: 2018-09-07

## 2018-09-07 DIAGNOSIS — E78.00 HYPERCHOLESTEREMIA: Chronic | ICD-10-CM

## 2018-09-07 DIAGNOSIS — I10 ESSENTIAL HYPERTENSION: Chronic | ICD-10-CM

## 2018-09-07 DIAGNOSIS — I25.10 CORONARY ARTERY DISEASE INVOLVING NATIVE CORONARY ARTERY OF NATIVE HEART WITHOUT ANGINA PECTORIS: Chronic | ICD-10-CM

## 2018-09-07 NOTE — TELEPHONE ENCOUNTER
labs needed   Received: Today    Call patient   Message Contents   Leigh Ann Ansari, Med Ass't  Liliane Lorenzo ROsbaldoN.             Working on PA for PCSK9 therapy need lipid panel within 30 days, please order labs for patient   Thank you      Lab ordered.  Mailed to patient's residence.

## 2018-09-07 NOTE — TELEPHONE ENCOUNTER
PAR sent to patient's plan:  Isai Fuentes (Key: [id number])       Status   Sent to [facility]   Next Steps   The plan will fax you a determination, typically within 1 to 5 business days.  How do I follow up?   DrugPraluent 75MG/ML pen-injectors   FormAetna Medicare Praluent Injectable Medication Precertification FormPrecertification Request Form for Praluent Injectable [phone]

## 2018-09-14 ENCOUNTER — TELEPHONE (OUTPATIENT)
Dept: CARDIOLOGY | Facility: MEDICAL CENTER | Age: 78
End: 2018-09-14

## 2018-09-14 DIAGNOSIS — I50.20 ACC/AHA STAGE C SYSTOLIC HEART FAILURE (HCC): ICD-10-CM

## 2018-09-14 DIAGNOSIS — I10 HTN (HYPERTENSION), MALIGNANT: ICD-10-CM

## 2018-09-14 DIAGNOSIS — I25.5 ISCHEMIC CARDIOMYOPATHY: Chronic | ICD-10-CM

## 2018-09-14 DIAGNOSIS — I44.1 SECOND DEGREE AV BLOCK, MOBITZ TYPE II: Chronic | ICD-10-CM

## 2018-09-14 DIAGNOSIS — Z95.810 BIVENTRICULAR IMPLANTABLE CARDIOVERTER-DEFIBRILLATOR IN SITU: ICD-10-CM

## 2018-09-14 DIAGNOSIS — I48.0 PAROXYSMAL ATRIAL FIBRILLATION (HCC): ICD-10-CM

## 2018-09-14 DIAGNOSIS — Z98.890 S/P ABLATION OF ATRIAL FIBRILLATION: ICD-10-CM

## 2018-09-14 DIAGNOSIS — I51.89 LEFT VENTRICULAR SYSTOLIC DYSFUNCTION, NYHA CLASS 3: ICD-10-CM

## 2018-09-14 DIAGNOSIS — Z86.79 S/P ABLATION OF ATRIAL FIBRILLATION: ICD-10-CM

## 2018-09-14 DIAGNOSIS — I25.10 CORONARY ARTERY DISEASE INVOLVING NATIVE CORONARY ARTERY OF NATIVE HEART WITHOUT ANGINA PECTORIS: Chronic | ICD-10-CM

## 2018-09-14 RX ORDER — SPIRONOLACTONE 25 MG/1
25 TABLET ORAL DAILY
Qty: 90 TAB | Refills: 3 | Status: SHIPPED | OUTPATIENT
Start: 2018-09-14 | End: 2019-10-05 | Stop reason: SDUPTHER

## 2018-09-14 NOTE — TELEPHONE ENCOUNTER
patient is calling to discuss medication and possible interaction   Received: Today   Message Contents   EDILBERTO Gamble/Liliane       Patient's wife Ivelisse is calling to discuss his medications. Pharmacy told her they can't do refill because of possible interaction between Spirinolactone and Entresto. Pt #[phone]        Attempted to contact patient, no answer. LVM.to call back

## 2018-09-14 NOTE — TELEPHONE ENCOUNTER
Patient's wife returning call   Received: Today   Message Contents   EDILBERTO Rodgers/Liliane     Patient's wife, Ivelisse, is returning your call and can be reached at [phone].      Contacted patient's wife, discussed medications.  She states her pharmacy ([facility]) will not refill patient's entresto or spironolactone because of possible interaction (increase K+)    [facility]  Patient reports K+ was 4.4    Advised to continue to take medications as prescribed.     Patient asking if meds (Entresto/Spironolactone) can be sent to [facility] [address]. Patient also asked for K+ Rx refill, however need confirmation first     Entresto and Spironolactone Refills only sent to [facility]        To  - Should patient remain on 40 mEq K+ BID per MAR?

## 2018-09-19 ENCOUNTER — HOSPITAL ENCOUNTER (OUTPATIENT)
Dept: LAB | Facility: MEDICAL CENTER | Age: 78
End: 2018-09-19
Attending: INTERNAL MEDICINE
Payer: MEDICARE

## 2018-09-19 DIAGNOSIS — E78.00 HYPERCHOLESTEREMIA: Chronic | ICD-10-CM

## 2018-09-19 DIAGNOSIS — I10 ESSENTIAL HYPERTENSION: Chronic | ICD-10-CM

## 2018-09-19 DIAGNOSIS — I25.10 CORONARY ARTERY DISEASE INVOLVING NATIVE CORONARY ARTERY OF NATIVE HEART WITHOUT ANGINA PECTORIS: Chronic | ICD-10-CM

## 2018-09-19 LAB
CHOLEST SERPL-MCNC: 193 MG/DL (ref 100–199)
FASTING STATUS PATIENT QL REPORTED: NORMAL
HDLC SERPL-MCNC: 48 MG/DL
LDLC SERPL CALC-MCNC: 123 MG/DL
TRIGL SERPL-MCNC: 109 MG/DL (ref 0–149)

## 2018-09-19 PROCEDURE — 80061 LIPID PANEL: CPT

## 2018-09-19 PROCEDURE — 36415 COLL VENOUS BLD VENIPUNCTURE: CPT

## 2018-09-19 NOTE — TELEPHONE ENCOUNTER
"I spoke again to Ivelisse (wife) to clarify all of his meds:    Here are the variations from the med list:    Spironolactone 25 mg:  Take 1/2 pill /day  Potassium 20 mEq:  Takes 1/2 pill every 2 days  Furosemide 40 mg:  Takes \"about 1x/weeks\"  Metolazone 5 mg:  NOT taking    All other medications are taken as they should be.    Last K+ as noted from [facility] by Liliane, but this is on the above medication routine.    To Laura, as she will follow up 10/3.  I am having patient get the BMP done at [facility] on 10/1.    I am having Luis Armando be consistent with that routine until the lab is done; unless change suggested by Laura.      "

## 2018-09-27 ENCOUNTER — HOSPITAL ENCOUNTER (OUTPATIENT)
Dept: LAB | Facility: MEDICAL CENTER | Age: 78
End: 2018-09-27
Attending: NURSE PRACTITIONER
Payer: MEDICARE

## 2018-09-27 DIAGNOSIS — I25.5 ISCHEMIC CARDIOMYOPATHY: Chronic | ICD-10-CM

## 2018-09-27 DIAGNOSIS — I47.29 VENTRICULAR TACHYCARDIA (PAROXYSMAL) (HCC): ICD-10-CM

## 2018-09-27 LAB
ANION GAP SERPL CALC-SCNC: 7 MMOL/L (ref 0–11.9)
BUN SERPL-MCNC: 22 MG/DL (ref 8–22)
CALCIUM SERPL-MCNC: 10.2 MG/DL (ref 8.5–10.5)
CHLORIDE SERPL-SCNC: 103 MMOL/L (ref 96–112)
CO2 SERPL-SCNC: 28 MMOL/L (ref 20–33)
CREAT SERPL-MCNC: 1.53 MG/DL (ref 0.5–1.4)
FASTING STATUS PATIENT QL REPORTED: NORMAL
GLUCOSE SERPL-MCNC: 103 MG/DL (ref 65–99)
POTASSIUM SERPL-SCNC: 4.6 MMOL/L (ref 3.6–5.5)
SODIUM SERPL-SCNC: 138 MMOL/L (ref 135–145)

## 2018-09-27 PROCEDURE — 80048 BASIC METABOLIC PNL TOTAL CA: CPT

## 2018-09-27 PROCEDURE — 36415 COLL VENOUS BLD VENIPUNCTURE: CPT

## 2018-09-28 ENCOUNTER — TELEPHONE (OUTPATIENT)
Dept: CARDIOLOGY | Facility: MEDICAL CENTER | Age: 78
End: 2018-09-28

## 2018-09-28 NOTE — TELEPHONE ENCOUNTER
----- Message from PRATEEK Gregorio sent at 9/28/2018 10:43 AM PDT -----  Labs a bit better continue same meds.

## 2018-10-03 ENCOUNTER — OFFICE VISIT (OUTPATIENT)
Dept: CARDIOLOGY | Facility: MEDICAL CENTER | Age: 78
End: 2018-10-03
Payer: MEDICARE

## 2018-10-03 VITALS
BODY MASS INDEX: 28 KG/M2 | OXYGEN SATURATION: 94 % | HEART RATE: 70 BPM | DIASTOLIC BLOOD PRESSURE: 56 MMHG | WEIGHT: 200 LBS | SYSTOLIC BLOOD PRESSURE: 88 MMHG | HEIGHT: 71 IN

## 2018-10-03 DIAGNOSIS — Z98.890 S/P ABLATION OF ATRIAL FIBRILLATION: ICD-10-CM

## 2018-10-03 DIAGNOSIS — I25.5 ISCHEMIC CARDIOMYOPATHY: Chronic | ICD-10-CM

## 2018-10-03 DIAGNOSIS — Z86.79 S/P ABLATION OF ATRIAL FIBRILLATION: ICD-10-CM

## 2018-10-03 DIAGNOSIS — I47.29 VENTRICULAR TACHYCARDIA (PAROXYSMAL) (HCC): ICD-10-CM

## 2018-10-03 DIAGNOSIS — Z95.810 BIVENTRICULAR IMPLANTABLE CARDIOVERTER-DEFIBRILLATOR IN SITU: ICD-10-CM

## 2018-10-03 PROCEDURE — 99214 OFFICE O/P EST MOD 30 MIN: CPT | Mod: 25 | Performed by: NURSE PRACTITIONER

## 2018-10-03 PROCEDURE — 93284 PRGRMG EVAL IMPLANTABLE DFB: CPT | Performed by: NURSE PRACTITIONER

## 2018-10-03 ASSESSMENT — ENCOUNTER SYMPTOMS
CHILLS: 0
HEADACHES: 0
PND: 0
SORE THROAT: 0
SPUTUM PRODUCTION: 0
FOCAL WEAKNESS: 0
PALPITATIONS: 0
CLAUDICATION: 0
DOUBLE VISION: 0
SEIZURES: 0
DIZZINESS: 1
WHEEZING: 0
FEVER: 0
VOMITING: 0
MUSCULOSKELETAL NEGATIVE: 1
SHORTNESS OF BREATH: 0
PSYCHIATRIC NEGATIVE: 1
WEIGHT LOSS: 0
HEARTBURN: 0
NAUSEA: 0
LOSS OF CONSCIOUSNESS: 0
ORTHOPNEA: 0
SPEECH CHANGE: 0
FLANK PAIN: 0
COUGH: 0
BLURRED VISION: 0
ABDOMINAL PAIN: 0

## 2018-10-03 NOTE — LETTER
"     [facility]   [address]  [place] [address]  Phone: [phone]  Fax: [phone]              Isai Fuentes  1940    Encounter Date: 10/3/2018    PRATEEK Gregorio          PROGRESS NOTE:  Chief Complaint   Patient presents with   • Atrial Fibrillation       Subjective:   Isai Fuentes is a 77 y.o. male who presents today   For review of his cardiac status.  Luis Armando has a severe ICM and continues despite medication changes to demonstrate an improvement in his Ef (30%) but is stable. He states he is exhausted all the time. He also describes diffuse aching throughout his body. He is starting on Alondronate for his decreased bone density. He has been on methotrexate for years for his psoriasis.  His medications were recently changed with reduction of his lasix and switch to Aldactone.  No atrial arrhythmias or ventricular over the last 3 months.  Device function appears intact with his BiV ICD. BV 2.9 years.  Past Medical History:   Diagnosis Date   • Atrial fibrillation (HCC) 9/20/2011   • Biventricular implantable cardioverter-defibrillator in situ 8/7/2015   • Breath shortness    • CAD (coronary artery disease) 9/20/2011   • Cancer ([facility]) 2012    prostate   • CARDIOMYOPATHY 9/20/2011   • Congestive heart failure (HCC)    • COPD (chronic obstructive pulmonary disease) ([facility])    • Fatigue 10/28/2010   • Heartburn 10/23/2008   • History of cardiac catheterization 9/20/2011   • History of myocardial infarction     \"many\"   • HTN (hypertension) 9/20/2011   • Indigestion    • Insomnia 7/8/2010   • Ischemic cardiomyopathy 9/20/2011   • Long term (current) use of anticoagulants 5/13/2011   • Myocardial infarct (HCC) 2007   • Orthostatic hypotension 5/6/2009   • WILLA (obstructive sleep apnea)     CPAP   • Other drug allergy(995.27) 10/16/2008   • Other specified disorder of intestines 07-28-15    constipation/diarrhea/ reports some bleeding from " "rectum w/blood clots. Seeing GI   • Pacemaker     [employer]   • Pain 07-28-15    \"chronic\", 0/10   • Pleural effusion 5/18/2009   • Presence of permanent cardiac pacemaker 9/20/2011   • Prostate cancer (HCC) 5/13/2011   • Second degree AV block, Mobitz type II 11/10/2010   • Snoring    • Stroke (HCC) 2/3/2009     Past Surgical History:   Procedure Laterality Date   • RECOVERY  10/16/2015    Procedure: CATH LAB LEAD REVISION [facility];  Surgeon: Recoveryonly Surgery;  Location: SURGERY [facility] PROC [facility];  Service:    • RECOVERY  8/14/2015    Procedure:  CATH LAB LEAD EXTRACTION AWILDA [facility] LRG RP AQUINO;  Surgeon: Recoveryonly Surgery;  Location: SURGERY PRE[facility] PROC [facility];  Service:    • RECOVERY  7/30/2015    Procedure: CATH LAB  LEAD EXTRACTION, UPGRADE TO BIV PM [facility] LRG GRP KENIA ;  Surgeon: Recoveryonly Surgery;  Location: SURGERY PRE[facility]POST PROC [facility];  Service:    • RECOVERY  7/29/2015    Procedure: CATH LAB NEW PM INSERTION DUAL ATRIAL & VENTRICULAR-LV LEAD W/ NEW GENERATOR AQUINO ICD9: 414.8;  Surgeon: Recoveryonly Surgery;  Location: SURGERY PRE[facility]POST PROC [facility];  Service:    • PACEMAKER INSERTION  11/24/08    [facility]   • MULTIPLE CORONARY ARTERY BYPASS  2007    2 vessel   • CARDIAC CATH      has 2 stents     Family History   Problem Relation Age of Onset   • Thyroid Sister      Social History     Social History   • Marital status:      Spouse name: N/A   • Number of children: N/A   • Years of education: N/A     Occupational History   • Not on file.     Social History Main Topics   • Smoking status: Former Smoker     Packs/day: 1.50     Years: 3.00     Types: Cigarettes     Quit date: 1/1/1970   • Smokeless tobacco: Former User     Types: Chew     Quit date: 1/1/1972   • Alcohol use No      Comment: 10-14 per week; scotch & wine   • Drug use: No   • Sexual activity: Not on file     Other Topics Concern   • Not on file     Social History Narrative   • No narrative on file     " "    Allergies   Allergen Reactions   • Plavix [Clopidogrel Bisulfate] Anaphylaxis   • Statins [Hmg-Coa-R Inhibitors] Unspecified     Muscles aches     • Ticlid [Ticlopidine Hydrochloride] Unspecified     Pt states \"I'm not sure what happens\".       Outpatient Encounter Prescriptions as of 10/3/2018   Medication Sig Dispense Refill   • spironolactone (ALDACTONE) 25 MG Tab Take 1 Tab by mouth every day. 90 Tab 3   • sacubitril-valsartan (ENTRESTO)  MG Tab tablet Take 1 Tab by mouth 2 Times a Day. 180 Tab 3   • Alirocumab 75 MG/ML Solution Pen-injector Inject 75 mg as instructed every 14 days. 6 PEN 3   • potassium chloride (KLOR-CON) 20 MEQ Pack Take 10 mEq by mouth every day. Once per week     • metoprolol SR (TOPROL XL) 50 MG TABLET SR 24 HR Take 1 Tab by mouth every day. 90 Tab 3   • vitamin D (CHOLECALCIFEROL) 1000 UNIT Tab Take 1,000 Units by mouth every day.     • beclomethasone (QVAR) 80 MCG/ACT inhaler Inhale 2 Puffs by mouth 2 times a day. Use spacer. Rinse mouth after each use. 2 Inhaler 0   • Methotrexate, PF, 10 MG/0.2ML Solution Auto-injector Inject 10 mg as instructed every 7 days. On Mon   Indications: Psoriasis     • Omega-3 Fatty Acids (FISH OIL) 1000 MG Cap capsule Take 1,000 mg by mouth every day.     • Multiple Vitamins-Minerals (OCUVITE) Tab Take 1 Tab by mouth every day.     • aspirin 81 MG tablet Take 81 mg by mouth every day.     • Coenzyme Q10 (CO Q-10 PO) Take 1 Cap by mouth every day.     • HYDROcodone-acetaminophen (NORCO) 5-325 MG Tab per tablet TAKE 1 TAB BY MOUTH EVERY 4 HOURS AS NEEDED  0   • furosemide (LASIX) 40 MG Tab Take 40 mg by mouth every day.     • metOLazone (ZAROXOLYN) 5 MG Tab Take 1 Tab by mouth 1 time daily as needed. 30 Tab 3     No facility-administered encounter medications on file as of 10/3/2018.      Review of Systems   Constitutional: Positive for malaise/fatigue. Negative for chills, fever and weight loss.   HENT: Negative for congestion and sore throat.  " "  Eyes: Negative for blurred vision and double vision.   Respiratory: Negative for cough, sputum production, shortness of breath and wheezing.    Cardiovascular: Negative for chest pain, palpitations, orthopnea, claudication, leg swelling and PND.   Gastrointestinal: Negative for abdominal pain, heartburn, nausea and vomiting.   Genitourinary: Negative for dysuria, flank pain and frequency.   Musculoskeletal: Negative.    Skin: Negative.    Neurological: Positive for dizziness (When getting up quickly). Negative for speech change, focal weakness, seizures, loss of consciousness and headaches.   Endo/Heme/Allergies: Negative.    Psychiatric/Behavioral: Negative.         Objective:   BP (!) 88/56 (BP Location: Left arm, Patient Position: Sitting, BP Cuff Size: Adult)   Pulse 70   Ht 1.803 m (5' 11\")   Wt 90.7 kg (200 lb)   SpO2 94%   BMI 27.89 kg/m²      Physical Exam   Constitutional: He is oriented to person, place, and time. He appears well-developed and well-nourished.   HENT:   Head: Normocephalic and atraumatic.   Eyes: Pupils are equal, round, and reactive to light.   Neck: Normal range of motion. Neck supple.   Cardiovascular: Normal rate and regular rhythm.    Pulmonary/Chest: Effort normal and breath sounds normal.   Device site is uncomplicated.   Abdominal: Soft. Bowel sounds are normal.   Musculoskeletal: Normal range of motion.   Neurological: He is alert and oriented to person, place, and time.   Skin: Skin is warm and dry.   Psychiatric: He has a normal mood and affect.       Assessment:     1. Biventricular implantable cardioverter-defibrillator in situ     2. Ischemic cardiomyopathy     3. S/P ablation of atrial fibrillation     4. Ventricular tachycardia (paroxysmal) (HCC)         Medical Decision Making:  Today's Assessment / Status / Plan:     1. BiV ICD BV 3 years. Function intact see flow sheet.  2. ICM stable.  3. AF ablation with significant recurrence . AF burden < 1%.  4. VT no " events.  RTC 3 months    Collaborating MD Jay Pagan M.D.  [address]  [address]  [place] [address]  VIA Facsimile: [phone]

## 2018-10-04 NOTE — PROGRESS NOTES
"Chief Complaint   Patient presents with   • Atrial Fibrillation       Subjective:   Isai Fuentes is a 77 y.o. male who presents today   For review of his cardiac status.  Luis Armando has a severe ICM and continues despite medication changes to demonstrate an improvement in his Ef (30%) but is stable. He states he is exhausted all the time. He also describes diffuse aching throughout his body. He is starting on Alondronate for his decreased bone density. He has been on methotrexate for years for his psoriasis.  His medications were recently changed with reduction of his lasix and switch to Aldactone.  No atrial arrhythmias or ventricular over the last 3 months.  Device function appears intact with his BiV ICD. BV 2.9 years.  Past Medical History:   Diagnosis Date   • Atrial fibrillation ([facility]) 9/20/2011   • Biventricular implantable cardioverter-defibrillator in situ 8/7/2015   • Breath shortness    • CAD (coronary artery disease) 9/20/2011   • Cancer ([facility]) 2012    prostate   • CARDIOMYOPATHY 9/20/2011   • Congestive heart failure ([facility])    • COPD (chronic obstructive pulmonary disease) ([facility])    • Fatigue 10/28/2010   • Heartburn 10/23/2008   • History of cardiac catheterization 9/20/2011   • History of myocardial infarction     \"many\"   • HTN (hypertension) 9/20/2011   • Indigestion    • Insomnia 7/8/2010   • Ischemic cardiomyopathy 9/20/2011   • Long term (current) use of anticoagulants 5/13/2011   • Myocardial infarct ([facility]) 2007   • Orthostatic hypotension 5/6/2009   • WILLA (obstructive sleep apnea)     CPAP   • Other drug allergy(995.27) 10/16/2008   • Other specified disorder of intestines 07-28-15    constipation/diarrhea/ reports some bleeding from rectum w/blood clots. Seeing GI   • Pacemaker     [employer]   • Pain 07-28-15    \"chronic\", 0/10   • Pleural effusion 5/18/2009   • Presence of permanent cardiac pacemaker 9/20/2011   • Prostate cancer ([facility]) 5/13/2011   • Second degree AV block, Mobitz type II " "11/10/2010   • Snoring    • Stroke (HCC) 2/3/2009     Past Surgical History:   Procedure Laterality Date   • RECOVERY  10/16/2015    Procedure: CATH LAB LEAD REVISION .MARIA R AQUINO;  Surgeon: Recoveryonly Surgery;  Location: SURGERY PRE-POST PROC UNIT [facility];  Service:    • RECOVERY  8/14/2015    Procedure:  CATH LAB LEAD EXTRACTION AWILDA [facility] LR[facility] PAULO AQUINO;  Surgeon: Recoveryonly Surgery;  Location: SURGERY PRE-POST PROC UNIT [facility];  Service:    • RECOVERY  7/30/2015    Procedure: CATH LAB  LEAD EXTRACTION, UPGRADE TO BIV PM [facility]IA R LRG MAGGIE AQUINO ;  Surgeon: Recoveryonly Surgery;  Location: SURGERY PRE-POST PROC UNIT [facility];  Service:    • RECOVERY  7/29/2015    Procedure: CATH LAB NEW PM INSERTION DUAL ATRIAL & VENTRICULAR-LV LEAD W/ NEW GENERATOR AQUINO ICD9: 414.8;  Surgeon: Recoveryonfreddy Surgery;  Location: SURGERY PRE-POST PROC UNIT [facility];  Service:    • PACEMAKER INSERTION  11/24/08    [facility]   • MULTIPLE CORONARY ARTERY BYPASS  2007    2 vessel   • CARDIAC CATH      has 2 stents     Family History   Problem Relation Age of Onset   • Thyroid Sister      Social History     Social History   • Marital status:      Spouse name: N/A   • Number of children: N/A   • Years of education: N/A     Occupational History   • Not on file.     Social History Main Topics   • Smoking status: Former Smoker     Packs/day: 1.50     Years: 3.00     Types: Cigarettes     Quit date: 1/1/1970   • Smokeless tobacco: Former User     Types: Chew     Quit date: 1/1/1972   • Alcohol use No      Comment: 10-14 per week; scotch & wine   • Drug use: No   • Sexual activity: Not on file     Other Topics Concern   • Not on file     Social History Narrative   • No narrative on file     Allergies   Allergen Reactions   • Plavix [Clopidogrel Bisulfate] Anaphylaxis   • Statins [Hmg-Coa-R Inhibitors] Unspecified     Muscles aches     • Ticlid [Ticlopidine Hydrochloride] Unspecified     Pt states \"I'm not sure what happens\".       Outpatient Encounter " Prescriptions as of 10/3/2018   Medication Sig Dispense Refill   • spironolactone (ALDACTONE) 25 MG Tab Take 1 Tab by mouth every day. 90 Tab 3   • sacubitril-valsartan (ENTRESTO)  MG Tab tablet Take 1 Tab by mouth 2 Times a Day. 180 Tab 3   • Alirocumab 75 MG/ML Solution Pen-injector Inject 75 mg as instructed every 14 days. 6 PEN 3   • potassium chloride (KLOR-CON) 20 MEQ Pack Take 10 mEq by mouth every day. Once per week     • metoprolol SR (TOPROL XL) 50 MG TABLET SR 24 HR Take 1 Tab by mouth every day. 90 Tab 3   • vitamin D (CHOLECALCIFEROL) 1000 UNIT Tab Take 1,000 Units by mouth every day.     • beclomethasone (QVAR) 80 MCG/ACT inhaler Inhale 2 Puffs by mouth 2 times a day. Use spacer. Rinse mouth after each use. 2 Inhaler 0   • Methotrexate, PF, 10 MG/0.2ML Solution Auto-injector Inject 10 mg as instructed every 7 days. On Mon   Indications: Psoriasis     • Omega-3 Fatty Acids (FISH OIL) 1000 MG Cap capsule Take 1,000 mg by mouth every day.     • Multiple Vitamins-Minerals (OCUVITE) Tab Take 1 Tab by mouth every day.     • aspirin 81 MG tablet Take 81 mg by mouth every day.     • Coenzyme Q10 (CO Q-10 PO) Take 1 Cap by mouth every day.     • HYDROcodone-acetaminophen (NORCO) 5-325 MG Tab per tablet TAKE 1 TAB BY MOUTH EVERY 4 HOURS AS NEEDED  0   • furosemide (LASIX) 40 MG Tab Take 40 mg by mouth every day.     • metOLazone (ZAROXOLYN) 5 MG Tab Take 1 Tab by mouth 1 time daily as needed. 30 Tab 3     No facility-administered encounter medications on file as of 10/3/2018.      Review of Systems   Constitutional: Positive for malaise/fatigue. Negative for chills, fever and weight loss.   HENT: Negative for congestion and sore throat.    Eyes: Negative for blurred vision and double vision.   Respiratory: Negative for cough, sputum production, shortness of breath and wheezing.    Cardiovascular: Negative for chest pain, palpitations, orthopnea, claudication, leg swelling and PND.   Gastrointestinal:  "Negative for abdominal pain, heartburn, nausea and vomiting.   Genitourinary: Negative for dysuria, flank pain and frequency.   Musculoskeletal: Negative.    Skin: Negative.    Neurological: Positive for dizziness (When getting up quickly). Negative for speech change, focal weakness, seizures, loss of consciousness and headaches.   Endo/Heme/Allergies: Negative.    Psychiatric/Behavioral: Negative.         Objective:   BP (!) 88/56 (BP Location: Left arm, Patient Position: Sitting, BP Cuff Size: Adult)   Pulse 70   Ht 1.803 m (5' 11\")   Wt 90.7 kg (200 lb)   SpO2 94%   BMI 27.89 kg/m²     Physical Exam   Constitutional: He is oriented to person, place, and time. He appears well-developed and well-nourished.   HENT:   Head: Normocephalic and atraumatic.   Eyes: Pupils are equal, round, and reactive to light.   Neck: Normal range of motion. Neck supple.   Cardiovascular: Normal rate and regular rhythm.    Pulmonary/Chest: Effort normal and breath sounds normal.   Device site is uncomplicated.   Abdominal: Soft. Bowel sounds are normal.   Musculoskeletal: Normal range of motion.   Neurological: He is alert and oriented to person, place, and time.   Skin: Skin is warm and dry.   Psychiatric: He has a normal mood and affect.       Assessment:     1. Biventricular implantable cardioverter-defibrillator in situ     2. Ischemic cardiomyopathy     3. S/P ablation of atrial fibrillation     4. Ventricular tachycardia (paroxysmal) (HCC)         Medical Decision Making:  Today's Assessment / Status / Plan:     1. BiV ICD BV 3 years. Function intact see flow sheet.  2. ICM stable.  3. AF ablation with significant recurrence . AF burden < 1%.  4. VT no events.  RTC 3 months    Collaborating MD Thompson  "

## 2018-11-28 ENCOUNTER — OFFICE VISIT (OUTPATIENT)
Dept: CARDIOLOGY | Facility: MEDICAL CENTER | Age: 78
End: 2018-11-28
Payer: MEDICARE

## 2018-11-28 VITALS
WEIGHT: 203 LBS | HEIGHT: 71 IN | HEART RATE: 80 BPM | OXYGEN SATURATION: 94 % | SYSTOLIC BLOOD PRESSURE: 110 MMHG | DIASTOLIC BLOOD PRESSURE: 80 MMHG | BODY MASS INDEX: 28.42 KG/M2

## 2018-11-28 DIAGNOSIS — N18.30 STAGE 3 CHRONIC KIDNEY DISEASE (HCC): ICD-10-CM

## 2018-11-28 DIAGNOSIS — I50.20 SYSTOLIC HEART FAILURE, ACC/AHA STAGE C (HCC): ICD-10-CM

## 2018-11-28 DIAGNOSIS — I25.2 HISTORY OF MYOCARDIAL INFARCTION: Chronic | ICD-10-CM

## 2018-11-28 DIAGNOSIS — Z79.899 HIGH RISK MEDICATION USE: ICD-10-CM

## 2018-11-28 DIAGNOSIS — I50.23 NYHA CLASS 3 ACUTE ON CHRONIC SYSTOLIC HEART FAILURE (HCC): ICD-10-CM

## 2018-11-28 DIAGNOSIS — I25.5 ISCHEMIC CARDIOMYOPATHY: Chronic | ICD-10-CM

## 2018-11-28 DIAGNOSIS — I10 ESSENTIAL HYPERTENSION: Chronic | ICD-10-CM

## 2018-11-28 DIAGNOSIS — I63.9 CEREBROVASCULAR ACCIDENT (CVA), UNSPECIFIED MECHANISM (HCC): Chronic | ICD-10-CM

## 2018-11-28 DIAGNOSIS — Z98.890 S/P ABLATION OF ATRIAL FIBRILLATION: ICD-10-CM

## 2018-11-28 DIAGNOSIS — Z86.79 S/P ABLATION OF ATRIAL FIBRILLATION: ICD-10-CM

## 2018-11-28 DIAGNOSIS — I25.10 CORONARY ARTERY DISEASE INVOLVING NATIVE CORONARY ARTERY OF NATIVE HEART WITHOUT ANGINA PECTORIS: Chronic | ICD-10-CM

## 2018-11-28 DIAGNOSIS — Z95.810 BIVENTRICULAR IMPLANTABLE CARDIOVERTER-DEFIBRILLATOR IN SITU: ICD-10-CM

## 2018-11-28 DIAGNOSIS — E78.00 HYPERCHOLESTEREMIA: Chronic | ICD-10-CM

## 2018-11-28 DIAGNOSIS — I48.0 PAROXYSMAL ATRIAL FIBRILLATION (HCC): ICD-10-CM

## 2018-11-28 DIAGNOSIS — K76.6 PORTAL HYPERTENSION (HCC): ICD-10-CM

## 2018-11-28 DIAGNOSIS — I51.89 LEFT VENTRICULAR SYSTOLIC DYSFUNCTION, NYHA CLASS 3: ICD-10-CM

## 2018-11-28 DIAGNOSIS — F10.10 ALCOHOL ABUSE: ICD-10-CM

## 2018-11-28 PROCEDURE — 99214 OFFICE O/P EST MOD 30 MIN: CPT | Performed by: INTERNAL MEDICINE

## 2018-11-28 ASSESSMENT — ENCOUNTER SYMPTOMS
WEAKNESS: 0
EYES NEGATIVE: 1
FEVER: 0
PALPITATIONS: 0
GASTROINTESTINAL NEGATIVE: 1
SHORTNESS OF BREATH: 0
COUGH: 0
PND: 0
NEUROLOGICAL NEGATIVE: 1
CHILLS: 0
SPUTUM PRODUCTION: 0
CLAUDICATION: 0
BRUISES/BLEEDS EASILY: 0
SORE THROAT: 0
CARDIOVASCULAR NEGATIVE: 1
LOSS OF CONSCIOUSNESS: 0
STRIDOR: 0
RESPIRATORY NEGATIVE: 1
MUSCULOSKELETAL NEGATIVE: 1
HEMOPTYSIS: 0
DIZZINESS: 0
ORTHOPNEA: 0
WHEEZING: 0

## 2018-11-28 NOTE — PROGRESS NOTES
"Chief Complaint   Patient presents with   • Congestive Heart Failure     HF est.       Subjective:   Isai Fuentes is a 77 y.o. 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  He try to get his injectable cholesterol medication but it was too expensive for him anyway pick that up.  His cholesterol continues to be poorly controlled.  Is not currently on any medications.  He is compliant with his other medications.  His blood pressure is controlled.  His most recent EF was 30%.  He had a pacemaker check recently which was unremarkable.  He has no other changes to his clinical status.  He continues to complain of fatigue which is unchanged from prior.    Past Medical History:   Diagnosis Date   • Atrial fibrillation ([facility]) 9/20/2011   • Biventricular implantable cardioverter-defibrillator in situ 8/7/2015   • Breath shortness    • CAD (coronary artery disease) 9/20/2011   • Cancer ([facility]) 2012    prostate   • CARDIOMYOPATHY 9/20/2011   • Congestive heart failure ([facility])    • COPD (chronic obstructive pulmonary disease) ([facility])    • Fatigue 10/28/2010   • Heartburn 10/23/2008   • History of cardiac catheterization 9/20/2011   • History of myocardial infarction     \"many\"   • HTN (hypertension) 9/20/2011   • Indigestion    • Insomnia 7/8/2010   • Ischemic cardiomyopathy 9/20/2011   • Long term (current) use of anticoagulants 5/13/2011   • Myocardial infarct ([facility]) 2007   • Orthostatic hypotension 5/6/2009   • WILLA (obstructive sleep apnea)     CPAP   • Other drug allergy(995.27) 10/16/2008   • Other specified disorder of intestines 07-28-15    constipation/diarrhea/ reports some bleeding from rectum w/blood clots. Seeing GI   • Pacemaker     [employer]   • Pain 07-28-15    \"chronic\", 0/10   • Pleural effusion 5/18/2009   • Presence of permanent cardiac pacemaker 9/20/2011   • Prostate cancer ([facility]) 5/13/2011   • Second degree AV block, Mobitz type II 11/10/2010   • Snoring    • Stroke ([facility]) " "2/3/2009     Past Surgical History:   Procedure Laterality Date   • RECOVERY  10/16/2015    Procedure: CATH LAB LEAD REVISION [facility] RJOS[facility] AQUINO;  Surgeon: Recoveryonly Surgery;  Location: SURGERY PRE[facility]POST PROC [facility];  Service:    • RECOVERY  8/14/2015    Procedure:  CATH LAB LEAD EXTRACTION AWILDA [facility] PAULO AQUINO;  Surgeon: Recoveryonly Surgery;  Location: SURGERY PRE-POST PROC UNIT [facility];  Service:    • RECOVERY  7/30/2015    Procedure: CATH LAB  LEAD EXTRACTION, UPGRADE TO BIV PM [facility] R BIPING GRP AQUINO ;  Surgeon: Recoveryonly Surgery;  Location: SURGERY PRE-POST PROC [facility];  Service:    • RECOVERY  7/29/2015    Procedure: CATH LAB NEW PM INSERTION DUAL ATRIAL & VENTRICULAR-LV LEAD W/ NEW GENERATOR AQUINO ICD9: 414.8;  Surgeon: Recoveryonfreddy Surgery;  Location: SURGERY PRE-POST PROC UNIT [facility];  Service:    • PACEMAKER INSERTION  11/24/08    [facility]   • MULTIPLE CORONARY ARTERY BYPASS  2007    2 vessel   • CARDIAC CATH      has 2 stents     Family History   Problem Relation Age of Onset   • Thyroid Sister      Social History     Social History   • Marital status:      Spouse name: N/A   • Number of children: N/A   • Years of education: N/A     Occupational History   • Not on file.     Social History Main Topics   • Smoking status: Former Smoker     Packs/day: 1.50     Years: 3.00     Types: Cigarettes     Quit date: 1/1/1970   • Smokeless tobacco: Former User     Types: Chew     Quit date: 1/1/1972   • Alcohol use No      Comment: 10-14 per week; scotch & wine   • Drug use: No   • Sexual activity: Not on file     Other Topics Concern   • Not on file     Social History Narrative   • No narrative on file     Allergies   Allergen Reactions   • Plavix [Clopidogrel Bisulfate] Anaphylaxis   • Statins [Hmg-Coa-R Inhibitors] Unspecified     Muscles aches     • Ticlid [Ticlopidine Hydrochloride] Unspecified     Pt states \"I'm not sure what happens\".       Outpatient Encounter Prescriptions as of 11/28/2018   Medication " Sig Dispense Refill   • spironolactone (ALDACTONE) 25 MG Tab Take 1 Tab by mouth every day. 90 Tab 3   • sacubitril-valsartan (ENTRESTO)  MG Tab tablet Take 1 Tab by mouth 2 Times a Day. 180 Tab 3   • HYDROcodone-acetaminophen (NORCO) 5-325 MG Tab per tablet TAKE 1 TAB BY MOUTH EVERY 4 HOURS AS NEEDED  0   • potassium chloride (KLOR-CON) 20 MEQ Pack Take 10 mEq by mouth every day. Once per week     • furosemide (LASIX) 40 MG Tab Take 40 mg by mouth every day.     • metoprolol SR (TOPROL XL) 50 MG TABLET SR 24 HR Take 1 Tab by mouth every day. 90 Tab 3   • vitamin D (CHOLECALCIFEROL) 1000 UNIT Tab Take 1,000 Units by mouth every day.     • metOLazone (ZAROXOLYN) 5 MG Tab Take 1 Tab by mouth 1 time daily as needed. 30 Tab 3   • Methotrexate, PF, 10 MG/0.2ML Solution Auto-injector Inject 10 mg as instructed every 7 days. On Mon   Indications: Psoriasis     • Omega-3 Fatty Acids (FISH OIL) 1000 MG Cap capsule Take 1,000 mg by mouth every day.     • Multiple Vitamins-Minerals (OCUVITE) Tab Take 1 Tab by mouth every day.     • aspirin 81 MG tablet Take 81 mg by mouth every day.     • Coenzyme Q10 (CO Q-10 PO) Take 1 Cap by mouth every day.     • [DISCONTINUED] Alirocumab 75 MG/ML Solution Pen-injector Inject 75 mg as instructed every 14 days. 6 PEN 3   • [DISCONTINUED] beclomethasone (QVAR) 80 MCG/ACT inhaler Inhale 2 Puffs by mouth 2 times a day. Use spacer. Rinse mouth after each use. 2 Inhaler 0     No facility-administered encounter medications on file as of 11/28/2018.      Review of Systems   Constitutional: Positive for malaise/fatigue. Negative for chills and fever.   HENT: Negative.  Negative for sore throat.    Eyes: Negative.    Respiratory: Negative.  Negative for cough, hemoptysis, sputum production, shortness of breath, wheezing and stridor.    Cardiovascular: Negative.  Negative for chest pain, palpitations, orthopnea, claudication, leg swelling and PND.   Gastrointestinal: Negative.    Genitourinary:  "Negative.    Musculoskeletal: Negative.    Skin: Negative.    Neurological: Negative.  Negative for dizziness, loss of consciousness and weakness.   Endo/Heme/Allergies: Negative.  Does not bruise/bleed easily.   All other systems reviewed and are negative.       Objective:   /80 (BP Location: Left arm, Patient Position: Sitting, BP Cuff Size: Adult)   Pulse 80   Ht 1.803 m (5' 11\")   Wt 92.1 kg (203 lb)   SpO2 94%   BMI 28.31 kg/m²     Physical Exam   Constitutional: He appears well-developed and well-nourished. No distress.   HENT:   Head: Normocephalic and atraumatic.   Right Ear: External ear normal.   Left Ear: External ear normal.   Nose: Nose normal.   Mouth/Throat: No oropharyngeal exudate.   Eyes: Pupils are equal, round, and reactive to light. Conjunctivae and EOM are normal. Right eye exhibits no discharge. Left eye exhibits no discharge. No scleral icterus.   Neck: Neck supple. No JVD present.   Cardiovascular: Normal rate, regular rhythm and intact distal pulses.  Exam reveals no gallop and no friction rub.    No murmur heard.  Pulmonary/Chest: Effort normal. No stridor. No respiratory distress. He has no wheezes. He has no rales. He exhibits no tenderness.   Abdominal: Soft. He exhibits no distension. There is no guarding.   Musculoskeletal: Normal range of motion. He exhibits no edema, tenderness or deformity.   Neurological: He is alert. He has normal reflexes. He displays normal reflexes. No cranial nerve deficit. He exhibits normal muscle tone. Coordination normal.   Skin: Skin is warm and dry. No rash noted. He is not diaphoretic. No erythema. No pallor.   Psychiatric: He has a normal mood and affect. His behavior is normal. Judgment and thought content normal.   Nursing note and vitals reviewed.    Lab Results   Component Value Date/Time    CHOLSTRLTOT 193 09/19/2018 09:59 AM     (H) 09/19/2018 09:59 AM    HDL 48 09/19/2018 09:59 AM    TRIGLYCERIDE 109 09/19/2018 09:59 AM     "   Lab Results   Component Value Date/Time    SODIUM 138 09/27/2018 07:29 AM    POTASSIUM 4.6 09/27/2018 07:29 AM    CHLORIDE 103 09/27/2018 07:29 AM    CO2 28 09/27/2018 07:29 AM    GLUCOSE 103 (H) 09/27/2018 07:29 AM    BUN 22 09/27/2018 07:29 AM    CREATININE 1.53 (H) 09/27/2018 07:29 AM    BUNCREATRAT 22 02/23/2017 10:27 AM     Lab Results   Component Value Date/Time    ALKPHOSPHAT 90 06/14/2017 10:45 AM    ASTSGOT 20 06/14/2017 10:45 AM    ALTSGPT 16 06/14/2017 10:45 AM    TBILIRUBIN 0.7 06/14/2017 10:45 AM        Assessment:     1. Alcohol abuse     2. Paroxysmal atrial fibrillation (HCC)     3. Biventricular implantable cardioverter-defibrillator in situ     4. Coronary artery disease involving native coronary artery of native heart without angina pectoris     5. High risk medication use     6. History of myocardial infarction     7. Essential hypertension     8. Hypercholesteremia     9. Ischemic cardiomyopathy     10. Left ventricular systolic dysfunction, NYHA class 3     11. NYHA class 3 acute on chronic systolic heart failure (HCC)     12. Portal hypertension (HCC)     13. S/P ablation of atrial fibrillation     14. Stage 3 chronic kidney disease (HCC)     15. Systolic heart failure, ACC/AHA stage C (HCC)     16. Cerebrovascular accident (CVA), unspecified mechanism (HCC)         Medical Decision Making:  Today's Assessment / Status / Plan:     77-year-old male with ischemic cardia myopathy.  He is doing well at this point.  For his CAD we will keep him on aspirin.  For his hyperlipidemia we will again submit a request for Praluent.  For his high risk medication usage his most recent labs were normal.  We will see him back in 6 months.    1. CHFrEF, Ef 30%    2. CAD s/p CABG    - cont asa    - allergy to statin    3. CKD    - labs unchanged    4. S/P BiV    - follow up EP    5. HLD    - intolerant to statins    - prior auth for praluent    Thank for you allowing me to take part in your patient's care,  please call should you have any questions or would like to discuss this patient.    Thank for you allowing me to take part in your patient's care, please call should you have any questions or would like to discuss this patient.

## 2018-11-28 NOTE — LETTER
"     [facility]   [address], [facility] [address]  [place] [address]  Phone: [phone]  Fax: [phone]              Isai Fuentes  1940    Encounter Date: 11/28/2018    Severo Lee M.D.          PROGRESS NOTE:  Chief Complaint   Patient presents with   • Congestive Heart Failure     HF est.       Subjective:   Isai Fuentes is a 77 y.o. 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  He try to get his injectable cholesterol medication but it was too expensive for him anyway pick that up.  His cholesterol continues to be poorly controlled.  Is not currently on any medications.  He is compliant with his other medications.  His blood pressure is controlled.  His most recent EF was 30%.  He had a pacemaker check recently which was unremarkable.  He has no other changes to his clinical status.  He continues to complain of fatigue which is unchanged from prior.    Past Medical History:   Diagnosis Date   • Atrial fibrillation ([facility]) 9/20/2011   • Biventricular implantable cardioverter-defibrillator in situ 8/7/2015   • Breath shortness    • CAD (coronary artery disease) 9/20/2011   • Cancer ([facility]) 2012    prostate   • CARDIOMYOPATHY 9/20/2011   • Congestive heart failure ([facility])    • COPD (chronic obstructive pulmonary disease) ([facility])    • Fatigue 10/28/2010   • Heartburn 10/23/2008   • History of cardiac catheterization 9/20/2011   • History of myocardial infarction     \"many\"   • HTN (hypertension) 9/20/2011   • Indigestion    • Insomnia 7/8/2010   • Ischemic cardiomyopathy 9/20/2011   • Long term (current) use of anticoagulants 5/13/2011   • Myocardial infarct ([facility]) 2007   • Orthostatic hypotension 5/6/2009   • WILLA (obstructive sleep apnea)     CPAP   • Other drug allergy(995.27) 10/16/2008   • Other specified disorder of intestines 07-28-15    constipation/diarrhea/ reports some bleeding from rectum w/blood clots. Seeing GI   • " "Pacemaker     [employer]   • Pain 07-28-15    \"chronic\", 0/10   • Pleural effusion 5/18/2009   • Presence of permanent cardiac pacemaker 9/20/2011   • Prostate cancer ([facility]) 5/13/2011   • Second degree AV block, Mobitz type II 11/10/2010   • Snoring    • Stroke ([facility]) 2/3/2009     Past Surgical History:   Procedure Laterality Date   • RECOVERY  10/16/2015    Procedure: CATH LAB LEAD REVISION ST.DRE AQUINO;  Surgeon: Recoveryonly Surgery;  Location: SURGERY PRE-POST PROC UNIT [facility];  Service:    • RECOVERY  8/14/2015    Procedure:  CATH LAB LEAD EXTRACTION AWILDA ST.DRE LRG RP AQUINO;  Surgeon: Recoveryonly Surgery;  Location: SURGERY PRE-POST PROC UN[facility];  Service:    • RECOVERY  7/30/2015    Procedure: CATH LAB  LEAD EXTRACTION, UPGRADE TO BIV PM ST.DRE LRG GRP KENIA ;  Surgeon: Recoveryonly Surgery;  Location: SURGERY PRE-POST PROC UN[facility];  Service:    • RECOVERY  7/29/2015    Procedure: CATH LAB NEW PM INSERTION DUAL ATRIAL & VENTRICULAR-LV LEAD W/ NEW GENERATOR KENIA ICD9: 414.8;  Surgeon: Recoveryonly Surgery;  Location: SURGERY PRE-POST PROC UNIT [facility];  Service:    • PACEMAKER INSERTION  11/24/08    [facility]   • MULTIPLE CORONARY ARTERY BYPASS  2007    2 vessel   • CARDIAC CATH      has 2 stents     Family History   Problem Relation Age of Onset   • Thyroid Sister      Social History     Social History   • Marital status:      Spouse name: N/A   • Number of children: N/A   • Years of education: N/A     Occupational History   • Not on file.     Social History Main Topics   • Smoking status: Former Smoker     Packs/day: 1.50     Years: 3.00     Types: Cigarettes     Quit date: 1/1/1970   • Smokeless tobacco: Former User     Types: Chew     Quit date: 1/1/1972   • Alcohol use No      Comment: 10-14 per week; scotch & wine   • Drug use: No   • Sexual activity: Not on file     Other Topics Concern   • Not on file     Social History Narrative   • No narrative on file     Allergies   Allergen Reactions   • " "Plavix [Clopidogrel Bisulfate] Anaphylaxis   • Statins [Hmg-Coa-R Inhibitors] Unspecified     Muscles aches     • Ticlid [Ticlopidine Hydrochloride] Unspecified     Pt states \"I'm not sure what happens\".       Outpatient Encounter Prescriptions as of 11/28/2018   Medication Sig Dispense Refill   • spironolactone (ALDACTONE) 25 MG Tab Take 1 Tab by mouth every day. 90 Tab 3   • sacubitril-valsartan (ENTRESTO)  MG Tab tablet Take 1 Tab by mouth 2 Times a Day. 180 Tab 3   • HYDROcodone-acetaminophen (NORCO) 5-325 MG Tab per tablet TAKE 1 TAB BY MOUTH EVERY 4 HOURS AS NEEDED  0   • potassium chloride (KLOR-CON) 20 MEQ Pack Take 10 mEq by mouth every day. Once per week     • furosemide (LASIX) 40 MG Tab Take 40 mg by mouth every day.     • metoprolol SR (TOPROL XL) 50 MG TABLET SR 24 HR Take 1 Tab by mouth every day. 90 Tab 3   • vitamin D (CHOLECALCIFEROL) 1000 UNIT Tab Take 1,000 Units by mouth every day.     • metOLazone (ZAROXOLYN) 5 MG Tab Take 1 Tab by mouth 1 time daily as needed. 30 Tab 3   • Methotrexate, PF, 10 MG/0.2ML Solution Auto-injector Inject 10 mg as instructed every 7 days. On Mon   Indications: Psoriasis     • Omega-3 Fatty Acids (FISH OIL) 1000 MG Cap capsule Take 1,000 mg by mouth every day.     • Multiple Vitamins-Minerals (OCUVITE) Tab Take 1 Tab by mouth every day.     • aspirin 81 MG tablet Take 81 mg by mouth every day.     • Coenzyme Q10 (CO Q-10 PO) Take 1 Cap by mouth every day.     • [DISCONTINUED] Alirocumab 75 MG/ML Solution Pen-injector Inject 75 mg as instructed every 14 days. 6 PEN 3   • [DISCONTINUED] beclomethasone (QVAR) 80 MCG/ACT inhaler Inhale 2 Puffs by mouth 2 times a day. Use spacer. Rinse mouth after each use. 2 Inhaler 0     No facility-administered encounter medications on file as of 11/28/2018.      Review of Systems   Constitutional: Positive for malaise/fatigue. Negative for chills and fever.   HENT: Negative.  Negative for sore throat.    Eyes: Negative.    " "  Respiratory: Negative.  Negative for cough, hemoptysis, sputum production, shortness of breath, wheezing and stridor.    Cardiovascular: Negative.  Negative for chest pain, palpitations, orthopnea, claudication, leg swelling and PND.   Gastrointestinal: Negative.    Genitourinary: Negative.    Musculoskeletal: Negative.    Skin: Negative.    Neurological: Negative.  Negative for dizziness, loss of consciousness and weakness.   Endo/Heme/Allergies: Negative.  Does not bruise/bleed easily.   All other systems reviewed and are negative.       Objective:   /80 (BP Location: Left arm, Patient Position: Sitting, BP Cuff Size: Adult)   Pulse 80   Ht 1.803 m (5' 11\")   Wt 92.1 kg (203 lb)   SpO2 94%   BMI 28.31 kg/m²      Physical Exam   Constitutional: He appears well-developed and well-nourished. No distress.   HENT:   Head: Normocephalic and atraumatic.   Right Ear: External ear normal.   Left Ear: External ear normal.   Nose: Nose normal.   Mouth/Throat: No oropharyngeal exudate.   Eyes: Pupils are equal, round, and reactive to light. Conjunctivae and EOM are normal. Right eye exhibits no discharge. Left eye exhibits no discharge. No scleral icterus.   Neck: Neck supple. No JVD present.   Cardiovascular: Normal rate, regular rhythm and intact distal pulses.  Exam reveals no gallop and no friction rub.    No murmur heard.  Pulmonary/Chest: Effort normal. No stridor. No respiratory distress. He has no wheezes. He has no rales. He exhibits no tenderness.   Abdominal: Soft. He exhibits no distension. There is no guarding.   Musculoskeletal: Normal range of motion. He exhibits no edema, tenderness or deformity.   Neurological: He is alert. He has normal reflexes. He displays normal reflexes. No cranial nerve deficit. He exhibits normal muscle tone. Coordination normal.   Skin: Skin is warm and dry. No rash noted. He is not diaphoretic. No erythema. No pallor.   Psychiatric: He has a normal mood and affect. His " behavior is normal. Judgment and thought content normal.   Nursing note and vitals reviewed.    Lab Results   Component Value Date/Time    CHOLSTRLTOT 193 09/19/2018 09:59 AM     (H) 09/19/2018 09:59 AM    HDL 48 09/19/2018 09:59 AM    TRIGLYCERIDE 109 09/19/2018 09:59 AM       Lab Results   Component Value Date/Time    SODIUM 138 09/27/2018 07:29 AM    POTASSIUM 4.6 09/27/2018 07:29 AM    CHLORIDE 103 09/27/2018 07:29 AM    CO2 28 09/27/2018 07:29 AM    GLUCOSE 103 (H) 09/27/2018 07:29 AM    BUN 22 09/27/2018 07:29 AM    CREATININE 1.53 (H) 09/27/2018 07:29 AM    BUNCREATRAT 22 02/23/2017 10:27 AM     Lab Results   Component Value Date/Time    ALKPHOSPHAT 90 06/14/2017 10:45 AM    ASTSGOT 20 06/14/2017 10:45 AM    ALTSGPT 16 06/14/2017 10:45 AM    TBILIRUBIN 0.7 06/14/2017 10:45 AM        Assessment:     1. Alcohol abuse     2. Paroxysmal atrial fibrillation (HCC)     3. Biventricular implantable cardioverter-defibrillator in situ     4. Coronary artery disease involving native coronary artery of native heart without angina pectoris     5. High risk medication use     6. History of myocardial infarction     7. Essential hypertension     8. Hypercholesteremia     9. Ischemic cardiomyopathy     10. Left ventricular systolic dysfunction, NYHA class 3     11. NYHA class 3 acute on chronic systolic heart failure (HCC)     12. Portal hypertension (HCC)     13. S/P ablation of atrial fibrillation     14. Stage 3 chronic kidney disease (HCC)     15. Systolic heart failure, ACC/AHA stage C (HCC)     16. Cerebrovascular accident (CVA), unspecified mechanism (HCC)         Medical Decision Making:  Today's Assessment / Status / Plan:     77-year-old male with ischemic cardia myopathy.  He is doing well at this point.  For his CAD we will keep him on aspirin.  For his hyperlipidemia we will again submit a request for Praluent.  For his high risk medication usage his most recent labs were normal.  We will see him back in  6 months.    1. CHFrEF, Ef 30%    2. CAD s/p CABG    - cont asa    - allergy to statin    3. CKD    - labs unchanged    4. S/P BiV    - follow up EP    5. HLD    - intolerant to statins    - prior auth for praluent    Thank for you allowing me to take part in your patient's care, please call should you have any questions or would like to discuss this patient.    Thank for you allowing me to take part in your patient's care, please call should you have any questions or would like to discuss this patient.      Matt Pagan M.D.  [address] [place] [address] [phone]  [address]  [place] [address]  VIA Facsimile: [phone]

## 2019-01-09 ENCOUNTER — OFFICE VISIT (OUTPATIENT)
Dept: CARDIOLOGY | Facility: MEDICAL CENTER | Age: 79
End: 2019-01-09
Payer: MEDICARE

## 2019-01-09 VITALS
BODY MASS INDEX: 28.56 KG/M2 | WEIGHT: 204 LBS | DIASTOLIC BLOOD PRESSURE: 74 MMHG | SYSTOLIC BLOOD PRESSURE: 116 MMHG | HEIGHT: 71 IN | OXYGEN SATURATION: 95 % | HEART RATE: 70 BPM

## 2019-01-09 DIAGNOSIS — I47.29 VENTRICULAR TACHYCARDIA (PAROXYSMAL) (HCC): ICD-10-CM

## 2019-01-09 DIAGNOSIS — Z86.79 S/P ABLATION OF ATRIAL FIBRILLATION: ICD-10-CM

## 2019-01-09 DIAGNOSIS — Z95.810 BIVENTRICULAR IMPLANTABLE CARDIOVERTER-DEFIBRILLATOR IN SITU: ICD-10-CM

## 2019-01-09 DIAGNOSIS — Z98.890 S/P ABLATION OF ATRIAL FIBRILLATION: ICD-10-CM

## 2019-01-09 DIAGNOSIS — I50.20 SYSTOLIC HEART FAILURE, ACC/AHA STAGE C (HCC): ICD-10-CM

## 2019-01-09 DIAGNOSIS — I25.10 CORONARY ARTERY DISEASE INVOLVING NATIVE CORONARY ARTERY OF NATIVE HEART WITHOUT ANGINA PECTORIS: Chronic | ICD-10-CM

## 2019-01-09 DIAGNOSIS — I25.5 ISCHEMIC CARDIOMYOPATHY: Chronic | ICD-10-CM

## 2019-01-09 PROCEDURE — 93284 PRGRMG EVAL IMPLANTABLE DFB: CPT | Performed by: NURSE PRACTITIONER

## 2019-01-09 PROCEDURE — 99214 OFFICE O/P EST MOD 30 MIN: CPT | Mod: 25 | Performed by: NURSE PRACTITIONER

## 2019-01-09 ASSESSMENT — ENCOUNTER SYMPTOMS
CHILLS: 0
COUGH: 0
FLANK PAIN: 0
SEIZURES: 0
BLURRED VISION: 0
DIZZINESS: 0
MUSCULOSKELETAL NEGATIVE: 1
ABDOMINAL PAIN: 0
ORTHOPNEA: 0
WEIGHT LOSS: 0
HEARTBURN: 0
SORE THROAT: 0
CLAUDICATION: 0
PND: 0
LOSS OF CONSCIOUSNESS: 0
DOUBLE VISION: 0
FOCAL WEAKNESS: 0
VOMITING: 0
SPUTUM PRODUCTION: 0
WHEEZING: 0
HEADACHES: 0
SHORTNESS OF BREATH: 0
PALPITATIONS: 0
NAUSEA: 0
PSYCHIATRIC NEGATIVE: 1
FEVER: 0
SPEECH CHANGE: 0

## 2019-01-09 NOTE — LETTER
"     [facility]   [address]  [place] [address]  Phone: [phone]  Fax: [phone]              Isai Fuentes  1940    Encounter Date: 1/9/2019    PRATEEK Gregorio          PROGRESS NOTE:  Chief Complaint   Patient presents with   • Atrial Fibrillation     F/V DX:AFIB       Subjective:   Isai Fuentes is a 78 y.o. male who presents today for review of his cardiac rhythm status.  For review of his cardiac status.  Luis Armando has a severe ICM and continues despite medication changes to demonstrate an improvement in his Ef (30%) but is stable. He states he is exhausted all the time. He also describes diffuse aching throughout his body. He is starting on Alondronate for his decreased bone density. He has been on methotrexate for years for his psoriasis.  His medications were recently changed with reduction of his lasix and switch to Aldactone. Followed by Dr quinn.  NSVT X 1 SVT X 4.  Device function appears intact with his BiV ICD. BV 2.8 years.  Past Medical History:   Diagnosis Date   • Atrial fibrillation (HCC) 9/20/2011   • Biventricular implantable cardioverter-defibrillator in situ 8/7/2015   • Breath shortness    • CAD (coronary artery disease) 9/20/2011   • Cancer ([facility]) 2012    prostate   • CARDIOMYOPATHY 9/20/2011   • Congestive heart failure (HCC)    • COPD (chronic obstructive pulmonary disease) ([facility])    • Fatigue 10/28/2010   • Heartburn 10/23/2008   • History of cardiac catheterization 9/20/2011   • History of myocardial infarction     \"many\"   • HTN (hypertension) 9/20/2011   • Indigestion    • Insomnia 7/8/2010   • Ischemic cardiomyopathy 9/20/2011   • Long term (current) use of anticoagulants 5/13/2011   • Myocardial infarct ([facility]) 2007   • Orthostatic hypotension 5/6/2009   • WILLA (obstructive sleep apnea)     CPAP   • Other drug allergy(995.27) 10/16/2008   • Other specified disorder of intestines 07-28-15   " "constipation/diarrhea/ reports some bleeding from rectum w/blood clots. Seeing GI   • Pacemaker     [employer]   • Pain 07-28-15    \"chronic\", 0/10   • Pleural effusion 5/18/2009   • Presence of permanent cardiac pacemaker 9/20/2011   • Prostate cancer (HCC) 5/13/2011   • Second degree AV block, Mobitz type II 11/10/2010   • Snoring    • Stroke (HCC) 2/3/2009     Past Surgical History:   Procedure Laterality Date   • RECOVERY  10/16/2015    Procedure: CATH LAB LEAD REVISION ST.DRE KENIA;  Surgeon: Recoveryonly Surgery;  Location: SURGERY PRE[facility]POST PROC [facility];  Service:    • RECOVERY  8/14/2015    Procedure:  CATH LAB LEAD EXTRACTION AWILDA ST.DRE LRG RP KENIA;  Surgeon: Recoveryonly Surgery;  Location: SURGERY PRE-POST PROC [facility];  Service:    • RECOVERY  7/30/2015    Procedure: CATH LAB  LEAD EXTRACTION, UPGRADE TO BIV PM ST.DRE LRG GRP KENIA ;  Surgeon: Recoveryonly Surgery;  Location: SURGERY PRE-POST PROC UN[facility];  Service:    • RECOVERY  7/29/2015    Procedure: CATH LAB NEW PM INSERTION DUAL ATRIAL & VENTRICULAR-LV LEAD W/ NEW GENERATOR AQUINO ICD9: 414.8;  Surgeon: Recoveryonly Surgery;  Location: SURGERY PRE-POST PROC UN[facility];  Service:    • PACEMAKER INSERTION  11/24/08    [facility]   • MULTIPLE CORONARY ARTERY BYPASS  2007    2 vessel   • CARDIAC CATH      has 2 stents     Family History   Problem Relation Age of Onset   • Thyroid Sister      Social History     Social History   • Marital status:      Spouse name: N/A   • Number of children: N/A   • Years of education: N/A     Occupational History   • Not on file.     Social History Main Topics   • Smoking status: Former Smoker     Packs/day: 1.50     Years: 3.00     Types: Cigarettes     Quit date: 1/1/1970   • Smokeless tobacco: Former User     Types: Chew     Quit date: 1/1/1972   • Alcohol use No      Comment: 10-14 per week; scotch & wine   • Drug use: No   • Sexual activity: Not on file     Other Topics Concern   • Not on file     " "    Social History Narrative   • No narrative on file     Allergies   Allergen Reactions   • Plavix [Clopidogrel Bisulfate] Anaphylaxis   • Statins [Hmg-Coa-R Inhibitors] Unspecified     Muscles aches     • Ticlid [Ticlopidine Hydrochloride] Unspecified     Pt states \"I'm not sure what happens\".       Outpatient Encounter Prescriptions as of 1/9/2019   Medication Sig Dispense Refill   • spironolactone (ALDACTONE) 25 MG Tab Take 1 Tab by mouth every day. 90 Tab 3   • sacubitril-valsartan (ENTRESTO)  MG Tab tablet Take 1 Tab by mouth 2 Times a Day. 180 Tab 3   • potassium chloride (KLOR-CON) 20 MEQ Pack Take 10 mEq by mouth every day. Once per week     • metoprolol SR (TOPROL XL) 50 MG TABLET SR 24 HR Take 1 Tab by mouth every day. 90 Tab 3   • vitamin D (CHOLECALCIFEROL) 1000 UNIT Tab Take 1,000 Units by mouth every day.     • Methotrexate, PF, 10 MG/0.2ML Solution Auto-injector Inject 10 mg as instructed every 7 days. On Mon   Indications: Psoriasis     • Omega-3 Fatty Acids (FISH OIL) 1000 MG Cap capsule Take 1,000 mg by mouth every day.     • Multiple Vitamins-Minerals (OCUVITE) Tab Take 1 Tab by mouth every day.     • aspirin 81 MG tablet Take 81 mg by mouth every day.     • Coenzyme Q10 (CO Q-10 PO) Take 1 Cap by mouth every day.     • HYDROcodone-acetaminophen (NORCO) 5-325 MG Tab per tablet TAKE 1 TAB BY MOUTH EVERY 4 HOURS AS NEEDED  0   • furosemide (LASIX) 40 MG Tab Take 40 mg by mouth every day.     • metOLazone (ZAROXOLYN) 5 MG Tab Take 1 Tab by mouth 1 time daily as needed. 30 Tab 3     No facility-administered encounter medications on file as of 1/9/2019.      Review of Systems   Constitutional: Positive for malaise/fatigue. Negative for chills, fever and weight loss.   HENT: Negative for congestion and sore throat.    Eyes: Negative for blurred vision and double vision.   Respiratory: Negative for cough, sputum production, shortness of breath and wheezing.    Cardiovascular: Negative for chest " "pain, palpitations, orthopnea, claudication, leg swelling and PND.   Gastrointestinal: Negative for abdominal pain, heartburn, nausea and vomiting.   Genitourinary: Negative for dysuria, flank pain and frequency.   Musculoskeletal: Negative.    Skin: Positive for itching and rash.        Eczema under derm treatment.   Neurological: Negative for dizziness, speech change, focal weakness, seizures, loss of consciousness and headaches.   Endo/Heme/Allergies: Negative.    Psychiatric/Behavioral: Negative.         Objective:   /74 (BP Location: Left arm, Patient Position: Sitting, BP Cuff Size: Adult)   Pulse 70   Ht 1.803 m (5' 11\")   Wt 92.5 kg (204 lb)   SpO2 95%   BMI 28.45 kg/m²      Physical Exam   Constitutional: He is oriented to person, place, and time. He appears well-developed and well-nourished.   HENT:   Head: Normocephalic and atraumatic.   Eyes: Pupils are equal, round, and reactive to light.   Neck: Normal range of motion. Neck supple.   Cardiovascular: Normal rate and regular rhythm.    Pulmonary/Chest: Effort normal and breath sounds normal.   Device site left chest is uncomplicated.   Abdominal: Soft. Bowel sounds are normal.   Musculoskeletal: Normal range of motion.   Neurological: He is alert and oriented to person, place, and time.   Skin: Skin is warm and dry. Rash noted. There is erythema.   Psychiatric: He has a normal mood and affect.       Assessment:     1. Biventricular implantable cardioverter-defibrillator in situ     2. Coronary artery disease involving native coronary artery of native heart without angina pectoris     3. Ischemic cardiomyopathy     4. Ventricular tachycardia (paroxysmal) (HCC)     5. Systolic heart failure, ACC/AHA stage C (HCC)     6. S/P ablation of atrial fibrillation         Medical Decision Making:  Today's Assessment / Status / Plan:     1. BIV ICD BV 2.8 years.  NSVT X 1 in VT1 zone. SVT X 4.  Continues on Metoprolol.  2. CAD no chest pain or anginal " equivalents.  3. ICM no HF symptoms or exam exacerbation.  4. VT one short episode of VT1 zone.  5. Systolic HF stable.  6. AF S/P ablation without recurrence.  RTC in 3 months   Dr Figueredo in 6months.    collaborating MD DAIN Pagan M.D.  [address] [place] [address] [id number]  [address]  [place] [id number]  VIA Facsimile: [phone]

## 2019-01-10 NOTE — PROGRESS NOTES
"Chief Complaint   Patient presents with   • Atrial Fibrillation     F/V DX:AFIB       Subjective:   Isai Fuentes is a 78 y.o. male who presents today for review of his cardiac rhythm status.  For review of his cardiac status.  Luis Armando has a severe ICM and continues despite medication changes to demonstrate an improvement in his Ef (30%) but is stable. He states he is exhausted all the time. He also describes diffuse aching throughout his body. He is starting on Alondronate for his decreased bone density. He has been on methotrexate for years for his psoriasis.  His medications were recently changed with reduction of his lasix and switch to Aldactone. Followed by Dr quinn.  NSVT X 1 SVT X 4.  Device function appears intact with his BiV ICD. BV 2.8 years.  Past Medical History:   Diagnosis Date   • Atrial fibrillation (HCC) 9/20/2011   • Biventricular implantable cardioverter-defibrillator in situ 8/7/2015   • Breath shortness    • CAD (coronary artery disease) 9/20/2011   • Cancer ([facility]) 2012    prostate   • CARDIOMYOPATHY 9/20/2011   • Congestive heart failure ([facility])    • COPD (chronic obstructive pulmonary disease) ([facility])    • Fatigue 10/28/2010   • Heartburn 10/23/2008   • History of cardiac catheterization 9/20/2011   • History of myocardial infarction     \"many\"   • HTN (hypertension) 9/20/2011   • Indigestion    • Insomnia 7/8/2010   • Ischemic cardiomyopathy 9/20/2011   • Long term (current) use of anticoagulants 5/13/2011   • Myocardial infarct ([facility]) 2007   • Orthostatic hypotension 5/6/2009   • WILLA (obstructive sleep apnea)     CPAP   • Other drug allergy(995.27) 10/16/2008   • Other specified disorder of intestines 07-28-15    constipation/diarrhea/ reports some bleeding from rectum w/blood clots. Seeing GI   • Pacemaker     [employer]   • Pain 07-28-15    \"chronic\", 0/10   • Pleural effusion 5/18/2009   • Presence of permanent cardiac pacemaker 9/20/2011   • Prostate cancer ([facility]) 5/13/2011   • Second " "degree AV block, Mobitz type II 11/10/2010   • Snoring    • Stroke (HCC) 2/3/2009     Past Surgical History:   Procedure Laterality Date   • RECOVERY  10/16/2015    Procedure: CATH LAB LEAD REVISION ST.DREJOSI AQUINO;  Surgeon: Recoveryonly Surgery;  Location: SURGERY PRE-POST PROC UNIT [facility];  Service:    • RECOVERY  8/14/2015    Procedure:  CATH LAB LEAD EXTRACTION AWILDA ST.DRE LRG RP AQUINO;  Surgeon: Recoveryonly Surgery;  Location: SURGERY PRE-POST PROC UN[facility];  Service:    • RECOVERY  7/30/2015    Procedure: CATH LAB  LEAD EXTRACTION, UPGRADE TO BIV PM ST.DRE LRG GRP KENIA ;  Surgeon: Recoveryonly Surgery;  Location: SURGERY PRE-POST PROC UNIT [facility];  Service:    • RECOVERY  7/29/2015    Procedure: CATH LAB NEW PM INSERTION DUAL ATRIAL & VENTRICULAR-LV LEAD W/ NEW GENERATOR AQUINO ICD9: 414.8;  Surgeon: Recoveryonly Surgery;  Location: SURGERY PRE-POST PROC UN[facility];  Service:    • PACEMAKER INSERTION  11/24/08    St Dre   • MULTIPLE CORONARY ARTERY BYPASS  2007    2 vessel   • CARDIAC CATH      has 2 stents     Family History   Problem Relation Age of Onset   • Thyroid Sister      Social History     Social History   • Marital status:      Spouse name: N/A   • Number of children: N/A   • Years of education: N/A     Occupational History   • Not on file.     Social History Main Topics   • Smoking status: Former Smoker     Packs/day: 1.50     Years: 3.00     Types: Cigarettes     Quit date: 1/1/1970   • Smokeless tobacco: Former User     Types: Chew     Quit date: 1/1/1972   • Alcohol use No      Comment: 10-14 per week; scotch & wine   • Drug use: No   • Sexual activity: Not on file     Other Topics Concern   • Not on file     Social History Narrative   • No narrative on file     Allergies   Allergen Reactions   • Plavix [Clopidogrel Bisulfate] Anaphylaxis   • Statins [Hmg-Coa-R Inhibitors] Unspecified     Muscles aches     • Ticlid [Ticlopidine Hydrochloride] Unspecified     Pt states \"I'm not sure what " "happens\".       Outpatient Encounter Prescriptions as of 1/9/2019   Medication Sig Dispense Refill   • spironolactone (ALDACTONE) 25 MG Tab Take 1 Tab by mouth every day. 90 Tab 3   • sacubitril-valsartan (ENTRESTO)  MG Tab tablet Take 1 Tab by mouth 2 Times a Day. 180 Tab 3   • potassium chloride (KLOR-CON) 20 MEQ Pack Take 10 mEq by mouth every day. Once per week     • metoprolol SR (TOPROL XL) 50 MG TABLET SR 24 HR Take 1 Tab by mouth every day. 90 Tab 3   • vitamin D (CHOLECALCIFEROL) 1000 UNIT Tab Take 1,000 Units by mouth every day.     • Methotrexate, PF, 10 MG/0.2ML Solution Auto-injector Inject 10 mg as instructed every 7 days. On Mon   Indications: Psoriasis     • Omega-3 Fatty Acids (FISH OIL) 1000 MG Cap capsule Take 1,000 mg by mouth every day.     • Multiple Vitamins-Minerals (OCUVITE) Tab Take 1 Tab by mouth every day.     • aspirin 81 MG tablet Take 81 mg by mouth every day.     • Coenzyme Q10 (CO Q-10 PO) Take 1 Cap by mouth every day.     • HYDROcodone-acetaminophen (NORCO) 5-325 MG Tab per tablet TAKE 1 TAB BY MOUTH EVERY 4 HOURS AS NEEDED  0   • furosemide (LASIX) 40 MG Tab Take 40 mg by mouth every day.     • metOLazone (ZAROXOLYN) 5 MG Tab Take 1 Tab by mouth 1 time daily as needed. 30 Tab 3     No facility-administered encounter medications on file as of 1/9/2019.      Review of Systems   Constitutional: Positive for malaise/fatigue. Negative for chills, fever and weight loss.   HENT: Negative for congestion and sore throat.    Eyes: Negative for blurred vision and double vision.   Respiratory: Negative for cough, sputum production, shortness of breath and wheezing.    Cardiovascular: Negative for chest pain, palpitations, orthopnea, claudication, leg swelling and PND.   Gastrointestinal: Negative for abdominal pain, heartburn, nausea and vomiting.   Genitourinary: Negative for dysuria, flank pain and frequency.   Musculoskeletal: Negative.    Skin: Positive for itching and rash.        " "Eczema under derm treatment.   Neurological: Negative for dizziness, speech change, focal weakness, seizures, loss of consciousness and headaches.   Endo/Heme/Allergies: Negative.    Psychiatric/Behavioral: Negative.         Objective:   /74 (BP Location: Left arm, Patient Position: Sitting, BP Cuff Size: Adult)   Pulse 70   Ht 1.803 m (5' 11\")   Wt 92.5 kg (204 lb)   SpO2 95%   BMI 28.45 kg/m²     Physical Exam   Constitutional: He is oriented to person, place, and time. He appears well-developed and well-nourished.   HENT:   Head: Normocephalic and atraumatic.   Eyes: Pupils are equal, round, and reactive to light.   Neck: Normal range of motion. Neck supple.   Cardiovascular: Normal rate and regular rhythm.    Pulmonary/Chest: Effort normal and breath sounds normal.   Device site left chest is uncomplicated.   Abdominal: Soft. Bowel sounds are normal.   Musculoskeletal: Normal range of motion.   Neurological: He is alert and oriented to person, place, and time.   Skin: Skin is warm and dry. Rash noted. There is erythema.   Psychiatric: He has a normal mood and affect.       Assessment:     1. Biventricular implantable cardioverter-defibrillator in situ     2. Coronary artery disease involving native coronary artery of native heart without angina pectoris     3. Ischemic cardiomyopathy     4. Ventricular tachycardia (paroxysmal) (HCC)     5. Systolic heart failure, ACC/AHA stage C (HCC)     6. S/P ablation of atrial fibrillation         Medical Decision Making:  Today's Assessment / Status / Plan:     1. BIV ICD BV 2.8 years.  NSVT X 1 in VT1 zone. SVT X 4.  Continues on Metoprolol.  2. CAD no chest pain or anginal equivalents.  3. ICM no HF symptoms or exam exacerbation.  4. VT one short episode of VT1 zone.  5. Systolic HF stable.  6. AF S/P ablation without recurrence.  RTC in 3 months   Dr Figueredo in 6months.    collaborating MD DAIN smith      "

## 2019-01-28 ENCOUNTER — TELEPHONE (OUTPATIENT)
Dept: PULMONOLOGY | Facility: HOSPICE | Age: 79
End: 2019-01-28

## 2019-01-28 DIAGNOSIS — G47.33 OSA (OBSTRUCTIVE SLEEP APNEA): ICD-10-CM

## 2019-01-29 ENCOUNTER — TELEPHONE (OUTPATIENT)
Dept: PULMONOLOGY | Facility: HOSPICE | Age: 79
End: 2019-01-29

## 2019-01-29 NOTE — TELEPHONE ENCOUNTER
This patient needs to be changed from [facility] to someone else.  I sent an encounter on his wife also.  Thank you.

## 2019-01-30 NOTE — TELEPHONE ENCOUNTER
Please sign corrected order as length of need was not put in    Please route back to Imelda so I can pt set up at [facility]

## 2019-04-16 ENCOUNTER — OFFICE VISIT (OUTPATIENT)
Dept: CARDIOLOGY | Facility: MEDICAL CENTER | Age: 79
End: 2019-04-16
Payer: MEDICARE

## 2019-04-16 VITALS
DIASTOLIC BLOOD PRESSURE: 64 MMHG | HEART RATE: 70 BPM | OXYGEN SATURATION: 94 % | BODY MASS INDEX: 28.7 KG/M2 | WEIGHT: 205 LBS | SYSTOLIC BLOOD PRESSURE: 92 MMHG | HEIGHT: 71 IN

## 2019-04-16 DIAGNOSIS — M79.10 MUSCLE PAIN: ICD-10-CM

## 2019-04-16 DIAGNOSIS — I48.0 PAROXYSMAL ATRIAL FIBRILLATION (HCC): ICD-10-CM

## 2019-04-16 DIAGNOSIS — I25.10 CORONARY ARTERY DISEASE INVOLVING NATIVE HEART WITHOUT ANGINA PECTORIS, UNSPECIFIED VESSEL OR LESION TYPE: ICD-10-CM

## 2019-04-16 DIAGNOSIS — I44.1 SECOND DEGREE AV BLOCK, MOBITZ TYPE II: Chronic | ICD-10-CM

## 2019-04-16 DIAGNOSIS — Z98.890 S/P ABLATION OF ATRIAL FIBRILLATION: ICD-10-CM

## 2019-04-16 DIAGNOSIS — Z79.01 ANTICOAGULATED: ICD-10-CM

## 2019-04-16 DIAGNOSIS — Z86.79 S/P ABLATION OF ATRIAL FIBRILLATION: ICD-10-CM

## 2019-04-16 PROCEDURE — 99214 OFFICE O/P EST MOD 30 MIN: CPT | Mod: 25 | Performed by: NURSE PRACTITIONER

## 2019-04-16 PROCEDURE — 93284 PRGRMG EVAL IMPLANTABLE DFB: CPT | Performed by: NURSE PRACTITIONER

## 2019-04-16 ASSESSMENT — ENCOUNTER SYMPTOMS
WHEEZING: 0
SPEECH CHANGE: 0
PSYCHIATRIC NEGATIVE: 1
SHORTNESS OF BREATH: 0
HEADACHES: 0
VOMITING: 0
FEVER: 0
SEIZURES: 0
NAUSEA: 0
FOCAL WEAKNESS: 0
PND: 0
COUGH: 0
CHILLS: 0
DOUBLE VISION: 0
FLANK PAIN: 0
CLAUDICATION: 0
SORE THROAT: 0
PALPITATIONS: 0
DIZZINESS: 0
HEARTBURN: 0
LOSS OF CONSCIOUSNESS: 0
WEIGHT LOSS: 0
ORTHOPNEA: 0
ABDOMINAL PAIN: 0
MYALGIAS: 1
BLURRED VISION: 0
SPUTUM PRODUCTION: 0

## 2019-04-16 NOTE — PROGRESS NOTES
"Chief Complaint   Patient presents with   • Atrial Fibrillation     FV       Subjective:   Isai Fuentes is a 78 y.o. male who presents today with diffuse aching.  For review of his cardiac status.  Luis Armando has a severe ICM and continues despite medication changes to demonstrate an improvement in his Ef (35%) but is stable. He states he is exhausted all the time. He also describes diffuse aching throughout his body. He is starting on Alondronate for his decreased bone density. He has been on methotrexate for years for his psoriasis.  His medications were recently changed with reduction of his lasix and switch to Aldactone. Followed by Dr quinn.  NSVT X 1 SVT X 4.  Device function appears intact with his BiV ICD. BV 2.8 years.    1/24/18  CONCLUSIONS  Compared to the prior Echo dated 10/19/17, no significant changes are   noted.  Left ventricle is mildly dilated.  Left ventricular ejection fraction is visually estimated to be 35%.  Apical aneurysm of the left ventricle.  Estimated right ventricular systolic pressure  is 35 mmHg.  Past Medical History:   Diagnosis Date   • Atrial fibrillation ([facility]) 9/20/2011   • Biventricular implantable cardioverter-defibrillator in situ 8/7/2015   • Breath shortness    • CAD (coronary artery disease) 9/20/2011   • Cancer ([facility]) 2012    prostate   • CARDIOMYOPATHY 9/20/2011   • Congestive heart failure ([facility])    • COPD (chronic obstructive pulmonary disease) ([facility])    • Fatigue 10/28/2010   • Heartburn 10/23/2008   • History of cardiac catheterization 9/20/2011   • History of myocardial infarction     \"many\"   • HTN (hypertension) 9/20/2011   • Indigestion    • Insomnia 7/8/2010   • Ischemic cardiomyopathy 9/20/2011   • Long term (current) use of anticoagulants 5/13/2011   • Myocardial infarct ([facility]) 2007   • Orthostatic hypotension 5/6/2009   • WILLA (obstructive sleep apnea)     CPAP   • Other drug allergy(995.27) 10/16/2008   • Other specified disorder of intestines 07-28-15    " "constipation/diarrhea/ reports some bleeding from rectum w/blood clots. Seeing GI   • Pacemaker     [employer]   • Pain 07-28-15    \"chronic\", 0/10   • Pleural effusion 5/18/2009   • Presence of permanent cardiac pacemaker 9/20/2011   • Prostate cancer (HCC) 5/13/2011   • Second degree AV block, Mobitz type II 11/10/2010   • Snoring    • Stroke (HCC) 2/3/2009     Past Surgical History:   Procedure Laterality Date   • RECOVERY  10/16/2015    Procedure: CATH LAB LEAD REVISION ST.DRE KENIA;  Surgeon: Recoveryonly Surgery;  Location: SURGERY PRE[facility]POST PROC [facility];  Service:    • RECOVERY  8/14/2015    Procedure:  CATH LAB LEAD EXTRACTION AWILDA ST.DRE LRG RP KENIA;  Surgeon: Recoveryonly Surgery;  Location: SURGERY PRE-POST PROC UN[facility];  Service:    • RECOVERY  7/30/2015    Procedure: CATH LAB  LEAD EXTRACTION, UPGRADE TO BIV PM ST.DRE LRG GRP KENIA ;  Surgeon: Recoveryonly Surgery;  Location: SURGERY PRE-POST PROC UN[facility];  Service:    • RECOVERY  7/29/2015    Procedure: CATH LAB NEW PM INSERTION DUAL ATRIAL & VENTRICULAR-LV LEAD W/ NEW GENERATOR AQUINO ICD9: 414.8;  Surgeon: Recoveryonly Surgery;  Location: SURGERY PRE-POST PROC [facility];  Service:    • PACEMAKER INSERTION  11/24/08    [facility]   • MULTIPLE CORONARY ARTERY BYPASS  2007    2 vessel   • ZZZ CARDIAC CATH      has 2 stents     Family History   Problem Relation Age of Onset   • Thyroid Sister      Social History     Social History   • Marital status:      Spouse name: N/A   • Number of children: N/A   • Years of education: N/A     Occupational History   • Not on file.     Social History Main Topics   • Smoking status: Former Smoker     Packs/day: 1.50     Years: 3.00     Types: Cigarettes     Quit date: 1/1/1970   • Smokeless tobacco: Former User     Types: Chew     Quit date: 1/1/1972   • Alcohol use No      Comment: 10-14 per week; scotch & wine   • Drug use: No   • Sexual activity: Not on file     Other Topics Concern   • Not on file " "    Social History Narrative   • No narrative on file     Allergies   Allergen Reactions   • Plavix [Clopidogrel Bisulfate] Anaphylaxis   • Statins [Hmg-Coa-R Inhibitors] Unspecified     Muscles aches     • Ticlid [Ticlopidine Hydrochloride] Unspecified     Pt states \"I'm not sure what happens\".       Outpatient Encounter Prescriptions as of 4/16/2019   Medication Sig Dispense Refill   • Ixekizumab (TALTZ SC) Inject  as instructed. Once a month-injection     • spironolactone (ALDACTONE) 25 MG Tab Take 1 Tab by mouth every day. 90 Tab 3   • sacubitril-valsartan (ENTRESTO)  MG Tab tablet Take 1 Tab by mouth 2 Times a Day. 180 Tab 3   • potassium chloride (KLOR-CON) 20 MEQ Pack Take 10 mEq by mouth every day. Once per week     • metoprolol SR (TOPROL XL) 50 MG TABLET SR 24 HR Take 1 Tab by mouth every day. 90 Tab 3   • vitamin D (CHOLECALCIFEROL) 1000 UNIT Tab Take 1,000 Units by mouth every day.     • Omega-3 Fatty Acids (FISH OIL) 1000 MG Cap capsule Take 1,000 mg by mouth every day.     • Multiple Vitamins-Minerals (OCUVITE) Tab Take 1 Tab by mouth every day.     • aspirin 81 MG tablet Take 81 mg by mouth every day.     • Coenzyme Q10 (CO Q-10 PO) Take 1 Cap by mouth every day.     • HYDROcodone-acetaminophen (NORCO) 5-325 MG Tab per tablet TAKE 1 TAB BY MOUTH EVERY 4 HOURS AS NEEDED  0   • furosemide (LASIX) 40 MG Tab Take 40 mg by mouth every day.     • metOLazone (ZAROXOLYN) 5 MG Tab Take 1 Tab by mouth 1 time daily as needed. 30 Tab 3   • [DISCONTINUED] Methotrexate, PF, 10 MG/0.2ML Solution Auto-injector Inject 10 mg as instructed every 7 days. On Mon   Indications: Psoriasis       No facility-administered encounter medications on file as of 4/16/2019.      Review of Systems   Constitutional: Negative for chills, fever, malaise/fatigue and weight loss.   HENT: Negative for congestion and sore throat.    Eyes: Negative for blurred vision and double vision.   Respiratory: Negative for cough, sputum " "production, shortness of breath and wheezing.    Cardiovascular: Negative for chest pain, palpitations, orthopnea, claudication, leg swelling and PND.   Gastrointestinal: Negative for abdominal pain, heartburn, nausea and vomiting.   Genitourinary: Negative for dysuria, flank pain and frequency.   Musculoskeletal: Positive for myalgias.   Skin: Negative.    Neurological: Negative for dizziness, speech change, focal weakness, seizures, loss of consciousness and headaches.   Endo/Heme/Allergies: Negative.    Psychiatric/Behavioral: Negative.         Objective:   BP (!) 92/64 (BP Location: Left arm, Patient Position: Sitting, BP Cuff Size: Adult)   Pulse 70   Ht 1.803 m (5' 11\")   Wt 93 kg (205 lb)   SpO2 94%   BMI 28.59 kg/m²     Physical Exam   Constitutional: He is oriented to person, place, and time. He appears well-developed and well-nourished.   HENT:   Head: Normocephalic and atraumatic.   Eyes: Pupils are equal, round, and reactive to light.   Neck: Normal range of motion. Neck supple.   Cardiovascular: Normal rate and regular rhythm.    Pulmonary/Chest: Effort normal and breath sounds normal.   Device site uncomplicated.   Abdominal: Soft. Bowel sounds are normal.   Musculoskeletal: Normal range of motion.   Neurological: He is alert and oriented to person, place, and time.   Skin: Skin is warm and dry.   Psychiatric: He has a normal mood and affect.       Assessment:     1. Paroxysmal atrial fibrillation (HCC)     2. Second degree AV block, Mobitz type II     3. S/P ablation of atrial fibrillation         Medical Decision Making:  Today's Assessment / Status / Plan:     1. PAF no further episodes. One 35 secnd run of SVT  bpm.  2. HB stable with BiV ICD  SJM device with 2.5 years of remaining longevity.  3. S/P ablation for AF in past.  4. Myalgias labs ordered. Referred back to PCP for evaluation of diffuse muscle aching.  RTc 3 months     Collaborating MD Thompson  "

## 2019-04-16 NOTE — LETTER
[facility]   [address]  [place] [address]  Phone: [phone]  Fax: [phone]              Isai Fuentes  1940    Encounter Date: 4/16/2019    PRATEEK Gregorio          PROGRESS NOTE:  No notes on file      Matt Pagan M.D.  [address] [place] [address] [address]  [address]  [facility] [address]  VIA Facsimile: [phone]

## 2019-04-22 ENCOUNTER — TELEPHONE (OUTPATIENT)
Dept: CARDIOLOGY | Facility: MEDICAL CENTER | Age: 79
End: 2019-04-22

## 2019-04-22 ENCOUNTER — HOSPITAL ENCOUNTER (OUTPATIENT)
Dept: LAB | Facility: MEDICAL CENTER | Age: 79
End: 2019-04-22
Attending: NURSE PRACTITIONER
Payer: MEDICARE

## 2019-04-22 DIAGNOSIS — I48.0 PAROXYSMAL ATRIAL FIBRILLATION (HCC): ICD-10-CM

## 2019-04-22 DIAGNOSIS — M79.10 MUSCLE PAIN: ICD-10-CM

## 2019-04-22 LAB
ALBUMIN SERPL BCP-MCNC: 4.1 G/DL (ref 3.2–4.9)
ALBUMIN/GLOB SERPL: 1.4 G/DL
ALP SERPL-CCNC: 100 U/L (ref 30–99)
ALT SERPL-CCNC: 16 U/L (ref 2–50)
ANION GAP SERPL CALC-SCNC: 7 MMOL/L (ref 0–11.9)
AST SERPL-CCNC: 22 U/L (ref 12–45)
BASOPHILS # BLD AUTO: 0.8 % (ref 0–1.8)
BASOPHILS # BLD: 0.07 K/UL (ref 0–0.12)
BILIRUB SERPL-MCNC: 0.7 MG/DL (ref 0.1–1.5)
BUN SERPL-MCNC: 27 MG/DL (ref 8–22)
CALCIUM SERPL-MCNC: 9.3 MG/DL (ref 8.5–10.5)
CHLORIDE SERPL-SCNC: 105 MMOL/L (ref 96–112)
CHOLEST SERPL-MCNC: 226 MG/DL (ref 100–199)
CO2 SERPL-SCNC: 24 MMOL/L (ref 20–33)
CREAT SERPL-MCNC: 1.42 MG/DL (ref 0.5–1.4)
EOSINOPHIL # BLD AUTO: 0.33 K/UL (ref 0–0.51)
EOSINOPHIL NFR BLD: 3.7 % (ref 0–6.9)
ERYTHROCYTE [DISTWIDTH] IN BLOOD BY AUTOMATED COUNT: 50 FL (ref 35.9–50)
ERYTHROCYTE [SEDIMENTATION RATE] IN BLOOD BY WESTERGREN METHOD: 33 MM/HOUR (ref 0–20)
GLOBULIN SER CALC-MCNC: 2.9 G/DL (ref 1.9–3.5)
GLUCOSE SERPL-MCNC: 104 MG/DL (ref 65–99)
HCT VFR BLD AUTO: 50.9 % (ref 42–52)
HDLC SERPL-MCNC: 55 MG/DL
HGB BLD-MCNC: 16.2 G/DL (ref 14–18)
IMM GRANULOCYTES # BLD AUTO: 0.05 K/UL (ref 0–0.11)
IMM GRANULOCYTES NFR BLD AUTO: 0.6 % (ref 0–0.9)
LDLC SERPL CALC-MCNC: 149 MG/DL
LYMPHOCYTES # BLD AUTO: 3.06 K/UL (ref 1–4.8)
LYMPHOCYTES NFR BLD: 34.2 % (ref 22–41)
MCH RBC QN AUTO: 29.5 PG (ref 27–33)
MCHC RBC AUTO-ENTMCNC: 31.8 G/DL (ref 33.7–35.3)
MCV RBC AUTO: 92.5 FL (ref 81.4–97.8)
MONOCYTES # BLD AUTO: 0.8 K/UL (ref 0–0.85)
MONOCYTES NFR BLD AUTO: 8.9 % (ref 0–13.4)
NEUTROPHILS # BLD AUTO: 4.65 K/UL (ref 1.82–7.42)
NEUTROPHILS NFR BLD: 51.8 % (ref 44–72)
NRBC # BLD AUTO: 0 K/UL
NRBC BLD-RTO: 0 /100 WBC
PLATELET # BLD AUTO: 186 K/UL (ref 164–446)
PMV BLD AUTO: 11.6 FL (ref 9–12.9)
POTASSIUM SERPL-SCNC: 4.6 MMOL/L (ref 3.6–5.5)
PROT SERPL-MCNC: 7 G/DL (ref 6–8.2)
RBC # BLD AUTO: 5.5 M/UL (ref 4.7–6.1)
SODIUM SERPL-SCNC: 136 MMOL/L (ref 135–145)
T4 FREE SERPL-MCNC: 0.96 NG/DL (ref 0.53–1.43)
TRIGL SERPL-MCNC: 108 MG/DL (ref 0–149)
TSH SERPL DL<=0.005 MIU/L-ACNC: 1.27 UIU/ML (ref 0.38–5.33)
WBC # BLD AUTO: 9 K/UL (ref 4.8–10.8)

## 2019-04-22 PROCEDURE — 84439 ASSAY OF FREE THYROXINE: CPT

## 2019-04-22 PROCEDURE — 84443 ASSAY THYROID STIM HORMONE: CPT

## 2019-04-22 PROCEDURE — 80053 COMPREHEN METABOLIC PANEL: CPT

## 2019-04-22 PROCEDURE — 36415 COLL VENOUS BLD VENIPUNCTURE: CPT

## 2019-04-22 PROCEDURE — 85025 COMPLETE CBC W/AUTO DIFF WBC: CPT

## 2019-04-22 PROCEDURE — 85652 RBC SED RATE AUTOMATED: CPT

## 2019-04-22 PROCEDURE — 80061 LIPID PANEL: CPT

## 2019-04-22 NOTE — TELEPHONE ENCOUNTER
----- Message from PRATEEK Gregorio sent at 4/22/2019  3:37 PM PDT -----  Kidney function better. Lipids are more elevated and above goal. His sed rate is elevated and with diffuse aching needs to discuss with PCP.   ? polymyalgia rheumatica

## 2019-04-26 ENCOUNTER — HOSPITAL ENCOUNTER (OUTPATIENT)
Dept: RADIOLOGY | Facility: MEDICAL CENTER | Age: 79
End: 2019-04-26
Attending: FAMILY MEDICINE
Payer: MEDICARE

## 2019-04-26 DIAGNOSIS — M25.551 BILATERAL HIP PAIN: ICD-10-CM

## 2019-04-26 DIAGNOSIS — M25.552 BILATERAL HIP PAIN: ICD-10-CM

## 2019-04-26 DIAGNOSIS — M54.5 LOW BACK PAIN, UNSPECIFIED BACK PAIN LATERALITY, UNSPECIFIED CHRONICITY, WITH SCIATICA PRESENCE UNSPECIFIED: ICD-10-CM

## 2019-04-26 PROCEDURE — 73521 X-RAY EXAM HIPS BI 2 VIEWS: CPT

## 2019-04-26 PROCEDURE — 72100 X-RAY EXAM L-S SPINE 2/3 VWS: CPT

## 2019-05-21 ENCOUNTER — HOSPITAL ENCOUNTER (OUTPATIENT)
Dept: LAB | Facility: MEDICAL CENTER | Age: 79
End: 2019-05-21
Attending: NURSE PRACTITIONER
Payer: MEDICARE

## 2019-05-21 LAB
BASOPHILS # BLD AUTO: 0.9 % (ref 0–1.8)
BASOPHILS # BLD: 0.07 K/UL (ref 0–0.12)
CHOLEST SERPL-MCNC: 215 MG/DL (ref 100–199)
EOSINOPHIL # BLD AUTO: 0.29 K/UL (ref 0–0.51)
EOSINOPHIL NFR BLD: 3.7 % (ref 0–6.9)
ERYTHROCYTE [DISTWIDTH] IN BLOOD BY AUTOMATED COUNT: 52.6 FL (ref 35.9–50)
FASTING STATUS PATIENT QL REPORTED: NORMAL
HCT VFR BLD AUTO: 48.7 % (ref 42–52)
HDLC SERPL-MCNC: 54 MG/DL
HGB BLD-MCNC: 15.4 G/DL (ref 14–18)
IMM GRANULOCYTES # BLD AUTO: 0.04 K/UL (ref 0–0.11)
IMM GRANULOCYTES NFR BLD AUTO: 0.5 % (ref 0–0.9)
LDLC SERPL CALC-MCNC: 139 MG/DL
LYMPHOCYTES # BLD AUTO: 2.6 K/UL (ref 1–4.8)
LYMPHOCYTES NFR BLD: 33.2 % (ref 22–41)
MCH RBC QN AUTO: 29.9 PG (ref 27–33)
MCHC RBC AUTO-ENTMCNC: 31.6 G/DL (ref 33.7–35.3)
MCV RBC AUTO: 94.6 FL (ref 81.4–97.8)
MONOCYTES # BLD AUTO: 0.82 K/UL (ref 0–0.85)
MONOCYTES NFR BLD AUTO: 10.5 % (ref 0–13.4)
NEUTROPHILS # BLD AUTO: 4.02 K/UL (ref 1.82–7.42)
NEUTROPHILS NFR BLD: 51.2 % (ref 44–72)
NRBC # BLD AUTO: 0 K/UL
NRBC BLD-RTO: 0 /100 WBC
PLATELET # BLD AUTO: 177 K/UL (ref 164–446)
PMV BLD AUTO: 12 FL (ref 9–12.9)
RBC # BLD AUTO: 5.15 M/UL (ref 4.7–6.1)
T4 FREE SERPL-MCNC: 0.85 NG/DL (ref 0.53–1.43)
TRIGL SERPL-MCNC: 109 MG/DL (ref 0–149)
TSH SERPL DL<=0.005 MIU/L-ACNC: 1.67 UIU/ML (ref 0.38–5.33)
WBC # BLD AUTO: 7.8 K/UL (ref 4.8–10.8)

## 2019-05-21 PROCEDURE — 80061 LIPID PANEL: CPT

## 2019-05-21 PROCEDURE — 84439 ASSAY OF FREE THYROXINE: CPT

## 2019-05-21 PROCEDURE — 85025 COMPLETE CBC W/AUTO DIFF WBC: CPT

## 2019-05-21 PROCEDURE — 84443 ASSAY THYROID STIM HORMONE: CPT

## 2019-05-21 PROCEDURE — 36415 COLL VENOUS BLD VENIPUNCTURE: CPT

## 2019-05-22 ENCOUNTER — TELEPHONE (OUTPATIENT)
Dept: CARDIOLOGY | Facility: MEDICAL CENTER | Age: 79
End: 2019-05-22

## 2019-05-22 NOTE — TELEPHONE ENCOUNTER
FINA Gregorio.  Karen Rangel, R.N.             Labs are stable has he ever tried Zetia?      Spoke w/wife regarding above as patient was not available. Advised her of PB question and also advised that it can be brought up at  with RO on 5/24/19 or he can call me back to discuss. Either way is fine. She verbalized understanding and stated she would speak to him regarding this.

## 2019-05-24 ENCOUNTER — OFFICE VISIT (OUTPATIENT)
Dept: CARDIOLOGY | Facility: MEDICAL CENTER | Age: 79
End: 2019-05-24
Payer: MEDICARE

## 2019-05-24 VITALS
SYSTOLIC BLOOD PRESSURE: 112 MMHG | OXYGEN SATURATION: 95 % | BODY MASS INDEX: 28.56 KG/M2 | WEIGHT: 204 LBS | DIASTOLIC BLOOD PRESSURE: 70 MMHG | HEIGHT: 71 IN | HEART RATE: 70 BPM

## 2019-05-24 DIAGNOSIS — I51.89 LEFT VENTRICULAR SYSTOLIC DYSFUNCTION, NYHA CLASS 3: ICD-10-CM

## 2019-05-24 DIAGNOSIS — G47.33 OBSTRUCTIVE SLEEP APNEA SYNDROME: Chronic | ICD-10-CM

## 2019-05-24 DIAGNOSIS — I50.20 ACC/AHA STAGE C SYSTOLIC HEART FAILURE (HCC): ICD-10-CM

## 2019-05-24 DIAGNOSIS — I25.5 ISCHEMIC CARDIOMYOPATHY: Chronic | ICD-10-CM

## 2019-05-24 DIAGNOSIS — N18.30 STAGE 3 CHRONIC KIDNEY DISEASE (HCC): ICD-10-CM

## 2019-05-24 DIAGNOSIS — I44.1 SECOND DEGREE AV BLOCK, MOBITZ TYPE II: Chronic | ICD-10-CM

## 2019-05-24 DIAGNOSIS — Z95.810 BIVENTRICULAR IMPLANTABLE CARDIOVERTER-DEFIBRILLATOR IN SITU: ICD-10-CM

## 2019-05-24 DIAGNOSIS — Z98.890 S/P ABLATION OF ATRIAL FIBRILLATION: ICD-10-CM

## 2019-05-24 DIAGNOSIS — Z79.899 HIGH RISK MEDICATION USE: ICD-10-CM

## 2019-05-24 DIAGNOSIS — I50.20 SYSTOLIC HEART FAILURE, ACC/AHA STAGE C (HCC): ICD-10-CM

## 2019-05-24 DIAGNOSIS — I50.23 NYHA CLASS 3 ACUTE ON CHRONIC SYSTOLIC HEART FAILURE (HCC): ICD-10-CM

## 2019-05-24 DIAGNOSIS — E78.00 HYPERCHOLESTEREMIA: Chronic | ICD-10-CM

## 2019-05-24 DIAGNOSIS — I48.0 PAROXYSMAL ATRIAL FIBRILLATION (HCC): ICD-10-CM

## 2019-05-24 DIAGNOSIS — I25.10 CORONARY ARTERY DISEASE INVOLVING NATIVE CORONARY ARTERY OF NATIVE HEART WITHOUT ANGINA PECTORIS: Chronic | ICD-10-CM

## 2019-05-24 DIAGNOSIS — K76.6 PORTAL HYPERTENSION (HCC): ICD-10-CM

## 2019-05-24 DIAGNOSIS — I25.5 ISCHEMIC CARDIOMYOPATHY: ICD-10-CM

## 2019-05-24 DIAGNOSIS — I47.29 VENTRICULAR TACHYCARDIA (PAROXYSMAL) (HCC): ICD-10-CM

## 2019-05-24 DIAGNOSIS — I25.2 HISTORY OF MYOCARDIAL INFARCTION: Chronic | ICD-10-CM

## 2019-05-24 DIAGNOSIS — Z86.79 S/P ABLATION OF ATRIAL FIBRILLATION: ICD-10-CM

## 2019-05-24 PROCEDURE — 99214 OFFICE O/P EST MOD 30 MIN: CPT | Performed by: INTERNAL MEDICINE

## 2019-05-24 RX ORDER — METOPROLOL SUCCINATE 50 MG/1
50 TABLET, EXTENDED RELEASE ORAL DAILY
Qty: 90 TAB | Refills: 3 | Status: SHIPPED | OUTPATIENT
Start: 2019-05-24 | End: 2020-06-02

## 2019-05-24 ASSESSMENT — ENCOUNTER SYMPTOMS
DIZZINESS: 0
COUGH: 0
ORTHOPNEA: 0
PND: 0
CARDIOVASCULAR NEGATIVE: 1
MUSCULOSKELETAL NEGATIVE: 1
LOSS OF CONSCIOUSNESS: 0
FEVER: 0
EYES NEGATIVE: 1
SORE THROAT: 0
BRUISES/BLEEDS EASILY: 0
GASTROINTESTINAL NEGATIVE: 1
PALPITATIONS: 0
STRIDOR: 0
SHORTNESS OF BREATH: 0
NEUROLOGICAL NEGATIVE: 1
CLAUDICATION: 0
SPUTUM PRODUCTION: 0
CHILLS: 0
RESPIRATORY NEGATIVE: 1
WHEEZING: 0
HEMOPTYSIS: 0
WEAKNESS: 0

## 2019-05-24 NOTE — PROGRESS NOTES
"Chief Complaint   Patient presents with   • Atrial Fibrillation       Subjective:   Isai Fuentes is a 78 y.o. male who presents today as a follow-up for his heart failure with reduced ejection fraction hypertension hyperlipidemia and high risk medication usage.  Since he was last seen he continues to do well.  We have checked an echocardiogram in over 1 year.  His last LDL was 139.  He is been intolerant of statins in the past.  He is been able to get qualified to get Praluent.  He is never tried once weekly statin dosing.  He continues to be fatigued most of the time.    Past Medical History:   Diagnosis Date   • Atrial fibrillation ([facility]) 9/20/2011   • Biventricular implantable cardioverter-defibrillator in situ 8/7/2015   • Breath shortness    • CAD (coronary artery disease) 9/20/2011   • Cancer ([facility]) 2012    prostate   • CARDIOMYOPATHY 9/20/2011   • Congestive heart failure ([facility])    • COPD (chronic obstructive pulmonary disease) ([facility])    • Fatigue 10/28/2010   • Heartburn 10/23/2008   • History of cardiac catheterization 9/20/2011   • History of myocardial infarction     \"many\"   • HTN (hypertension) 9/20/2011   • Indigestion    • Insomnia 7/8/2010   • Ischemic cardiomyopathy 9/20/2011   • Long term (current) use of anticoagulants 5/13/2011   • Myocardial infarct ([facility]) 2007   • Orthostatic hypotension 5/6/2009   • WILLA (obstructive sleep apnea)     CPAP   • Other drug allergy(995.27) 10/16/2008   • Other specified disorder of intestines 07-28-15    constipation/diarrhea/ reports some bleeding from rectum w/blood clots. Seeing GI   • Pacemaker     [employer]   • Pain 07-28-15    \"chronic\", 0/10   • Pleural effusion 5/18/2009   • Presence of permanent cardiac pacemaker 9/20/2011   • Prostate cancer ([facility]) 5/13/2011   • Second degree AV block, Mobitz type II 11/10/2010   • Snoring    • Stroke ([facility]) 2/3/2009     Past Surgical History:   Procedure Laterality Date   • RECOVERY  10/16/2015    Procedure: CATH " "LAB LEAD REVISION OsbaldoMARIA R AQUINO;  Surgeon: Recoveryonly Surgery;  Location: SURGERY PRE-POST PROC UNIT [facility] [place];  Service:    • RECOVERY  8/14/2015    Procedure:  CATH LAB LEAD EXTRACTION AWILDA ST.[facility] R LRG PAULO AQUINO;  Surgeon: Recoveryonly Surgery;  Location: SURGERY PRE-POST PROC UNIT [facility] [place];  Service:    • RECOVERY  7/30/2015    Procedure: CATH LAB  LEAD EXTRACTION, UPGRADE TO BIV PM ST.MARIA R JAS GRP AQUINO ;  Surgeon: Recoveryonly Surgery;  Location: SURGERY PRE-POST PROC UNIT [facility] [place];  Service:    • RECOVERY  7/29/2015    Procedure: CATH LAB NEW PM INSERTION DUAL ATRIAL & VENTRICULAR-LV LEAD W/ NEW GENERATOR AQUINO ICD9: 414.8;  Surgeon: Recoveryonly Surgery;  Location: SURGERY PRE-POST PROC UNIT [facility] [place];  Service:    • PACEMAKER INSERTION  11/24/08    [facility]   • MULTIPLE CORONARY ARTERY BYPASS  2007    2 vessel   • ZZZ CARDIAC CATH      has 2 stents     Family History   Problem Relation Age of Onset   • Thyroid Sister      Social History     Social History   • Marital status:      Spouse name: N/A   • Number of children: N/A   • Years of education: N/A     Occupational History   • Not on file.     Social History Main Topics   • Smoking status: Former Smoker     Packs/day: 1.50     Years: 3.00     Types: Cigarettes     Quit date: 1/1/1970   • Smokeless tobacco: Former User     Types: Chew     Quit date: 1/1/1972   • Alcohol use No      Comment: 10-14 per week; scotch & wine   • Drug use: No   • Sexual activity: Not on file     Other Topics Concern   • Not on file     Social History Narrative   • No narrative on file     Allergies   Allergen Reactions   • Plavix [Clopidogrel Bisulfate] Anaphylaxis   • Statins [Hmg-Coa-R Inhibitors] Unspecified     Muscles aches     • Ticlid [Ticlopidine Hydrochloride] Unspecified     Pt states \"I'm not sure what happens\".       Outpatient Encounter Prescriptions as of 5/24/2019   Medication Sig Dispense Refill   • metoprolol SR (TOPROL XL) 50 MG TABLET SR 24 HR Take 1 Tab by mouth every day. " "90 Tab 3   • Ixekizumab (TALTZ SC) Inject  as instructed. Once a month-injection     • spironolactone (ALDACTONE) 25 MG Tab Take 1 Tab by mouth every day. 90 Tab 3   • sacubitril-valsartan (ENTRESTO)  MG Tab tablet Take 1 Tab by mouth 2 Times a Day. 180 Tab 3   • HYDROcodone-acetaminophen (NORCO) 5-325 MG Tab per tablet TAKE 1 TAB BY MOUTH EVERY 4 HOURS AS NEEDED  0   • potassium chloride (KLOR-CON) 20 MEQ Pack Take 10 mEq by mouth every day. Once per week     • furosemide (LASIX) 40 MG Tab Take 40 mg by mouth every day.     • vitamin D (CHOLECALCIFEROL) 1000 UNIT Tab Take 1,000 Units by mouth every day.     • metOLazone (ZAROXOLYN) 5 MG Tab Take 1 Tab by mouth 1 time daily as needed. 30 Tab 3   • Omega-3 Fatty Acids (FISH OIL) 1000 MG Cap capsule Take 1,000 mg by mouth every day.     • Multiple Vitamins-Minerals (OCUVITE) Tab Take 1 Tab by mouth every day.     • aspirin 81 MG tablet Take 81 mg by mouth every day.     • Coenzyme Q10 (CO Q-10 PO) Take 1 Cap by mouth every day.       No facility-administered encounter medications on file as of 5/24/2019.      Review of Systems   Constitutional: Positive for malaise/fatigue. Negative for chills and fever.   HENT: Negative.  Negative for sore throat.    Eyes: Negative.    Respiratory: Negative.  Negative for cough, hemoptysis, sputum production, shortness of breath, wheezing and stridor.    Cardiovascular: Negative.  Negative for chest pain, palpitations, orthopnea, claudication, leg swelling and PND.   Gastrointestinal: Negative.    Genitourinary: Negative.    Musculoskeletal: Negative.    Skin: Negative.    Neurological: Negative.  Negative for dizziness, loss of consciousness and weakness.   Endo/Heme/Allergies: Negative.  Does not bruise/bleed easily.   All other systems reviewed and are negative.       Objective:   /70 (BP Location: Left arm, Patient Position: Sitting, BP Cuff Size: Adult)   Pulse 70   Ht 1.803 m (5' 11\")   Wt 92.5 kg (204 lb)   " SpO2 95%   BMI 28.45 kg/m²     Physical Exam   Constitutional: He appears well-developed and well-nourished. No distress.   HENT:   Head: Normocephalic and atraumatic.   Right Ear: External ear normal.   Left Ear: External ear normal.   Nose: Nose normal.   Mouth/Throat: No oropharyngeal exudate.   Eyes: Pupils are equal, round, and reactive to light. Conjunctivae and EOM are normal. Right eye exhibits no discharge. Left eye exhibits no discharge. No scleral icterus.   Neck: Neck supple. No JVD present.   Cardiovascular: Normal rate, regular rhythm and intact distal pulses.  Exam reveals no gallop and no friction rub.    No murmur heard.  Pulmonary/Chest: Effort normal. No stridor. No respiratory distress. He has no wheezes. He has no rales. He exhibits no tenderness.   Abdominal: Soft. He exhibits no distension. There is no guarding.   Musculoskeletal: Normal range of motion. He exhibits no edema, tenderness or deformity.   Neurological: He is alert. He has normal reflexes. He displays normal reflexes. No cranial nerve deficit. He exhibits normal muscle tone. Coordination normal.   Skin: Skin is warm and dry. No rash noted. He is not diaphoretic. No erythema. No pallor.   Psychiatric: He has a normal mood and affect. His behavior is normal. Judgment and thought content normal.   Nursing note and vitals reviewed.      Assessment:     1. High risk medication use     2. History of myocardial infarction     3. Paroxysmal atrial fibrillation (HCC)     4. Biventricular implantable cardioverter-defibrillator in situ     5. Coronary artery disease involving native coronary artery of native heart without angina pectoris     6. Hypercholesteremia  REFERRAL TO LIPID CLINIC   7. Ischemic cardiomyopathy  REFERRAL TO LIPID CLINIC   8. Left ventricular systolic dysfunction, NYHA class 3  EC-ECHOCARDIOGRAM COMPLETE W/O CONT   9. NYHA class 3 acute on chronic systolic heart failure (HCC)  EC-ECHOCARDIOGRAM COMPLETE W/O CONT   10.  Portal hypertension (HCC)     11. S/P ablation of atrial fibrillation     12. Second degree AV block, Mobitz type II     13. Obstructive sleep apnea syndrome     14. Stage 3 chronic kidney disease (HCC)     15. Systolic heart failure, ACC/AHA stage C (HCC)     16. Ventricular tachycardia (paroxysmal) (HCC)         Medical Decision Making:  Today's Assessment / Status / Plan:     70-year-old male with heart failure with reduced ejection fraction status post biventricular pacemaker with continued hyperlipidemia.  We will go ahead and refer him to the lipid clinic.  I will also repeat his echocardiogram.  I will see him back in 6 months.    1. CHFrEF, Ef 30%    - repeat TTE     2. CAD s/p CABG    - cont asa    - allergy to statin    - refer to lipid clinic     3. CKD    - labs unchanged     4. S/P BiV    - follow up EP     5. HLD    - intolerant to statins    -refer to lipid clinic    Thank for you allowing me to take part in your patient's care, please call should you have any questions or would like to discuss this patient.

## 2019-05-24 NOTE — LETTER
"     [facility]   [address]  [place] [address]  Phone: [phone]  Fax: [phone]              Isai Fuentes  1940    Encounter Date: 5/24/2019    Severo Lee M.D.          PROGRESS NOTE:  Chief Complaint   Patient presents with   • Atrial Fibrillation       Subjective:   Isai Fuentes is a 78 y.o. male who presents today as a follow-up for his heart failure with reduced ejection fraction hypertension hyperlipidemia and high risk medication usage.  Since he was last seen he continues to do well.  We have checked an echocardiogram in over 1 year.  His last LDL was 139.  He is been intolerant of statins in the past.  He is been able to get qualified to get Praluent.  He is never tried once weekly statin dosing.  He continues to be fatigued most of the time.    Past Medical History:   Diagnosis Date   • Atrial fibrillation ([facility]) 9/20/2011   • Biventricular implantable cardioverter-defibrillator in situ 8/7/2015   • Breath shortness    • CAD (coronary artery disease) 9/20/2011   • Cancer ([facility]) 2012    prostate   • CARDIOMYOPATHY 9/20/2011   • Congestive heart failure ([facility])    • COPD (chronic obstructive pulmonary disease) ([facility])    • Fatigue 10/28/2010   • Heartburn 10/23/2008   • History of cardiac catheterization 9/20/2011   • History of myocardial infarction     \"many\"   • HTN (hypertension) 9/20/2011   • Indigestion    • Insomnia 7/8/2010   • Ischemic cardiomyopathy 9/20/2011   • Long term (current) use of anticoagulants 5/13/2011   • Myocardial infarct ([facility]) 2007   • Orthostatic hypotension 5/6/2009   • WILLA (obstructive sleep apnea)     CPAP   • Other drug allergy(995.27) 10/16/2008   • Other specified disorder of intestines 07-28-15    constipation/diarrhea/ reports some bleeding from rectum w/blood clots. Seeing GI   • Pacemaker     [employer]   • Pain 07-28-15    \"chronic\", 0/10   • Pleural effusion 5/18/2009   • " Presence of permanent cardiac pacemaker 9/20/2011   • Prostate cancer (HCC) 5/13/2011   • Second degree AV block, Mobitz type II 11/10/2010   • Snoring    • Stroke ([facility]) 2/3/2009     Past Surgical History:   Procedure Laterality Date   • RECOVERY  10/16/2015    Procedure: CATH LAB LEAD REVISION OsbaldoMARIA R AQUINO;  Surgeon: Recoveryonfreddy Surgery;  [place] PRE[place] [facility] [place];  Service:    • RECOVERY  8/14/2015    Procedure:  CATH LAB LEAD EXTRACTION AWILDA [facility] R LRG PAULO AQUINO;  Surgeon: Recoveryonly Surgery;  Location: SURGERY PRE-POST PROC UNIT [facility];  Service:    • RECOVERY  7/30/2015    Procedure: CATH LAB  LEAD EXTRACTION, UPGRADE TO BIV PM [facility]MAR[facility] R LRG MAGGIE AQUINO ;  Surgeon: Recoveryonfreddy Surgery;  Location: SURGERY PRE-POST PROC UNIT [facility];  Service:    • RECOVERY  7/29/2015    Procedure: CATH LAB NEW PM INSERTION DUAL ATRIAL & VENTRICULAR-LV LEAD W/ NEW GENERATOR KENIA ICD9: 414.8;  Surgeon: Recoveryonfreddy Surgery;  Location: SURG[place] PRE[place] PROC [place] [place];  Service:    • PACEMAKER INSERTION  11/24/08    [facility]   • MULTIPLE CORONARY ARTERY BYPASS  2007    2 vessel   • ZZZ CARDIAC CATH      has 2 stents     Family History   Problem Relation Age of Onset   • Thyroid Sister      Social History     Social History   • Marital status:      Spouse name: N/A   • Number of children: N/A   • Years of education: N/A     Occupational History   • Not on file.     Social History Main Topics   • Smoking status: Former Smoker     Packs/day: 1.50     Years: 3.00     Types: Cigarettes     Quit date: 1/1/1970   • Smokeless tobacco: Former User     Types: Chew     Quit date: 1/1/1972   • Alcohol use No      Comment: 10-14 per week; scotch & wine   • Drug use: No   • Sexual activity: Not on file     Other Topics Concern   • Not on file     Social History Narrative   • No narrative on file     Allergies   Allergen Reactions   • Plavix [Clopidogrel Bisulfate] Anaphylaxis   • Statins [Hmg-Coa-R Inhibitors] Unspecified      "Muscles aches     • Ticlid [Ticlopidine Hydrochloride] Unspecified     Pt states \"I'm not sure what happens\".       Outpatient Encounter Prescriptions as of 5/24/2019   Medication Sig Dispense Refill   • metoprolol SR (TOPROL XL) 50 MG TABLET SR 24 HR Take 1 Tab by mouth every day. 90 Tab 3   • Ixekizumab (TALTZ SC) Inject  as instructed. Once a month-injection     • spironolactone (ALDACTONE) 25 MG Tab Take 1 Tab by mouth every day. 90 Tab 3   • sacubitril-valsartan (ENTRESTO)  MG Tab tablet Take 1 Tab by mouth 2 Times a Day. 180 Tab 3   • HYDROcodone-acetaminophen (NORCO) 5-325 MG Tab per tablet TAKE 1 TAB BY MOUTH EVERY 4 HOURS AS NEEDED  0   • potassium chloride (KLOR-CON) 20 MEQ Pack Take 10 mEq by mouth every day. Once per week     • furosemide (LASIX) 40 MG Tab Take 40 mg by mouth every day.     • vitamin D (CHOLECALCIFEROL) 1000 UNIT Tab Take 1,000 Units by mouth every day.     • metOLazone (ZAROXOLYN) 5 MG Tab Take 1 Tab by mouth 1 time daily as needed. 30 Tab 3   • Omega-3 Fatty Acids (FISH OIL) 1000 MG Cap capsule Take 1,000 mg by mouth every day.     • Multiple Vitamins-Minerals (OCUVITE) Tab Take 1 Tab by mouth every day.     • aspirin 81 MG tablet Take 81 mg by mouth every day.     • Coenzyme Q10 (CO Q-10 PO) Take 1 Cap by mouth every day.       No facility-administered encounter medications on file as of 5/24/2019.      Review of Systems   Constitutional: Positive for malaise/fatigue. Negative for chills and fever.   HENT: Negative.  Negative for sore throat.    Eyes: Negative.    Respiratory: Negative.  Negative for cough, hemoptysis, sputum production, shortness of breath, wheezing and stridor.    Cardiovascular: Negative.  Negative for chest pain, palpitations, orthopnea, claudication, leg swelling and PND.   Gastrointestinal: Negative.    Genitourinary: Negative.    Musculoskeletal: Negative.    Skin: Negative.    Neurological: Negative.  Negative for dizziness, loss of consciousness and " "weakness.   Endo/Heme/Allergies: Negative.  Does not bruise/bleed easily.   All other systems reviewed and are negative.       Objective:   /70 (BP Location: Left arm, Patient Position: Sitting, BP Cuff Size: Adult)   Pulse 70   Ht 1.803 m (5' 11\")   Wt 92.5 kg (204 lb)   SpO2 95%   BMI 28.45 kg/m²      Physical Exam   Constitutional: He appears well-developed and well-nourished. No distress.   HENT:   Head: Normocephalic and atraumatic.   Right Ear: External ear normal.   Left Ear: External ear normal.   Nose: Nose normal.   Mouth/Throat: No oropharyngeal exudate.   Eyes: Pupils are equal, round, and reactive to light. Conjunctivae and EOM are normal. Right eye exhibits no discharge. Left eye exhibits no discharge. No scleral icterus.   Neck: Neck supple. No JVD present.   Cardiovascular: Normal rate, regular rhythm and intact distal pulses.  Exam reveals no gallop and no friction rub.    No murmur heard.  Pulmonary/Chest: Effort normal. No stridor. No respiratory distress. He has no wheezes. He has no rales. He exhibits no tenderness.   Abdominal: Soft. He exhibits no distension. There is no guarding.   Musculoskeletal: Normal range of motion. He exhibits no edema, tenderness or deformity.   Neurological: He is alert. He has normal reflexes. He displays normal reflexes. No cranial nerve deficit. He exhibits normal muscle tone. Coordination normal.   Skin: Skin is warm and dry. No rash noted. He is not diaphoretic. No erythema. No pallor.   Psychiatric: He has a normal mood and affect. His behavior is normal. Judgment and thought content normal.   Nursing note and vitals reviewed.      Assessment:     1. High risk medication use     2. History of myocardial infarction     3. Paroxysmal atrial fibrillation (HCC)     4. Biventricular implantable cardioverter-defibrillator in situ     5. Coronary artery disease involving native coronary artery of native heart without angina pectoris     6. " Hypercholesteremia  REFERRAL TO LIPID CLINIC   7. Ischemic cardiomyopathy  REFERRAL TO LIPID CLINIC   8. Left ventricular systolic dysfunction, NYHA class 3  EC-ECHOCARDIOGRAM COMPLETE W/O CONT   9. NYHA class 3 acute on chronic systolic heart failure (HCC)  EC-ECHOCARDIOGRAM COMPLETE W/O CONT   10. Portal hypertension (HCC)     11. S/P ablation of atrial fibrillation     12. Second degree AV block, Mobitz type II     13. Obstructive sleep apnea syndrome     14. Stage 3 chronic kidney disease (HCC)     15. Systolic heart failure, ACC/AHA stage C (HCC)     16. Ventricular tachycardia (paroxysmal) (HCC)         Medical Decision Making:  Today's Assessment / Status / Plan:     70-year-old male with heart failure with reduced ejection fraction status post biventricular pacemaker with continued hyperlipidemia.  We will go ahead and refer him to the lipid clinic.  I will also repeat his echocardiogram.  I will see him back in 6 months.    1. CHFrEF, Ef 30%    - repeat TTE     2. CAD s/p CABG    - cont asa    - allergy to statin    - refer to lipid clinic     3. CKD    - labs unchanged     4. S/P BiV    - follow up EP     5. HLD    - intolerant to statins    - refer to lipid clinic    Thank for you allowing me to take part in your patient's care, please call should you have any questions or would like to discuss this patient.      Matt Pagan M.D.  [id number]  [address]  [place] [id number]  VIA Facsimile: [phone]

## 2019-06-11 ENCOUNTER — HOSPITAL ENCOUNTER (OUTPATIENT)
Dept: CARDIOLOGY | Facility: MEDICAL CENTER | Age: 79
End: 2019-06-11
Attending: INTERNAL MEDICINE
Payer: MEDICARE

## 2019-06-11 DIAGNOSIS — I51.89 LEFT VENTRICULAR SYSTOLIC DYSFUNCTION, NYHA CLASS 3: ICD-10-CM

## 2019-06-11 DIAGNOSIS — I50.23 NYHA CLASS 3 ACUTE ON CHRONIC SYSTOLIC HEART FAILURE (HCC): ICD-10-CM

## 2019-06-11 PROCEDURE — 93306 TTE W/DOPPLER COMPLETE: CPT

## 2019-06-11 PROCEDURE — 93306 TTE W/DOPPLER COMPLETE: CPT | Mod: 26 | Performed by: INTERNAL MEDICINE

## 2019-06-12 LAB
LV EJECT FRACT  99904: 40
LV EJECT FRACT MOD 2C 99903: 41.64
LV EJECT FRACT MOD 4C 99902: 41.22
LV EJECT FRACT MOD BP 99901: 41.13

## 2019-07-08 ENCOUNTER — OFFICE VISIT (OUTPATIENT)
Dept: CARDIOLOGY | Facility: MEDICAL CENTER | Age: 79
End: 2019-07-08
Payer: MEDICARE

## 2019-07-08 VITALS
BODY MASS INDEX: 28.84 KG/M2 | WEIGHT: 206 LBS | OXYGEN SATURATION: 94 % | HEART RATE: 71 BPM | HEIGHT: 71 IN | SYSTOLIC BLOOD PRESSURE: 118 MMHG | DIASTOLIC BLOOD PRESSURE: 64 MMHG

## 2019-07-08 DIAGNOSIS — I25.10 CORONARY ARTERY DISEASE INVOLVING NATIVE CORONARY ARTERY OF NATIVE HEART WITHOUT ANGINA PECTORIS: Chronic | ICD-10-CM

## 2019-07-08 DIAGNOSIS — Z86.79 S/P ABLATION OF ATRIAL FIBRILLATION: ICD-10-CM

## 2019-07-08 DIAGNOSIS — Z95.1 HX OF CABG: ICD-10-CM

## 2019-07-08 DIAGNOSIS — I25.5 ISCHEMIC CARDIOMYOPATHY: Chronic | ICD-10-CM

## 2019-07-08 DIAGNOSIS — I25.2 HISTORY OF MYOCARDIAL INFARCTION: Chronic | ICD-10-CM

## 2019-07-08 DIAGNOSIS — I10 ESSENTIAL HYPERTENSION: Chronic | ICD-10-CM

## 2019-07-08 DIAGNOSIS — Z98.890 S/P ABLATION OF ATRIAL FIBRILLATION: ICD-10-CM

## 2019-07-08 DIAGNOSIS — F10.10 ALCOHOL ABUSE: ICD-10-CM

## 2019-07-08 DIAGNOSIS — I48.0 PAROXYSMAL ATRIAL FIBRILLATION (HCC): ICD-10-CM

## 2019-07-08 DIAGNOSIS — Z95.810 BIVENTRICULAR IMPLANTABLE CARDIOVERTER-DEFIBRILLATOR IN SITU: ICD-10-CM

## 2019-07-08 DIAGNOSIS — I44.1 SECOND DEGREE AV BLOCK, MOBITZ TYPE II: Chronic | ICD-10-CM

## 2019-07-08 PROBLEM — J41.0 SIMPLE CHRONIC BRONCHITIS (HCC): Status: RESOLVED | Noted: 2018-01-16 | Resolved: 2019-07-08

## 2019-07-08 PROCEDURE — 99214 OFFICE O/P EST MOD 30 MIN: CPT | Performed by: INTERNAL MEDICINE

## 2019-07-08 NOTE — LETTER
"     [facility]   [address]  [place] [address]  Phone: [phone]  Fax: [phone]              Isai Fuentes  1940    Encounter Date: 7/8/2019    Angel Luis Figueredo M.D.          PROGRESS NOTE:  Chief Complaint   Patient presents with   • Atrial Fibrillation     F/V DX:PAF       Subjective:   Isai Fuentes is a 78 y.o. male who presents today with a CRT-D defibrillator for ischemic cardiomyopathy.  Previous A. fib ablation.  No A. fib seen.  Denies any syncope or presyncope.  Denies any chest pain mild fatigue.  Statin intolerance still has not heard back regarding PCSK9  inhibitors.    Past Medical History:   Diagnosis Date   • Atrial fibrillation ([facility]) 9/20/2011   • Biventricular implantable cardioverter-defibrillator in situ 8/7/2015   • Breath shortness    • CAD (coronary artery disease) 9/20/2011   • Cancer ([facility]) 2012    prostate   • CARDIOMYOPATHY 9/20/2011   • Congestive heart failure ([facility])    • COPD (chronic obstructive pulmonary disease) ([facility])    • Fatigue 10/28/2010   • Heartburn 10/23/2008   • History of cardiac catheterization 9/20/2011   • History of myocardial infarction     \"many\"   • HTN (hypertension) 9/20/2011   • Indigestion    • Insomnia 7/8/2010   • Ischemic cardiomyopathy 9/20/2011   • Long term (current) use of anticoagulants 5/13/2011   • Myocardial infarct ([facility]) 2007   • Orthostatic hypotension 5/6/2009   • WILLA (obstructive sleep apnea)     CPAP   • Other drug allergy(995.27) 10/16/2008   • Other specified disorder of intestines 07-28-15    constipation/diarrhea/ reports some bleeding from rectum w/blood clots. Seeing GI   • Pacemaker     [employer]   • Pain 07-28-15    \"chronic\", 0/10   • Pleural effusion 5/18/2009   • Presence of permanent cardiac pacemaker 9/20/2011   • Prostate cancer ([facility]) 5/13/2011   • Second degree AV block, Mobitz type II 11/10/2010   • Snoring    • Stroke ([facility]) 2/3/2009     Past " "Surgical History:   Procedure Laterality Date   • RECOVERY  10/16/2015    Procedure: CATH LAB LEAD REVISION ST.JUDE AQUINO;  Surgeon: Recoveryonly Surgery;  Location: SURGERY PRE-POST PROC UNIT [facility] [place];  Service:    • RECOVERY  8/14/2015    Procedure:  CATH LAB LEAD EXTRACTION AWILDA ST.DRE JAS AQUINO;  Surgeon: Recoveryonly Surgery;  Location: SURGERY PRE-POST PROC UNIT [facility] [place];  Service:    • RECOVERY  7/30/2015    Procedure: CATH LAB  LEAD EXTRACTION, UPGRADE TO BIV PM ST.DREJOSI AQUINO ;  Surgeon: Recoveryonly Surgery;  Location: SURGERY PRE-POST PROC UNIT [facility];  Service:    • RECOVERY  7/29/2015    Procedure: CATH LAB NEW PM INSERTION DUAL ATRIAL & VENTRICULAR-LV LEAD W/ NEW GENERATOR AQUINO ICD9: 414.8;  Surgeon: Recoveryonfreddy Surgery;  Location: SURGERY PRE-POST PROC UNIT [facility] [place];  Service:    • PACEMAKER INSERTION  11/24/08    [facility]   • MULTIPLE CORONARY ARTERY BYPASS  2007    2 vessel   • ZZZ CARDIAC CATH      has 2 stents     Family History   Problem Relation Age of Onset   • Thyroid Sister      Social History     Social History   • Marital status:      Spouse name: N/A   • Number of children: N/A   • Years of education: N/A     Occupational History   • Not on file.     Social History Main Topics   • Smoking status: Former Smoker     Packs/day: 1.50     Years: 3.00     Types: Cigarettes     Quit date: 1/1/1970   • Smokeless tobacco: Former User     Types: Chew     Quit date: 1/1/1972   • Alcohol use No      Comment: 10-14 per week; scotch & wine   • Drug use: No   • Sexual activity: Not on file     Other Topics Concern   • Not on file     Social History Narrative   • No narrative on file     Allergies   Allergen Reactions   • Plavix [Clopidogrel Bisulfate] Anaphylaxis   • Statins [Hmg-Coa-R Inhibitors] Unspecified     Muscles aches     • Ticlid [Ticlopidine Hydrochloride] Unspecified     Pt states \"I'm not sure what happens\".       Outpatient Encounter Prescriptions as of 7/8/2019   Medication Sig Dispense " "Refill   • metoprolol SR (TOPROL XL) 50 MG TABLET SR 24 HR Take 1 Tab by mouth every day. 90 Tab 3   • Ixekizumab (TALTZ SC) Inject  as instructed. Once a month-injection     • spironolactone (ALDACTONE) 25 MG Tab Take 1 Tab by mouth every day. 90 Tab 3   • sacubitril-valsartan (ENTRESTO)  MG Tab tablet Take 1 Tab by mouth 2 Times a Day. 180 Tab 3   • potassium chloride (KLOR-CON) 20 MEQ Pack Take 10 mEq by mouth every day. Once per week     • vitamin D (CHOLECALCIFEROL) 1000 UNIT Tab Take 1,000 Units by mouth every day.     • Omega-3 Fatty Acids (FISH OIL) 1000 MG Cap capsule Take 1,000 mg by mouth every day.     • Multiple Vitamins-Minerals (OCUVITE) Tab Take 1 Tab by mouth every day.     • aspirin 81 MG tablet Take 81 mg by mouth every day.     • Coenzyme Q10 (CO Q-10 PO) Take 1 Cap by mouth every day.     • HYDROcodone-acetaminophen (NORCO) 5-325 MG Tab per tablet TAKE 1 TAB BY MOUTH EVERY 4 HOURS AS NEEDED  0   • furosemide (LASIX) 40 MG Tab Take 40 mg by mouth every day.     • metOLazone (ZAROXOLYN) 5 MG Tab Take 1 Tab by mouth 1 time daily as needed. 30 Tab 3     No facility-administered encounter medications on file as of 7/8/2019.      ROS     Objective:   /64 (BP Location: Left arm, Patient Position: Sitting, BP Cuff Size: Adult)   Pulse 71   Ht 1.803 m (5' 11\")   Wt 93.4 kg (206 lb)   SpO2 94%   BMI 28.73 kg/m²      Physical Exam   Constitutional: He is oriented to person, place, and time. He appears well-developed and well-nourished.   HENT:   Head: Normocephalic and atraumatic.   Eyes: EOM are normal.   Neck: Normal range of motion. Neck supple.   Cardiovascular: Normal rate, regular rhythm and intact distal pulses.  Exam reveals no gallop and no friction rub.    No murmur heard.  Pulmonary/Chest: Effort normal and breath sounds normal.   Abdominal: Soft.   Musculoskeletal: Normal range of motion. He exhibits no edema.   Neurological: He is alert and oriented to person, place, and time. "   Skin: Skin is warm and dry.   Psychiatric: He has a normal mood and affect. His behavior is normal. Judgment and thought content normal.       Assessment:     1. Ischemic cardiomyopathy     2. Essential hypertension     3. Paroxysmal atrial fibrillation (HCC)     4. Second degree AV block, Mobitz type II     5. History of myocardial infarction     6. S/P ablation of atrial fibrillation     7. Alcohol abuse     8. Biventricular implantable cardioverter-defibrillator in situ     9. Coronary artery disease involving native coronary artery of native heart without angina pectoris     10. Hx of CABG         Medical Decision Making:  Today's Assessment / Status / Plan:   1.  CRT-D normal function.  2.  Ischemic cardia myopathy on appropriate medications.  Heart failure stable.  3.  Hypercholesterolemia recheck referral to Dr. Bloch.  4.  Atrial arrhythmias none seen.  5.  Follow-up in device clinic in 6 months follow-up me in 1 year.      Matt Pagan M.D.  [id number]  [address]  [place] [id number]  VIA Facsimile: [phone]

## 2019-07-08 NOTE — PROGRESS NOTES
"Chief Complaint   Patient presents with   • Atrial Fibrillation     F/V DX:PAF       Subjective:   Isai Fuentes is a 78 y.o. male who presents today with a CRT-D defibrillator for ischemic cardiomyopathy.  Previous A. fib ablation.  No A. fib seen.  Denies any syncope or presyncope.  Denies any chest pain mild fatigue.  Statin intolerance still has not heard back regarding PCSK9  inhibitors.    Past Medical History:   Diagnosis Date   • Atrial fibrillation (HCC) 9/20/2011   • Biventricular implantable cardioverter-defibrillator in situ 8/7/2015   • Breath shortness    • CAD (coronary artery disease) 9/20/2011   • Cancer ([facility]) 2012    prostate   • CARDIOMYOPATHY 9/20/2011   • Congestive heart failure ([facility])    • COPD (chronic obstructive pulmonary disease) ([facility])    • Fatigue 10/28/2010   • Heartburn 10/23/2008   • History of cardiac catheterization 9/20/2011   • History of myocardial infarction     \"many\"   • HTN (hypertension) 9/20/2011   • Indigestion    • Insomnia 7/8/2010   • Ischemic cardiomyopathy 9/20/2011   • Long term (current) use of anticoagulants 5/13/2011   • Myocardial infarct ([facility]) 2007   • Orthostatic hypotension 5/6/2009   • WILLA (obstructive sleep apnea)     CPAP   • Other drug allergy(995.27) 10/16/2008   • Other specified disorder of intestines 07-28-15    constipation/diarrhea/ reports some bleeding from rectum w/blood clots. Seeing GI   • Pacemaker     [employer]   • Pain 07-28-15    \"chronic\", 0/10   • Pleural effusion 5/18/2009   • Presence of permanent cardiac pacemaker 9/20/2011   • Prostate cancer ([facility]) 5/13/2011   • Second degree AV block, Mobitz type II 11/10/2010   • Snoring    • Stroke ([facility]) 2/3/2009     Past Surgical History:   Procedure Laterality Date   • RECOVERY  10/16/2015    Procedure: CATH LAB LEAD REVISION ST.JUDE AQUINO;  Surgeon: Recoveryonly Surgery;  Location: SURGERY PRE-POST PROC UNIT [facility];  Service:    • RECOVERY  8/14/2015    Procedure:  CATH LAB LEAD " "EXTRACTION AWILDA ST.MARIA R LRG PAULO AQUINO;  Surgeon: Recoveryonly Surgery;  Location: SURGERY PRE-POST PROC UNIT [facility];  Service:    • RECOVERY  7/30/2015    Procedure: CATH LAB  LEAD EXTRACTION, UPGRADE TO BIV PM ST.MARIA R LRG MAGGIE AQUINO ;  Surgeon: Recoveryonfreddy Surgery;  Location: SURGERY PRE-POST PROC UNIT [facility];  Service:    • RECOVERY  7/29/2015    Procedure: CATH LAB NEW PM INSERTION DUAL ATRIAL & VENTRICULAR-LV LEAD W/ NEW GENERATOR AQUINO ICD9: 414.8;  Surgeon: Recoveryonfreddy Surgery;  Location: SURGERY PRE-POST PROC UNIT [facility];  Service:    • PACEMAKER INSERTION  11/24/08    [facility]   • MULTIPLE CORONARY ARTERY BYPASS  2007    2 vessel   • ZZZ CARDIAC CATH      has 2 stents     Family History   Problem Relation Age of Onset   • Thyroid Sister      Social History     Social History   • Marital status:      Spouse name: N/A   • Number of children: N/A   • Years of education: N/A     Occupational History   • Not on file.     Social History Main Topics   • Smoking status: Former Smoker     Packs/day: 1.50     Years: 3.00     Types: Cigarettes     Quit date: 1/1/1970   • Smokeless tobacco: Former User     Types: Chew     Quit date: 1/1/1972   • Alcohol use No      Comment: 10-14 per week; scotch & wine   • Drug use: No   • Sexual activity: Not on file     Other Topics Concern   • Not on file     Social History Narrative   • No narrative on file     Allergies   Allergen Reactions   • Plavix [Clopidogrel Bisulfate] Anaphylaxis   • Statins [Hmg-Coa-R Inhibitors] Unspecified     Muscles aches     • Ticlid [Ticlopidine Hydrochloride] Unspecified     Pt states \"I'm not sure what happens\".       Outpatient Encounter Prescriptions as of 7/8/2019   Medication Sig Dispense Refill   • metoprolol SR (TOPROL XL) 50 MG TABLET SR 24 HR Take 1 Tab by mouth every day. 90 Tab 3   • Ixekizumab (TALTZ SC) Inject  as instructed. Once a month-injection     • spironolactone (ALDACTONE) 25 MG Tab Take 1 Tab by mouth every day. 90 Tab 3   • " "sacubitril-valsartan (ENTRESTO)  MG Tab tablet Take 1 Tab by mouth 2 Times a Day. 180 Tab 3   • potassium chloride (KLOR-CON) 20 MEQ Pack Take 10 mEq by mouth every day. Once per week     • vitamin D (CHOLECALCIFEROL) 1000 UNIT Tab Take 1,000 Units by mouth every day.     • Omega-3 Fatty Acids (FISH OIL) 1000 MG Cap capsule Take 1,000 mg by mouth every day.     • Multiple Vitamins-Minerals (OCUVITE) Tab Take 1 Tab by mouth every day.     • aspirin 81 MG tablet Take 81 mg by mouth every day.     • Coenzyme Q10 (CO Q-10 PO) Take 1 Cap by mouth every day.     • HYDROcodone-acetaminophen (NORCO) 5-325 MG Tab per tablet TAKE 1 TAB BY MOUTH EVERY 4 HOURS AS NEEDED  0   • furosemide (LASIX) 40 MG Tab Take 40 mg by mouth every day.     • metOLazone (ZAROXOLYN) 5 MG Tab Take 1 Tab by mouth 1 time daily as needed. 30 Tab 3     No facility-administered encounter medications on file as of 7/8/2019.      ROS     Objective:   /64 (BP Location: Left arm, Patient Position: Sitting, BP Cuff Size: Adult)   Pulse 71   Ht 1.803 m (5' 11\")   Wt 93.4 kg (206 lb)   SpO2 94%   BMI 28.73 kg/m²     Physical Exam   Constitutional: He is oriented to person, place, and time. He appears well-developed and well-nourished.   HENT:   Head: Normocephalic and atraumatic.   Eyes: EOM are normal.   Neck: Normal range of motion. Neck supple.   Cardiovascular: Normal rate, regular rhythm and intact distal pulses.  Exam reveals no gallop and no friction rub.    No murmur heard.  Pulmonary/Chest: Effort normal and breath sounds normal.   Abdominal: Soft.   Musculoskeletal: Normal range of motion. He exhibits no edema.   Neurological: He is alert and oriented to person, place, and time.   Skin: Skin is warm and dry.   Psychiatric: He has a normal mood and affect. His behavior is normal. Judgment and thought content normal.       Assessment:     1. Ischemic cardiomyopathy     2. Essential hypertension     3. Paroxysmal atrial fibrillation " (HCC)     4. Second degree AV block, Mobitz type II     5. History of myocardial infarction     6. S/P ablation of atrial fibrillation     7. Alcohol abuse     8. Biventricular implantable cardioverter-defibrillator in situ     9. Coronary artery disease involving native coronary artery of native heart without angina pectoris     10. Hx of CABG         Medical Decision Making:  Today's Assessment / Status / Plan:   1.  CRT-D normal function.  2.  Ischemic cardia myopathy on appropriate medications.  Heart failure stable.  3.  Hypercholesterolemia recheck referral to Dr. Bloch.  4.  Atrial arrhythmias none seen.  5.  Follow-up in device clinic in 6 months follow-up me in 1 year.

## 2019-08-06 ENCOUNTER — OFFICE VISIT (OUTPATIENT)
Dept: PULMONOLOGY | Facility: HOSPICE | Age: 79
End: 2019-08-06

## 2019-08-06 ENCOUNTER — APPOINTMENT (OUTPATIENT)
Dept: PULMONOLOGY | Facility: HOSPICE | Age: 79
End: 2019-08-06
Payer: MEDICARE

## 2019-08-06 VITALS
OXYGEN SATURATION: 94 % | DIASTOLIC BLOOD PRESSURE: 62 MMHG | BODY MASS INDEX: 28.56 KG/M2 | HEIGHT: 71 IN | SYSTOLIC BLOOD PRESSURE: 118 MMHG | RESPIRATION RATE: 16 BRPM | HEART RATE: 70 BPM | WEIGHT: 204 LBS

## 2019-08-06 DIAGNOSIS — I25.5 ISCHEMIC CARDIOMYOPATHY: Chronic | ICD-10-CM

## 2019-08-06 DIAGNOSIS — G47.33 OSA (OBSTRUCTIVE SLEEP APNEA): ICD-10-CM

## 2019-08-06 DIAGNOSIS — J44.9 CHRONIC OBSTRUCTIVE PULMONARY DISEASE, UNSPECIFIED COPD TYPE (HCC): ICD-10-CM

## 2019-08-06 PROCEDURE — 99214 OFFICE O/P EST MOD 30 MIN: CPT | Performed by: INTERNAL MEDICINE

## 2019-08-06 RX ORDER — CLOTRIMAZOLE AND BETAMETHASONE DIPROPIONATE 10; .64 MG/G; MG/G
CREAM TOPICAL
Refills: 0 | COMMUNITY
Start: 2019-07-10 | End: 2020-09-28

## 2019-08-06 NOTE — PROGRESS NOTES
"Chief Complaint   Patient presents with   • Follow-Up     Chronic obstructive pulmonary disease with acute exacerbation (CMS-HCC) ([facility])     HPI: This patient is a 78 y.o. Male who returns for annual follow-up of COPD as well as WILLA. He has stage I COPD, on albuterol when necessary. He denies any significant inhaler use.  Last PFTs showed FEV1 2.55 L or 85% predicted. He has paroxysmal atrial fibrillation and ischemic cardiomyopathy which  contribute towards exertional dyspnea and fatigue. He denies cough, wheezing, chest tightness or edema. Denies AECOPD over the past year. He continues to walk his dog 30-40 minutes daily.  He has severe WILLA, AHI 45 events per hour, on AutoPap 8-11 cm of water. Compliance card confirms 100% % usage for 3 hours nightly, and normal AHI of 1.7. Denies daytime hypersomnolence.  He requires switch DME for supplies.  He has not had had new CPAP supplies in over 6 months.  Weight has been stable.      Past Medical History:   Diagnosis Date   • Atrial fibrillation ([facility]) 9/20/2011   • Biventricular implantable cardioverter-defibrillator in situ 8/7/2015   • Breath shortness    • CAD (coronary artery disease) 9/20/2011   • Cancer ([facility]) 2012    prostate   • CARDIOMYOPATHY 9/20/2011   • Congestive heart failure ([facility])    • COPD (chronic obstructive pulmonary disease) ([facility])    • Fatigue 10/28/2010   • Heartburn 10/23/2008   • History of cardiac catheterization 9/20/2011   • History of myocardial infarction     \"many\"   • HTN (hypertension) 9/20/2011   • Indigestion    • Insomnia 7/8/2010   • Ischemic cardiomyopathy 9/20/2011   • Long term (current) use of anticoagulants 5/13/2011   • Myocardial infarct ([facility]) 2007   • Orthostatic hypotension 5/6/2009   • WILLA (obstructive sleep apnea)     CPAP   • Other drug allergy(995.27) 10/16/2008   • Other specified disorder of intestines 07-28-15    constipation/diarrhea/ reports some bleeding from rectum w/blood clots. Seeing GI   • Pacemaker     [employer] " "[employer]   • Pain 07-28-15    \"chronic\", 0/10   • Pleural effusion 2009   • Presence of permanent cardiac pacemaker 2011   • Prostate cancer (HCC) 2011   • Second degree AV block, Mobitz type II 11/10/2010   • Snoring    • Stroke ([facility]CC) 2/3/2009       Social History     Socioeconomic History   • Marital status:      Spouse name: Not on file   • Number of children: Not on file   • Years of education: Not on file   • Highest education level: Not on file   Occupational History   • Not on file   Social Needs   • Financial resource strain: Not on file   • Food insecurity:     Worry: Not on file     Inability: Not on file   • Transportation needs:     Medical: Not on file     Non-medical: Not on file   Tobacco Use   • Smoking status: Former Smoker     Packs/day: 1.50     Years: 3.00     Pack years: 4.50     Types: Cigarettes     Last attempt to quit: 1970     Years since quittin.6   • Smokeless tobacco: Former User     Types: Chew     Quit date: 1972   Substance and Sexual Activity   • Alcohol use: No     Comment: 10-14 per week; scotch & wine   • Drug use: No   • Sexual activity: Not on file   Lifestyle   • Physical activity:     Days per week: Not on file     Minutes per session: Not on file   • Stress: Not on file   Relationships   • Social connections:     Talks on phone: Not on file     Gets together: Not on file     Attends Voodoo service: Not on file     Active member of club or organization: Not on file     Attends meetings of clubs or organizations: Not on file     Relationship status: Not on file   • Intimate partner violence:     Fear of current or ex partner: Not on file     Emotionally abused: Not on file     Physically abused: Not on file     Forced sexual activity: Not on file   Other Topics Concern   • Not on file   Social History Narrative   • Not on file       Family History   Problem Relation Age of Onset   • Thyroid Sister        Current Outpatient Medications on " File Prior to Visit   Medication Sig Dispense Refill   • metoprolol SR (TOPROL XL) 50 MG TABLET SR 24 HR Take 1 Tab by mouth every day. 90 Tab 3   • spironolactone (ALDACTONE) 25 MG Tab Take 1 Tab by mouth every day. 90 Tab 3   • sacubitril-valsartan (ENTRESTO)  MG Tab tablet Take 1 Tab by mouth 2 Times a Day. 180 Tab 3   • HYDROcodone-acetaminophen (NORCO) 5-325 MG Tab per tablet TAKE 1 TAB BY MOUTH EVERY 4 HOURS AS NEEDED  0   • potassium chloride (KLOR-CON) 20 MEQ Pack Take 10 mEq by mouth every day. Once per week     • furosemide (LASIX) 40 MG Tab Take 40 mg by mouth every day.     • vitamin D (CHOLECALCIFEROL) 1000 UNIT Tab Take 1,000 Units by mouth every day.     • metOLazone (ZAROXOLYN) 5 MG Tab Take 1 Tab by mouth 1 time daily as needed. 30 Tab 3   • Omega-3 Fatty Acids (FISH OIL) 1000 MG Cap capsule Take 1,000 mg by mouth every day.     • Multiple Vitamins-Minerals (OCUVITE) Tab Take 1 Tab by mouth every day.     • aspirin 81 MG tablet Take 81 mg by mouth every day.     • Coenzyme Q10 (CO Q-10 PO) Take 1 Cap by mouth every day.     • clotrimazole-betamethasone (LOTRISONE) 1-0.05 % Cream every day. APPLY TO AFFECTED AREA  0   • Ixekizumab (TALTZ SC) Inject  as instructed. Once a month-injection       No current facility-administered medications on file prior to visit.        Allergies: Plavix [clopidogrel bisulfate]; Statins [hmg-coa-r inhibitors]; and Ticlid [ticlopidine hydrochloride]    ROS:   Constitutional: Denies fevers, chills, night sweats, +fatigue, denies weight loss  Eyes: Denies vision loss, pain, drainage, double vision  Ears, Nose, Throat: Denies earache, difficulty hearing, tinnitus, nasal congestion, hoarseness  Cardiovascular: Denies chest pain, tightness, palpitations, orthopnea or edema  Respiratory: diminished BS  Sleep: Denies daytime sleepiness, snoring, apneas, insomnia, morning headaches  GI: Denies heartburn, dysphagia, nausea, abdominal pain, diarrhea or constipation  :  "Denies frequent urination, hematuria, discharge or painful urination  Musculoskeletal: Denies back pain, painful joints, sore muscles  Neurological: Denies weakness or headaches  Skin: No rashes    /62 (BP Location: Left arm, Patient Position: Sitting, BP Cuff Size: Adult)   Pulse 70   Resp 16   Ht 1.803 m (5' 11\")   Wt 92.5 kg (204 lb)   SpO2 94%     Physical Exam:  Appearance: Well-nourished, well-developed, in no acute distress  HEENT: Normocephalic, atraumatic, white sclera, PERRLA, oropharynx clear  Neck: No adenopathy or masses  Respiratory: no intercostal retractions or accessory muscle use  Lungs auscultation: Clear to auscultation bilaterally  Cardiovascular: Regular rate rhythm. No murmurs, rubs or gallops.  No LE edema  Abdomen: soft, nondistended  Gait: Normal  Digits: No clubbing, cyanosis  Motor: No focal deficits  Orientation: Oriented to time, person and place    Diagnosis:  1. Chronic obstructive pulmonary disease, unspecified COPD type (HCC)  PULMONARY FUNCTION TESTS -Test requested: Complete Pulmonary Function Test   2. WILLA (obstructive sleep apnea)  DME Mask and Supplies   3. Ischemic cardiomyopathy         Plan:  The patient's respiratory status is stable.  He rarely uses his albuterol inhaler.    Continue albuterol HFA as needed.  We will update pulmonary function testing at next visit.  He shows excellent CPAP compliance and response to therapy; he requires new CPAP supplies and will be established with a new DME.  Return in about 1 year (around 8/6/2020) for with PFT.      "

## 2019-08-27 ENCOUNTER — OFFICE VISIT (OUTPATIENT)
Dept: VASCULAR LAB | Facility: MEDICAL CENTER | Age: 79
End: 2019-08-27
Attending: INTERNAL MEDICINE
Payer: MEDICARE

## 2019-08-27 VITALS
HEART RATE: 70 BPM | DIASTOLIC BLOOD PRESSURE: 51 MMHG | WEIGHT: 201.6 LBS | SYSTOLIC BLOOD PRESSURE: 86 MMHG | BODY MASS INDEX: 28.22 KG/M2 | HEIGHT: 71 IN

## 2019-08-27 DIAGNOSIS — I25.5 ISCHEMIC CARDIOMYOPATHY: Chronic | ICD-10-CM

## 2019-08-27 DIAGNOSIS — I25.10 CORONARY ARTERY DISEASE INVOLVING NATIVE CORONARY ARTERY OF NATIVE HEART WITHOUT ANGINA PECTORIS: ICD-10-CM

## 2019-08-27 DIAGNOSIS — E78.00 HYPERCHOLESTEREMIA: Chronic | ICD-10-CM

## 2019-08-27 PROCEDURE — 99212 OFFICE O/P EST SF 10 MIN: CPT

## 2019-08-27 PROCEDURE — 99212 OFFICE O/P EST SF 10 MIN: CPT | Performed by: INTERNAL MEDICINE

## 2019-08-27 PROCEDURE — 99204 OFFICE O/P NEW MOD 45 MIN: CPT | Performed by: INTERNAL MEDICINE

## 2019-08-27 RX ORDER — EZETIMIBE 10 MG/1
10 TABLET ORAL DAILY
Qty: 30 TAB | Refills: 11 | Status: SHIPPED | OUTPATIENT
Start: 2019-08-27 | End: 2019-10-28

## 2019-08-27 RX ORDER — ATORVASTATIN CALCIUM 10 MG/1
5 TABLET, FILM COATED ORAL DAILY
Qty: 30 TAB | Refills: 11 | Status: SHIPPED | OUTPATIENT
Start: 2019-08-27 | End: 2019-10-28

## 2019-08-27 ASSESSMENT — ENCOUNTER SYMPTOMS
SHORTNESS OF BREATH: 0
BLURRED VISION: 0
LOSS OF CONSCIOUSNESS: 0
DIZZINESS: 0
BACK PAIN: 1
HEADACHES: 0
FOCAL WEAKNESS: 0
SPEECH CHANGE: 0
DEPRESSION: 0
WEIGHT LOSS: 0
MYALGIAS: 1
GASTROINTESTINAL NEGATIVE: 1
PALPITATIONS: 0
DOUBLE VISION: 0
NERVOUS/ANXIOUS: 0

## 2019-08-27 NOTE — PATIENT INSTRUCTIONS
Increase coq 10 to 2x daily    Increase vit D to 4000 IU daily    Trial of atrovastatin 5 mg daily   Trail of ezetimibe 10 mg daily    If worsening of muscle pain/achiness stop and call.     Blood work a week before follow up visit.    Take a look at [email or web address] and [email or web address]    Follow Up:  Oct 11 at 1000    Michael Bloch, MD   [facility]  [phone]

## 2019-08-27 NOTE — NON-PROVIDER
"Pt reports slight dizziness. \"Not much.\"    Viet Myles, Med. Ass't  [facility]    "

## 2019-08-27 NOTE — PROGRESS NOTES
[facility] - Initial Visit  Date of Service: 08/27/19    Isai Fuentes has been referred for evaluation and management of dyslipidemia    Referral Source: cardiology    HPI  History of ASCVD: Yes, Details: CAD s/p CABG 2007 had a couple of stents previous  Other Established (non-atherosclerotic) Vascular Disease, if Present:   Heart failure  Pacemaker  Age at Initial Diagnosis of Dyslipidemia: 65  Current Prescription Lipid Lowering Medications - including dose:   Statin: None  Non-Statin: None  Current Lipid Lowering and Related Supplements:   Omega 3s 1000  coq10  Vit D 1000  Any Current Side Effects Potentially Related to Lipid Lowering therapy?   No  Current Adherence to Lipid Lowering Therapies   Complete  Previously Attempted Interventions for Lipids - including outcome  Statin:  rosuvastatin 40 mg - intractable myalgias (2017)   Pravastatin 40 mg - intractable myalgias 2015   Has tried others that also caused muscle pain  Non-Statin: fenofibrate    Never tried zetia   Any Previous History of Statin Intolerance?   Yes, intolerable myalgias on both rosuvastatin 40 and pravastatin 40 in the past.  Resolved with discontinuation  Baseline Lipids Prior to Treatment:   , HDL 54, triglycerides 109 -May 2019  Other Pertinent History:   No known history of thyroid disease  Does have mild chronic kidney disease but no evidence of nephrotic syndrome  History of other CV risk factors:   Blood pressure currently well controlled on his current regimen  No known history of diabetes  Remains on aspirin    PAST MEDICAL HISTORY:  has a past medical history of Atrial fibrillation ([facility]) (9/20/2011), Biventricular implantable cardioverter-defibrillator in situ (8/7/2015), Breath shortness, CAD (coronary artery disease) (9/20/2011), Cancer ([facility]) (2012), CARDIOMYOPATHY (9/20/2011), Congestive heart failure ([facility]), COPD (chronic obstructive pulmonary disease) ([facility]), Fatigue (10/28/2010), Heartburn (10/23/2008),  History of cardiac catheterization (9/20/2011), History of myocardial infarction, HTN (hypertension) (9/20/2011), Indigestion, Insomnia (7/8/2010), Ischemic cardiomyopathy (9/20/2011), Long term (current) use of anticoagulants (5/13/2011), Myocardial infarct (HCC) (2007), Orthostatic hypotension (5/6/2009), WILLA (obstructive sleep apnea), Other drug allergy(995.27) (10/16/2008), Other specified disorder of intestines (07-28-15), Pacemaker, Pain (07-28-15), Pleural effusion (5/18/2009), Presence of permanent cardiac pacemaker (9/20/2011), Prostate cancer ([facility]) (5/13/2011), Second degree AV block, Mobitz type II (11/10/2010), Snoring, and Stroke ([facility]) (2/3/2009).    PAST SURGICAL HISTORY:  has a past surgical history that includes zzz cardiac cath; multiple coronary artery bypass (2007); pacemaker insertion (11/24/08); recovery (7/30/2015); recovery (7/29/2015); recovery (8/14/2015); and recovery (10/16/2015).    CURRENT MEDICATIONS:   Current Outpatient Medications:   •  atorvastatin, 5 mg, Oral, DAILY  •  ezetimibe, 10 mg, Oral, DAILY  •  clotrimazole-betamethasone, every day. APPLY TO AFFECTED AREA, Taking  •  metoprolol SR, 50 mg, Oral, DAILY, Taking  •  Ixekizumab (TALTZ SC), Inject  as instructed. Once a month-injection, Taking  •  spironolactone, 25 mg, Oral, DAILY, Taking  •  sacubitril-valsartan, 1 Tab, Oral, BID, Taking  •  HYDROcodone-acetaminophen, TAKE 1 TAB BY MOUTH EVERY 4 HOURS AS NEEDED, PRN  •  potassium chloride, 10 mEq, Oral, DAILY, Taking  •  vitamin D, 1,000 Units, Oral, DAILY, Taking  •  metOLazone, 5 mg, Oral, QDAY PRN, Taking  •  fish oil, 1,000 mg, Oral, DAILY, Taking  •  OCUVITE, 1 Tab, Oral, DAILY, Taking  •  aspirin, 81 mg, Oral, DAILY, Taking  •  Coenzyme Q10 (CO Q-10 PO), 1 Cap, Oral, DAILY, Taking  •  furosemide, 40 mg, Oral, DAILY, Not Taking    ALLERGIES: Plavix [clopidogrel bisulfate]; Statins [hmg-coa-r inhibitors]; and Ticlid [ticlopidine hydrochloride]    FAMILY HISTORY:    Dad -  CAD with cabg    SOCIAL HISTORY   Social History     Tobacco Use   Smoking Status Former Smoker   • Packs/day: 1.50   • Years: 3.00   • Pack years: 4.50   • Types: Cigarettes   • Last attempt to quit: 1970   • Years since quittin.6   Smokeless Tobacco Former User   • Types: Chew   • Quit date: 1972     Change in weight: stable  Exercise habits: moderate regular exercise program  Diet: low sodium    Review of Systems   Constitutional: Positive for malaise/fatigue. Negative for weight loss.   HENT: Negative for hearing loss and tinnitus.    Eyes: Negative for blurred vision and double vision.   Respiratory: Negative for shortness of breath.    Cardiovascular: Negative for chest pain, palpitations and leg swelling.   Gastrointestinal: Negative.    Genitourinary: Negative.    Musculoskeletal: Positive for back pain, joint pain and myalgias.   Skin: Negative for itching and rash.   Neurological: Negative for dizziness, speech change, focal weakness, loss of consciousness and headaches.   Psychiatric/Behavioral: Negative for depression. The patient is not nervous/anxious.      Physical Exam   Constitutional: He is oriented to person, place, and time. He appears well-developed and well-nourished. No distress.   HENT:   Head: Normocephalic and atraumatic.   Eyes: Pupils are equal, round, and reactive to light. Conjunctivae and EOM are normal. No scleral icterus.   Neck: Normal range of motion. Neck supple. No JVD present. No thyromegaly present.   Cardiovascular: Normal rate, regular rhythm, normal heart sounds and intact distal pulses. Exam reveals no gallop and no friction rub.   No murmur heard.  Pulmonary/Chest: Breath sounds normal. No respiratory distress. He has no wheezes. He has no rales.   Abdominal: Soft. Bowel sounds are normal. He exhibits no distension and no mass. There is no tenderness. There is no rebound.   Musculoskeletal: Normal range of motion. He exhibits no edema or tenderness.    Neurological: He is alert and oriented to person, place, and time. No cranial nerve deficit. Coordination normal.   Skin: Skin is warm and dry. No rash noted. He is not diaphoretic.   Psychiatric: He has a normal mood and affect. His behavior is normal.   Vitals reviewed.      DATA REVIEW:  Most Recent Lipid Panel:   Lab Results   Component Value Date    CHOLSTRLTOT 215 (H) 05/21/2019    TRIGLYCERIDE 109 05/21/2019    HDL 54 05/21/2019     (H) 05/21/2019       Other Pertinent Blood Work:   Lab Results   Component Value Date    SODIUM 136 04/22/2019    POTASSIUM 4.6 04/22/2019    CHLORIDE 105 04/22/2019    CO2 24 04/22/2019    ANION 7.0 04/22/2019    GLUCOSE 104 (H) 04/22/2019    BUN 27 (H) 04/22/2019    CREATININE 1.42 (H) 04/22/2019    CALCIUM 9.3 04/22/2019    ASTSGOT 22 04/22/2019    ALTSGPT 16 04/22/2019    ALKPHOSPHAT 100 (H) 04/22/2019    TBILIRUBIN 0.7 04/22/2019    ALBUMIN 4.1 04/22/2019    AGRATIO 1.4 04/22/2019    TSHULTRASEN 1.670 05/21/2019       [facility]  GFR is 45, , triglycerides 163, HDL 50, hemoglobin 16    ASSESSMENT AND PLAN  Patient Type, check all that apply:   Secondary Prevention  Established Atherosclerotic Cardiovascular Disease (ASCVD)  Yes, Details: Coronary artery disease status post PCI, MI, and CABG  Other Established (non-atherosclerotic) Vascular Disease, if Present:  Ischemic cardiomyopathy  Pacemaker  Evidence of Heterozygous Familial Hypercholesterolemia (FH):   No   ACC/AHA Indication for Statin Therapy, padmini all that apply:  Established ASCVD: Indication for High intensity statin   Calculated Risk for ASCVD, if applicable    N/A  Other Significant Risk Markers, if any, padmini all that apply   Family history of premature ASCVD in first degree relative  Goal LDL-C and nonHDL-C based on Clinic Protocol  LDL-C:   <70 mg/dL  Lifestyle Recommendations From Today’s Visit:    Eating Plan: Concentrate on  Low sat/Trans fat  Exercise: Continue  excellent exercise routine  Statin Therapy Recommendations from Today’s Visit:   Patient is not tolerated pravastatin and rosuvastatin in the past.  He also feels that he has not tolerated other statins but cannot name them.  Is been at least 2 years since he has had another trial of statin  -Trial of atorvastatin 5 mg daily.  Stop and call if myalgias  Non-Statin Medications Recommendations from Today’s Visit:   -Trial of ezetimibe 10 mg daily  Indication for PCSK9 Inhibitor, if applicable:  ASCVD with suboptimal control of LDL-C despite maximally tolerated statin -would quickly move to PCSK9 inhibitor if unable to tolerate this very low-dose of a statinn(this would be his third trial)  Supplements Recommended at this visit:   -Increase CoQ10 to twice daily  -Increase vitamin D to 4000 units a day  Recommendations for Other Cardiovascular Risk Factors, padmini all that apply:   -Continue aspirin  Other Issues:  -Ischemic cardiomyopathy, appears well compensated.  Continue current therapy.  Follow-up with cardiology for further management  -Status post PPM -defer all further management to cardiology    Studies Ordered at Todays Visit: None  Blood Work Ordered At Today’s visit: Lipid panel, complete metabolic panel, lipoprotein a, CK  Follow-Up: 6 weeks    Michael J Bloch, M.D.    CC:  ANA Mcmillan

## 2019-09-04 ENCOUNTER — HOSPITAL ENCOUNTER (OUTPATIENT)
Dept: LAB | Facility: MEDICAL CENTER | Age: 79
End: 2019-09-04
Payer: MEDICARE

## 2019-09-04 LAB — PSA SERPL-MCNC: 0.29 NG/ML (ref 0–4)

## 2019-09-04 PROCEDURE — 36415 COLL VENOUS BLD VENIPUNCTURE: CPT | Mod: GA

## 2019-09-04 PROCEDURE — 84153 ASSAY OF PSA TOTAL: CPT | Mod: GA

## 2019-09-25 DIAGNOSIS — I51.89 LEFT VENTRICULAR SYSTOLIC DYSFUNCTION, NYHA CLASS 3: ICD-10-CM

## 2019-09-25 DIAGNOSIS — I10 HTN (HYPERTENSION), MALIGNANT: ICD-10-CM

## 2019-09-25 DIAGNOSIS — Z95.810 BIVENTRICULAR IMPLANTABLE CARDIOVERTER-DEFIBRILLATOR IN SITU: ICD-10-CM

## 2019-09-25 DIAGNOSIS — I25.5 ISCHEMIC CARDIOMYOPATHY: Chronic | ICD-10-CM

## 2019-09-25 DIAGNOSIS — I50.20 ACC/AHA STAGE C SYSTOLIC HEART FAILURE (HCC): ICD-10-CM

## 2019-09-25 RX ORDER — SACUBITRIL AND VALSARTAN 97; 103 MG/1; MG/1
TABLET, FILM COATED ORAL
Qty: 60 TAB | Refills: 11 | Status: SHIPPED | OUTPATIENT
Start: 2019-09-25 | End: 2020-09-28

## 2019-10-05 DIAGNOSIS — I25.5 ISCHEMIC CARDIOMYOPATHY: Chronic | ICD-10-CM

## 2019-10-05 DIAGNOSIS — Z86.79 S/P ABLATION OF ATRIAL FIBRILLATION: ICD-10-CM

## 2019-10-05 DIAGNOSIS — I44.1 SECOND DEGREE AV BLOCK, MOBITZ TYPE II: Chronic | ICD-10-CM

## 2019-10-05 DIAGNOSIS — I48.0 PAROXYSMAL ATRIAL FIBRILLATION (HCC): ICD-10-CM

## 2019-10-05 DIAGNOSIS — Z98.890 S/P ABLATION OF ATRIAL FIBRILLATION: ICD-10-CM

## 2019-10-05 DIAGNOSIS — I25.10 CORONARY ARTERY DISEASE INVOLVING NATIVE CORONARY ARTERY OF NATIVE HEART WITHOUT ANGINA PECTORIS: Chronic | ICD-10-CM

## 2019-10-08 RX ORDER — SPIRONOLACTONE 25 MG/1
TABLET ORAL
Qty: 90 TAB | Refills: 3 | Status: SHIPPED | OUTPATIENT
Start: 2019-10-08 | End: 2020-03-23

## 2019-10-10 ENCOUNTER — HOSPITAL ENCOUNTER (OUTPATIENT)
Dept: LAB | Facility: MEDICAL CENTER | Age: 79
End: 2019-10-10
Attending: INTERNAL MEDICINE
Payer: MEDICARE

## 2019-10-10 DIAGNOSIS — E78.00 HYPERCHOLESTEREMIA: Chronic | ICD-10-CM

## 2019-10-10 LAB
ALBUMIN SERPL BCP-MCNC: 4.1 G/DL (ref 3.2–4.9)
ALBUMIN/GLOB SERPL: 1.5 G/DL
ALP SERPL-CCNC: 109 U/L (ref 30–99)
ALT SERPL-CCNC: 15 U/L (ref 2–50)
ANION GAP SERPL CALC-SCNC: 7 MMOL/L (ref 0–11.9)
AST SERPL-CCNC: 23 U/L (ref 12–45)
BILIRUB SERPL-MCNC: 0.7 MG/DL (ref 0.1–1.5)
BUN SERPL-MCNC: 30 MG/DL (ref 8–22)
CALCIUM SERPL-MCNC: 9.3 MG/DL (ref 8.5–10.5)
CHLORIDE SERPL-SCNC: 104 MMOL/L (ref 96–112)
CHOLEST SERPL-MCNC: 222 MG/DL (ref 100–199)
CK SERPL-CCNC: 91 U/L (ref 0–154)
CO2 SERPL-SCNC: 27 MMOL/L (ref 20–33)
CREAT SERPL-MCNC: 1.38 MG/DL (ref 0.5–1.4)
FASTING STATUS PATIENT QL REPORTED: NORMAL
GLOBULIN SER CALC-MCNC: 2.8 G/DL (ref 1.9–3.5)
GLUCOSE SERPL-MCNC: 97 MG/DL (ref 65–99)
HDLC SERPL-MCNC: 64 MG/DL
LDLC SERPL CALC-MCNC: 142 MG/DL
POTASSIUM SERPL-SCNC: 4.8 MMOL/L (ref 3.6–5.5)
PROT SERPL-MCNC: 6.9 G/DL (ref 6–8.2)
SODIUM SERPL-SCNC: 138 MMOL/L (ref 135–145)
TRIGL SERPL-MCNC: 81 MG/DL (ref 0–149)

## 2019-10-10 PROCEDURE — 36415 COLL VENOUS BLD VENIPUNCTURE: CPT

## 2019-10-10 PROCEDURE — 80053 COMPREHEN METABOLIC PANEL: CPT

## 2019-10-10 PROCEDURE — 80061 LIPID PANEL: CPT

## 2019-10-10 PROCEDURE — 82550 ASSAY OF CK (CPK): CPT

## 2019-10-10 PROCEDURE — 83695 ASSAY OF LIPOPROTEIN(A): CPT

## 2019-10-11 ENCOUNTER — TELEPHONE (OUTPATIENT)
Dept: VASCULAR LAB | Facility: MEDICAL CENTER | Age: 79
End: 2019-10-11

## 2019-10-11 LAB — LPA SERPL-MCNC: 6 MG/DL

## 2019-10-16 ENCOUNTER — OFFICE VISIT (OUTPATIENT)
Dept: VASCULAR LAB | Facility: MEDICAL CENTER | Age: 79
End: 2019-10-16
Attending: INTERNAL MEDICINE
Payer: MEDICARE

## 2019-10-16 VITALS
SYSTOLIC BLOOD PRESSURE: 119 MMHG | DIASTOLIC BLOOD PRESSURE: 63 MMHG | HEART RATE: 70 BPM | BODY MASS INDEX: 28.7 KG/M2 | WEIGHT: 205 LBS | HEIGHT: 71 IN

## 2019-10-16 DIAGNOSIS — I10 ESSENTIAL HYPERTENSION: Chronic | ICD-10-CM

## 2019-10-16 DIAGNOSIS — E78.00 HYPERCHOLESTEREMIA: Chronic | ICD-10-CM

## 2019-10-16 DIAGNOSIS — I25.2 HISTORY OF MYOCARDIAL INFARCTION: Chronic | ICD-10-CM

## 2019-10-16 DIAGNOSIS — I25.10 CORONARY ARTERY DISEASE INVOLVING NATIVE CORONARY ARTERY OF NATIVE HEART WITHOUT ANGINA PECTORIS: Chronic | ICD-10-CM

## 2019-10-16 PROCEDURE — 99214 OFFICE O/P EST MOD 30 MIN: CPT | Performed by: NURSE PRACTITIONER

## 2019-10-16 PROCEDURE — 99212 OFFICE O/P EST SF 10 MIN: CPT | Performed by: NURSE PRACTITIONER

## 2019-10-16 ASSESSMENT — ENCOUNTER SYMPTOMS
BACK PAIN: 1
LOSS OF CONSCIOUSNESS: 0
SPEECH CHANGE: 0
MYALGIAS: 1
PALPITATIONS: 0
NERVOUS/ANXIOUS: 0
HEADACHES: 0
GASTROINTESTINAL NEGATIVE: 1
BLURRED VISION: 0
DEPRESSION: 0
SHORTNESS OF BREATH: 0
FOCAL WEAKNESS: 0
DIZZINESS: 0
WEIGHT LOSS: 0
DOUBLE VISION: 0

## 2019-10-16 NOTE — PROGRESS NOTES
[facility] - Follow Up Visit  Date of Service: 10/16/19    Isai Fuentes has been referred for evaluation and management of dyslipidemia    Referral Source: cardiology    HPI  History of ASCVD: Yes, Details: CAD s/p CABG 2007 had a couple of stents previous  Other Established (non-atherosclerotic) Vascular Disease, if Present:   Heart failure  Pacemaker  Age at Initial Diagnosis of Dyslipidemia: 65  Current Prescription Lipid Lowering Medications - including dose:   Statin: None  Non-Statin: None  Current Lipid Lowering and Related Supplements:   Omega 3s 1000  coq10  Vit D 1000  Any Current Side Effects Potentially Related to Lipid Lowering therapy?   No  Current Adherence to Lipid Lowering Therapies   Complete  Previously Attempted Interventions for Lipids - including outcome  Statin:  rosuvastatin 40 mg - intractable myalgias (2017)   Pravastatin 40 mg - intractable myalgias 2015   Has tried others that also caused muscle pain  Non-Statin: fenofibrate    Never tried zetia   Any Previous History of Statin Intolerance?   Yes, intolerable myalgias on both rosuvastatin 40 and pravastatin 40 in the past.  Resolved with discontinuation  Baseline Lipids Prior to Treatment:   , HDL 54, triglycerides 109 -May 2019  Other Pertinent History:   No known history of thyroid disease  Does have mild chronic kidney disease but no evidence of nephrotic syndrome  History of other CV risk factors:   Blood pressure currently well controlled on his current regimen  No known history of diabetes  Remains on aspirin    CURRENT MEDICATIONS:   Current Outpatient Medications:   •  Evolocumab, 140 mg, Subcutaneous, Q14 DAYS  •  spironolactone, TAKE 1 TABLET BY MOUTH EVERY DAY, Taking  •  ENTRESTO, TAKE 1 TABLET BY MOUTH TWICE A DAY, Taking  •  atorvastatin, 5 mg, Oral, DAILY, Taking  •  ezetimibe, 10 mg, Oral, DAILY, Taking  •  clotrimazole-betamethasone, every day. APPLY TO AFFECTED AREA, Taking  •  metoprolol SR, 50  mg, Oral, DAILY, Taking  •  Ixekizumab (TALTZ SC), Inject  as instructed. Once a month-injection, Taking  •  HYDROcodone-acetaminophen, TAKE 1 TAB BY MOUTH EVERY 4 HOURS AS NEEDED, PRN  •  potassium chloride, 10 mEq, Oral, DAILY, Taking  •  furosemide, 40 mg, Oral, DAILY, PRN  •  vitamin D, 1,000 Units, Oral, DAILY, Taking  •  metOLazone, 5 mg, Oral, QDAY PRN, PRN  •  fish oil, 1,000 mg, Oral, DAILY, Taking  •  OCUVITE, 1 Tab, Oral, DAILY, Taking  •  aspirin, 81 mg, Oral, DAILY, Taking  •  Coenzyme Q10 (CO Q-10 PO), 1 Cap, Oral, DAILY, Taking    ALLERGIES: Plavix [clopidogrel bisulfate]; Statins [hmg-coa-r inhibitors]; and Ticlid [ticlopidine hydrochloride]    FAMILY HISTORY:    Dad - CAD with cabg    SOCIAL HISTORY   Social History     Tobacco Use   Smoking Status Former Smoker   • Packs/day: 1.50   • Years: 3.00   • Pack years: 4.50   • Types: Cigarettes   • Last attempt to quit: 1970   • Years since quittin.8   Smokeless Tobacco Former User   • Types: Chew   • Quit date: 1972     Change in weight: stable  Exercise habits: moderate regular exercise program  Diet: low sodium    Review of Systems   Constitutional: Positive for malaise/fatigue. Negative for weight loss.   HENT: Negative for hearing loss and tinnitus.    Eyes: Negative for blurred vision and double vision.   Respiratory: Negative for shortness of breath.    Cardiovascular: Negative for chest pain, palpitations and leg swelling.   Gastrointestinal: Negative.    Genitourinary: Negative.    Musculoskeletal: Positive for back pain, joint pain and myalgias.   Skin: Negative for itching and rash.   Neurological: Negative for dizziness, speech change, focal weakness, loss of consciousness and headaches.   Psychiatric/Behavioral: Negative for depression. The patient is not nervous/anxious.      Physical Exam   Constitutional: He is oriented to person, place, and time. He appears well-developed and well-nourished. No distress.   Cardiovascular:  Normal rate, regular rhythm, normal heart sounds and intact distal pulses. Exam reveals no gallop and no friction rub.   No murmur heard.  Pulmonary/Chest: Effort normal and breath sounds normal. He has no wheezes. He has no rales.   Abdominal: Soft. Bowel sounds are normal.   Musculoskeletal: Normal range of motion. He exhibits no edema or tenderness.   Neurological: He is alert and oriented to person, place, and time. Coordination normal.   Skin: Skin is warm and dry. No rash noted. He is not diaphoretic.   Psychiatric: He has a normal mood and affect. His behavior is normal.   Vitals reviewed.    DATA REVIEW:  Most Recent Lipid Panel:   Lab Results   Component Value Date    CHOLSTRLTOT 222 (H) 10/10/2019    TRIGLYCERIDE 81 10/10/2019    HDL 64 10/10/2019     (H) 10/10/2019     Other Pertinent Blood Work:   Lab Results   Component Value Date    SODIUM 138 10/10/2019    POTASSIUM 4.8 10/10/2019    CHLORIDE 104 10/10/2019    CO2 27 10/10/2019    ANION 7.0 10/10/2019    GLUCOSE 97 10/10/2019    BUN 30 (H) 10/10/2019    CREATININE 1.38 10/10/2019    CALCIUM 9.3 10/10/2019    ASTSGOT 23 10/10/2019    ALTSGPT 15 10/10/2019    ALKPHOSPHAT 109 (H) 10/10/2019    TBILIRUBIN 0.7 10/10/2019    ALBUMIN 4.1 10/10/2019    AGRATIO 1.5 10/10/2019    LIPOPROTA 6 10/10/2019    TSHULTRASEN 1.670 05/21/2019    CPKTOTAL 91 10/10/2019     [facility]  GFR is 45, , triglycerides 163, HDL 50, hemoglobin 16    ASSESSMENT AND PLAN  Patient Type, check all that apply:   Secondary Prevention  Established Atherosclerotic Cardiovascular Disease (ASCVD)  Yes, Details: Coronary artery disease status post PCI, MI, and CABG  Other Established (non-atherosclerotic) Vascular Disease, if Present:  Ischemic cardiomyopathy  Pacemaker  Evidence of Heterozygous Familial Hypercholesterolemia (FH):   No   ACC/AHA Indication for Statin Therapy, padmini all that apply:  Established ASCVD: Indication for High intensity statin    Calculated Risk for ASCVD, if applicable    N/A  Other Significant Risk Markers, if any, padmini all that apply   Family history of premature ASCVD in first degree relative  Goal LDL-C and nonHDL-C based on Clinic Protocol  LDL-C:   <70 mg/dL  Lifestyle Recommendations From Today’s Visit:    Eating Plan: Concentrate on  Low sat/Trans fat  Exercise: Continue excellent exercise routine  Statin Therapy Recommendations from Today’s Visit:   Patient is not tolerated pravastatin and rosuvastatin in the past.  He also feels that he has not tolerated other statins but cannot name them.  Is been at least 2 years since he has had another trial of statin.  Trial of atorvastatin 5mg daily for 3 weeks resulted in myalgias.  Stopped and resolved.  Has attempted multiple other statins with the same bothersome side effects and has been unable to continue use.  - HOLD all statins   Non-Statin Medications Recommendations from Today’s Visit:   Did not attempt Zetia as he did not  the prescription.  Unlikely to reach LDL goal <70 on Zetia as monotherapy.  Indication for PCSK9 Inhibitor, if applicable:  ASCVD with suboptimal control of LDL-C despite maximally tolerated statin -would quickly move to PCSK9 inhibitor if unable to tolerate this very low-dose of a statin (this would be his third or fourth trial)  - Start Repatha 140mg subq every 14 days  Supplements Recommended at this visit:   -Continue CoQ10 to twice daily  -Continue vitamin D to 4000 units a day  Recommendations for Other Cardiovascular Risk Factors, padmini all that apply:   -Continue aspirin  Other Issues:  -Ischemic cardiomyopathy, appears well compensated.  Continue current therapy.  Follow-up with cardiology for further management  -Status post PPM -defer all further management to cardiology    Studies Ordered at Todays Visit: None  Blood Work Ordered At Today’s visit: Will order at next visit depending on status of PCSK9i approval  Follow-Up: 6 weeks    Selena CAREY  PRATEEK Cee    CC:  ANA Mcmillan

## 2019-10-29 NOTE — TELEPHONE ENCOUNTER
PA forms for repatha completed at the request of diplomat  Medical assistant to submit  Await coverage determination    Michael J Bloch, MD  Certified Clinical Lipid Specialist  Medial Director, [facility]

## 2019-11-04 ENCOUNTER — TELEPHONE (OUTPATIENT)
Dept: VASCULAR LAB | Facility: MEDICAL CENTER | Age: 79
End: 2019-11-04

## 2019-11-05 NOTE — TELEPHONE ENCOUNTER
Received documentation from [employer] that prior authorization for repatha was approved  Medical assistant to contact patient and assess affordability of out-of-pocket    Michael J Bloch, MD  Certified Clinical Lipid Specialist  Medial Director, [facility]

## 2019-11-06 ENCOUNTER — TELEPHONE (OUTPATIENT)
Dept: VASCULAR LAB | Facility: MEDICAL CENTER | Age: 79
End: 2019-11-06

## 2019-11-13 ENCOUNTER — TELEPHONE (OUTPATIENT)
Dept: VASCULAR LAB | Facility: MEDICAL CENTER | Age: 79
End: 2019-11-13

## 2019-12-03 ENCOUNTER — OFFICE VISIT (OUTPATIENT)
Dept: CARDIOLOGY | Facility: MEDICAL CENTER | Age: 79
End: 2019-12-03
Payer: MEDICARE

## 2019-12-03 VITALS
WEIGHT: 208.8 LBS | HEIGHT: 71 IN | HEART RATE: 69 BPM | OXYGEN SATURATION: 96 % | SYSTOLIC BLOOD PRESSURE: 112 MMHG | BODY MASS INDEX: 29.23 KG/M2 | DIASTOLIC BLOOD PRESSURE: 70 MMHG

## 2019-12-03 DIAGNOSIS — F10.10 ALCOHOL ABUSE: ICD-10-CM

## 2019-12-03 DIAGNOSIS — Z79.899 HIGH RISK MEDICATION USE: ICD-10-CM

## 2019-12-03 DIAGNOSIS — I51.89 LEFT VENTRICULAR SYSTOLIC DYSFUNCTION, NYHA CLASS 3: ICD-10-CM

## 2019-12-03 DIAGNOSIS — I10 ESSENTIAL HYPERTENSION: Chronic | ICD-10-CM

## 2019-12-03 DIAGNOSIS — I47.29 VENTRICULAR TACHYCARDIA (PAROXYSMAL) (HCC): ICD-10-CM

## 2019-12-03 DIAGNOSIS — Z86.79 S/P ABLATION OF ATRIAL FIBRILLATION: ICD-10-CM

## 2019-12-03 DIAGNOSIS — I25.2 HISTORY OF MYOCARDIAL INFARCTION: Chronic | ICD-10-CM

## 2019-12-03 DIAGNOSIS — I25.5 ISCHEMIC CARDIOMYOPATHY: Chronic | ICD-10-CM

## 2019-12-03 DIAGNOSIS — G47.33 OBSTRUCTIVE SLEEP APNEA SYNDROME: Chronic | ICD-10-CM

## 2019-12-03 DIAGNOSIS — N18.30 STAGE 3 CHRONIC KIDNEY DISEASE (HCC): ICD-10-CM

## 2019-12-03 DIAGNOSIS — I50.23 NYHA CLASS 3 ACUTE ON CHRONIC SYSTOLIC HEART FAILURE (HCC): ICD-10-CM

## 2019-12-03 DIAGNOSIS — Z98.890 S/P ABLATION OF ATRIAL FIBRILLATION: ICD-10-CM

## 2019-12-03 DIAGNOSIS — E78.00 HYPERCHOLESTEREMIA: Chronic | ICD-10-CM

## 2019-12-03 DIAGNOSIS — Z95.810 BIVENTRICULAR IMPLANTABLE CARDIOVERTER-DEFIBRILLATOR IN SITU: ICD-10-CM

## 2019-12-03 DIAGNOSIS — K76.6 PORTAL HYPERTENSION (HCC): ICD-10-CM

## 2019-12-03 DIAGNOSIS — I25.10 CORONARY ARTERY DISEASE INVOLVING NATIVE CORONARY ARTERY OF NATIVE HEART WITHOUT ANGINA PECTORIS: Chronic | ICD-10-CM

## 2019-12-03 DIAGNOSIS — Z95.1 HX OF CABG: ICD-10-CM

## 2019-12-03 DIAGNOSIS — I48.0 PAROXYSMAL ATRIAL FIBRILLATION (HCC): ICD-10-CM

## 2019-12-03 PROCEDURE — 99214 OFFICE O/P EST MOD 30 MIN: CPT | Performed by: INTERNAL MEDICINE

## 2019-12-03 ASSESSMENT — ENCOUNTER SYMPTOMS
HEMOPTYSIS: 0
ORTHOPNEA: 0
WHEEZING: 0
SHORTNESS OF BREATH: 0
CARDIOVASCULAR NEGATIVE: 1
LOSS OF CONSCIOUSNESS: 0
NEUROLOGICAL NEGATIVE: 1
COUGH: 0
PALPITATIONS: 0
GASTROINTESTINAL NEGATIVE: 1
MYALGIAS: 1
EYES NEGATIVE: 1
STRIDOR: 0
RESPIRATORY NEGATIVE: 1
WEAKNESS: 0
FEVER: 0
CHILLS: 0
DIZZINESS: 0
PND: 0
SORE THROAT: 0
SPUTUM PRODUCTION: 0
BRUISES/BLEEDS EASILY: 0
CLAUDICATION: 0

## 2019-12-03 ASSESSMENT — MINNESOTA LIVING WITH HEART FAILURE QUESTIONNAIRE (MLHF)
HAVING TO SIT OR LIE DOWN DURING THE DAY: 4
TOTAL_SCORE: 61
GIVING YOU SIDE EFFECTS FROM TREATMENTS: 3
DIFFICULTY GOING AWAY FROM HOME: 3
DIFFICULTY TO CONCENTRATE OR REMEMBERING THINGS: 2
MAKING YOU SHORT OF BREATH: 5
LOSS OF SELF CONTROL IN YOUR LIFE: 2
DIFFICULTY WITH SEXUAL ACTIVITIES: 5
FEELING LIKE A BURDEN TO FAMILY AND FRIENDS: 2
MAKING YOU FEEL DEPRESSED: 2
COSTING YOU MONEY FOR MEDICAL CARE: 1
DIFFICULTY WITH RECREATIONAL PASTIMES, SPORTS, HOBBIES: 5
DIFFICULTY WORKING TO EARN A LIVING: 4
TIRED, FATIGUED OR LOW ON ENERGY: 5
DIFFICULTY SOCIALIZING WITH FAMILY OR FRIENDS: 3
EATING LESS FOODS YOU LIKE: 1
WORKING AROUND THE HOUSE OR YARD DIFFICULT: 3
MAKING YOU WORRY: 3
SWELLING IN ANKLES OR LEGS: 1
DIFFICULTY SLEEPING WELL AT NIGHT: 4
MAKING YOU STAY IN A HOSPITAL: 0
WALKING ABOUT OR CLIMBING STAIRS DIFFICULT: 3

## 2019-12-03 ASSESSMENT — 6 MINUTE WALK TEST (6MWT): TOTAL DISTANCE WALKED (METERS): 393

## 2019-12-03 NOTE — PROGRESS NOTES
"Chief Complaint   Patient presents with   • Congestive Heart Failure       Subjective:   JUAQUIN Fuentes is a 78 y.o. male who presents today as a follow-up for his CAD status post CABG hypertension hyperlipidemia and heart failure with reduced ejection fraction.  He continues to be fatigued and has taken nap during the day.  When he takes his dog for walk he feels like he has to be down out of everything for couple days afterwards.  He can pleaded 393 m on a 6-minute walk test today.  He is having no chest pain palpitations or PND.  He cannot tolerate any statins at this point.  He is scheduled to start a PCSK9 inhibitor.    Past Medical History:   Diagnosis Date   • Atrial fibrillation ([facility]) 9/20/2011   • Biventricular implantable cardioverter-defibrillator in situ 8/7/2015   • Breath shortness    • CAD (coronary artery disease) 9/20/2011   • Cancer ([facility]) 2012    prostate   • CARDIOMYOPATHY 9/20/2011   • Congestive heart failure ([facility])    • COPD (chronic obstructive pulmonary disease) ([facility])    • Fatigue 10/28/2010   • Heartburn 10/23/2008   • History of cardiac catheterization 9/20/2011   • History of myocardial infarction     \"many\"   • HTN (hypertension) 9/20/2011   • Indigestion    • Insomnia 7/8/2010   • Ischemic cardiomyopathy 9/20/2011   • Long term (current) use of anticoagulants 5/13/2011   • Myocardial infarct ([facility]) 2007   • Orthostatic hypotension 5/6/2009   • WILLA (obstructive sleep apnea)     CPAP   • Other drug allergy(995.27) 10/16/2008   • Other specified disorder of intestines 07-28-15    constipation/diarrhea/ reports some bleeding from rectum w/blood clots. Seeing GI   • Pacemaker     [employer]   • Pain 07-28-15    \"chronic\", 0/10   • Pleural effusion 5/18/2009   • Presence of permanent cardiac pacemaker 9/20/2011   • Prostate cancer ([facility]) 5/13/2011   • Second degree AV block, Mobitz type II 11/10/2010   • Snoring    • Stroke ([facility]) 2/3/2009     Past Surgical History:   Procedure Laterality " Date   • RECOVERY  10/16/2015    Procedure: CATH LAB LEAD REVISION .MARIA R AQUINO;  Surgeon: Recoveryonly Surgery;  Location: SURGERY PRE-POST PROC UNIT [facility];  Service:    • RECOVERY  2015    Procedure:  CATH LAB LEAD EXTRACTION AWILDA ST.MARIA R BIPING PAULO AQUINO;  Surgeon: Recoveryonly Surgery;  Location: SURGERY PRE-POST PROC UNIT [facility];  Service:    • RECOVERY  2015    Procedure: CATH LAB  LEAD EXTRACTION, UPGRADE TO BIV PM ST.MARIA R JAS AQUINO ;  Surgeon: Recoveryonly Surgery;  Location: SURGERY PRE-POST PROC UNIT [facility];  Service:    • RECOVERY  2015    Procedure: CATH LAB NEW PM INSERTION DUAL ATRIAL & VENTRICULAR-LV LEAD W/ NEW GENERATOR AQUINO ICD9: 414.8;  Surgeon: Recoveryonfreddy Surgery;  Location: SURGERY PRE-POST PROC UNIT [facility];  Service:    • PACEMAKER INSERTION  08    [facility]   • MULTIPLE CORONARY ARTERY BYPASS      2 vessel   • ZZZ CARDIAC CATH      has 2 stents     Family History   Problem Relation Age of Onset   • Thyroid Sister      Social History     Socioeconomic History   • Marital status:      Spouse name: Not on file   • Number of children: Not on file   • Years of education: Not on file   • Highest education level: Not on file   Occupational History   • Not on file   Social Needs   • Financial resource strain: Not on file   • Food insecurity:     Worry: Not on file     Inability: Not on file   • Transportation needs:     Medical: Not on file     Non-medical: Not on file   Tobacco Use   • Smoking status: Former Smoker     Packs/day: 1.50     Years: 3.00     Pack years: 4.50     Types: Cigarettes     Last attempt to quit: 1970     Years since quittin.9   • Smokeless tobacco: Former User     Types: Chew     Quit date: 1972   Substance and Sexual Activity   • Alcohol use: No     Comment: 10-14 per week; scotch & wine   • Drug use: No   • Sexual activity: Not on file   Lifestyle   • Physical activity:     Days per week: Not on file     Minutes per session: Not on file   •  "Stress: Not on file   Relationships   • Social connections:     Talks on phone: Not on file     Gets together: Not on file     Attends [employer] service: Not on file     Active member of club or organization: Not on file     Attends meetings of clubs or organizations: Not on file     Relationship status: Not on file   • Intimate partner violence:     Fear of current or ex partner: Not on file     Emotionally abused: Not on file     Physically abused: Not on file     Forced sexual activity: Not on file   Other Topics Concern   • Not on file   Social History Narrative   • Not on file     Allergies   Allergen Reactions   • Plavix [Clopidogrel Bisulfate] Anaphylaxis   • Statins [Hmg-Coa-R Inhibitors] Unspecified     Muscles aches     • Ticlid [Ticlopidine Hydrochloride] Unspecified     Pt states \"I'm not sure what happens\".       Outpatient Encounter Medications as of 12/3/2019   Medication Sig Dispense Refill   • spironolactone (ALDACTONE) 25 MG Tab TAKE 1 TABLET BY MOUTH EVERY DAY 90 Tab 3   • ENTRESTO  MG Tab tablet TAKE 1 TABLET BY MOUTH TWICE A DAY 60 Tab 11   • clotrimazole-betamethasone (LOTRISONE) 1-0.05 % Cream every day. APPLY TO AFFECTED AREA  0   • metoprolol SR (TOPROL XL) 50 MG TABLET SR 24 HR Take 1 Tab by mouth every day. 90 Tab 3   • Ixekizumab (TALTZ SC) Inject  as instructed. Once a month-injection     • HYDROcodone-acetaminophen (NORCO) 5-325 MG Tab per tablet TAKE 1 TAB BY MOUTH EVERY 4 HOURS AS NEEDED  0   • potassium chloride (KLOR-CON) 20 MEQ Pack Take 10 mEq by mouth every day. Once per week     • furosemide (LASIX) 40 MG Tab Take 40 mg by mouth every day.     • vitamin D (CHOLECALCIFEROL) 1000 UNIT Tab Take 1,000 Units by mouth every day.     • metOLazone (ZAROXOLYN) 5 MG Tab Take 1 Tab by mouth 1 time daily as needed. 30 Tab 3   • Omega-3 Fatty Acids (FISH OIL) 1000 MG Cap capsule Take 1,000 mg by mouth every day.     • Multiple Vitamins-Minerals (OCUVITE) Tab Take 1 Tab by mouth every " "day.     • aspirin 81 MG tablet Take 81 mg by mouth every day.     • Coenzyme Q10 (CO Q-10 PO) Take 1 Cap by mouth every day.       No facility-administered encounter medications on file as of 12/3/2019.      Review of Systems   Constitutional: Positive for malaise/fatigue. Negative for chills and fever.   HENT: Negative.  Negative for sore throat.    Eyes: Negative.    Respiratory: Negative.  Negative for cough, hemoptysis, sputum production, shortness of breath, wheezing and stridor.    Cardiovascular: Negative.  Negative for chest pain, palpitations, orthopnea, claudication, leg swelling and PND.   Gastrointestinal: Negative.    Genitourinary: Negative.    Musculoskeletal: Positive for joint pain and myalgias.   Skin: Negative.    Neurological: Negative.  Negative for dizziness, loss of consciousness and weakness.   Endo/Heme/Allergies: Negative.  Does not bruise/bleed easily.   All other systems reviewed and are negative.       Objective:   /70 (BP Location: Left arm, Patient Position: Sitting, BP Cuff Size: Adult)   Pulse 69   Ht 1.803 m (5' 11\")   Wt 94.7 kg (208 lb 12.8 oz)   SpO2 96%   BMI 29.12 kg/m²     Physical Exam   Constitutional: He appears well-developed and well-nourished. No distress.   HENT:   Head: Normocephalic and atraumatic.   Right Ear: External ear normal.   Left Ear: External ear normal.   Nose: Nose normal.   Mouth/Throat: No oropharyngeal exudate.   Eyes: Pupils are equal, round, and reactive to light. Conjunctivae and EOM are normal. Right eye exhibits no discharge. Left eye exhibits no discharge. No scleral icterus.   Neck: Neck supple. No JVD present.   Cardiovascular: Normal rate, regular rhythm and intact distal pulses. Exam reveals no gallop and no friction rub.   No murmur heard.  Pulmonary/Chest: Effort normal. No stridor. No respiratory distress. He has no wheezes. He has no rales. He exhibits no tenderness.   Abdominal: Soft. He exhibits no distension. There is no " guarding.   Musculoskeletal: Normal range of motion.         General: No tenderness, deformity or edema.   Neurological: He is alert. He has normal reflexes. He displays normal reflexes. No cranial nerve deficit. He exhibits normal muscle tone. Coordination normal.   Skin: Skin is warm and dry. No rash noted. He is not diaphoretic. No erythema. No pallor.   Psychiatric: He has a normal mood and affect. His behavior is normal. Judgment and thought content normal.   Nursing note and vitals reviewed.      Assessment:     1. Alcohol abuse     2. Paroxysmal atrial fibrillation (HCC)     3. Biventricular implantable cardioverter-defibrillator in situ     4. Coronary artery disease involving native coronary artery of native heart without angina pectoris     5. High risk medication use     6. History of myocardial infarction     7. Essential hypertension     8. Hx of CABG     9. Hypercholesteremia     10. Ischemic cardiomyopathy     11. Left ventricular systolic dysfunction, NYHA class 3     12. NYHA class 3 acute on chronic systolic heart failure (HCC)     13. Portal hypertension (HCC)     14. S/P ablation of atrial fibrillation     15. Obstructive sleep apnea syndrome     16. Stage 3 chronic kidney disease (HCC)     17. Ventricular tachycardia (paroxysmal) (HCC)         Medical Decision Making:  Today's Assessment / Status / Plan:     78-year-old male with CAD status post CABG with biventricular heart failure status post CRT with uncontrolled hyperlipidemia.  He will start a PCSK9 inhibitor today.  Otherwise his risk factors are well-managed.  He does not require any follow-up refills.  We will see him back in 6 months per

## 2019-12-09 ENCOUNTER — TELEPHONE (OUTPATIENT)
Dept: VASCULAR LAB | Facility: MEDICAL CENTER | Age: 79
End: 2019-12-09

## 2019-12-09 NOTE — TELEPHONE ENCOUNTER
Forms for patient assistance for Repatha completed  Medical assistant to submit  Await coverage determination    Michael J Bloch, MD  Certified Clinical Lipid Specialist  Medial Director, [facility]

## 2019-12-10 ENCOUNTER — TELEPHONE (OUTPATIENT)
Dept: VASCULAR LAB | Facility: MEDICAL CENTER | Age: 79
End: 2019-12-10

## 2019-12-11 ENCOUNTER — APPOINTMENT (OUTPATIENT)
Dept: VASCULAR LAB | Facility: MEDICAL CENTER | Age: 79
End: 2019-12-11
Payer: MEDICARE

## 2019-12-11 ASSESSMENT — ENCOUNTER SYMPTOMS
BLURRED VISION: 0
DIZZINESS: 0
MYALGIAS: 1
LOSS OF CONSCIOUSNESS: 0
FOCAL WEAKNESS: 0
SPEECH CHANGE: 0
BACK PAIN: 1
WEIGHT LOSS: 0
GASTROINTESTINAL NEGATIVE: 1
HEADACHES: 0
PALPITATIONS: 0
DEPRESSION: 0
DOUBLE VISION: 0
SHORTNESS OF BREATH: 0
NERVOUS/ANXIOUS: 0

## 2019-12-11 NOTE — PROGRESS NOTES
[facility] - Follow Up Visit  Date of Service: 12/11/19    Isai Fuentes has been referred for evaluation and management of dyslipidemia    Referral Source: cardiology    HPI  History of ASCVD: Yes, Details: CAD s/p CABG 2007 had a couple of stents previous  Other Established (non-atherosclerotic) Vascular Disease, if Present:   Heart failure  Pacemaker  Age at Initial Diagnosis of Dyslipidemia: 65  Current Prescription Lipid Lowering Medications - including dose:   Statin: None  Non-Statin: None  Current Lipid Lowering and Related Supplements:   Omega 3s 1000  coq10  Vit D 1000  Any Current Side Effects Potentially Related to Lipid Lowering therapy?   No  Current Adherence to Lipid Lowering Therapies   Complete  Previously Attempted Interventions for Lipids - including outcome  Statin:  rosuvastatin 40 mg - intractable myalgias (2017)   Pravastatin 40 mg - intractable myalgias 2015   Has tried others that also caused muscle pain  Non-Statin: fenofibrate    Never tried zetia   Any Previous History of Statin Intolerance?   Yes, intolerable myalgias on both rosuvastatin 40 and pravastatin 40 in the past.  Resolved with discontinuation  Baseline Lipids Prior to Treatment:   , HDL 54, triglycerides 109 -May 2019  Other Pertinent History:   No known history of thyroid disease  Does have mild chronic kidney disease but no evidence of nephrotic syndrome  History of other CV risk factors:   Blood pressure currently well controlled on his current regimen  No known history of diabetes  Remains on aspirin    CURRENT MEDICATIONS:   Current Outpatient Medications:   •  spironolactone, TAKE 1 TABLET BY MOUTH EVERY DAY, Taking  •  ENTRESTO, TAKE 1 TABLET BY MOUTH TWICE A DAY, Taking  •  clotrimazole-betamethasone, every day. APPLY TO AFFECTED AREA, Taking  •  metoprolol SR, 50 mg, Oral, DAILY, Taking  •  Ixekizumab (TALTZ SC), Inject  as instructed. Once a month-injection, Taking  •  HYDROcodone-acetaminophen,  TAKE 1 TAB BY MOUTH EVERY 4 HOURS AS NEEDED, PRN  •  potassium chloride, 10 mEq, Oral, DAILY, Taking  •  furosemide, 40 mg, Oral, DAILY, PRN  •  vitamin D, 1,000 Units, Oral, DAILY, Taking  •  metOLazone, 5 mg, Oral, QDAY PRN, PRN  •  fish oil, 1,000 mg, Oral, DAILY, Taking  •  OCUVITE, 1 Tab, Oral, DAILY, Taking  •  aspirin, 81 mg, Oral, DAILY, Taking  •  Coenzyme Q10 (CO Q-10 PO), 1 Cap, Oral, DAILY, Taking    ALLERGIES: Plavix [clopidogrel bisulfate]; Statins [hmg-coa-r inhibitors]; and Ticlid [ticlopidine hydrochloride]    FAMILY HISTORY:    Dad - CAD with cabg    SOCIAL HISTORY   Social History     Tobacco Use   Smoking Status Former Smoker   • Packs/day: 1.50   • Years: 3.00   • Pack years: 4.50   • Types: Cigarettes   • Last attempt to quit: 1970   • Years since quittin.9   Smokeless Tobacco Former User   • Types: Chew   • Quit date: 1972     Change in weight: stable  Exercise habits: moderate regular exercise program  Diet: low sodium    Review of Systems   Constitutional: Positive for malaise/fatigue. Negative for weight loss.   HENT: Negative for hearing loss and tinnitus.    Eyes: Negative for blurred vision and double vision.   Respiratory: Negative for shortness of breath.    Cardiovascular: Negative for chest pain, palpitations and leg swelling.   Gastrointestinal: Negative.    Genitourinary: Negative.    Musculoskeletal: Positive for back pain, joint pain and myalgias.   Skin: Negative for itching and rash.   Neurological: Negative for dizziness, speech change, focal weakness, loss of consciousness and headaches.   Psychiatric/Behavioral: Negative for depression. The patient is not nervous/anxious.      Physical Exam   Constitutional: He is oriented to person, place, and time. He appears well-developed and well-nourished. No distress.   Cardiovascular: Normal rate, regular rhythm, normal heart sounds and intact distal pulses. Exam reveals no gallop and no friction rub.   No murmur  heard.  Pulmonary/Chest: Effort normal and breath sounds normal. He has no wheezes. He has no rales.   Abdominal: Soft. Bowel sounds are normal.   Musculoskeletal: Normal range of motion.         General: No tenderness or edema.   Neurological: He is alert and oriented to person, place, and time. Coordination normal.   Skin: Skin is warm and dry. No rash noted. He is not diaphoretic.   Psychiatric: He has a normal mood and affect. His behavior is normal.   Vitals reviewed.    DATA REVIEW:  Most Recent Lipid Panel:   Lab Results   Component Value Date    CHOLSTRLTOT 222 (H) 10/10/2019    TRIGLYCERIDE 81 10/10/2019    HDL 64 10/10/2019     (H) 10/10/2019     Other Pertinent Blood Work:   Lab Results   Component Value Date    SODIUM 138 10/10/2019    POTASSIUM 4.8 10/10/2019    CHLORIDE 104 10/10/2019    CO2 27 10/10/2019    ANION 7.0 10/10/2019    GLUCOSE 97 10/10/2019    BUN 30 (H) 10/10/2019    CREATININE 1.38 10/10/2019    CALCIUM 9.3 10/10/2019    ASTSGOT 23 10/10/2019    ALTSGPT 15 10/10/2019    ALKPHOSPHAT 109 (H) 10/10/2019    TBILIRUBIN 0.7 10/10/2019    ALBUMIN 4.1 10/10/2019    AGRATIO 1.5 10/10/2019    LIPOPROTA 6 10/10/2019    TSHULTRASEN 1.670 05/21/2019    CPKTOTAL 91 10/10/2019     [facility]  GFR is 45, , triglycerides 163, HDL 50, hemoglobin 16    ASSESSMENT AND PLAN  Patient Type, check all that apply:   Secondary Prevention  Established Atherosclerotic Cardiovascular Disease (ASCVD)  Yes, Details: Coronary artery disease status post PCI, MI, and CABG  Other Established (non-atherosclerotic) Vascular Disease, if Present:  Ischemic cardiomyopathy  Pacemaker  Evidence of Heterozygous Familial Hypercholesterolemia (FH):   No   ACC/AHA Indication for Statin Therapy, padmini all that apply:  Established ASCVD: Indication for High intensity statin   Calculated Risk for ASCVD, if applicable    N/A  Other Significant Risk Markers, if any, padmini all that apply   Family  history of premature ASCVD in first degree relative  Goal LDL-C and nonHDL-C based on Clinic Protocol  LDL-C:   <70 mg/dL  Lifestyle Recommendations From Today’s Visit:    Eating Plan: Concentrate on  Low sat/Trans fat  Exercise: Continue excellent exercise routine  Statin Therapy Recommendations from Today’s Visit:   Patient is not tolerated pravastatin and rosuvastatin in the past.  He also feels that he has not tolerated other statins but cannot name them.  Is been at least 2 years since he has had another trial of statin.  Trial of atorvastatin 5mg daily for 3 weeks resulted in myalgias.  Stopped and resolved.  Has attempted multiple other statins with the same bothersome side effects and has been unable to continue use.  - HOLD all statins   Non-Statin Medications Recommendations from Today’s Visit:   Did not attempt Zetia as he did not  the prescription.  Unlikely to reach LDL goal <70 on Zetia as monotherapy.  Indication for PCSK9 Inhibitor, if applicable:  ASCVD with suboptimal control of LDL-C despite maximally tolerated statin -would quickly move to PCSK9 inhibitor if unable to tolerate this very low-dose of a statin (this would be his third or fourth trial) waiting for approval from [employer] safety net application faxed in on 12/10/19.   - Start Repatha 140mg subq every 14 days  Supplements Recommended at this visit:   -Continue CoQ10 to twice daily  -Continue vitamin D to 4000 units a day  Recommendations for Other Cardiovascular Risk Factors, padmini all that apply:   -Continue aspirin  Other Issues:  HTN: Tolerating metoprolol and entresto  -Ischemic cardiomyopathy, appears well compensated.  Continue current therapy.  Follow-up with cardiology for further management  -Status post PPM -defer all further management to cardiology    Studies Ordered at Todays Visit: None  Blood Work Ordered At Today’s visit: Will order at next visit depending on status of PCSK9i approval  Follow-Up: 6 weeks    Anika CUBA  FINA Camacho.    CC:  ANA Mcmillan

## 2019-12-16 ENCOUNTER — TELEPHONE (OUTPATIENT)
Dept: VASCULAR LAB | Facility: MEDICAL CENTER | Age: 79
End: 2019-12-16

## 2019-12-16 NOTE — TELEPHONE ENCOUNTER
Received indication that patient has been approved for [employer]  Will ask medical assistant to inform patient and make sure he receives shipments as scheduled    Michael J Bloch, MD  Certified Clinical Lipid Specialist  Medial Director, [facility]

## 2019-12-26 ENCOUNTER — TELEPHONE (OUTPATIENT)
Dept: VASCULAR LAB | Facility: MEDICAL CENTER | Age: 79
End: 2019-12-26

## 2019-12-26 NOTE — TELEPHONE ENCOUNTER
[facility]    Left VM with pt requesting a call back to discuss recent approval for [employer].  Requested update if pt is receiving the product.    Anthony Kovacs, PharmD

## 2020-01-03 ENCOUNTER — TELEPHONE (OUTPATIENT)
Dept: VASCULAR LAB | Facility: MEDICAL CENTER | Age: 80
End: 2020-01-03

## 2020-01-03 NOTE — TELEPHONE ENCOUNTER
[facility]    Although pt has been approved by the [employer] Safety Net, he reports he hasn't received any product.  I will request the help of our MA and see if we can help find out what's causing the delay.    Anthony Kovacs, TalonD

## 2020-01-08 ENCOUNTER — OFFICE VISIT (OUTPATIENT)
Dept: VASCULAR LAB | Facility: MEDICAL CENTER | Age: 80
End: 2020-01-08
Attending: INTERNAL MEDICINE
Payer: MEDICARE

## 2020-01-08 VITALS
HEART RATE: 69 BPM | SYSTOLIC BLOOD PRESSURE: 108 MMHG | BODY MASS INDEX: 28.38 KG/M2 | WEIGHT: 202.7 LBS | HEIGHT: 71 IN | DIASTOLIC BLOOD PRESSURE: 68 MMHG

## 2020-01-08 DIAGNOSIS — I10 ESSENTIAL HYPERTENSION: Chronic | ICD-10-CM

## 2020-01-08 DIAGNOSIS — I48.0 PAROXYSMAL ATRIAL FIBRILLATION (HCC): ICD-10-CM

## 2020-01-08 DIAGNOSIS — E78.00 HYPERCHOLESTEREMIA: Chronic | ICD-10-CM

## 2020-01-08 PROCEDURE — 99212 OFFICE O/P EST SF 10 MIN: CPT | Performed by: NURSE PRACTITIONER

## 2020-01-08 PROCEDURE — 99213 OFFICE O/P EST LOW 20 MIN: CPT | Performed by: NURSE PRACTITIONER

## 2020-01-08 ASSESSMENT — ENCOUNTER SYMPTOMS
HEADACHES: 0
BLOOD IN STOOL: 1
BRUISES/BLEEDS EASILY: 0
DOUBLE VISION: 0
HEARTBURN: 0
PALPITATIONS: 0
FOCAL WEAKNESS: 0
SHORTNESS OF BREATH: 1
VOMITING: 0
DEPRESSION: 0
BACK PAIN: 1
BLURRED VISION: 0
LOSS OF CONSCIOUSNESS: 0
NAUSEA: 0
NERVOUS/ANXIOUS: 0
DIZZINESS: 1
MYALGIAS: 1
WEIGHT LOSS: 0
SPEECH CHANGE: 0

## 2020-01-08 NOTE — PROGRESS NOTES
[facility] - Follow Up Visit  Date of Service: 1/8/20    Isai Fuentes has been referred for evaluation and management of dyslipidemia    Referral Source: cardiology    HPI  History of ASCVD: Yes, Details: CAD s/p CABG 2007 had a couple of stents previous  Other Established (non-atherosclerotic) Vascular Disease, if Present:   Heart failure  Pacemaker  Age at Initial Diagnosis of Dyslipidemia: 65  Current Prescription Lipid Lowering Medications - including dose:   Statin: None  Non-Statin: None  Current Lipid Lowering and Related Supplements:   Omega 3s 1000  coq10  Vit D 1000  Any Current Side Effects Potentially Related to Lipid Lowering therapy?   No  Current Adherence to Lipid Lowering Therapies   Complete  Previously Attempted Interventions for Lipids - including outcome  Statin:  rosuvastatin 40 mg - intractable myalgias (2017)   Pravastatin 40 mg - intractable myalgias 2015   Has tried others that also caused muscle pain  Non-Statin: fenofibrate    Never tried zetia   Any Previous History of Statin Intolerance?   Yes, intolerable myalgias on both rosuvastatin 40 and pravastatin 40 in the past.  Resolved with discontinuation  Baseline Lipids Prior to Treatment:   , HDL 54, triglycerides 109 -May 2019  Other Pertinent History:   No known history of thyroid disease  Does have mild chronic kidney disease but no evidence of nephrotic syndrome  History of other CV risk factors:   Blood pressure currently well controlled on his current regimen  No known history of diabetes  Remains on aspirin    CURRENT MEDICATIONS:   Current Outpatient Medications:   •  spironolactone, TAKE 1 TABLET BY MOUTH EVERY DAY, Taking  •  ENTRESTO, TAKE 1 TABLET BY MOUTH TWICE A DAY, Taking  •  clotrimazole-betamethasone, every day. APPLY TO AFFECTED AREA, Taking  •  metoprolol SR, 50 mg, Oral, DAILY, Taking  •  Ixekizumab (TALTZ SC), Inject  as instructed. Once a month-injection, Taking  •  HYDROcodone-acetaminophen,  TAKE 1 TAB BY MOUTH EVERY 4 HOURS AS NEEDED, PRN  •  potassium chloride, 10 mEq, Oral, DAILY, Taking  •  furosemide, 40 mg, Oral, DAILY, PRN  •  vitamin D, 1,000 Units, Oral, DAILY, Taking  •  metOLazone, 5 mg, Oral, QDAY PRN, PRN  •  fish oil, 1,000 mg, Oral, DAILY, Taking  •  OCUVITE, 1 Tab, Oral, DAILY, Taking  •  aspirin, 81 mg, Oral, DAILY, Taking  •  Coenzyme Q10 (CO Q-10 PO), 1 Cap, Oral, DAILY, Taking    ALLERGIES: Plavix [clopidogrel bisulfate]; Statins [hmg-coa-r inhibitors]; and Ticlid [ticlopidine hydrochloride]    FAMILY HISTORY:    Dad - CAD with cabg    SOCIAL HISTORY   Social History     Tobacco Use   Smoking Status Former Smoker   • Packs/day: 1.50   • Years: 3.00   • Pack years: 4.50   • Types: Cigarettes   • Last attempt to quit: 1970   • Years since quittin.0   Smokeless Tobacco Former User   • Types: Chew   • Quit date: 1972     Change in weight: stable  Exercise habits: moderate regular exercise program  Diet: low sodium    Review of Systems   Constitutional: Positive for malaise/fatigue. Negative for weight loss.   HENT: Negative for hearing loss, nosebleeds and tinnitus.    Eyes: Negative for blurred vision and double vision.   Respiratory: Positive for shortness of breath.    Cardiovascular: Negative for chest pain, palpitations and leg swelling.   Gastrointestinal: Positive for blood in stool. Negative for heartburn, nausea and vomiting.   Genitourinary: Negative.    Musculoskeletal: Positive for back pain, joint pain and myalgias.   Skin: Negative for itching and rash.   Neurological: Positive for dizziness. Negative for speech change, focal weakness, loss of consciousness and headaches.   Endo/Heme/Allergies: Does not bruise/bleed easily.   Psychiatric/Behavioral: Negative for depression. The patient is not nervous/anxious.      Physical Exam   Constitutional: He is oriented to person, place, and time. He appears well-developed and well-nourished. No distress.    Cardiovascular: Normal rate, regular rhythm, normal heart sounds and intact distal pulses. Exam reveals no gallop and no friction rub.   No murmur heard.  Pulmonary/Chest: Effort normal and breath sounds normal. He has no wheezes. He has no rales.   Abdominal: Soft. Bowel sounds are normal.   Musculoskeletal: Normal range of motion.         General: No tenderness or edema.   Neurological: He is alert and oriented to person, place, and time. Coordination normal.   Skin: Skin is warm and dry. No rash noted. He is not diaphoretic.   Psychiatric: He has a normal mood and affect. His behavior is normal.   Vitals reviewed.    DATA REVIEW:  Most Recent Lipid Panel:   Lab Results   Component Value Date    CHOLSTRLTOT 222 (H) 10/10/2019    TRIGLYCERIDE 81 10/10/2019    HDL 64 10/10/2019     (H) 10/10/2019     Other Pertinent Blood Work:   Lab Results   Component Value Date    SODIUM 138 10/10/2019    POTASSIUM 4.8 10/10/2019    CHLORIDE 104 10/10/2019    CO2 27 10/10/2019    ANION 7.0 10/10/2019    GLUCOSE 97 10/10/2019    BUN 30 (H) 10/10/2019    CREATININE 1.38 10/10/2019    CALCIUM 9.3 10/10/2019    ASTSGOT 23 10/10/2019    ALTSGPT 15 10/10/2019    ALKPHOSPHAT 109 (H) 10/10/2019    TBILIRUBIN 0.7 10/10/2019    ALBUMIN 4.1 10/10/2019    AGRATIO 1.5 10/10/2019    LIPOPROTA 6 10/10/2019    TSHULTRASEN 1.670 05/21/2019    CPKTOTAL 91 10/10/2019     [facility]  GFR is 45, , triglycerides 163, HDL 50, hemoglobin 16    ASSESSMENT AND PLAN  Patient Type, check all that apply:   Secondary Prevention  Established Atherosclerotic Cardiovascular Disease (ASCVD)  Yes, Details: Coronary artery disease status post PCI, MI, and CABG  Other Established (non-atherosclerotic) Vascular Disease, if Present:  Ischemic cardiomyopathy  Pacemaker  Evidence of Heterozygous Familial Hypercholesterolemia (FH):   No   ACC/AHA Indication for Statin Therapy, padmini all that apply:  Established ASCVD: Indication for  High intensity statin   Calculated Risk for ASCVD, if applicable    N/A  Other Significant Risk Markers, if any, padmini all that apply   Family history of premature ASCVD in first degree relative  Goal LDL-C and nonHDL-C based on Clinic Protocol  LDL-C:   <70 mg/dL  Lifestyle Recommendations From Today’s Visit:    Eating Plan: Concentrate on  Low sat/Trans fat  Exercise: Continue excellent exercise routine  Statin Therapy Recommendations from Today’s Visit:   Patient is not tolerated pravastatin and rosuvastatin in the past.  He also feels that he has not tolerated other statins but cannot name them.  Is been at least 2 years since he has had another trial of statin.  Trial of atorvastatin 5mg daily for 3 weeks resulted in myalgias.  Stopped and resolved.  Has attempted multiple other statins with the same bothersome side effects and has been unable to continue use.  - HOLD all statins   Non-Statin Medications Recommendations from Today’s Visit:   Did not attempt Zetia as he did not  the prescription.  Unlikely to reach LDL goal <70 on Zetia as monotherapy.  Indication for PCSK9 Inhibitor, if applicable:  ASCVD with suboptimal control of LDL-C despite maximally tolerated statin -would quickly move to PCSK9 inhibitor if unable to tolerate this very low-dose of a statin (this would be his third or fourth trial)  - Start Repatha 140mg subq every 14 days when received from [facility]. Pt will call tomorrow to arrange for shipment.   Supplements Recommended at this visit:   -Continue CoQ10 to twice daily  -Continue vitamin D to 4000 units a day  Recommendations for Other Cardiovascular Risk Factors, padmini all that apply:   -Continue aspirin  Other Issues:  -Ischemic cardiomyopathy, appears well compensated.  Continue current therapy.  Follow-up with cardiology for further management  -Status post PPM -defer all further management to cardiology    Studies Ordered at Todays Visit: None  Blood Work Ordered At Today’s  visit:lipid, cmp, MA call MA for dates when first injection given.  Follow-Up:10 weeks    FINA Conley.    CC:  ANA Mcmillan

## 2020-01-10 ENCOUNTER — NON-PROVIDER VISIT (OUTPATIENT)
Dept: CARDIOLOGY | Facility: MEDICAL CENTER | Age: 80
End: 2020-01-10
Payer: MEDICARE

## 2020-01-10 DIAGNOSIS — I25.5 ISCHEMIC CARDIOMYOPATHY: Chronic | ICD-10-CM

## 2020-01-10 DIAGNOSIS — Z95.810 BIVENTRICULAR IMPLANTABLE CARDIOVERTER-DEFIBRILLATOR IN SITU: ICD-10-CM

## 2020-01-10 DIAGNOSIS — I50.23 NYHA CLASS 3 ACUTE ON CHRONIC SYSTOLIC HEART FAILURE (HCC): ICD-10-CM

## 2020-01-10 PROCEDURE — 93284 PRGRMG EVAL IMPLANTABLE DFB: CPT | Performed by: INTERNAL MEDICINE

## 2020-01-13 ENCOUNTER — APPOINTMENT (OUTPATIENT)
Dept: PULMONOLOGY | Facility: HOSPICE | Age: 80
End: 2020-01-13
Payer: MEDICARE

## 2020-01-13 ENCOUNTER — NON-PROVIDER VISIT (OUTPATIENT)
Dept: PULMONOLOGY | Facility: HOSPICE | Age: 80
End: 2020-01-13
Payer: MEDICARE

## 2020-01-13 VITALS — BODY MASS INDEX: 27.89 KG/M2 | WEIGHT: 200 LBS

## 2020-01-13 DIAGNOSIS — J44.9 CHRONIC OBSTRUCTIVE PULMONARY DISEASE, UNSPECIFIED COPD TYPE (HCC): ICD-10-CM

## 2020-01-13 PROCEDURE — 94060 EVALUATION OF WHEEZING: CPT | Performed by: INTERNAL MEDICINE

## 2020-01-13 PROCEDURE — 94726 PLETHYSMOGRAPHY LUNG VOLUMES: CPT | Performed by: INTERNAL MEDICINE

## 2020-01-13 PROCEDURE — 94729 DIFFUSING CAPACITY: CPT | Performed by: INTERNAL MEDICINE

## 2020-01-13 ASSESSMENT — PULMONARY FUNCTION TESTS
FVC_PERCENT_PREDICTED: 94
FEV1/FVC: 73.45
FEV1_PREDICTED: 2.86
FEV1/FVC_PERCENT_LLN: 63
FVC_PERCENT_PREDICTED: 91
FEV1/FVC_PERCENT_PREDICTED: 94
FEV1_PERCENT_CHANGE: 2
FVC: 3.64
FVC_LLN: 3.22
FEV1/FVC_PERCENT_PREDICTED: 92
FEV1_PERCENT_PREDICTED: 90
FEV1/FVC: 70
FEV1: 2.6
FEV1_LLN: 2.39
FEV1/FVC_PERCENT_PREDICTED: 74
FEV1/FVC_PERCENT_CHANGE: 5
FVC_PREDICTED: 3.86
FEV1: 2.53
FEV1/FVC_PERCENT_PREDICTED: 98
FEV1/FVC_PERCENT_CHANGE: -100
FEV1_PERCENT_CHANGE: -2
FEV1_PERCENT_PREDICTED: 88
FEV1/FVC: 70
FEV1/FVC_PERCENT_PREDICTED: 99
FVC: 3.54
FEV1/FVC_PREDICTED: 75
FEV1/FVC: 73

## 2020-01-13 NOTE — PROCEDURES
Technician: MARY Duarte    Technician Comment:  Good patient effort & cooperation.  The results of this test meet the ATS/ERS standards for acceptability & reproducibility.  Test was performed on the [employer] Body Plethysmograph-Elite DX system.  Predicted values were GLI-2012 for spirometry, GLI-2017 for DLCO, ITS for Lung Volumes.  The DLCO was uncorrected for Hgb.  A bronchodilator of Ventolin HFA -2puffs via spacer administered.  DLCO performed during dilation period.    Interpretation:  This exam is performed with a primary diagnosis of COPD to help establish a pre-test probability of the patient’s diagnostic findings.     The Flow Volume Loop is of sufficient quality and the volume-time graph demonstrates an adequate exhalation to provide a confident interpretation.    The FEV1/FVC ratio is normal by GOLD criteria and confidence interval data indicating an absence of irreversible obstructive lung disease (i.e. no evidence of COPD).  This is accompanied by a normal FEV1 and FVC based on predicted values. No significant response to bronchodilator.    Lung volumes are within the limits of predicted values effectively ruling out restrictive lung disease. Diffuse capacity is mildly reduced but corrected for alveolar volume.     Impression:   1. No evidence of COPD   2. Mildly reduced diffusing capacity but corrected for alveolar volume. Clinical correlation recommended.

## 2020-01-15 ENCOUNTER — OFFICE VISIT (OUTPATIENT)
Dept: PULMONOLOGY | Facility: HOSPICE | Age: 80
End: 2020-01-15
Payer: MEDICARE

## 2020-01-15 ENCOUNTER — TELEPHONE (OUTPATIENT)
Dept: VASCULAR LAB | Facility: MEDICAL CENTER | Age: 80
End: 2020-01-15

## 2020-01-15 VITALS
DIASTOLIC BLOOD PRESSURE: 68 MMHG | BODY MASS INDEX: 27.58 KG/M2 | HEART RATE: 70 BPM | SYSTOLIC BLOOD PRESSURE: 110 MMHG | RESPIRATION RATE: 16 BRPM | WEIGHT: 197 LBS | HEIGHT: 71 IN | OXYGEN SATURATION: 98 % | TEMPERATURE: 98 F

## 2020-01-15 DIAGNOSIS — J42 CHRONIC BRONCHITIS, UNSPECIFIED CHRONIC BRONCHITIS TYPE (HCC): ICD-10-CM

## 2020-01-15 DIAGNOSIS — I48.0 PAROXYSMAL ATRIAL FIBRILLATION (HCC): ICD-10-CM

## 2020-01-15 DIAGNOSIS — G47.33 OBSTRUCTIVE SLEEP APNEA SYNDROME: Chronic | ICD-10-CM

## 2020-01-15 PROCEDURE — 99214 OFFICE O/P EST MOD 30 MIN: CPT | Performed by: NURSE PRACTITIONER

## 2020-01-15 RX ORDER — ALBUTEROL SULFATE 90 UG/1
2 AEROSOL, METERED RESPIRATORY (INHALATION) EVERY 6 HOURS PRN
COMMUNITY
End: 2020-09-11

## 2020-01-15 ASSESSMENT — ENCOUNTER SYMPTOMS
EYE DISCHARGE: 0
GASTROINTESTINAL NEGATIVE: 1
EYE PAIN: 0
CONSTITUTIONAL NEGATIVE: 1
PSYCHIATRIC NEGATIVE: 1
RESPIRATORY NEGATIVE: 1
BRUISES/BLEEDS EASILY: 0
MUSCULOSKELETAL NEGATIVE: 1
CARDIOVASCULAR NEGATIVE: 1
NEUROLOGICAL NEGATIVE: 1

## 2020-01-15 ASSESSMENT — PAIN SCALES - GENERAL: PAINLEVEL: NO PAIN

## 2020-01-15 NOTE — TELEPHONE ENCOUNTER
[facility]    Confirmed with pt's wife that the pt received a shipment of Repatha yesterday and is tolerating the medication well without complaint.    Anthony Kovacs, TalonD

## 2020-01-15 NOTE — PROGRESS NOTES
"Chief Complaint   Patient presents with   • Follow-Up     Review PFT and 6MW results, COPD         HPI: This patient is a 79 y.o. male, who presents for follow-up PFT and 6-minute walk results.     He was last seen with Dr. Ruby Thakur, August 6, 2019. He has afib, biventricular implanted cardioverter/defibrillator, CAD, cardiomyopathy, CHF managed by cardiology.    PFTs January 13, 2020 show stage I COPD with an FEV1 2.53 L 88% predicted, FEV1 FVC ratio of 70.  DLCO 74%.  6-minute walk showed adequate saturations on RA.  He uses albuterol when needed.  He continues to walk his dog 30 to 40 minutes daily.  He denies URI or exacerbations since last being seen.  He has been feeling well.  He uses his albuterol maybe once per month.    He has severe sleep apnea with an AHI of 45.  He is compliant with AutoPap 8 to 11 cm H2O.  Former compliance was excellent.  He says he lost his compliance chip.  Were able to download information wirelessly.  This shows 97% use, with an AHI of 2.2.  Patient continues to benefit from therapy.    Past Medical History:   Diagnosis Date   • Atrial fibrillation (HCC) 9/20/2011   • Biventricular implantable cardioverter-defibrillator in situ 8/7/2015   • Breath shortness    • CAD (coronary artery disease) 9/20/2011   • Cancer ([facility]) 2012    prostate   • CARDIOMYOPATHY 9/20/2011   • Congestive heart failure (HCC)    • COPD (chronic obstructive pulmonary disease) ([facility])    • Fatigue 10/28/2010   • Heartburn 10/23/2008   • History of cardiac catheterization 9/20/2011   • History of myocardial infarction     \"many\"   • HTN (hypertension) 9/20/2011   • Indigestion    • Insomnia 7/8/2010   • Ischemic cardiomyopathy 9/20/2011   • Long term (current) use of anticoagulants 5/13/2011   • Myocardial infarct ([facility]) 2007   • Orthostatic hypotension 5/6/2009   • WILLA (obstructive sleep apnea)     CPAP   • Other drug allergy(995.27) 10/16/2008   • Other specified disorder of intestines 07-28-15    " "constipation/diarrhea/ reports some bleeding from rectum w/blood clots. Seeing GI   • Pacemaker     [employer]   • Pain -15    \"chronic\", 0/10   • Pleural effusion 2009   • Presence of permanent cardiac pacemaker 2011   • Prostate cancer (HCC) 2011   • Second degree AV block, Mobitz type II 11/10/2010   • Snoring    • Stroke (HCC) 2/3/2009       Social History     Tobacco Use   • Smoking status: Former Smoker     Packs/day: 1.50     Years: 3.00     Pack years: 4.50     Types: Cigarettes     Last attempt to quit: 1970     Years since quittin.0   • Smokeless tobacco: Former User     Types: Chew     Quit date: 1972   Substance Use Topics   • Alcohol use: No     Comment: 10-14 per week; scotch & wine   • Drug use: No       Family History   Problem Relation Age of Onset   • Thyroid Sister        Immunization History   Administered Date(s) Administered   • Influenza Seasonal Injectable 2014   • Influenza TIV (IM) 2014, 10/20/2019   • Influenza Vaccine Adult HD 10/17/2015, 2017, 2017   • Pneumococcal Conjugate Vaccine (Prevnar/PCV-13) 12/15/2016   • Pneumococcal polysaccharide vaccine (PPSV-23) 2014   • TD Vaccine 2011       Current medications as of today   Current Outpatient Medications   Medication Sig Dispense Refill   • spironolactone (ALDACTONE) 25 MG Tab TAKE 1 TABLET BY MOUTH EVERY DAY 90 Tab 3   • ENTRESTO  MG Tab tablet TAKE 1 TABLET BY MOUTH TWICE A DAY 60 Tab 11   • clotrimazole-betamethasone (LOTRISONE) 1-0.05 % Cream every day. APPLY TO AFFECTED AREA  0   • metoprolol SR (TOPROL XL) 50 MG TABLET SR 24 HR Take 1 Tab by mouth every day. 90 Tab 3   • Ixekizumab (TALTZ SC) Inject  as instructed. Once a month-injection     • HYDROcodone-acetaminophen (NORCO) 5-325 MG Tab per tablet TAKE 1 TAB BY MOUTH EVERY 4 HOURS AS NEEDED  0   • potassium chloride (KLOR-CON) 20 MEQ Pack Take 10 mEq by mouth every day. Once per week     • " furosemide (LASIX) 40 MG Tab Take 40 mg by mouth every day.     • vitamin D (CHOLECALCIFEROL) 1000 UNIT Tab Take 1,000 Units by mouth every day.     • metOLazone (ZAROXOLYN) 5 MG Tab Take 1 Tab by mouth 1 time daily as needed. 30 Tab 3   • Omega-3 Fatty Acids (FISH OIL) 1000 MG Cap capsule Take 1,000 mg by mouth every day.     • Multiple Vitamins-Minerals (OCUVITE) Tab Take 1 Tab by mouth every day.     • aspirin 81 MG tablet Take 81 mg by mouth every day.     • Coenzyme Q10 (CO Q-10 PO) Take 1 Cap by mouth every day.       No current facility-administered medications for this visit.        Allergies: Plavix [clopidogrel bisulfate]; Statins [hmg-coa-r inhibitors]; and Ticlid [ticlopidine hydrochloride]    There were no vitals taken for this visit.      Review of Systems   Constitutional: Negative.    HENT: Negative.    Eyes: Negative for pain and discharge.   Respiratory: Negative.    Cardiovascular: Negative.    Gastrointestinal: Negative.    Musculoskeletal: Negative.    Skin: Negative.    Neurological: Negative.    Endo/Heme/Allergies: Negative for environmental allergies. Does not bruise/bleed easily.   Psychiatric/Behavioral: Negative.        Physical Exam   Constitutional: He is oriented to person, place, and time and well-developed, well-nourished, and in no distress.   HENT:   Head: Normocephalic and atraumatic.   Eyes: Pupils are equal, round, and reactive to light.   Neck: Normal range of motion. Neck supple. No tracheal deviation present.   Cardiovascular: Normal rate, regular rhythm, normal heart sounds and intact distal pulses.   Pulmonary/Chest: Effort normal and breath sounds normal.   Musculoskeletal: Normal range of motion.   Neurological: He is alert and oriented to person, place, and time. Gait normal.   Skin: Skin is warm and dry.   Psychiatric: Mood, memory, affect and judgment normal.       Diagnoses/Plan:    1. Obstructive sleep apnea syndrome   Continue CPAP nightly, Clean mask & tubing  weekly, Replace supplies as insurance will allow, RX for new supplies and compliance check to DME  - DME Mask and Supplies    2. Paroxysmal atrial fibrillation (HCC)  Clinically stable, managed by cardiology, rate controlled with metoprolol     3. Chronic bronchitis, unspecified chronic bronchitis type (HCC)  Stable, continue albuterol as needed.  PFTs are stable with an FEV1 of 88%.  6-minute walk showed normal saturations on room air with exertion.    He will follow-up in 6 months with Dr. Thakur, sooner if needed        This dictation was created using voice recognition software. The accuracy of the dictation is limited to the abilities of the software. I expect there may be some errors of grammar and possibly content.

## 2020-03-03 ENCOUNTER — TELEPHONE (OUTPATIENT)
Dept: CARDIOLOGY | Facility: MEDICAL CENTER | Age: 80
End: 2020-03-03

## 2020-03-03 DIAGNOSIS — I95.1 ORTHOSTATIC HYPOTENSION: ICD-10-CM

## 2020-03-03 DIAGNOSIS — Z79.899 HIGH RISK MEDICATION USE: ICD-10-CM

## 2020-03-03 DIAGNOSIS — N18.30 STAGE 3 CHRONIC KIDNEY DISEASE: ICD-10-CM

## 2020-03-03 DIAGNOSIS — I50.20 ACC/AHA STAGE C SYSTOLIC HEART FAILURE (HCC): ICD-10-CM

## 2020-03-03 DIAGNOSIS — I50.23 NYHA CLASS 3 ACUTE ON CHRONIC SYSTOLIC HEART FAILURE (HCC): ICD-10-CM

## 2020-03-03 NOTE — TELEPHONE ENCOUNTER
"new RX   Received: Yesterday   Message Contents   Leigh Ann Ansari, Med Ass't  Tri M. MARIA ELENA Nieto.; Severo Lee M.D.             [facility] [address] is requesting a new RX for potassium   The current med list shows packets but INS doesn't pay for packets   If possible please submit a new RX for tablets at appropriate dose   Thank you      Called patient and spoke with patients wife Ivis. Pt has been off of K+ for some time now and she thinks maybe in the six month range. Pt has been stating that he does not feel well and she thought perhaps it was that. \"I don't feel good all over\". Patient is vague about symptom complaints. She called in the refill and was just going to start giving it to him again. We discuss last labs were several months ago. They are agreeable to a BMP today.    Discussed with MD  BMP ordered    Will review after labs are resulted.   "

## 2020-03-04 ENCOUNTER — TELEPHONE (OUTPATIENT)
Dept: CARDIOLOGY | Facility: MEDICAL CENTER | Age: 80
End: 2020-03-04

## 2020-03-05 NOTE — TELEPHONE ENCOUNTER
RO    Pt's wife, Ivis has concerns about his low blood pressure (88/46). Please call [phone] for details.

## 2020-03-05 NOTE — TELEPHONE ENCOUNTER
"Returned call to Ivis. She states pt doesn't feel well at all today. Cant get off the couch. ER advised secondary to not feeling well for \"weeks\" per report yesterday. Pt has only drank approx 34 oz of water. Hydration discussed. Missed labs discussed. ER heavily advised. She states she is going to continue to monitor BP and if improved will take him for labs tomorrow and call back tomorrow. Cautioned against this plan and ER strongly recommended. She states understanding.  "

## 2020-03-09 ENCOUNTER — HOSPITAL ENCOUNTER (OUTPATIENT)
Dept: LAB | Facility: MEDICAL CENTER | Age: 80
End: 2020-03-09
Attending: INTERNAL MEDICINE
Payer: MEDICARE

## 2020-03-09 DIAGNOSIS — I95.1 ORTHOSTATIC HYPOTENSION: ICD-10-CM

## 2020-03-09 DIAGNOSIS — I50.20 ACC/AHA STAGE C SYSTOLIC HEART FAILURE (HCC): ICD-10-CM

## 2020-03-09 DIAGNOSIS — N18.30 STAGE 3 CHRONIC KIDNEY DISEASE: ICD-10-CM

## 2020-03-09 DIAGNOSIS — Z79.899 HIGH RISK MEDICATION USE: ICD-10-CM

## 2020-03-09 DIAGNOSIS — I50.23 NYHA CLASS 3 ACUTE ON CHRONIC SYSTOLIC HEART FAILURE (HCC): ICD-10-CM

## 2020-03-09 LAB
ANION GAP SERPL CALC-SCNC: 12 MMOL/L (ref 7–16)
BUN SERPL-MCNC: 31 MG/DL (ref 8–22)
CALCIUM SERPL-MCNC: 9.9 MG/DL (ref 8.4–10.2)
CHLORIDE SERPL-SCNC: 102 MMOL/L (ref 96–112)
CO2 SERPL-SCNC: 24 MMOL/L (ref 20–33)
CREAT SERPL-MCNC: 1.46 MG/DL (ref 0.5–1.4)
GLUCOSE SERPL-MCNC: 99 MG/DL (ref 65–99)
POTASSIUM SERPL-SCNC: 5.6 MMOL/L (ref 3.6–5.5)
SODIUM SERPL-SCNC: 138 MMOL/L (ref 135–145)

## 2020-03-09 PROCEDURE — 80048 BASIC METABOLIC PNL TOTAL CA: CPT

## 2020-03-09 PROCEDURE — 36415 COLL VENOUS BLD VENIPUNCTURE: CPT

## 2020-03-23 ENCOUNTER — TELEPHONE (OUTPATIENT)
Dept: CARDIOLOGY | Facility: MEDICAL CENTER | Age: 80
End: 2020-03-23

## 2020-03-23 DIAGNOSIS — E87.5 HYPERKALEMIA: ICD-10-CM

## 2020-03-23 DIAGNOSIS — I50.20 ACC/AHA STAGE C SYSTOLIC HEART FAILURE (HCC): ICD-10-CM

## 2020-03-23 DIAGNOSIS — R06.09 DYSPNEA ON EXERTION: ICD-10-CM

## 2020-03-23 DIAGNOSIS — I50.23 NYHA CLASS 3 ACUTE ON CHRONIC SYSTOLIC HEART FAILURE (HCC): ICD-10-CM

## 2020-03-23 DIAGNOSIS — Z79.899 HIGH RISK MEDICATION USE: ICD-10-CM

## 2020-03-23 NOTE — TELEPHONE ENCOUNTER
RO    Good morning Tri,    Pt's wife Ivelisse called to get lab results. She would like a call back at: H: [phone], Cell: [phone].    Thank you so much,    Jayleen

## 2020-03-23 NOTE — TELEPHONE ENCOUNTER
"Returned call. Advised to stop spironolactone per MD instructions and repeat BMP in one week. Ivelisse states understanding. Conversation is challenged by a 3 yo \"having a tantrum\" in the background. Repeated to ensure understanding.   "

## 2020-03-24 ENCOUNTER — TELEPHONE (OUTPATIENT)
Dept: VASCULAR LAB | Facility: MEDICAL CENTER | Age: 80
End: 2020-03-24

## 2020-03-24 NOTE — TELEPHONE ENCOUNTER
patient called in and left message to cancel appt. called patient back to confirm. no answer, no vm. appt cancelled per vm

## 2020-03-25 ENCOUNTER — APPOINTMENT (OUTPATIENT)
Dept: VASCULAR LAB | Facility: MEDICAL CENTER | Age: 80
End: 2020-03-25
Payer: MEDICARE

## 2020-03-30 ENCOUNTER — HOSPITAL ENCOUNTER (OUTPATIENT)
Dept: LAB | Facility: MEDICAL CENTER | Age: 80
End: 2020-03-30
Attending: NURSE PRACTITIONER
Payer: MEDICARE

## 2020-03-30 ENCOUNTER — HOSPITAL ENCOUNTER (OUTPATIENT)
Dept: LAB | Facility: MEDICAL CENTER | Age: 80
End: 2020-03-30
Attending: INTERNAL MEDICINE
Payer: MEDICARE

## 2020-03-30 DIAGNOSIS — I10 ESSENTIAL HYPERTENSION: Chronic | ICD-10-CM

## 2020-03-30 LAB
ALBUMIN SERPL BCP-MCNC: 4.1 G/DL (ref 3.2–4.9)
ALBUMIN/GLOB SERPL: 1.4 G/DL
ALP SERPL-CCNC: 114 U/L (ref 30–99)
ALT SERPL-CCNC: 12 U/L (ref 2–50)
ANION GAP SERPL CALC-SCNC: 11 MMOL/L (ref 7–16)
AST SERPL-CCNC: 18 U/L (ref 12–45)
BILIRUB SERPL-MCNC: 0.5 MG/DL (ref 0.1–1.5)
BUN SERPL-MCNC: 23 MG/DL (ref 8–22)
CALCIUM SERPL-MCNC: 9.5 MG/DL (ref 8.4–10.2)
CHLORIDE SERPL-SCNC: 103 MMOL/L (ref 96–112)
CHOLEST SERPL-MCNC: 147 MG/DL (ref 100–199)
CO2 SERPL-SCNC: 23 MMOL/L (ref 20–33)
CREAT SERPL-MCNC: 1.23 MG/DL (ref 0.5–1.4)
CREAT UR-MCNC: 101.71 MG/DL
FASTING STATUS PATIENT QL REPORTED: NORMAL
GLOBULIN SER CALC-MCNC: 2.9 G/DL (ref 1.9–3.5)
GLUCOSE SERPL-MCNC: 109 MG/DL (ref 65–99)
HDLC SERPL-MCNC: 61 MG/DL
LDLC SERPL CALC-MCNC: 72 MG/DL
MICROALBUMIN UR-MCNC: <1.2 MG/DL
MICROALBUMIN/CREAT UR: NORMAL MG/G (ref 0–30)
POTASSIUM SERPL-SCNC: 4.4 MMOL/L (ref 3.6–5.5)
PROT SERPL-MCNC: 7 G/DL (ref 6–8.2)
SODIUM SERPL-SCNC: 137 MMOL/L (ref 135–145)
TRIGL SERPL-MCNC: 71 MG/DL (ref 0–149)

## 2020-03-30 PROCEDURE — 82043 UR ALBUMIN QUANTITATIVE: CPT

## 2020-03-30 PROCEDURE — 80061 LIPID PANEL: CPT

## 2020-03-30 PROCEDURE — 82570 ASSAY OF URINE CREATININE: CPT

## 2020-03-30 PROCEDURE — 36415 COLL VENOUS BLD VENIPUNCTURE: CPT

## 2020-03-30 PROCEDURE — 80053 COMPREHEN METABOLIC PANEL: CPT

## 2020-04-01 ENCOUNTER — TELEPHONE (OUTPATIENT)
Dept: CARDIOLOGY | Facility: MEDICAL CENTER | Age: 80
End: 2020-04-01

## 2020-04-01 NOTE — TELEPHONE ENCOUNTER
Pt called. No answer, voicemail left with information that [facility] labs were drawn. Dr. Nazario appt for tomorrow and time provided. Contact information left for call back if needed.     ====================================================    Pt's wife calls back.  warm transfers call. She has not listened to voicemail. She states she wants to know what to do about Spironolactone. We re-review messages from 3/3 and 3/4 and we discuss Luis Armando's symptoms at that time and why original call came through. She states he is drinking more water. Approx 3-4 17 oz bottles per day now. She states he is feeling better and more energetic at this time and would like to know what to do about medication and plan of care. Pt scheduled to see Dr. Nazario tomorrow. Pt remains off spironolactone at this time. Reassured that MD is off site this week but that I would clarify and get back to them with instructions. She states understanding.

## 2020-04-02 ENCOUNTER — TELEPHONE (OUTPATIENT)
Dept: VASCULAR LAB | Facility: MEDICAL CENTER | Age: 80
End: 2020-04-02

## 2020-04-02 ENCOUNTER — OFFICE VISIT (OUTPATIENT)
Dept: VASCULAR LAB | Facility: MEDICAL CENTER | Age: 80
End: 2020-04-02
Attending: FAMILY MEDICINE
Payer: MEDICARE

## 2020-04-02 DIAGNOSIS — I25.5 ISCHEMIC CARDIOMYOPATHY: ICD-10-CM

## 2020-04-02 DIAGNOSIS — E78.49 OTHER HYPERLIPIDEMIA: ICD-10-CM

## 2020-04-02 DIAGNOSIS — I48.0 PAROXYSMAL ATRIAL FIBRILLATION (HCC): ICD-10-CM

## 2020-04-02 DIAGNOSIS — N18.31 CKD STAGE G3A/A1, GFR 45-59 AND ALBUMIN CREATININE RATIO <30 MG/G: ICD-10-CM

## 2020-04-02 DIAGNOSIS — I10 ESSENTIAL HYPERTENSION: ICD-10-CM

## 2020-04-02 DIAGNOSIS — I25.2 HISTORY OF MYOCARDIAL INFARCTION: ICD-10-CM

## 2020-04-02 DIAGNOSIS — I25.10 CORONARY ARTERY DISEASE INVOLVING NATIVE CORONARY ARTERY OF NATIVE HEART WITHOUT ANGINA PECTORIS: ICD-10-CM

## 2020-04-02 PROCEDURE — 99443 PR PHYSICIAN TELEPHONE EVALUATION 21-30 MIN: CPT | Performed by: FAMILY MEDICINE

## 2020-04-02 ASSESSMENT — ENCOUNTER SYMPTOMS
BLOOD IN STOOL: 0
HEADACHES: 0
LOSS OF CONSCIOUSNESS: 0
SPEECH CHANGE: 0
DEPRESSION: 0
FOCAL WEAKNESS: 0
SHORTNESS OF BREATH: 0
NAUSEA: 0
HEARTBURN: 0
WEIGHT LOSS: 0
ORTHOPNEA: 0
DIZZINESS: 1
BACK PAIN: 1
VOMITING: 0
BRUISES/BLEEDS EASILY: 0
MYALGIAS: 1
NERVOUS/ANXIOUS: 0
PALPITATIONS: 0
DOUBLE VISION: 0
BLURRED VISION: 0

## 2020-04-02 NOTE — TELEPHONE ENCOUNTER
Mailed to home address listed   ----- Message from Anthony Nazario M.D. sent at 4/2/2020  2:46 PM PDT -----  Please mail copy of today's lab order and an appt card with next visit to patient.  Thanks

## 2020-04-02 NOTE — PROGRESS NOTES
Telephone Appointment Visit   As a means of avoiding spread of COVID-19, this visit is being conducted by telephone. This telephone visit was initiated by the patient and they verbally consented.    Time at start of call: 1425    [facility] - Follow Up Visit  Date of Service: 4/2/20    Isai Fuentes has been referred for evaluation and management of dyslipidemia    Referral Source: cardiology    HPI  History of ASCVD: Yes, Details: CAD s/p CABG 2007 had a couple of stents previous  Other Established (non-atherosclerotic) Vascular Disease, if Present:   Heart failure  Pacemaker  Age at Initial Diagnosis of Dyslipidemia: 65  Current Prescription Lipid Lowering Medications - including dose:   Statin: None  Non-Statin: None  Current Lipid Lowering and Related Supplements:   Omega 3s 1000  coq10  Vit D 1000  Any Current Side Effects Potentially Related to Lipid Lowering therapy?   No  Current Adherence to Lipid Lowering Therapies   Complete  Previously Attempted Interventions for Lipids - including outcome  Statin:  rosuvastatin 40 mg - intractable myalgias (2017)   Pravastatin 40 mg - intractable myalgias 2015   Has tried others that also caused muscle pain  Non-Statin: fenofibrate    Never tried zetia   Any Previous History of Statin Intolerance?   Yes, intolerable myalgias on both rosuvastatin 40 and pravastatin 40 in the past.  Resolved with discontinuation  Baseline Lipids Prior to Treatment:      Ref. Range 4/22/2019 08:06   Cholesterol,Tot Latest Ref Range: 100 - 199 mg/dL 226 (H)   Triglycerides Latest Ref Range: 0 - 149 mg/dL 108   HDL Latest Ref Range: >=40 mg/dL 55   LDL Latest Ref Range: <100 mg/dL 149 (H)     Other Pertinent History:   No known history of thyroid disease  Does have mild chronic kidney disease but no evidence of nephrotic syndrome  History of other CV risk factors:   Blood pressure currently well controlled on his current regimen  No known history of diabetes  Remains on  aspirin    CURRENT MEDICATIONS:   Current Outpatient Medications:   •  albuterol, 2 Puff, Inhalation, Q6HRS PRN, Taking  •  Entresto, TAKE 1 TABLET BY MOUTH TWICE A DAY, Taking  •  clotrimazole-betamethasone, every day. APPLY TO AFFECTED AREA, Taking  •  metoprolol SR, 50 mg, Oral, DAILY, Taking  •  Ixekizumab (TALTZ SC), Inject  as instructed. Once a month-injection, Taking  •  HYDROcodone-acetaminophen, TAKE 1 TAB BY MOUTH EVERY 4 HOURS AS NEEDED, PRN  •  potassium chloride, 10 mEq, Oral, DAILY, Taking  •  furosemide, 40 mg, Oral, DAILY, PRN  •  vitamin D, 1,000 Units, Oral, DAILY, Taking  •  metOLazone, 5 mg, Oral, QDAY PRN, PRN  •  fish oil, 1,000 mg, Oral, DAILY, Taking  •  Ocuvite, 1 Tab, Oral, DAILY, Taking  •  aspirin, 81 mg, Oral, DAILY, Taking  •  Coenzyme Q10 (CO Q-10 PO), 1 Cap, Oral, DAILY, Taking    ALLERGIES: Plavix [clopidogrel bisulfate]; Statins [hmg-coa-r inhibitors]; and Ticlid [ticlopidine hydrochloride]    FAMILY HISTORY:    Dad - CAD with cabg    SOCIAL HISTORY   Social History     Tobacco Use   Smoking Status Former Smoker   • Packs/day: 1.50   • Years: 3.00   • Pack years: 4.50   • Types: Cigarettes   • Last attempt to quit: 1970   • Years since quittin.2   Smokeless Tobacco Former User   • Types: Chew   • Quit date: 1972     Change in weight: stable  Exercise habits: moderate regular exercise program  Diet: low sodium    Review of Systems   Constitutional: Positive for malaise/fatigue (better off spirono). Negative for weight loss.   HENT: Negative for hearing loss, nosebleeds and tinnitus.    Eyes: Negative for blurred vision and double vision.   Respiratory: Negative for shortness of breath.    Cardiovascular: Negative for chest pain, palpitations, orthopnea and leg swelling.   Gastrointestinal: Negative for blood in stool, heartburn, nausea and vomiting.   Genitourinary: Negative.    Musculoskeletal: Positive for back pain, joint pain and myalgias.   Skin: Negative for  itching and rash.   Neurological: Positive for dizziness. Negative for speech change, focal weakness, loss of consciousness and headaches.   Endo/Heme/Allergies: Does not bruise/bleed easily.   Psychiatric/Behavioral: Negative for depression. The patient is not nervous/anxious.        Home BP:  131/86,  132/84, 142/96    Physical Exam  DATA REVIEW:  Most Recent Lipid Panel:   Lab Results   Component Value Date    CHOLSTRLTOT 147 03/30/2020    TRIGLYCERIDE 71 03/30/2020    HDL 61 03/30/2020    LDL 72 03/30/2020     Other Pertinent Blood Work:   Lab Results   Component Value Date    SODIUM 137 03/30/2020    POTASSIUM 4.4 03/30/2020    CHLORIDE 103 03/30/2020    CO2 23 03/30/2020    ANION 11.0 03/30/2020    GLUCOSE 109 (H) 03/30/2020    BUN 23 (H) 03/30/2020    CREATININE 1.23 03/30/2020    CALCIUM 9.5 03/30/2020    ASTSGOT 18 03/30/2020    ALTSGPT 12 03/30/2020    ALKPHOSPHAT 114 (H) 03/30/2020    TBILIRUBIN 0.5 03/30/2020    ALBUMIN 4.1 03/30/2020    AGRATIO 1.4 03/30/2020    LIPOPROTA 6 10/10/2019    TSHULTRASEN 1.670 05/21/2019    CPKTOTAL 91 10/10/2019     [facility]  GFR is 45, , triglycerides 163, HDL 50, hemoglobin 16    ASSESSMENT AND PLAN  Patient Type, check all that apply:   Secondary Prevention  Established Atherosclerotic Cardiovascular Disease (ASCVD)  Yes, Details: Coronary artery disease status post PCI, MI, and CABG     Other Established (non-atherosclerotic) Vascular Disease, if Present:  Ischemic cardiomyopathy  Pacemaker    Evidence of Heterozygous Familial Hypercholesterolemia (FH):   No     ACC/AHA Indication for Statin Therapy, padmini all that apply:  Established ASCVD: Indication for High intensity statin   Calculated Risk for ASCVD, if applicable    N/A  Other Significant Risk Markers, if any, padmini all that apply   Family history of premature ASCVD in first degree relative  Goal LDL-C and nonHDL-C based on Clinic Protocol  LDL-C:   <70 mg/dL, close to goal at 72      Lifestyle Recommendations From Today’s Visit:    Eating Plan: Concentrate on  Low sat/Trans fat  Exercise: Continue excellent exercise routine     Statin Therapy Recommendations from Today’s Visit:   Patient is not tolerated pravastatin and rosuvastatin in the past.  He also feels that he has not tolerated other statins but cannot name them.  Is been at least 2 years since he has had another trial of statin.  Trial of atorvastatin 5mg daily for 3 weeks resulted in myalgias.  Stopped and resolved.  Has attempted multiple other statins with the same bothersome side effects and has been unable to continue use.  - HOLD all statins     Non-Statin Medications Recommendations from Today’s Visit:   Did not attempt Zetia as he did not  the prescription.  Unlikely to reach LDL goal <70 on Zetia as monotherapy.    Indication for PCSK9 Inhibitor, if applicable:  ASCVD with suboptimal control of LDL-C despite maximally tolerated statin -would quickly move to PCSK9 inhibitor if unable to tolerate this very low-dose of a statin (this would be his third or fourth trial)  - continue Repatha 140mg subq every 14 days when received from [employer]. Pt will call tomorrow to arrange for shipment.     Supplements Recommended at this visit:   -Continue CoQ10 to twice daily  -Continue vitamin D to 4000 units a day    Recommendations for Other Cardiovascular Risk Factors, padmini all that apply:   -Continue aspirin    Other Issues:    -Ischemic cardiomyopathy, appears well compensated.  Continue current therapy.  Follow-up with cardiology for further management    -Status post PPM -defer all further management to cardiology    CKD G3a/A1, stable  - avoid nephrotoxins  - continue HTN control with RAAS blockade   - monitor lytes/gfr routinely  - eval with nephrology if worsening over time    Studies Ordered at Todays Visit: None  Blood Work Ordered At Today’s visit: as noted   Follow-Up: 3 mo with aprn (phone)     Time at end of call:  1443  Total Time Spent: 21-30 minutes    Anthony Nazario M.D.

## 2020-04-17 ENCOUNTER — TELEPHONE (OUTPATIENT)
Dept: VASCULAR LAB | Facility: MEDICAL CENTER | Age: 80
End: 2020-04-17

## 2020-04-17 NOTE — TELEPHONE ENCOUNTER
Spoke with patient who was concerned about not being able to receive Repatha. Let the patient know that back on 12/13/19 he was approved through the [employer] and the drug should be covered free of charge through 12/31/20. Gave patient his ID number and telephone number to reach out to the company and set up delivery.

## 2020-04-20 ENCOUNTER — TELEPHONE (OUTPATIENT)
Dept: VASCULAR LAB | Facility: MEDICAL CENTER | Age: 80
End: 2020-04-20

## 2020-04-20 NOTE — TELEPHONE ENCOUNTER
S/with Gibran at [employer]. He confirmed that [facility] through [facility]. Due to Rx not being a specialty drug there is no need for it to go through [employer].

## 2020-04-20 NOTE — TELEPHONE ENCOUNTER
Spoke with patient regarding Repatha, patient states that he is getting this through [facility] mail order with a $0 co pay. Patient states that he will give us a call Wednesday or Thursday to let us know if he has or hasn't received his shipment.

## 2020-04-24 ENCOUNTER — TELEPHONE (OUTPATIENT)
Dept: VASCULAR LAB | Facility: MEDICAL CENTER | Age: 80
End: 2020-04-24

## 2020-06-01 DIAGNOSIS — I50.20 ACC/AHA STAGE C SYSTOLIC HEART FAILURE (HCC): ICD-10-CM

## 2020-06-01 DIAGNOSIS — I25.5 ISCHEMIC CARDIOMYOPATHY: ICD-10-CM

## 2020-06-01 DIAGNOSIS — I51.89 LEFT VENTRICULAR SYSTOLIC DYSFUNCTION, NYHA CLASS 3: ICD-10-CM

## 2020-06-04 RX ORDER — METOPROLOL SUCCINATE 50 MG/1
TABLET, EXTENDED RELEASE ORAL
Qty: 90 TAB | Refills: 2 | Status: SHIPPED | OUTPATIENT
Start: 2020-06-04 | End: 2020-12-23 | Stop reason: SDUPTHER

## 2020-06-09 ENCOUNTER — NON-PROVIDER VISIT (OUTPATIENT)
Dept: CARDIOLOGY | Facility: MEDICAL CENTER | Age: 80
End: 2020-06-09
Payer: MEDICARE

## 2020-06-09 ENCOUNTER — OFFICE VISIT (OUTPATIENT)
Dept: CARDIOLOGY | Facility: MEDICAL CENTER | Age: 80
End: 2020-06-09
Payer: MEDICARE

## 2020-06-09 VITALS
DIASTOLIC BLOOD PRESSURE: 70 MMHG | OXYGEN SATURATION: 95 % | SYSTOLIC BLOOD PRESSURE: 100 MMHG | HEART RATE: 70 BPM | BODY MASS INDEX: 28.56 KG/M2 | WEIGHT: 204 LBS | HEIGHT: 71 IN

## 2020-06-09 DIAGNOSIS — Z95.1 HX OF CABG: ICD-10-CM

## 2020-06-09 DIAGNOSIS — E78.00 HYPERCHOLESTEREMIA: Chronic | ICD-10-CM

## 2020-06-09 DIAGNOSIS — I25.2 HISTORY OF MYOCARDIAL INFARCTION: Chronic | ICD-10-CM

## 2020-06-09 DIAGNOSIS — I47.29 PAROXYSMAL VENTRICULAR TACHYCARDIA (HCC): ICD-10-CM

## 2020-06-09 DIAGNOSIS — I48.0 PAROXYSMAL ATRIAL FIBRILLATION (HCC): ICD-10-CM

## 2020-06-09 DIAGNOSIS — I51.89 LEFT VENTRICULAR SYSTOLIC DYSFUNCTION, NYHA CLASS 3: ICD-10-CM

## 2020-06-09 DIAGNOSIS — Z86.79 S/P ABLATION OF ATRIAL FIBRILLATION: ICD-10-CM

## 2020-06-09 DIAGNOSIS — I44.1 SECOND DEGREE AV BLOCK, MOBITZ TYPE II: Chronic | ICD-10-CM

## 2020-06-09 DIAGNOSIS — F10.10 ALCOHOL ABUSE: ICD-10-CM

## 2020-06-09 DIAGNOSIS — I10 ESSENTIAL HYPERTENSION: Chronic | ICD-10-CM

## 2020-06-09 DIAGNOSIS — Z98.890 S/P ABLATION OF ATRIAL FIBRILLATION: ICD-10-CM

## 2020-06-09 DIAGNOSIS — Z95.810 BIVENTRICULAR IMPLANTABLE CARDIOVERTER-DEFIBRILLATOR IN SITU: ICD-10-CM

## 2020-06-09 DIAGNOSIS — I25.5 ISCHEMIC CARDIOMYOPATHY: Chronic | ICD-10-CM

## 2020-06-09 DIAGNOSIS — I25.10 CORONARY ARTERY DISEASE INVOLVING NATIVE CORONARY ARTERY OF NATIVE HEART WITHOUT ANGINA PECTORIS: Chronic | ICD-10-CM

## 2020-06-09 DIAGNOSIS — I50.20 SYSTOLIC HEART FAILURE, ACC/AHA STAGE C (HCC): ICD-10-CM

## 2020-06-09 DIAGNOSIS — N18.31 CKD STAGE G3A/A1, GFR 45-59 AND ALBUMIN CREATININE RATIO <30 MG/G: ICD-10-CM

## 2020-06-09 DIAGNOSIS — Z79.899 HIGH RISK MEDICATION USE: ICD-10-CM

## 2020-06-09 PROCEDURE — 99214 OFFICE O/P EST MOD 30 MIN: CPT | Mod: 25 | Performed by: INTERNAL MEDICINE

## 2020-06-09 PROCEDURE — 93284 PRGRMG EVAL IMPLANTABLE DFB: CPT | Performed by: INTERNAL MEDICINE

## 2020-06-09 ASSESSMENT — ENCOUNTER SYMPTOMS
CHILLS: 0
HEMOPTYSIS: 0
SORE THROAT: 0
BRUISES/BLEEDS EASILY: 0
FEVER: 0
GASTROINTESTINAL NEGATIVE: 1
PND: 0
ORTHOPNEA: 0
RESPIRATORY NEGATIVE: 1
STRIDOR: 0
WEAKNESS: 0
CARDIOVASCULAR NEGATIVE: 1
MUSCULOSKELETAL NEGATIVE: 1
WHEEZING: 0
EYES NEGATIVE: 1
SHORTNESS OF BREATH: 0
LOSS OF CONSCIOUSNESS: 0
DIZZINESS: 0
SPUTUM PRODUCTION: 0
PALPITATIONS: 0
CLAUDICATION: 0
COUGH: 0
NEUROLOGICAL NEGATIVE: 1

## 2020-06-09 ASSESSMENT — FIBROSIS 4 INDEX: FIB4 SCORE: 2.32

## 2020-06-09 NOTE — PROGRESS NOTES
"Chief Complaint   Patient presents with   • Cardiomyopathy (Ischemic)     Follow up       Subjective:   Isai Fuentes is a 79 y.o. male who presents today as a follow-up for his ischemic cardiomyopathy.  Since he was last seen he got started on Repatha.  His LDL is doing well and is down to 72.  He said no chest pain palpitations or PND.  He is struggling with fatigue still.  His last EF was 35%.  He feels like he just has no energy.  He does check his blood pressures and throughout the day they seem to range 100-1 10 systolic.  He has never noticed any dizziness.    Past Medical History:   Diagnosis Date   • Atrial fibrillation ([facility]) 9/20/2011   • Biventricular implantable cardioverter-defibrillator in situ 8/7/2015   • Breath shortness    • CAD (coronary artery disease) 9/20/2011   • Cancer ([facility]) 2012    prostate   • CARDIOMYOPATHY 9/20/2011   • Congestive heart failure ([facility])    • COPD (chronic obstructive pulmonary disease) ([facility])    • Fatigue 10/28/2010   • Heartburn 10/23/2008   • History of cardiac catheterization 9/20/2011   • History of myocardial infarction     \"many\"   • HTN (hypertension) 9/20/2011   • Indigestion    • Insomnia 7/8/2010   • Ischemic cardiomyopathy 9/20/2011   • Long term (current) use of anticoagulants 5/13/2011   • Myocardial infarct ([facility]) 2007   • Orthostatic hypotension 5/6/2009   • WILLA (obstructive sleep apnea)     CPAP   • Other drug allergy(995.27) 10/16/2008   • Other specified disorder of intestines 07-28-15    constipation/diarrhea/ reports some bleeding from rectum w/blood clots. Seeing GI   • Pacemaker     [employer]   • Pain 07-28-15    \"chronic\", 0/10   • Pleural effusion 5/18/2009   • Presence of permanent cardiac pacemaker 9/20/2011   • Prostate cancer ([facility]) 5/13/2011   • Second degree AV block, Mobitz type II 11/10/2010   • Snoring    • Stroke ([facility]) 2/3/2009     Past Surgical History:   Procedure Laterality Date   • RECOVERY  10/16/2015    Procedure: CATH LAB " LEAD REVISION ST.JUDE AQUINO;  Surgeon: Recoveryonly Surgery;  Location: SURGERY PRE-POST PROC UNIT [facility];  Service:    • RECOVERY  2015    Procedure:  CATH LAB LEAD EXTRACTION AWILDA ST.DRE BIPING PAULO AQUINO;  Surgeon: Recoveryonly Surgery;  Location: SURGERY PRE-POST PROC UNIT [facility];  Service:    • RECOVERY  2015    Procedure: CATH LAB  LEAD EXTRACTION, UPGRADE TO BIV PM ST.DRE BIPING GRP AQUINO ;  Surgeon: Recoveryonly Surgery;  Location: SURGERY PRE-POST PROC UNIT [facility];  Service:    • RECOVERY  2015    Procedure: CATH LAB NEW PM INSERTION DUAL ATRIAL & VENTRICULAR-LV LEAD W/ NEW GENERATOR AQUINO ICD9: 414.8;  Surgeon: Recoveryonly Surgery;  Location: SURGERY PRE-POST PROC UNIT [facility];  Service:    • PACEMAKER INSERTION  08    St Dre   • MULTIPLE CORONARY ARTERY BYPASS  2007    2 vessel   • ZZZ CARDIAC CATH      has 2 stents     Family History   Problem Relation Age of Onset   • Thyroid Sister      Social History     Socioeconomic History   • Marital status:      Spouse name: Not on file   • Number of children: Not on file   • Years of education: Not on file   • Highest education level: Not on file   Occupational History   • Not on file   Social Needs   • Financial resource strain: Not on file   • Food insecurity     Worry: Not on file     Inability: Not on file   • Transportation needs     Medical: Not on file     Non-medical: Not on file   Tobacco Use   • Smoking status: Former Smoker     Packs/day: 1.50     Years: 3.00     Pack years: 4.50     Types: Cigarettes     Last attempt to quit: 1970     Years since quittin.4   • Smokeless tobacco: Former User     Types: Chew     Quit date: 1972   Substance and Sexual Activity   • Alcohol use: No     Comment: 10-14 per week; scotch & wine   • Drug use: No   • Sexual activity: Not on file   Lifestyle   • Physical activity     Days per week: Not on file     Minutes per session: Not on file   • Stress: Not on file   Relationships   • Social  "connections     Talks on phone: Not on file     Gets together: Not on file     Attends [employer] service: Not on file     Active member of club or organization: Not on file     Attends meetings of clubs or organizations: Not on file     Relationship status: Not on file   • Intimate partner violence     Fear of current or ex partner: Not on file     Emotionally abused: Not on file     Physically abused: Not on file     Forced sexual activity: Not on file   Other Topics Concern   • Not on file   Social History Narrative   • Not on file     Allergies   Allergen Reactions   • Plavix [Clopidogrel Bisulfate] Anaphylaxis   • Statins [Hmg-Coa-R Inhibitors] Unspecified     Muscles aches     • Ticlid [Ticlopidine Hydrochloride] Unspecified     Pt states \"I'm not sure what happens\".       Outpatient Encounter Medications as of 6/9/2020   Medication Sig Dispense Refill   • metoprolol SR (TOPROL XL) 50 MG TABLET SR 24 HR TAKE 1 TABLET BY MOUTH EVERY DAY 90 Tab 2   • Evolocumab 140 MG/ML Solution Prefilled Syringe Inject 1 mL as instructed every 14 days for 90 days. 6 Syringe 3   • albuterol (VENTOLIN HFA) 108 (90 Base) MCG/ACT Aero Soln inhalation aerosol Inhale 2 Puffs by mouth every 6 hours as needed for Shortness of Breath.     • ENTRESTO  MG Tab tablet TAKE 1 TABLET BY MOUTH TWICE A DAY 60 Tab 11   • clotrimazole-betamethasone (LOTRISONE) 1-0.05 % Cream every day. APPLY TO AFFECTED AREA  0   • Ixekizumab (TALTZ SC) Inject  as instructed. Once a month-injection     • HYDROcodone-acetaminophen (NORCO) 5-325 MG Tab per tablet TAKE 1 TAB BY MOUTH EVERY 4 HOURS AS NEEDED  0   • potassium chloride (KLOR-CON) 20 MEQ Pack Take 10 mEq by mouth every day. Once per week     • furosemide (LASIX) 40 MG Tab Take 40 mg by mouth every day.     • vitamin D (CHOLECALCIFEROL) 1000 UNIT Tab Take 1,000 Units by mouth every day.     • metOLazone (ZAROXOLYN) 5 MG Tab Take 1 Tab by mouth 1 time daily as needed. 30 Tab 3   • Omega-3 Fatty " "Acids (FISH OIL) 1000 MG Cap capsule Take 1,000 mg by mouth every day.     • Multiple Vitamins-Minerals (OCUVITE) Tab Take 1 Tab by mouth every day.     • aspirin 81 MG tablet Take 81 mg by mouth every day.     • Coenzyme Q10 (CO Q-10 PO) Take 1 Cap by mouth every day.     • [DISCONTINUED] sacubitril-valsartan (ENTRESTO) 49-51 MG Tab tablet Take 1 Tab by mouth 2 Times a Day. 60 Tab 11     No facility-administered encounter medications on file as of 6/9/2020.      Review of Systems   Constitutional: Positive for malaise/fatigue. Negative for chills and fever.   HENT: Negative.  Negative for sore throat.    Eyes: Negative.    Respiratory: Negative.  Negative for cough, hemoptysis, sputum production, shortness of breath, wheezing and stridor.    Cardiovascular: Negative.  Negative for chest pain, palpitations, orthopnea, claudication, leg swelling and PND.   Gastrointestinal: Negative.    Genitourinary: Negative.    Musculoskeletal: Negative.    Skin: Negative.    Neurological: Negative.  Negative for dizziness, loss of consciousness and weakness.   Endo/Heme/Allergies: Negative.  Does not bruise/bleed easily.   All other systems reviewed and are negative.       Objective:   /70 (BP Location: Left arm, Patient Position: Sitting, BP Cuff Size: Adult)   Pulse 70   Ht 1.803 m (5' 11\")   Wt 92.5 kg (204 lb)   SpO2 95%   BMI 28.45 kg/m²     Physical Exam   Constitutional: He appears well-developed and well-nourished. No distress.   HENT:   Head: Normocephalic and atraumatic.   Right Ear: External ear normal.   Left Ear: External ear normal.   Nose: Nose normal.   Mouth/Throat: No oropharyngeal exudate.   Eyes: Pupils are equal, round, and reactive to light. Conjunctivae and EOM are normal. Right eye exhibits no discharge. Left eye exhibits no discharge. No scleral icterus.   Neck: Neck supple. No JVD present.   Cardiovascular: Normal rate, regular rhythm and intact distal pulses. Exam reveals no gallop and no " friction rub.   No murmur heard.  Pulmonary/Chest: Effort normal. No stridor. No respiratory distress. He has no wheezes. He has no rales. He exhibits no tenderness.   Abdominal: Soft. He exhibits no distension. There is no guarding.   Musculoskeletal: Normal range of motion.         General: No tenderness, deformity or edema.   Neurological: He is alert. He has normal reflexes. He displays normal reflexes. No cranial nerve deficit. He exhibits normal muscle tone. Coordination normal.   Skin: Skin is warm and dry. No rash noted. He is not diaphoretic. No erythema. No pallor.   Psychiatric: He has a normal mood and affect. His behavior is normal. Judgment and thought content normal.   Nursing note and vitals reviewed.      Assessment:     1. Biventricular implantable cardioverter-defibrillator in situ     2. Paroxysmal atrial fibrillation (HCC)     3. Alcohol abuse     4. Coronary artery disease involving native coronary artery of native heart without angina pectoris     5. CKD stage G3a/A1, GFR 45-59 and albumin creatinine ratio <30 mg/g ([facility])  DISCONTINUED: sacubitril-valsartan (ENTRESTO) 49-51 MG Tab tablet   6. High risk medication use     7. History of myocardial infarction     8. Essential hypertension  DISCONTINUED: sacubitril-valsartan (ENTRESTO) 49-51 MG Tab tablet   9. Hypercholesteremia     10. Hx of CABG     11. Ischemic cardiomyopathy     12. Left ventricular systolic dysfunction, NYHA class 3  DISCONTINUED: sacubitril-valsartan (ENTRESTO) 49-51 MG Tab tablet   13. Second degree AV block, Mobitz type II     14. S/P ablation of atrial fibrillation     15. Systolic heart failure, ACC/AHA stage C (HCC)     16. Ventricular tachycardia (paroxysmal) ([facility])         Medical Decision Making:  Today's Assessment / Status / Plan:     79-year-old male with heart failure with reduced ejection fraction of ischemic cardiomyopathy.  We will reduce his Entresto to 4961.  I am concerned that his blood pressure might be  running a little bit too low.  I do want a repeat his echocardiogram is relatively stable for some time but he is status post CRT-D.  He will stay on the Repatha for his high cholesterol.  We will see him back in 3 months.

## 2020-07-03 ENCOUNTER — HOSPITAL ENCOUNTER (OUTPATIENT)
Dept: LAB | Facility: MEDICAL CENTER | Age: 80
End: 2020-07-03
Attending: FAMILY MEDICINE
Payer: MEDICARE

## 2020-07-03 DIAGNOSIS — I25.10 CORONARY ARTERY DISEASE INVOLVING NATIVE CORONARY ARTERY OF NATIVE HEART WITHOUT ANGINA PECTORIS: ICD-10-CM

## 2020-07-03 DIAGNOSIS — E78.49 OTHER HYPERLIPIDEMIA: ICD-10-CM

## 2020-07-03 LAB
ALBUMIN SERPL BCP-MCNC: 4.2 G/DL (ref 3.2–4.9)
ALBUMIN/GLOB SERPL: 1.4 G/DL
ALP SERPL-CCNC: 106 U/L (ref 30–99)
ALT SERPL-CCNC: 11 U/L (ref 2–50)
ANION GAP SERPL CALC-SCNC: 10 MMOL/L (ref 7–16)
AST SERPL-CCNC: 20 U/L (ref 12–45)
BILIRUB SERPL-MCNC: 0.8 MG/DL (ref 0.1–1.5)
BUN SERPL-MCNC: 24 MG/DL (ref 8–22)
CALCIUM SERPL-MCNC: 9.5 MG/DL (ref 8.4–10.2)
CHLORIDE SERPL-SCNC: 105 MMOL/L (ref 96–112)
CHOLEST SERPL-MCNC: 136 MG/DL (ref 100–199)
CO2 SERPL-SCNC: 21 MMOL/L (ref 20–33)
CREAT SERPL-MCNC: 1.16 MG/DL (ref 0.5–1.4)
FASTING STATUS PATIENT QL REPORTED: NORMAL
GLOBULIN SER CALC-MCNC: 3.1 G/DL (ref 1.9–3.5)
GLUCOSE SERPL-MCNC: 94 MG/DL (ref 65–99)
HDLC SERPL-MCNC: 65 MG/DL
LDLC SERPL CALC-MCNC: 54 MG/DL
POTASSIUM SERPL-SCNC: 4.7 MMOL/L (ref 3.6–5.5)
PROT SERPL-MCNC: 7.3 G/DL (ref 6–8.2)
SODIUM SERPL-SCNC: 136 MMOL/L (ref 135–145)
TRIGL SERPL-MCNC: 87 MG/DL (ref 0–149)

## 2020-07-03 PROCEDURE — 83721 ASSAY OF BLOOD LIPOPROTEIN: CPT | Mod: XU

## 2020-07-03 PROCEDURE — 36415 COLL VENOUS BLD VENIPUNCTURE: CPT

## 2020-07-03 PROCEDURE — 80053 COMPREHEN METABOLIC PANEL: CPT

## 2020-07-03 PROCEDURE — 80061 LIPID PANEL: CPT

## 2020-07-05 LAB — LDLC SERPL-MCNC: 68 MG/DL (ref 0–129)

## 2020-07-07 ENCOUNTER — OFFICE VISIT (OUTPATIENT)
Dept: VASCULAR LAB | Facility: MEDICAL CENTER | Age: 80
End: 2020-07-07
Attending: NURSE PRACTITIONER
Payer: MEDICARE

## 2020-07-07 VITALS
BODY MASS INDEX: 28 KG/M2 | SYSTOLIC BLOOD PRESSURE: 112 MMHG | HEART RATE: 69 BPM | HEIGHT: 71 IN | DIASTOLIC BLOOD PRESSURE: 64 MMHG | WEIGHT: 200 LBS

## 2020-07-07 DIAGNOSIS — I10 ESSENTIAL HYPERTENSION: Chronic | ICD-10-CM

## 2020-07-07 DIAGNOSIS — E78.49 OTHER HYPERLIPIDEMIA: ICD-10-CM

## 2020-07-07 DIAGNOSIS — E78.00 HYPERCHOLESTEREMIA: Chronic | ICD-10-CM

## 2020-07-07 DIAGNOSIS — I25.10 CORONARY ARTERY DISEASE INVOLVING NATIVE CORONARY ARTERY OF NATIVE HEART WITHOUT ANGINA PECTORIS: Chronic | ICD-10-CM

## 2020-07-07 PROCEDURE — 99214 OFFICE O/P EST MOD 30 MIN: CPT | Performed by: NURSE PRACTITIONER

## 2020-07-07 PROCEDURE — 99212 OFFICE O/P EST SF 10 MIN: CPT | Performed by: NURSE PRACTITIONER

## 2020-07-07 ASSESSMENT — ENCOUNTER SYMPTOMS
DIZZINESS: 0
LOSS OF CONSCIOUSNESS: 0
ORTHOPNEA: 0
DOUBLE VISION: 0
PALPITATIONS: 0
BLURRED VISION: 0
MYALGIAS: 0
NECK PAIN: 1
SPEECH CHANGE: 0
FOCAL WEAKNESS: 0
DEPRESSION: 0
BRUISES/BLEEDS EASILY: 0
NAUSEA: 0
BACK PAIN: 0
WEIGHT LOSS: 0
BLOOD IN STOOL: 0
VOMITING: 0
NERVOUS/ANXIOUS: 0
SHORTNESS OF BREATH: 0
HEADACHES: 0
HEARTBURN: 0

## 2020-07-07 ASSESSMENT — FIBROSIS 4 INDEX: FIB4 SCORE: 2.69

## 2020-07-07 NOTE — PROGRESS NOTES
[facility] - Follow Up Visit  Date of Service: 7/7/2020    Isai Feuntes has been referred for evaluation and management of dyslipidemia    Referral Source: cardiology  HPI  History of ASCVD: Yes, Details: CAD s/p CABG 2007 had a couple of stents previous  Other Established (non-atherosclerotic) Vascular Disease, if Present:   Heart failure  Pacemaker  Age at Initial Diagnosis of Dyslipidemia: 65  Current Prescription Lipid Lowering Medications - including dose:   Statin: None  Non-Statin: None  Current Lipid Lowering and Related Supplements:   Omega 3s 1000  coq10  Vit D 1000  Any Current Side Effects Potentially Related to Lipid Lowering therapy?   No  Current Adherence to Lipid Lowering Therapies   Complete  Previously Attempted Interventions for Lipids - including outcome  Statin:  Rosuvastatin 40 mg - intractable myalgias (2017)   Pravastatin 40 mg - intractable myalgias 2015   Has tried others that also caused muscle pain  Non-Statin: fenofibrate    Never tried zetia   Any Previous History of Statin Intolerance?   Yes, intolerable myalgias on both rosuvastatin 40 and pravastatin 40 in the past.  Resolved with discontinuation  Baseline Lipids Prior to Treatment:      Ref. Range 4/22/2019 08:06   Cholesterol,Tot Latest Ref Range: 100 - 199 mg/dL 226 (H)   Triglycerides Latest Ref Range: 0 - 149 mg/dL 108   HDL Latest Ref Range: >=40 mg/dL 55   LDL Latest Ref Range: <100 mg/dL 149 (H)     Other Pertinent History:   No known history of thyroid disease  Does have mild chronic kidney disease but no evidence of nephrotic syndrome  History of other CV risk factors:   Blood pressure currently well controlled on his current regimen  No known history of diabetes  Remains on aspirin    CURRENT MEDICATIONS:   Current Outpatient Medications:   •  metoprolol SR, TAKE 1 TABLET BY MOUTH EVERY DAY, Taking  •  albuterol, 2 Puff, Inhalation, Q6HRS PRN, Taking  •  Entresto, TAKE 1 TABLET BY MOUTH TWICE A DAY,  Taking  •  clotrimazole-betamethasone, every day. APPLY TO AFFECTED AREA, Taking  •  Ixekizumab (TALTZ SC), Inject  as instructed. Once a month-injection, Taking  •  HYDROcodone-acetaminophen, TAKE 1 TAB BY MOUTH EVERY 4 HOURS AS NEEDED, PRN  •  potassium chloride, 10 mEq, Oral, DAILY, Taking  •  furosemide, 40 mg, Oral, DAILY, PRN  •  vitamin D, 1,000 Units, Oral, DAILY, Taking  •  metOLazone, 5 mg, Oral, QDAY PRN, PRN  •  fish oil, 1,000 mg, Oral, DAILY, Taking  •  Ocuvite, 1 Tab, Oral, DAILY, Taking  •  aspirin, 81 mg, Oral, DAILY, Taking  •  Coenzyme Q10 (CO Q-10 PO), 1 Cap, Oral, DAILY, Taking    ALLERGIES: Plavix [clopidogrel bisulfate]; Statins [hmg-coa-r inhibitors]; and Ticlid [ticlopidine hydrochloride]    FAMILY HISTORY:    Dad - CAD with cabg    SOCIAL HISTORY   Social History     Tobacco Use   Smoking Status Former Smoker   • Packs/day: 1.50   • Years: 3.00   • Pack years: 4.50   • Types: Cigarettes   • Last attempt to quit: 1970   • Years since quittin.5   Smokeless Tobacco Former User   • Types: Chew   • Quit date: 1972     Change in weight: stable  Exercise habits: moderate regular exercise program  Diet: low sodium    Review of Systems   Constitutional: Positive for malaise/fatigue (better off spirono). Negative for weight loss.   HENT: Negative for hearing loss, nosebleeds and tinnitus.    Eyes: Negative for blurred vision and double vision.   Respiratory: Negative for shortness of breath.    Cardiovascular: Negative for chest pain, palpitations, orthopnea and leg swelling.   Gastrointestinal: Negative for blood in stool, heartburn, nausea and vomiting.   Genitourinary: Negative.    Musculoskeletal: Positive for joint pain and neck pain. Negative for back pain and myalgias.   Skin: Negative for itching and rash.   Neurological: Negative for dizziness, speech change, focal weakness, loss of consciousness and headaches.   Endo/Heme/Allergies: Does not bruise/bleed easily.    Psychiatric/Behavioral: Negative for depression. The patient is not nervous/anxious.      Physical Exam   Constitutional: He is oriented to person, place, and time. He appears well-developed.   Cardiovascular: Normal rate and intact distal pulses.   No murmur heard.  Pulmonary/Chest: Effort normal. No respiratory distress.   Musculoskeletal: Normal range of motion.         General: No edema.   Neurological: He is alert and oriented to person, place, and time. Coordination normal.   Skin: Skin is warm and dry. He is not diaphoretic.   Psychiatric: He has a normal mood and affect.     DATA REVIEW:  Most Recent Lipid Panel:   Lab Results   Component Value Date    CHOLSTRLTOT 136 07/03/2020    TRIGLYCERIDE 87 07/03/2020    HDL 65 07/03/2020    LDL 54 07/03/2020     Other Pertinent Blood Work:   Lab Results   Component Value Date    SODIUM 136 07/03/2020    POTASSIUM 4.7 07/03/2020    CHLORIDE 105 07/03/2020    CO2 21 07/03/2020    ANION 10.0 07/03/2020    GLUCOSE 94 07/03/2020    BUN 24 (H) 07/03/2020    CREATININE 1.16 07/03/2020    CALCIUM 9.5 07/03/2020    ASTSGOT 20 07/03/2020    ALTSGPT 11 07/03/2020    ALKPHOSPHAT 106 (H) 07/03/2020    TBILIRUBIN 0.8 07/03/2020    ALBUMIN 4.2 07/03/2020    AGRATIO 1.4 07/03/2020    LIPOPROTA 6 10/10/2019    TSHULTRASEN 1.670 05/21/2019    CPKTOTAL 91 10/10/2019     [facility]  GFR is 45, , triglycerides 163, HDL 50, hemoglobin 16    ASSESSMENT AND PLAN  Patient Type, check all that apply:   Secondary Prevention  Established Atherosclerotic Cardiovascular Disease (ASCVD)  Yes, Details: Coronary artery disease status post PCI, MI, and CABG     Other Established (non-atherosclerotic) Vascular Disease, if Present:  Ischemic cardiomyopathy  Pacemaker    Evidence of Heterozygous Familial Hypercholesterolemia (FH):   No     ACC/AHA Indication for Statin Therapy, padmini all that apply:  Established ASCVD: Indication for High intensity statin   Calculated  Risk for ASCVD, if applicable    N/A  Other Significant Risk Markers, if any, padmini all that apply   Family history of premature ASCVD in first degree relative  Goal LDL-C and nonHDL-C based on Clinic Protocol  LDL-C:   <70 mg/dL, close to goal at 72     Lifestyle Recommendations From Today’s Visit:    Eating Plan: Concentrate on  Low sat/Trans fat  Exercise: Continue excellent exercise routine     Statin Therapy Recommendations from Today’s Visit:   Patient is not tolerated pravastatin and rosuvastatin in the past.  He also feels that he has not tolerated other statins but cannot name them.  Is been at least 2 years since he has had another trial of statin.  Trial of atorvastatin 5mg daily for 3 weeks resulted in myalgias.  Stopped and resolved.  Has attempted multiple other statins with the same bothersome side effects and has been unable to continue use.  - HOLD all statins     Non-Statin Medications Recommendations from Today’s Visit:   Did not attempt Zetia as he did not  the prescription.  Unlikely to reach LDL goal <70 on Zetia as monotherapy.    Indication for PCSK9 Inhibitor, if applicable:  ASCVD with suboptimal control of LDL-C despite maximally tolerated statin (Approved for [employer] for 1 year)  - Continue Repatha 140mg subq every 14 days     Supplements Recommended at this visit:   -Continue CoQ10 to twice daily  -Continue vitamin D to 4000 units a day    Recommendations for Other Cardiovascular Risk Factors, padmini all that apply:   -Continue aspirin    Other Issues:    -Ischemic cardiomyopathy, appears well compensated.  Continue current therapy.  Follow-up with cardiology for further management    -Status post PPM -defer all further management to cardiology    CKD G3a/A1, stable  - avoid nephrotoxins  - continue HTN control with RAAS blockade   - monitor lytes/gfr routinely  - eval with nephrology if worsening over time    - Fatigue, likely related to decreased EF.  Currently following  with cardiology regularly and pulm for his CPAP. Trial decrease in metoprolol to half tab, d/t possible slightly over treated BP and common S/E of fatigue in beta blockers.  Recommend he discuss further with Dr. Lee at his next appt in 3 months.  Will defer further BP management to cardiology.       Studies Ordered at Todays Visit: None  Blood Work Ordered At Today’s visit: as noted   Follow-Up: 6 months     FINA Kimble.

## 2020-07-16 ENCOUNTER — APPOINTMENT (OUTPATIENT)
Dept: PULMONOLOGY | Facility: HOSPICE | Age: 80
End: 2020-07-16
Attending: INTERNAL MEDICINE
Payer: MEDICARE

## 2020-07-17 ENCOUNTER — HOSPITAL ENCOUNTER (OUTPATIENT)
Dept: RADIOLOGY | Facility: MEDICAL CENTER | Age: 80
End: 2020-07-17
Attending: PAIN MEDICINE
Payer: MEDICARE

## 2020-07-17 DIAGNOSIS — M54.2 CERVICALGIA: ICD-10-CM

## 2020-07-17 PROCEDURE — 72125 CT NECK SPINE W/O DYE: CPT

## 2020-08-03 ENCOUNTER — TELEPHONE (OUTPATIENT)
Dept: VASCULAR LAB | Facility: MEDICAL CENTER | Age: 80
End: 2020-08-03

## 2020-08-03 DIAGNOSIS — E78.00 HYPERCHOLESTEREMIA: ICD-10-CM

## 2020-08-03 RX ORDER — EVOLOCUMAB 140 MG/ML
1 INJECTION, SOLUTION SUBCUTANEOUS
Qty: 6 ML | Refills: 1 | Status: SHIPPED | OUTPATIENT
Start: 2020-08-03 | End: 2020-08-07 | Stop reason: SDUPTHER

## 2020-08-07 DIAGNOSIS — E78.00 HYPERCHOLESTEREMIA: ICD-10-CM

## 2020-08-07 RX ORDER — EVOLOCUMAB 140 MG/ML
1 INJECTION, SOLUTION SUBCUTANEOUS
Qty: 6 ML | Refills: 1 | Status: SHIPPED | OUTPATIENT
Start: 2020-08-07 | End: 2020-08-11 | Stop reason: SDUPTHER

## 2020-08-11 DIAGNOSIS — E78.00 HYPERCHOLESTEREMIA: ICD-10-CM

## 2020-08-11 RX ORDER — EVOLOCUMAB 140 MG/ML
1 INJECTION, SOLUTION SUBCUTANEOUS
Qty: 6 PEN | Refills: 3 | Status: SHIPPED | OUTPATIENT
Start: 2020-08-11 | End: 2021-01-12 | Stop reason: SDUPTHER

## 2020-09-11 ENCOUNTER — OFFICE VISIT (OUTPATIENT)
Dept: CARDIOLOGY | Facility: MEDICAL CENTER | Age: 80
End: 2020-09-11
Payer: MEDICARE

## 2020-09-11 ENCOUNTER — NON-PROVIDER VISIT (OUTPATIENT)
Dept: CARDIOLOGY | Facility: MEDICAL CENTER | Age: 80
End: 2020-09-11
Payer: MEDICARE

## 2020-09-11 VITALS
DIASTOLIC BLOOD PRESSURE: 62 MMHG | HEIGHT: 71 IN | WEIGHT: 204.2 LBS | SYSTOLIC BLOOD PRESSURE: 114 MMHG | HEART RATE: 68 BPM | BODY MASS INDEX: 28.59 KG/M2 | OXYGEN SATURATION: 94 % | RESPIRATION RATE: 20 BRPM

## 2020-09-11 DIAGNOSIS — I48.0 PAROXYSMAL ATRIAL FIBRILLATION (HCC): ICD-10-CM

## 2020-09-11 DIAGNOSIS — I51.89 LEFT VENTRICULAR SYSTOLIC DYSFUNCTION, NYHA CLASS 3: ICD-10-CM

## 2020-09-11 DIAGNOSIS — I44.1 SECOND DEGREE AV BLOCK, MOBITZ TYPE II: Chronic | ICD-10-CM

## 2020-09-11 DIAGNOSIS — I25.10 CORONARY ARTERY DISEASE INVOLVING NATIVE CORONARY ARTERY OF NATIVE HEART WITHOUT ANGINA PECTORIS: Chronic | ICD-10-CM

## 2020-09-11 DIAGNOSIS — Z95.1 HX OF CABG: ICD-10-CM

## 2020-09-11 DIAGNOSIS — N18.31 CKD STAGE G3A/A1, GFR 45-59 AND ALBUMIN CREATININE RATIO <30 MG/G: ICD-10-CM

## 2020-09-11 DIAGNOSIS — Z86.79 S/P ABLATION OF ATRIAL FIBRILLATION: ICD-10-CM

## 2020-09-11 DIAGNOSIS — K76.6 PORTAL HYPERTENSION (HCC): ICD-10-CM

## 2020-09-11 DIAGNOSIS — Z79.899 HIGH RISK MEDICATION USE: ICD-10-CM

## 2020-09-11 DIAGNOSIS — F10.10 ALCOHOL ABUSE: ICD-10-CM

## 2020-09-11 DIAGNOSIS — Z95.810 BIVENTRICULAR IMPLANTABLE CARDIOVERTER-DEFIBRILLATOR IN SITU: ICD-10-CM

## 2020-09-11 DIAGNOSIS — I47.29 PAROXYSMAL VENTRICULAR TACHYCARDIA (HCC): ICD-10-CM

## 2020-09-11 DIAGNOSIS — E78.00 HYPERCHOLESTEREMIA: Chronic | ICD-10-CM

## 2020-09-11 DIAGNOSIS — Z98.890 S/P ABLATION OF ATRIAL FIBRILLATION: ICD-10-CM

## 2020-09-11 DIAGNOSIS — I95.1 ORTHOSTATIC HYPOTENSION: Chronic | ICD-10-CM

## 2020-09-11 DIAGNOSIS — I50.23 NYHA CLASS 3 ACUTE ON CHRONIC SYSTOLIC HEART FAILURE (HCC): ICD-10-CM

## 2020-09-11 DIAGNOSIS — I10 ESSENTIAL HYPERTENSION: Chronic | ICD-10-CM

## 2020-09-11 DIAGNOSIS — I63.9 CEREBROVASCULAR ACCIDENT (CVA), UNSPECIFIED MECHANISM (HCC): Chronic | ICD-10-CM

## 2020-09-11 DIAGNOSIS — J42 CHRONIC BRONCHITIS, UNSPECIFIED CHRONIC BRONCHITIS TYPE (HCC): ICD-10-CM

## 2020-09-11 DIAGNOSIS — I25.2 HISTORY OF MYOCARDIAL INFARCTION: Chronic | ICD-10-CM

## 2020-09-11 DIAGNOSIS — I25.5 ISCHEMIC CARDIOMYOPATHY: Chronic | ICD-10-CM

## 2020-09-11 DIAGNOSIS — E87.5 HYPERKALEMIA: ICD-10-CM

## 2020-09-11 DIAGNOSIS — I50.20 SYSTOLIC HEART FAILURE, ACC/AHA STAGE C (HCC): ICD-10-CM

## 2020-09-11 PROCEDURE — 99214 OFFICE O/P EST MOD 30 MIN: CPT | Performed by: INTERNAL MEDICINE

## 2020-09-11 PROCEDURE — 93284 PRGRMG EVAL IMPLANTABLE DFB: CPT | Performed by: INTERNAL MEDICINE

## 2020-09-11 RX ORDER — SPIRONOLACTONE 25 MG/1
25 TABLET ORAL DAILY
COMMUNITY
End: 2020-09-11

## 2020-09-11 ASSESSMENT — ENCOUNTER SYMPTOMS
RESPIRATORY NEGATIVE: 1
CLAUDICATION: 0
WEAKNESS: 0
DIZZINESS: 0
COUGH: 0
PND: 0
SHORTNESS OF BREATH: 0
GASTROINTESTINAL NEGATIVE: 1
EYES NEGATIVE: 1
HEMOPTYSIS: 0
SPUTUM PRODUCTION: 0
STRIDOR: 0
NEUROLOGICAL NEGATIVE: 1
BRUISES/BLEEDS EASILY: 0
PALPITATIONS: 0
MUSCULOSKELETAL NEGATIVE: 1
FEVER: 0
SORE THROAT: 0
LOSS OF CONSCIOUSNESS: 0
ORTHOPNEA: 0
WHEEZING: 0
CONSTITUTIONAL NEGATIVE: 1
CHILLS: 0
CARDIOVASCULAR NEGATIVE: 1

## 2020-09-11 ASSESSMENT — MINNESOTA LIVING WITH HEART FAILURE QUESTIONNAIRE (MLHF)
WALKING ABOUT OR CLIMBING STAIRS DIFFICULT: 3
MAKING YOU FEEL DEPRESSED: 2
DIFFICULTY TO CONCENTRATE OR REMEMBERING THINGS: 2
TOTAL_SCORE: 60
COSTING YOU MONEY FOR MEDICAL CARE: 1
MAKING YOU WORRY: 3
WORKING AROUND THE HOUSE OR YARD DIFFICULT: 4
TIRED, FATIGUED OR LOW ON ENERGY: 4
FEELING LIKE A BURDEN TO FAMILY AND FRIENDS: 2
MAKING YOU SHORT OF BREATH: 3
MAKING YOU STAY IN A HOSPITAL: 1
DIFFICULTY WITH RECREATIONAL PASTIMES, SPORTS, HOBBIES: 5
GIVING YOU SIDE EFFECTS FROM TREATMENTS: 3
SWELLING IN ANKLES OR LEGS: 1
HAVING TO SIT OR LIE DOWN DURING THE DAY: 4
DIFFICULTY WORKING TO EARN A LIVING: 5
DIFFICULTY GOING AWAY FROM HOME: 3
DIFFICULTY WITH SEXUAL ACTIVITIES: 4
DIFFICULTY SOCIALIZING WITH FAMILY OR FRIENDS: 4
EATING LESS FOODS YOU LIKE: 1
LOSS OF SELF CONTROL IN YOUR LIFE: 3
DIFFICULTY SLEEPING WELL AT NIGHT: 2

## 2020-09-11 ASSESSMENT — FIBROSIS 4 INDEX: FIB4 SCORE: 2.69

## 2020-09-11 NOTE — PROGRESS NOTES
"Chief Complaint   Patient presents with   • Congestive Heart Failure      NYHA class 3 acute on chronic systolic heart failure        Subjective:   Isai Fuentes is a 79 y.o. male who presents today as a follow-up for his CAD hypertension hyperlipidemia and ischemic cardiomyopathy.  Since he was last seen he continues to be fatigued.  His blood pressures are doing well.  His most recent pacemaker interrogation showed a 99% V pacing.    Past Medical History:   Diagnosis Date   • Atrial fibrillation ([facility]) 9/20/2011   • Biventricular implantable cardioverter-defibrillator in situ 8/7/2015   • Breath shortness    • CAD (coronary artery disease) 9/20/2011   • Cancer ([facility]) 2012    prostate   • CARDIOMYOPATHY 9/20/2011   • Congestive heart failure ([facility])    • COPD (chronic obstructive pulmonary disease) ([facility])    • Fatigue 10/28/2010   • Heartburn 10/23/2008   • History of cardiac catheterization 9/20/2011   • History of myocardial infarction     \"many\"   • HTN (hypertension) 9/20/2011   • Indigestion    • Insomnia 7/8/2010   • Ischemic cardiomyopathy 9/20/2011   • Long term (current) use of anticoagulants 5/13/2011   • Myocardial infarct ([facility]) 2007   • Orthostatic hypotension 5/6/2009   • WILLA (obstructive sleep apnea)     CPAP   • Other drug allergy(995.27) 10/16/2008   • Other specified disorder of intestines 07-28-15    constipation/diarrhea/ reports some bleeding from rectum w/blood clots. Seeing GI   • Pacemaker     [employer]   • Pain 07-28-15    \"chronic\", 0/10   • Pleural effusion 5/18/2009   • Presence of permanent cardiac pacemaker 9/20/2011   • Prostate cancer ([facility]) 5/13/2011   • Second degree AV block, Mobitz type II 11/10/2010   • Snoring    • Stroke ([facility]) 2/3/2009     Past Surgical History:   Procedure Laterality Date   • RECOVERY  10/16/2015    Procedure: CATH LAB LEAD REVISION STSIXTO AQUINO;  Surgeon: Amy Surgery;  [place] [facility] [place]:    • RECOVERY  " 2015    Procedure:  CATH LAB LEAD EXTRACTION AWILDA ST.DRE LRG RP KENIA;  Surgeon: Recoveryonly Surgery;  Location: SURGERY PRE-POST PROC UNIT [facility];  Service:    • RECOVERY  2015    Procedure: CATH LAB  LEAD EXTRACTION, UPGRADE TO BIV PM ST.DRE LRG GRP AQUINO ;  Surgeon: Recoveryonfreddy Surgery;  Location: SURGERY PRE-POST PROC UNIT [facility];  Service:    • RECOVERY  2015    Procedure: CATH LAB NEW PM INSERTION DUAL ATRIAL & VENTRICULAR-LV LEAD W/ NEW GENERATOR KENIA ICD9: 414.8;  Surgeon: Recoveryonfreddy Surgery;  [place]: [place] [facility] PROC [place] [facility] [place];  Service:    • PACEMAKER INSERTION  08    St Dre   • MULTIPLE CORONARY ARTERY BYPASS  2007    2 vessel   • ZZZ CARDIAC CATH      has 2 stents     Family History   Problem Relation Age of Onset   • Thyroid Sister      Social History     Socioeconomic History   • Marital status:      Spouse name: Not on file   • Number of children: Not on file   • Years of education: Not on file   • Highest education level: Not on file   Occupational History   • Not on file   Social Needs   • Financial resource strain: Not on file   • Food insecurity     Worry: Not on file     Inability: Not on file   • Transportation needs     Medical: Not on file     Non-medical: Not on file   Tobacco Use   • Smoking status: Former Smoker     Packs/day: 1.50     Years: 3.00     Pack years: 4.50     Types: Cigarettes     Quit date: 1970     Years since quittin.7   • Smokeless tobacco: Former User     Types: Chew     Quit date: 1972   Substance and Sexual Activity   • Alcohol use: No     Comment: 10-14 per week; scotch & wine   • Drug use: No   • Sexual activity: Not on file   Lifestyle   • Physical activity     Days per week: Not on file     Minutes per session: Not on file   • Stress: Not on file   Relationships   • Social connections     Talks on phone: Not on file     Gets together: Not on file     Attends Spiritism service: Not on file     Active member of club or  "organization: Not on file     Attends meetings of clubs or organizations: Not on file     Relationship status: Not on file   • Intimate partner violence     Fear of current or ex partner: Not on file     Emotionally abused: Not on file     Physically abused: Not on file     Forced sexual activity: Not on file   Other Topics Concern   • Not on file   Social History Narrative   • Not on file     Allergies   Allergen Reactions   • Plavix [Clopidogrel Bisulfate] Anaphylaxis   • Spironolactone Unspecified     Hyperkalemia   • Statins [Hmg-Coa-R Inhibitors] Unspecified     Muscles aches     • Ticlid [Ticlopidine Hydrochloride] Unspecified     Pt states \"I'm not sure what happens\".       Outpatient Encounter Medications as of 9/11/2020   Medication Sig Dispense Refill   • metoprolol SR (TOPROL XL) 50 MG TABLET SR 24 HR TAKE 1 TABLET BY MOUTH EVERY DAY 90 Tab 2   • ENTRESTO  MG Tab tablet TAKE 1 TABLET BY MOUTH TWICE A DAY 60 Tab 11   • clotrimazole-betamethasone (LOTRISONE) 1-0.05 % Cream every day. APPLY TO AFFECTED AREA  0   • Ixekizumab (TALTZ SC) Inject  as instructed. Once a month-injection     • HYDROcodone-acetaminophen (NORCO) 5-325 MG Tab per tablet TAKE 1 TAB BY MOUTH EVERY 4 HOURS AS NEEDED  0   • potassium chloride (KLOR-CON) 20 MEQ Pack Take 10 mEq by mouth every day. Once per week     • furosemide (LASIX) 40 MG Tab Take 40 mg by mouth every day.     • vitamin D (CHOLECALCIFEROL) 1000 UNIT Tab Take 1,000 Units by mouth every day.     • metOLazone (ZAROXOLYN) 5 MG Tab Take 1 Tab by mouth 1 time daily as needed. 30 Tab 3   • Omega-3 Fatty Acids (FISH OIL) 1000 MG Cap capsule Take 1,000 mg by mouth every day.     • Multiple Vitamins-Minerals (OCUVITE) Tab Take 1 Tab by mouth every day.     • aspirin 81 MG tablet Take 81 mg by mouth every day.     • Coenzyme Q10 (CO Q-10 PO) Take 1 Cap by mouth every day.     • [DISCONTINUED] spironolactone (ALDACTONE) 25 MG Tab Take 25 mg by mouth every day.     • " "Evolocumab, REPATHA, (REPATHA SURECLICK) 140 MG/ML Solution Auto-injector Inject 1 Each as instructed every 14 days. 6 PEN 3   • [DISCONTINUED] albuterol (VENTOLIN HFA) 108 (90 Base) MCG/ACT Aero Soln inhalation aerosol Inhale 2 Puffs by mouth every 6 hours as needed for Shortness of Breath.       No facility-administered encounter medications on file as of 9/11/2020.      Review of Systems   Constitutional: Negative.  Negative for chills, fever and malaise/fatigue.   HENT: Negative.  Negative for sore throat.    Eyes: Negative.    Respiratory: Negative.  Negative for cough, hemoptysis, sputum production, shortness of breath, wheezing and stridor.    Cardiovascular: Negative.  Negative for chest pain, palpitations, orthopnea, claudication, leg swelling and PND.   Gastrointestinal: Negative.    Genitourinary: Negative.    Musculoskeletal: Negative.    Skin: Negative.    Neurological: Negative.  Negative for dizziness, loss of consciousness and weakness.   Endo/Heme/Allergies: Negative.  Does not bruise/bleed easily.   All other systems reviewed and are negative.       Objective:   /62 (BP Location: Left arm, Patient Position: Sitting, BP Cuff Size: Adult)   Pulse 68   Resp 20   Ht 1.803 m (5' 11\")   Wt 92.6 kg (204 lb 3.2 oz)   SpO2 94%   BMI 28.48 kg/m²     Physical Exam   Constitutional: He appears well-developed and well-nourished. No distress.   HENT:   Head: Normocephalic and atraumatic.   Right Ear: External ear normal.   Left Ear: External ear normal.   Nose: Nose normal.   Mouth/Throat: No oropharyngeal exudate.   Eyes: Pupils are equal, round, and reactive to light. Conjunctivae and EOM are normal. Right eye exhibits no discharge. Left eye exhibits no discharge. No scleral icterus.   Neck: Neck supple. No JVD present.   Cardiovascular: Normal rate, regular rhythm and intact distal pulses. Exam reveals no gallop and no friction rub.   No murmur heard.  Pulmonary/Chest: Effort normal. No stridor. " No respiratory distress. He has no wheezes. He has no rales. He exhibits no tenderness.   Abdominal: Soft. He exhibits no distension. There is no guarding.   Musculoskeletal: Normal range of motion.         General: No tenderness, deformity or edema.   Neurological: He is alert. He has normal reflexes. He displays normal reflexes. No cranial nerve deficit. He exhibits normal muscle tone. Coordination normal.   Skin: Skin is warm and dry. No rash noted. He is not diaphoretic. No erythema. No pallor.   Psychiatric: He has a normal mood and affect. His behavior is normal. Judgment and thought content normal.   Nursing note and vitals reviewed.      Assessment:     1. Alcohol abuse     2. Paroxysmal atrial fibrillation (HCC)     3. Biventricular implantable cardioverter-defibrillator in situ     4. Coronary artery disease involving native coronary artery of native heart without angina pectoris     5. Chronic bronchitis, unspecified chronic bronchitis type ([facility])     6. CKD stage G3a/A1, GFR 45-59 and albumin creatinine ratio <30 mg/g ([facility])     7. High risk medication use     8. History of myocardial infarction     9. Essential hypertension     10. Hx of CABG     11. Hypercholesteremia     12. Ischemic cardiomyopathy     13. Left ventricular systolic dysfunction, NYHA class 3     14. NYHA class 3 acute on chronic systolic heart failure (HCC)     15. Orthostatic hypotension     16. Portal hypertension ([facility])     17. S/P ablation of atrial fibrillation     18. Second degree AV block, Mobitz type II     19. Ventricular tachycardia (paroxysmal) ([facility])     20. Systolic heart failure, ACC/AHA stage C (HCC)     21. Cerebrovascular accident (CVA), unspecified mechanism ([facility])     22. Hyperkalemia         Medical Decision Making:  Today's Assessment / Status / Plan:     79-year-old male with heart failure with reduced ejection fraction of ischemic cardiomyopathy.  He will stay on the same dose of medications he is he is on now.  Have  added spironolactone to his list of allergies to avoid hyperkalemia.  He had labs checked in July.  He is otherwise doing well.  We will see him back in 6 months.

## 2020-09-28 ENCOUNTER — APPOINTMENT (OUTPATIENT)
Dept: RADIOLOGY | Facility: MEDICAL CENTER | Age: 80
DRG: 193 | End: 2020-09-28
Attending: EMERGENCY MEDICINE
Payer: MEDICARE

## 2020-09-28 ENCOUNTER — APPOINTMENT (OUTPATIENT)
Dept: RADIOLOGY | Facility: MEDICAL CENTER | Age: 80
DRG: 193 | End: 2020-09-28
Attending: HOSPITALIST
Payer: MEDICARE

## 2020-09-28 ENCOUNTER — HOSPITAL ENCOUNTER (INPATIENT)
Facility: MEDICAL CENTER | Age: 80
LOS: 1 days | DRG: 193 | End: 2020-09-29
Attending: EMERGENCY MEDICINE | Admitting: HOSPITALIST
Payer: MEDICARE

## 2020-09-28 PROBLEM — Z86.73 HISTORY OF CVA (CEREBROVASCULAR ACCIDENT): Status: ACTIVE | Noted: 2020-09-28

## 2020-09-28 LAB
ALBUMIN SERPL BCP-MCNC: 4.2 G/DL (ref 3.2–4.9)
ALBUMIN/GLOB SERPL: 1.3 G/DL
ALP SERPL-CCNC: 134 U/L (ref 30–99)
ALT SERPL-CCNC: 16 U/L (ref 2–50)
ANION GAP SERPL CALC-SCNC: 12 MMOL/L (ref 7–16)
AST SERPL-CCNC: 26 U/L (ref 12–45)
BASOPHILS # BLD AUTO: 0.5 % (ref 0–1.8)
BASOPHILS # BLD: 0.04 K/UL (ref 0–0.12)
BILIRUB SERPL-MCNC: 0.5 MG/DL (ref 0.1–1.5)
BUN SERPL-MCNC: 23 MG/DL (ref 8–22)
CALCIUM SERPL-MCNC: 9.2 MG/DL (ref 8.4–10.2)
CHLORIDE SERPL-SCNC: 103 MMOL/L (ref 96–112)
CO2 SERPL-SCNC: 22 MMOL/L (ref 20–33)
COVID ORDER STATUS COVID19: NORMAL
CREAT SERPL-MCNC: 1.32 MG/DL (ref 0.5–1.4)
EKG IMPRESSION: NORMAL
EOSINOPHIL # BLD AUTO: 0.16 K/UL (ref 0–0.51)
EOSINOPHIL NFR BLD: 1.8 % (ref 0–6.9)
ERYTHROCYTE [DISTWIDTH] IN BLOOD BY AUTOMATED COUNT: 55.4 FL (ref 35.9–50)
GLOBULIN SER CALC-MCNC: 3.3 G/DL (ref 1.9–3.5)
GLUCOSE SERPL-MCNC: 113 MG/DL (ref 65–99)
GRAM STN SPEC: NORMAL
HCT VFR BLD AUTO: 52.4 % (ref 42–52)
HGB BLD-MCNC: 17.2 G/DL (ref 14–18)
IMM GRANULOCYTES # BLD AUTO: 0.08 K/UL (ref 0–0.11)
IMM GRANULOCYTES NFR BLD AUTO: 0.9 % (ref 0–0.9)
LYMPHOCYTES # BLD AUTO: 3.64 K/UL (ref 1–4.8)
LYMPHOCYTES NFR BLD: 41.2 % (ref 22–41)
MAGNESIUM SERPL-MCNC: 1.9 MG/DL (ref 1.5–2.5)
MCH RBC QN AUTO: 30.6 PG (ref 27–33)
MCHC RBC AUTO-ENTMCNC: 32.8 G/DL (ref 33.7–35.3)
MCV RBC AUTO: 93.1 FL (ref 81.4–97.8)
MONOCYTES # BLD AUTO: 0.77 K/UL (ref 0–0.85)
MONOCYTES NFR BLD AUTO: 8.7 % (ref 0–13.4)
NEUTROPHILS # BLD AUTO: 4.15 K/UL (ref 1.82–7.42)
NEUTROPHILS NFR BLD: 46.9 % (ref 44–72)
NRBC # BLD AUTO: 0 K/UL
NRBC BLD-RTO: 0 /100 WBC
NT-PROBNP SERPL IA-MCNC: 1054 PG/ML (ref 0–125)
PLATELET # BLD AUTO: 168 K/UL (ref 164–446)
PMV BLD AUTO: 10.6 FL (ref 9–12.9)
POTASSIUM SERPL-SCNC: 4.4 MMOL/L (ref 3.6–5.5)
PROT SERPL-MCNC: 7.5 G/DL (ref 6–8.2)
RBC # BLD AUTO: 5.63 M/UL (ref 4.7–6.1)
SARS-COV-2 RNA RESP QL NAA+PROBE: NOTDETECTED
SIGNIFICANT IND 70042: NORMAL
SITE SITE: NORMAL
SODIUM SERPL-SCNC: 137 MMOL/L (ref 135–145)
SOURCE SOURCE: NORMAL
SPECIMEN SOURCE: NORMAL
TROPONIN T SERPL-MCNC: 19 NG/L (ref 6–19)
TSH SERPL DL<=0.005 MIU/L-ACNC: 1.86 UIU/ML (ref 0.38–5.33)
WBC # BLD AUTO: 8.8 K/UL (ref 4.8–10.8)

## 2020-09-28 PROCEDURE — 87040 BLOOD CULTURE FOR BACTERIA: CPT

## 2020-09-28 PROCEDURE — 85025 COMPLETE CBC W/AUTO DIFF WBC: CPT

## 2020-09-28 PROCEDURE — 96375 TX/PRO/DX INJ NEW DRUG ADDON: CPT | Mod: XU

## 2020-09-28 PROCEDURE — 700111 HCHG RX REV CODE 636 W/ 250 OVERRIDE (IP): Performed by: EMERGENCY MEDICINE

## 2020-09-28 PROCEDURE — 96374 THER/PROPH/DIAG INJ IV PUSH: CPT

## 2020-09-28 PROCEDURE — 96366 THER/PROPH/DIAG IV INF ADDON: CPT

## 2020-09-28 PROCEDURE — 80053 COMPREHEN METABOLIC PANEL: CPT

## 2020-09-28 PROCEDURE — 770020 HCHG ROOM/CARE - TELE (206)

## 2020-09-28 PROCEDURE — 71275 CT ANGIOGRAPHY CHEST: CPT

## 2020-09-28 PROCEDURE — 96365 THER/PROPH/DIAG IV INF INIT: CPT | Mod: XU

## 2020-09-28 PROCEDURE — 99223 1ST HOSP IP/OBS HIGH 75: CPT | Performed by: HOSPITALIST

## 2020-09-28 PROCEDURE — 36415 COLL VENOUS BLD VENIPUNCTURE: CPT

## 2020-09-28 PROCEDURE — 700111 HCHG RX REV CODE 636 W/ 250 OVERRIDE (IP): Performed by: HOSPITALIST

## 2020-09-28 PROCEDURE — 71045 X-RAY EXAM CHEST 1 VIEW: CPT

## 2020-09-28 PROCEDURE — 93005 ELECTROCARDIOGRAM TRACING: CPT

## 2020-09-28 PROCEDURE — 700105 HCHG RX REV CODE 258: Performed by: HOSPITALIST

## 2020-09-28 PROCEDURE — 700102 HCHG RX REV CODE 250 W/ 637 OVERRIDE(OP): Performed by: HOSPITALIST

## 2020-09-28 PROCEDURE — 87205 SMEAR GRAM STAIN: CPT

## 2020-09-28 PROCEDURE — C9803 HOPD COVID-19 SPEC COLLECT: HCPCS | Performed by: EMERGENCY MEDICINE

## 2020-09-28 PROCEDURE — 700117 HCHG RX CONTRAST REV CODE 255: Performed by: HOSPITALIST

## 2020-09-28 PROCEDURE — 99285 EMERGENCY DEPT VISIT HI MDM: CPT

## 2020-09-28 PROCEDURE — 96372 THER/PROPH/DIAG INJ SC/IM: CPT | Mod: XU

## 2020-09-28 PROCEDURE — 83880 ASSAY OF NATRIURETIC PEPTIDE: CPT

## 2020-09-28 PROCEDURE — 83735 ASSAY OF MAGNESIUM: CPT

## 2020-09-28 PROCEDURE — U0003 INFECTIOUS AGENT DETECTION BY NUCLEIC ACID (DNA OR RNA); SEVERE ACUTE RESPIRATORY SYNDROME CORONAVIRUS 2 (SARS-COV-2) (CORONAVIRUS DISEASE [COVID-19]), AMPLIFIED PROBE TECHNIQUE, MAKING USE OF HIGH THROUGHPUT TECHNOLOGIES AS DESCRIBED BY CMS-2020-01-R: HCPCS

## 2020-09-28 PROCEDURE — 84443 ASSAY THYROID STIM HORMONE: CPT

## 2020-09-28 PROCEDURE — A9270 NON-COVERED ITEM OR SERVICE: HCPCS | Performed by: HOSPITALIST

## 2020-09-28 PROCEDURE — 84484 ASSAY OF TROPONIN QUANT: CPT

## 2020-09-28 RX ORDER — ONDANSETRON 2 MG/ML
4 INJECTION INTRAMUSCULAR; INTRAVENOUS ONCE
Status: COMPLETED | OUTPATIENT
Start: 2020-09-28 | End: 2020-09-28

## 2020-09-28 RX ORDER — AMOXICILLIN 250 MG
2 CAPSULE ORAL 2 TIMES DAILY
Status: DISCONTINUED | OUTPATIENT
Start: 2020-09-28 | End: 2020-09-29 | Stop reason: HOSPADM

## 2020-09-28 RX ORDER — ONDANSETRON 2 MG/ML
4 INJECTION INTRAMUSCULAR; INTRAVENOUS EVERY 4 HOURS PRN
Status: DISCONTINUED | OUTPATIENT
Start: 2020-09-28 | End: 2020-09-29 | Stop reason: HOSPADM

## 2020-09-28 RX ORDER — ASPIRIN 81 MG/1
162 TABLET, CHEWABLE ORAL DAILY
Status: DISCONTINUED | OUTPATIENT
Start: 2020-09-29 | End: 2020-09-29 | Stop reason: HOSPADM

## 2020-09-28 RX ORDER — POTASSIUM CITRATE 10 MEQ/1
10 TABLET, EXTENDED RELEASE ORAL
COMMUNITY
End: 2021-07-16 | Stop reason: SDUPTHER

## 2020-09-28 RX ORDER — ENALAPRIL MALEATE 5 MG/1
5 TABLET ORAL TWICE DAILY
Status: DISCONTINUED | OUTPATIENT
Start: 2020-09-28 | End: 2020-09-29

## 2020-09-28 RX ORDER — HEPARIN SODIUM 5000 [USP'U]/ML
5000 INJECTION, SOLUTION INTRAVENOUS; SUBCUTANEOUS EVERY 8 HOURS
Status: DISCONTINUED | OUTPATIENT
Start: 2020-09-28 | End: 2020-09-29 | Stop reason: HOSPADM

## 2020-09-28 RX ORDER — BISACODYL 10 MG
10 SUPPOSITORY, RECTAL RECTAL
Status: DISCONTINUED | OUTPATIENT
Start: 2020-09-28 | End: 2020-09-29 | Stop reason: HOSPADM

## 2020-09-28 RX ORDER — METOPROLOL SUCCINATE 25 MG/1
50 TABLET, EXTENDED RELEASE ORAL
Status: DISCONTINUED | OUTPATIENT
Start: 2020-09-28 | End: 2020-09-29 | Stop reason: HOSPADM

## 2020-09-28 RX ORDER — POTASSIUM CITRATE 5 MEQ/1
10 TABLET, EXTENDED RELEASE ORAL
Status: DISCONTINUED | OUTPATIENT
Start: 2020-09-28 | End: 2020-09-29 | Stop reason: HOSPADM

## 2020-09-28 RX ORDER — SACUBITRIL AND VALSARTAN 97; 103 MG/1; MG/1
0.5 TABLET, FILM COATED ORAL 2 TIMES DAILY
COMMUNITY
End: 2020-10-06

## 2020-09-28 RX ORDER — FUROSEMIDE 10 MG/ML
20 INJECTION INTRAMUSCULAR; INTRAVENOUS
Status: DISCONTINUED | OUTPATIENT
Start: 2020-09-28 | End: 2020-09-29

## 2020-09-28 RX ORDER — ONDANSETRON 4 MG/1
4 TABLET, ORALLY DISINTEGRATING ORAL EVERY 4 HOURS PRN
Status: DISCONTINUED | OUTPATIENT
Start: 2020-09-28 | End: 2020-09-29 | Stop reason: HOSPADM

## 2020-09-28 RX ORDER — POLYETHYLENE GLYCOL 3350 17 G/17G
1 POWDER, FOR SOLUTION ORAL
Status: DISCONTINUED | OUTPATIENT
Start: 2020-09-28 | End: 2020-09-29 | Stop reason: HOSPADM

## 2020-09-28 RX ORDER — ACETAMINOPHEN 325 MG/1
650 TABLET ORAL EVERY 6 HOURS PRN
Status: DISCONTINUED | OUTPATIENT
Start: 2020-09-28 | End: 2020-09-29 | Stop reason: HOSPADM

## 2020-09-28 RX ADMIN — ONDANSETRON 4 MG: 2 INJECTION INTRAMUSCULAR; INTRAVENOUS at 07:52

## 2020-09-28 RX ADMIN — ENALAPRIL MALEATE 5 MG: 5 TABLET ORAL at 11:58

## 2020-09-28 RX ADMIN — POTASSIUM CITRATE 10 MEQ: 5 TABLET ORAL at 11:59

## 2020-09-28 RX ADMIN — ENALAPRIL MALEATE 5 MG: 5 TABLET ORAL at 17:41

## 2020-09-28 RX ADMIN — SACUBITRIL AND VALSARTAN 0.5 TABLET: 97; 103 TABLET, FILM COATED ORAL at 17:43

## 2020-09-28 RX ADMIN — HEPARIN SODIUM 5000 UNITS: 5000 INJECTION, SOLUTION INTRAVENOUS; SUBCUTANEOUS at 14:38

## 2020-09-28 RX ADMIN — FUROSEMIDE 20 MG: 10 INJECTION, SOLUTION INTRAMUSCULAR; INTRAVENOUS at 16:45

## 2020-09-28 RX ADMIN — HEPARIN SODIUM 5000 UNITS: 5000 INJECTION, SOLUTION INTRAVENOUS; SUBCUTANEOUS at 22:41

## 2020-09-28 RX ADMIN — DEXTROSE MONOHYDRATE 500 MG: 50 INJECTION, SOLUTION INTRAVENOUS at 16:36

## 2020-09-28 RX ADMIN — FUROSEMIDE 20 MG: 10 INJECTION, SOLUTION INTRAMUSCULAR; INTRAVENOUS at 12:00

## 2020-09-28 RX ADMIN — METOPROLOL SUCCINATE 50 MG: 25 TABLET, EXTENDED RELEASE ORAL at 17:44

## 2020-09-28 RX ADMIN — CEFTRIAXONE SODIUM 2 G: 2 INJECTION, POWDER, FOR SOLUTION INTRAMUSCULAR; INTRAVENOUS at 15:38

## 2020-09-28 RX ADMIN — Medication 180 MG: at 11:56

## 2020-09-28 RX ADMIN — IOHEXOL 80 ML: 350 INJECTION, SOLUTION INTRAVENOUS at 10:30

## 2020-09-28 ASSESSMENT — LIFESTYLE VARIABLES
HAVE YOU EVER FELT YOU SHOULD CUT DOWN ON YOUR DRINKING: NO
ALCOHOL_USE: YES
TOTAL SCORE: 1
EVER FELT BAD OR GUILTY ABOUT YOUR DRINKING: YES
AVERAGE NUMBER OF DAYS PER WEEK YOU HAVE A DRINK CONTAINING ALCOHOL: 7
ON A TYPICAL DAY WHEN YOU DRINK ALCOHOL HOW MANY DRINKS DO YOU HAVE: 2
HOW MANY TIMES IN THE PAST YEAR HAVE YOU HAD 5 OR MORE DRINKS IN A DAY: 2
CONSUMPTION TOTAL: POSITIVE
HAVE PEOPLE ANNOYED YOU BY CRITICIZING YOUR DRINKING: NO
TOTAL SCORE: 1
EVER HAD A DRINK FIRST THING IN THE MORNING TO STEADY YOUR NERVES TO GET RID OF A HANGOVER: NO
TOTAL SCORE: 1

## 2020-09-28 ASSESSMENT — ENCOUNTER SYMPTOMS
SHORTNESS OF BREATH: 1
MUSCULOSKELETAL NEGATIVE: 1
FOCAL WEAKNESS: 0
PSYCHIATRIC NEGATIVE: 1
DIZZINESS: 1
SEIZURES: 0
WHEEZING: 0
CHILLS: 0
BLOOD IN STOOL: 0
HEMOPTYSIS: 0
NAUSEA: 0
GASTROINTESTINAL NEGATIVE: 1
COUGH: 0
FEVER: 0
DIARRHEA: 0
BRUISES/BLEEDS EASILY: 0
VOMITING: 0
PALPITATIONS: 0
CONSTIPATION: 0
NERVOUS/ANXIOUS: 0
HEADACHES: 0
DIAPHORESIS: 0
DOUBLE VISION: 0
ABDOMINAL PAIN: 0
HEARTBURN: 0
LOSS OF CONSCIOUSNESS: 0
EYES NEGATIVE: 1

## 2020-09-28 ASSESSMENT — PATIENT HEALTH QUESTIONNAIRE - PHQ9
7. TROUBLE CONCENTRATING ON THINGS, SUCH AS READING THE NEWSPAPER OR WATCHING TELEVISION: NOT AT ALL
6. FEELING BAD ABOUT YOURSELF - OR THAT YOU ARE A FAILURE OR HAVE LET YOURSELF OR YOUR FAMILY DOWN: SEVERAL DAYS
8. MOVING OR SPEAKING SO SLOWLY THAT OTHER PEOPLE COULD HAVE NOTICED. OR THE OPPOSITE, BEING SO FIGETY OR RESTLESS THAT YOU HAVE BEEN MOVING AROUND A LOT MORE THAN USUAL: NOT AT ALL
4. FEELING TIRED OR HAVING LITTLE ENERGY: NEARLY EVERY DAY
3. TROUBLE FALLING OR STAYING ASLEEP OR SLEEPING TOO MUCH: NEARLY EVERY DAY
9. THOUGHTS THAT YOU WOULD BE BETTER OFF DEAD, OR OF HURTING YOURSELF: NOT AT ALL
2. FEELING DOWN, DEPRESSED, IRRITABLE, OR HOPELESS: SEVERAL DAYS
SUM OF ALL RESPONSES TO PHQ QUESTIONS 1-9: 8
1. LITTLE INTEREST OR PLEASURE IN DOING THINGS: NOT AT ALL
SUM OF ALL RESPONSES TO PHQ9 QUESTIONS 1 AND 2: 1
5. POOR APPETITE OR OVEREATING: NOT AT ALL

## 2020-09-28 ASSESSMENT — COGNITIVE AND FUNCTIONAL STATUS - GENERAL
SUGGESTED CMS G CODE MODIFIER MOBILITY: CH
MOBILITY SCORE: 24
DAILY ACTIVITIY SCORE: 24
SUGGESTED CMS G CODE MODIFIER DAILY ACTIVITY: CH

## 2020-09-28 ASSESSMENT — FIBROSIS 4 INDEX
FIB4 SCORE: 2.69
FIB4 SCORE: 3.06

## 2020-09-28 NOTE — PROGRESS NOTES
Pt arrived to unit via gurney. [place], standby assist. Tele monitor applied, vitals taken. Pt assessed. A&O x 4. Admit profile and med rec complete. Discussed POC with pt, including echo, IV lasix, daily weights and stric I & Os. Welcome folder provided and discussed. Communication board filled out. Questions and concerns addressed, verbalized understanding. Fall precautions in place. Pt demonstrates ability to use call light appropriately. Pt left in lowest position. Bed locked and low.

## 2020-09-28 NOTE — ASSESSMENT & PLAN NOTE
- Blood pressure management optimization keep systolic blood pressure less than 140 diastolic under 90.  - Continue Metoprolol and Entresto, stop Enalapril given the ARB in Entresto

## 2020-09-28 NOTE — ASSESSMENT & PLAN NOTE
Low-fat low-cholesterol diet  Check a fasting lipid panel.  Currently is not on a statin.  Patient is taking Repatha.  Most recent LDL is 54.  Lab Results   Component Value Date/Time    CHOLSTRLTOT 136 07/03/2020 03:30 PM    LDL 54 07/03/2020 03:30 PM    HDL 65 07/03/2020 03:30 PM    TRIGLYCERIDE 87 07/03/2020 03:30 PM

## 2020-09-28 NOTE — ASSESSMENT & PLAN NOTE
Patient has a pacemaker in place was previously had second-degree Mobitz type II heart block.  Not MRI compatible.

## 2020-09-28 NOTE — ED PROVIDER NOTES
"ED Provider Note    CHIEF COMPLAINT  Chief Complaint   Patient presents with   • Nausea   • Dizziness   • Shortness of Breath       HPI  Isai Fuentes is a 79 y.o. male who presents with complaint of nausea and shortness of breath that started 90 minutes prior to presentation when he woke up this morning he took his blood pressure and it was elevated so he took a aspirin and his blood pressure medications.  He also has some dizziness lightheadedness type.  He still feels short of breath.  He states this feels how he feels before he has a heart attack.  He has no headache no chest pain his wife said he was kind of wobbly with walking this morning.  Says pacemaker checked within the last month and apparently had one episode of atrial fibrillation according to the wife.  He has no abdominal pain no fever no chills he has some chronic cough but nothing new no sore throat no leg swelling.  All other systems are negative    REVIEW OF SYSTEMS  See HPI for further details    PAST MEDICAL HISTORY  Past Medical History:   Diagnosis Date   • Atrial fibrillation ([facility]) 9/20/2011   • Biventricular implantable cardioverter-defibrillator in situ 8/7/2015   • Breath shortness    • CAD (coronary artery disease) 9/20/2011   • Cancer ([facility]) 2012    prostate   • CARDIOMYOPATHY 9/20/2011   • Congestive heart failure ([facility])    • COPD (chronic obstructive pulmonary disease) ([facility])    • Fatigue 10/28/2010   • Heartburn 10/23/2008   • History of cardiac catheterization 9/20/2011   • History of myocardial infarction     \"many\"   • HTN (hypertension) 9/20/2011   • Indigestion    • Insomnia 7/8/2010   • Ischemic cardiomyopathy 9/20/2011   • Long term (current) use of anticoagulants 5/13/2011   • Myocardial infarct ([facility]) 2007   • Orthostatic hypotension 5/6/2009   • WILLA (obstructive sleep apnea)     CPAP   • Other drug allergy(995.27) 10/16/2008   • Other specified disorder of intestines 07-28-15    constipation/diarrhea/ reports some bleeding " "from rectum w/blood clots. Seeing GI   • Pacemaker     [employer]   • Pain -28-15    \"chronic\", 0/10   • Pleural effusion 2009   • Presence of permanent cardiac pacemaker 2011   • Prostate cancer (HCC) 2011   • Second degree AV block, Mobitz type II 11/10/2010   • Snoring    • Stroke (HCC) 2/3/2009       FAMILY HISTORY  Family History   Problem Relation Age of Onset   • Thyroid Sister        SOCIAL HISTORY  Social History     Socioeconomic History   • Marital status:      Spouse name: Not on file   • Number of children: Not on file   • Years of education: Not on file   • Highest education level: Not on file   Occupational History   • Not on file   Social Needs   • Financial resource strain: Not on file   • Food insecurity     Worry: Not on file     Inability: Not on file   • Transportation needs     Medical: Not on file     Non-medical: Not on file   Tobacco Use   • Smoking status: Former Smoker     Packs/day: 1.50     Years: 3.00     Pack years: 4.50     Types: Cigarettes     Quit date: 1970     Years since quittin.7   • Smokeless tobacco: Former User     Types: Chew     Quit date: 1972   Substance and Sexual Activity   • Alcohol use: No     Comment: 10-14 per week; scotch & wine   • Drug use: No   • Sexual activity: Not on file   Lifestyle   • Physical activity     Days per week: Not on file     Minutes per session: Not on file   • Stress: Not on file   Relationships   • Social connections     Talks on phone: Not on file     Gets together: Not on file     Attends Taoist service: Not on file     Active member of club or organization: Not on file     Attends meetings of clubs or organizations: Not on file     Relationship status: Not on file   • Intimate partner violence     Fear of current or ex partner: Not on file     Emotionally abused: Not on file     Physically abused: Not on file     Forced sexual activity: Not on file   Other Topics Concern   • Not on file " "  Social History Narrative   • Not on file       SURGICAL HISTORY  Past Surgical History:   Procedure Laterality Date   • RECOVERY  10/16/2015    Procedure: CATH LAB LEAD REVISION [facility];  Surgeon: Recoveryonly Surgery;  Location: SURGERY PRE-POST PROC UNIT [facility];  Service:    • RECOVERY  8/14/2015    Procedure:  CATH LAB LEAD EXTRACTION AWILDA [facility] PAULO AQUINO;  Surgeon: Recoveryonly Surgery;  Location: SURGERY PRE-POST PROC UNIT [facility];  Service:    • RECOVERY  7/30/2015    Procedure: CATH LAB  LEAD EXTRACTION, UPGRADE TO BIV PM [facility] BIPING GRP KENIA ;  Surgeon: Recoveryonfreddy Surgery;  Location: SURGERY PRE-POST PROC UNIT [facility];  Service:    • RECOVERY  7/29/2015    Procedure: CATH LAB NEW PM INSERTION DUAL ATRIAL & VENTRICULAR-LV LEAD W/ NEW GENERATOR AQUINO ICD9: 414.8;  Surgeon: Recoveryonly Surgery;  Location: SURGERY PRE-POST PROC UNIT [facility];  Service:    • PACEMAKER INSERTION  11/24/08    [facility]   • MULTIPLE CORONARY ARTERY BYPASS  2007    2 vessel   • ZZZ CARDIAC CATH      has 2 stents       CURRENT MEDICATIONS  Home Medications    **Home medications have not yet been reviewed for this encounter**         ALLERGIES  Allergies   Allergen Reactions   • Plavix [Clopidogrel Bisulfate] Anaphylaxis   • Spironolactone Unspecified     Hyperkalemia   • Statins [Hmg-Coa-R Inhibitors] Unspecified     Muscles aches     • Ticlid [Ticlopidine Hydrochloride] Unspecified     Pt states \"I'm not sure what happens\".         PHYSICAL EXAM  VITAL SIGNS: /97   Pulse 70   Temp 36 °C (96.8 °F)   Resp 18   Wt 92 kg (202 lb 13.2 oz)   SpO2 99%   BMI 28.29 kg/m²     Constitutional: Patient is alert and oriented x3 in mild distress   HENT: Moist mucous membranes  Eyes:   No conjunctivitis or icterus  Neck: Trach is midline no Pap thyroid  Lymphatic: Anterior cervical adenopathy  Cardiovascular: Normal heart rate S4 gallop no murmur  Thorax & Lungs: Clear to auscultation  Back: No CVA tenderness  Abdomen: " Nontender  Neurologic: Cranial nerves intact normal motor  Extremities:   No edema  Psychiatric: Affect normal, Judgment normal, Mood normal.     EKG: Atrial sensed ventricular paced rhythm at 76 no further analysis  Results for orders placed or performed during the hospital encounter of 09/28/20   CBC WITH DIFFERENTIAL   Result Value Ref Range    WBC 8.8 4.8 - 10.8 K/uL    RBC 5.63 4.70 - 6.10 M/uL    Hemoglobin 17.2 14.0 - 18.0 g/dL    Hematocrit 52.4 (H) 42.0 - 52.0 %    MCV 93.1 81.4 - 97.8 fL    MCH 30.6 27.0 - 33.0 pg    MCHC 32.8 (L) 33.7 - 35.3 g/dL    RDW 55.4 (H) 35.9 - 50.0 fL    Platelet Count 168 164 - 446 K/uL    MPV 10.6 9.0 - 12.9 fL    Neutrophils-Polys 46.90 44.00 - 72.00 %    Lymphocytes 41.20 (H) 22.00 - 41.00 %    Monocytes 8.70 0.00 - 13.40 %    Eosinophils 1.80 0.00 - 6.90 %    Basophils 0.50 0.00 - 1.80 %    Immature Granulocytes 0.90 0.00 - 0.90 %    Nucleated RBC 0.00 /100 WBC    Neutrophils (Absolute) 4.15 1.82 - 7.42 K/uL    Lymphs (Absolute) 3.64 1.00 - 4.80 K/uL    Monos (Absolute) 0.77 0.00 - 0.85 K/uL    Eos (Absolute) 0.16 0.00 - 0.51 K/uL    Baso (Absolute) 0.04 0.00 - 0.12 K/uL    Immature Granulocytes (abs) 0.08 0.00 - 0.11 K/uL    NRBC (Absolute) 0.00 K/uL   COMP METABOLIC PANEL   Result Value Ref Range    Sodium 137 135 - 145 mmol/L    Potassium 4.4 3.6 - 5.5 mmol/L    Chloride 103 96 - 112 mmol/L    Co2 22 20 - 33 mmol/L    Anion Gap 12.0 7.0 - 16.0    Glucose 113 (H) 65 - 99 mg/dL    Bun 23 (H) 8 - 22 mg/dL    Creatinine 1.32 0.50 - 1.40 mg/dL    Calcium 9.2 8.4 - 10.2 mg/dL    AST(SGOT) 26 12 - 45 U/L    ALT(SGPT) 16 2 - 50 U/L    Alkaline Phosphatase 134 (H) 30 - 99 U/L    Total Bilirubin 0.5 0.1 - 1.5 mg/dL    Albumin 4.2 3.2 - 4.9 g/dL    Total Protein 7.5 6.0 - 8.2 g/dL    Globulin 3.3 1.9 - 3.5 g/dL    A-G Ratio 1.3 g/dL   TROPONIN   Result Value Ref Range    Troponin T 19 6 - 19 ng/L   proBrain Natriuretic Peptide, NT   Result Value Ref Range    NT-proBNP 1054 (H) 0 -  125 pg/mL   ESTIMATED GFR   Result Value Ref Range    GFR If African American >60 >60 mL/min/1.73 m 2    GFR If Non African American 52 (A) >60 mL/min/1.73 m 2   EKG   Result Value Ref Range    Report       [facility] Emergency Dept.    Test Date:  2020  Pt Name:    JESSICA ALBRECHT                 Department: E[facility]M  MRN:        [id number]                      [facility]  Gender:     Male                         Technician: HRR  :        [id number]                   Requested By:ER TRIAGE PROTOCOL  Order #:    [id number]                    Reading MD:    Measurements  Intervals                                Axis  Rate:       76                           P:          92  OR:         146                          QRS:        208  QRSD:       158                          T:          109  QT:         422  QTc:        475    Interpretive Statements  Atrial-sensed ventricular-paced rhythm  No further analysis attempted due to paced rhythm  Baseline wander in lead(s) II,III,aVR,aVL,aVF,V1,V2,V6  Compared to ECG 2018 14:18:52  AV dual-paced complex(es) or rhythm no longer present          COURSE & MEDICAL DECISION MAKING  Pertinent Labs & Imaging studies reviewed. (See chart for details)  Patient has high risk for coronary artery disease has had stents before feels similar to the past.  EKG does show a paced rhythm so no real analysis can be done.  However I do not see any scar Bosa criteria abnormalities.    Patient does have an elevated brain natruretic peptide.  His x-ray shows no abnormality that is acute.  He feels like he is about to have a heart attack or stroke.  He feels somewhat foggy and lightheaded.  Due to his high risk presentation I am contacting the hospitalist for admission.    Patient is being given Zofran for his nausea    FINAL IMPRESSION  1.  Dyspnea  2.  Nausea  3.         Electronically signed by: Adarsh Clements M.D., 2020 7:46 AM

## 2020-09-28 NOTE — H&P
Hospital Medicine History & Physical Note    Date of Service  9/28/2020    Primary Care Physician  Matt Pagan M.D.    Consultants  None    Code Status  DNAR/DNI    Chief Complaint  Chief Complaint   Patient presents with   • Nausea   • Dizziness   • Shortness of Breath       History of Presenting Illness  79 y.o. male who presented 9/28/2020 with shortness of breath that started about 2 hours prior to his arrival.  Patient has a longstanding history of coronary artery disease, CHF, Mobitz type II heart block, prostate cancer, dyslipidemia.  He is actively being followed as an outpatient by cardiology and urology.  Patient does have a previous left ventricular ejection fraction of 35 to 40%.  His most recent echocardiogram was June 2019.  We will repeat an echocardiogram his BNP is elevated.  He will be diuresed.  He will have a CTA of the chest to evaluate for possible infiltrate versus PE.    Review of Systems  Review of Systems   Constitutional: Positive for malaise/fatigue. Negative for chills, diaphoresis and fever.   HENT: Negative.    Eyes: Negative.  Negative for double vision.   Respiratory: Positive for shortness of breath. Negative for cough, hemoptysis and wheezing.    Cardiovascular: Positive for chest pain. Negative for palpitations and leg swelling.   Gastrointestinal: Negative.  Negative for abdominal pain, blood in stool, constipation, diarrhea, heartburn, nausea and vomiting.   Genitourinary: Negative.  Negative for frequency, hematuria and urgency.   Musculoskeletal: Negative.  Negative for joint pain.   Skin: Negative.  Negative for itching and rash.   Neurological: Positive for dizziness (With lightheadedness and near syncope). Negative for focal weakness, seizures, loss of consciousness and headaches.   Endo/Heme/Allergies: Negative.  Does not bruise/bleed easily.   Psychiatric/Behavioral: Negative.  Negative for suicidal ideas. The patient is not nervous/anxious.    All other systems  reviewed and are negative.      Past Medical History   has a past medical history of Atrial fibrillation ([facility]) (2011), Biventricular implantable cardioverter-defibrillator in situ (2015), Breath shortness, CAD (coronary artery disease) (2011), Cancer ([facility]) (), CARDIOMYOPATHY (2011), Congestive heart failure ([facility]), COPD (chronic obstructive pulmonary disease) ([facility]), Fatigue (10/28/2010), Heartburn (10/23/2008), History of cardiac catheterization (2011), History of myocardial infarction, HTN (hypertension) (2011), Indigestion, Insomnia (2010), Ischemic cardiomyopathy (2011), Long term (current) use of anticoagulants (2011), Myocardial infarct ([facility]) (), Orthostatic hypotension (2009), WILLA (obstructive sleep apnea), Other drug allergy(995.27) (10/16/2008), Other specified disorder of intestines (07-28-15), Pacemaker, Pain (07-28-15), Pleural effusion (2009), Presence of permanent cardiac pacemaker (2011), Prostate cancer ([facility]) (2011), Second degree AV block, Mobitz type II (11/10/2010), Snoring, and Stroke ([facility]) (2/3/2009).    Surgical History   has a past surgical history that includes zzz cardiac cath; multiple coronary artery bypass (); pacemaker insertion (08); recovery (2015); recovery (2015); recovery (2015); and recovery (10/16/2015).     Family History  family history includes Thyroid in his sister.  Both parents lived to their mid 90s.  Mom  of old age dad did have heart disease toward his end of life.    Social History   reports that he quit smoking about 50 years ago. His smoking use included cigarettes. He has a 4.50 pack-year smoking history. He quit smokeless tobacco use about 48 years ago.  His smokeless tobacco use included chew. He reports that he does not drink alcohol or use drugs.    Allergies  Allergies   Allergen Reactions   • Plavix [Clopidogrel Bisulfate] Anaphylaxis   • Spironolactone Unspecified  "    Hyperkalemia   • Statins [Hmg-Coa-R Inhibitors] Unspecified     Muscles aches     • Ticlid [Ticlopidine Hydrochloride] Unspecified     Pt states \"I'm not sure what happens\".         Medications  Prior to Admission Medications   Prescriptions Last Dose Informant Patient Reported? Taking?   Coenzyme Q10 (CO Q 10) 100 MG Cap 9/27/2020 at AM Significant Other Yes Yes   Sig: Take 200 mg by mouth every day.   Evolocumab, REPATHA, (REPATHA SURECLICK) 140 MG/ML Solution Auto-injector > 1 WEEK at Q14 Significant Other No No   Sig: Inject 1 Each as instructed every 14 days.   Ixekizumab (TALTZ) 80 MG/ML Solution Prefilled Syringe 8/25/2020 at Q30D Significant Other Yes No   Sig: Inject 80 mg as instructed Q30 DAYS.   aspirin 81 MG tablet 9/27/2020 at PM Significant Other Yes No   Sig: Take 81 mg by mouth every day.   metoprolol SR (TOPROL XL) 50 MG TABLET SR 24 HR 9/28/2020 at AM Significant Other No No   Sig: TAKE 1 TABLET BY MOUTH EVERY DAY   potassium citrate SR (UROCIT-K SR) 10 MEQ (1080 MG) Tab CR 9/27/2020 at Q48H Significant Other Yes Yes   Sig: Take 10 mEq by mouth every 48 hours.   sacubitril-valsartan (ENTRESTO)  MG Tab tablet 9/27/2020 at PM Significant Other Yes Yes   Sig: Take 0.5 Tabs by mouth 2 Times a Day. 0.5 tablet = dose , twice daily      Facility-Administered Medications: None       Physical Exam  Temp:  [36 °C (96.8 °F)] 36 °C (96.8 °F)  Pulse:  [70] 70  Resp:  [18] 18  BP: (144)/(71-97) 144/71  SpO2:  [95 %-99 %] 95 %    Physical Exam  Vitals signs and nursing note reviewed. Exam conducted with a chaperone present.   Constitutional:       Appearance: Normal appearance. He is well-developed. He is obese. He is ill-appearing. He is not diaphoretic.   HENT:      Head: Normocephalic and atraumatic.      Right Ear: External ear normal.      Left Ear: External ear normal.      Nose: Nose normal.      Mouth/Throat:      Mouth: Mucous membranes are moist.      Pharynx: Oropharynx is clear.   Eyes:     "  Extraocular Movements: Extraocular movements intact.      Conjunctiva/sclera: Conjunctivae normal.      Pupils: Pupils are equal, round, and reactive to light.   Neck:      Musculoskeletal: Normal range of motion and neck supple.      Thyroid: No thyromegaly.      Vascular: No JVD.   Cardiovascular:      Rate and Rhythm: Normal rate and regular rhythm.      Heart sounds: Murmur present.      Comments: Pacemaker present in the left upper portion of the chest wall  Pulmonary:      Effort: Pulmonary effort is normal.      Breath sounds: Decreased air movement present. Examination of the left-lower field reveals decreased breath sounds. Decreased breath sounds present.   Chest:      Chest wall: No tenderness.   Abdominal:      General: Abdomen is flat. Bowel sounds are normal. There is no distension.      Palpations: Abdomen is soft. There is no mass.      Tenderness: There is no abdominal tenderness. There is no guarding or rebound.   Musculoskeletal: Normal range of motion.   Lymphadenopathy:      Cervical: No cervical adenopathy.   Skin:     General: Skin is warm and dry.      Capillary Refill: Capillary refill takes more than 3 seconds.      Findings: No rash.   Neurological:      General: No focal deficit present.      Mental Status: He is alert and oriented to person, place, and time. Mental status is at baseline.      Cranial Nerves: No cranial nerve deficit.      Deep Tendon Reflexes: Reflexes are normal and symmetric.   Psychiatric:         Mood and Affect: Mood normal.         Behavior: Behavior normal.         Thought Content: Thought content normal.         Judgment: Judgment normal.         Laboratory:  Recent Labs     09/28/20  0745   WBC 8.8   RBC 5.63   HEMOGLOBIN 17.2   HEMATOCRIT 52.4*   MCV 93.1   MCH 30.6   MCHC 32.8*   RDW 55.4*   PLATELETCT 168   MPV 10.6     Recent Labs     09/28/20  0745   SODIUM 137   POTASSIUM 4.4   CHLORIDE 103   CO2 22   GLUCOSE 113*   BUN 23*   CREATININE 1.32   CALCIUM 9.2      Recent Labs     09/28/20  0745   ALTSGPT 16   ASTSGOT 26   ALKPHOSPHAT 134*   TBILIRUBIN 0.5   GLUCOSE 113*         Recent Labs     09/28/20  0745   NTPROBNP 1054*         Recent Labs     09/28/20  0745   TROPONINT 19       Imaging:  DX-CHEST-PORTABLE (1 VIEW)   Final Result         1. No pulmonary infiltrates or consolidations are noted.      2. Stable cardiomegaly.      CT-CTA CHEST PULMONARY ARTERY W/ RECONS    (Results Pending)   EC-ECHOCARDIOGRAM COMPLETE W/O CONT    (Results Pending)         Assessment/Plan:  I anticipate this patient is appropriate for observation status at this time.    NYHA class 3 acute on chronic systolic heart failure (HCC)- (present on admission)  Assessment & Plan  Patient comes in with dyspnea that started 2 hours ago.  He says the dyspnea is accompanied by slight dizziness and lightheadedness like he was going to pass out.  There is also epigastric discomfort it is more a burning sensation he says.  On chest x-ray which I reviewed myself in detail and compared to the previous there appears to be a possible effusion in the left lower lobe versus an infiltrate versus cardiomegaly that is worsening.  Patient will at this point need a CTA of the chest to evaluate for possible PE versus the effusion versus an infiltrate.  His white blood cell count is normal and I doubt this is pneumonia but it could be the beginning stage of pneumonia.  The patient's CT will count to determine the situation.  Patient meantime will be admitted for an echocardiogram, diuresis, monitoring of his cardiac status.  He will continue with beta-blocker he will also continue at this point with Entresto, I have added Lasix 20 mg IV twice daily as well as enalapril 5 mg twice daily.    Biventricular implantable cardioverter-defibrillator in situ- (present on admission)  Assessment & Plan  Patient has a pacemaker in place was previously had second-degree Mobitz type II heart block.  MRI cannot be done on him it  is not MRI compatible her is    Ischemic cardiomyopathy- (present on admission)  Assessment & Plan  History of ischemic cardiomyopathy with the most recent left ventricular ejection fraction 40%.  We will repeat the echocardiogram.  His last echocardiogram is from June 2019.    HTN (hypertension)- (present on admission)  Assessment & Plan  Blood pressure management optimization keep systolic blood pressure less than 140 diastolic under 90.  Continue with Vasotec 5 mg twice daily metoprolol 50 mg daily, per the wife the patient has been having elevated blood pressure this morning his blood pressure was up as well.    Hypercholesteremia- (present on admission)  Assessment & Plan  Low-fat low-cholesterol diet  Check a fasting lipid panel.  Currently is not on a statin.  Patient is taking Repatha.  Most recent LDL is 54.  Lab Results   Component Value Date/Time    CHOLSTRLTOT 136 07/03/2020 03:30 PM    LDL 54 07/03/2020 03:30 PM    HDL 65 07/03/2020 03:30 PM    TRIGLYCERIDE 87 07/03/2020 03:30 PM              CAD (coronary artery disease)- (present on admission)  Assessment & Plan  Patient has coronary artery disease.  Continue with chest pain management.  Patient is on metoprolol XL 50 mg daily, Entresto, aspirin, and he will be also at this point on Vasotec.  Monitor troponin levels initial troponin level is slightly elevated but not in the abnormal range.  EKG I reviewed myself which shows a paced rhythm no further interpretation really is able to be made from the EKG    History of CVA (cerebrovascular accident)  Assessment & Plan  Patient has a history of a CVA currently continued on aspirin but not on statin.  No neurological deficits noted at this point in time.    Alcohol abuse- (present on admission)  Assessment & Plan  Patient has 2 shots every night and sometimes more.  He has been educated on alcohol cessation.    Portal hypertension (HCC)- (present on admission)  Assessment & Plan  Continue with beta-blocker  for his portal hypertension.    Prostate cancer (HCC)- (present on admission)  Assessment & Plan  History of prostate cancer.  Followed as an outpatient by urology.

## 2020-09-28 NOTE — PROGRESS NOTES
Lab called by this RN so patient's blood cultures can be drawn before antibiotics are given. Sputum culture collected and sent down to lab.

## 2020-09-28 NOTE — ASSESSMENT & PLAN NOTE
History of ischemic cardiomyopathy with the most recent left ventricular ejection fraction 40%.  We will repeat the echocardiogram.  His last echocardiogram is from June 2019.

## 2020-09-28 NOTE — ASSESSMENT & PLAN NOTE
Patient has a history of a CVA, on ASA and Repatha due to statin intolerance.  No neurological deficits noted at this point in time.

## 2020-09-28 NOTE — ASSESSMENT & PLAN NOTE
Patient has coronary artery disease.  Continue with chest pain management.  Patient is on metoprolol XL 50 mg daily, Entresto, aspirin and Repatha at home.  Monitor troponin levels initial troponin level is slightly elevated but not in the abnormal range.  EKG I reviewed myself which shows a paced rhythm no further interpretation really is able to be made from the EKG

## 2020-09-28 NOTE — PROGRESS NOTES
Updated evaluation after CT of the chest was done shows left lower lobe infiltrate with groundglass opacities.  The patient will be placed on Rocephin azithromycin after sputum and blood cultures have been obtained.

## 2020-09-28 NOTE — ED NOTES
Med Rec complete per phone interview with pt's wife  Allergies Reviewed  No ABX in the last 14 days     Pt takes ASPRIN

## 2020-09-29 ENCOUNTER — APPOINTMENT (OUTPATIENT)
Dept: CARDIOLOGY | Facility: MEDICAL CENTER | Age: 80
DRG: 193 | End: 2020-09-29
Attending: HOSPITALIST
Payer: MEDICARE

## 2020-09-29 ENCOUNTER — PATIENT OUTREACH (OUTPATIENT)
Dept: HEALTH INFORMATION MANAGEMENT | Facility: OTHER | Age: 80
End: 2020-09-29

## 2020-09-29 ENCOUNTER — APPOINTMENT (OUTPATIENT)
Dept: RADIOLOGY | Facility: MEDICAL CENTER | Age: 80
DRG: 193 | End: 2020-09-29
Attending: FAMILY MEDICINE
Payer: MEDICARE

## 2020-09-29 VITALS
SYSTOLIC BLOOD PRESSURE: 111 MMHG | DIASTOLIC BLOOD PRESSURE: 71 MMHG | OXYGEN SATURATION: 92 % | WEIGHT: 202.82 LBS | HEART RATE: 70 BPM | TEMPERATURE: 97.4 F | HEIGHT: 71 IN | RESPIRATION RATE: 18 BRPM | BODY MASS INDEX: 28.4 KG/M2

## 2020-09-29 PROBLEM — J18.9 COMMUNITY ACQUIRED PNEUMONIA: Status: RESOLVED | Noted: 2020-09-29 | Resolved: 2020-09-29

## 2020-09-29 PROBLEM — J18.9 COMMUNITY ACQUIRED PNEUMONIA: Status: ACTIVE | Noted: 2020-09-29

## 2020-09-29 LAB
ANION GAP SERPL CALC-SCNC: 13 MMOL/L (ref 7–16)
BASOPHILS # BLD AUTO: 0.6 % (ref 0–1.8)
BASOPHILS # BLD: 0.06 K/UL (ref 0–0.12)
BUN SERPL-MCNC: 28 MG/DL (ref 8–22)
CALCIUM SERPL-MCNC: 9 MG/DL (ref 8.4–10.2)
CHLORIDE SERPL-SCNC: 101 MMOL/L (ref 96–112)
CHOLEST SERPL-MCNC: 151 MG/DL (ref 100–199)
CO2 SERPL-SCNC: 24 MMOL/L (ref 20–33)
CREAT SERPL-MCNC: 1.4 MG/DL (ref 0.5–1.4)
EOSINOPHIL # BLD AUTO: 0.33 K/UL (ref 0–0.51)
EOSINOPHIL NFR BLD: 3.5 % (ref 0–6.9)
ERYTHROCYTE [DISTWIDTH] IN BLOOD BY AUTOMATED COUNT: 55.4 FL (ref 35.9–50)
GLUCOSE SERPL-MCNC: 83 MG/DL (ref 65–99)
HCT VFR BLD AUTO: 50.8 % (ref 42–52)
HDLC SERPL-MCNC: 79 MG/DL
HGB BLD-MCNC: 16.6 G/DL (ref 14–18)
IMM GRANULOCYTES # BLD AUTO: 0.06 K/UL (ref 0–0.11)
IMM GRANULOCYTES NFR BLD AUTO: 0.6 % (ref 0–0.9)
LDLC SERPL CALC-MCNC: 59 MG/DL
LV EJECT FRACT  99904: 35
LV EJECT FRACT MOD 2C 99903: 12.96
LV EJECT FRACT MOD 4C 99902: 27.48
LV EJECT FRACT MOD BP 99901: 21.6
LYMPHOCYTES # BLD AUTO: 2.59 K/UL (ref 1–4.8)
LYMPHOCYTES NFR BLD: 27.7 % (ref 22–41)
MCH RBC QN AUTO: 30.5 PG (ref 27–33)
MCHC RBC AUTO-ENTMCNC: 32.7 G/DL (ref 33.7–35.3)
MCV RBC AUTO: 93.4 FL (ref 81.4–97.8)
MONOCYTES # BLD AUTO: 0.87 K/UL (ref 0–0.85)
MONOCYTES NFR BLD AUTO: 9.3 % (ref 0–13.4)
NEUTROPHILS # BLD AUTO: 5.45 K/UL (ref 1.82–7.42)
NEUTROPHILS NFR BLD: 58.3 % (ref 44–72)
NRBC # BLD AUTO: 0 K/UL
NRBC BLD-RTO: 0 /100 WBC
PLATELET # BLD AUTO: 165 K/UL (ref 164–446)
PMV BLD AUTO: 11 FL (ref 9–12.9)
POTASSIUM SERPL-SCNC: 4.5 MMOL/L (ref 3.6–5.5)
PROCALCITONIN SERPL-MCNC: 0.08 NG/ML
RBC # BLD AUTO: 5.44 M/UL (ref 4.7–6.1)
SODIUM SERPL-SCNC: 138 MMOL/L (ref 135–145)
TRIGL SERPL-MCNC: 63 MG/DL (ref 0–149)
TROPONIN T SERPL-MCNC: 21 NG/L (ref 6–19)
WBC # BLD AUTO: 9.4 K/UL (ref 4.8–10.8)

## 2020-09-29 PROCEDURE — 93978 VASCULAR STUDY: CPT | Mod: 26 | Performed by: INTERNAL MEDICINE

## 2020-09-29 PROCEDURE — 700102 HCHG RX REV CODE 250 W/ 637 OVERRIDE(OP): Performed by: HOSPITALIST

## 2020-09-29 PROCEDURE — 99239 HOSP IP/OBS DSCHRG MGMT >30: CPT | Performed by: FAMILY MEDICINE

## 2020-09-29 PROCEDURE — 80061 LIPID PANEL: CPT

## 2020-09-29 PROCEDURE — 84484 ASSAY OF TROPONIN QUANT: CPT

## 2020-09-29 PROCEDURE — 3E02340 INTRODUCTION OF INFLUENZA VACCINE INTO MUSCLE, PERCUTANEOUS APPROACH: ICD-10-PCS | Performed by: HOSPITALIST

## 2020-09-29 PROCEDURE — 93306 TTE W/DOPPLER COMPLETE: CPT | Mod: 26 | Performed by: INTERNAL MEDICINE

## 2020-09-29 PROCEDURE — 93306 TTE W/DOPPLER COMPLETE: CPT

## 2020-09-29 PROCEDURE — 700111 HCHG RX REV CODE 636 W/ 250 OVERRIDE (IP): Performed by: HOSPITALIST

## 2020-09-29 PROCEDURE — 80048 BASIC METABOLIC PNL TOTAL CA: CPT

## 2020-09-29 PROCEDURE — 90471 IMMUNIZATION ADMIN: CPT

## 2020-09-29 PROCEDURE — 700105 HCHG RX REV CODE 258: Performed by: HOSPITALIST

## 2020-09-29 PROCEDURE — 84145 PROCALCITONIN (PCT): CPT

## 2020-09-29 PROCEDURE — 700117 HCHG RX CONTRAST REV CODE 255: Performed by: HOSPITALIST

## 2020-09-29 PROCEDURE — A9270 NON-COVERED ITEM OR SERVICE: HCPCS | Performed by: HOSPITALIST

## 2020-09-29 PROCEDURE — 90662 IIV NO PRSV INCREASED AG IM: CPT | Performed by: HOSPITALIST

## 2020-09-29 PROCEDURE — 93978 VASCULAR STUDY: CPT

## 2020-09-29 PROCEDURE — 85025 COMPLETE CBC W/AUTO DIFF WBC: CPT

## 2020-09-29 RX ORDER — DOXYCYCLINE 100 MG/1
100 CAPSULE ORAL 2 TIMES DAILY
Qty: 10 CAP | Refills: 0 | Status: SHIPPED | OUTPATIENT
Start: 2020-09-29 | End: 2020-10-04

## 2020-09-29 RX ORDER — AZITHROMYCIN 250 MG/1
500 TABLET, FILM COATED ORAL DAILY
Status: DISCONTINUED | OUTPATIENT
Start: 2020-09-30 | End: 2020-09-29 | Stop reason: HOSPADM

## 2020-09-29 RX ADMIN — ENALAPRIL MALEATE 5 MG: 5 TABLET ORAL at 05:42

## 2020-09-29 RX ADMIN — SACUBITRIL AND VALSARTAN 0.5 TABLET: 97; 103 TABLET, FILM COATED ORAL at 05:41

## 2020-09-29 RX ADMIN — Medication 180 MG: at 05:40

## 2020-09-29 RX ADMIN — HEPARIN SODIUM 5000 UNITS: 5000 INJECTION, SOLUTION INTRAVENOUS; SUBCUTANEOUS at 05:39

## 2020-09-29 RX ADMIN — INFLUENZA A VIRUS A/MICHIGAN/45/2015 X-275 (H1N1) ANTIGEN (FORMALDEHYDE INACTIVATED), INFLUENZA A VIRUS A/SINGAPORE/INFIMH-16-0019/2016 IVR-186 (H3N2) ANTIGEN (FORMALDEHYDE INACTIVATED), INFLUENZA B VIRUS B/PHUKET/3073/2013 ANTIGEN (FORMALDEHYDE INACTIVATED), AND INFLUENZA B VIRUS B/MARYLAND/15/2016 BX-69A ANTIGEN (FORMALDEHYDE INACTIVATED) 0.7 ML: 60; 60; 60; 60 INJECTION, SUSPENSION INTRAMUSCULAR at 16:22

## 2020-09-29 RX ADMIN — CEFTRIAXONE SODIUM 2 G: 2 INJECTION, POWDER, FOR SOLUTION INTRAMUSCULAR; INTRAVENOUS at 05:38

## 2020-09-29 RX ADMIN — HUMAN ALBUMIN MICROSPHERES AND PERFLUTREN 3 ML: 10; .22 INJECTION, SOLUTION INTRAVENOUS at 15:33

## 2020-09-29 RX ADMIN — ASPIRIN 81 MG CHEWABLE TABLET 162 MG: 81 TABLET CHEWABLE at 05:41

## 2020-09-29 RX ADMIN — FUROSEMIDE 20 MG: 10 INJECTION, SOLUTION INTRAMUSCULAR; INTRAVENOUS at 05:39

## 2020-09-29 RX ADMIN — DEXTROSE MONOHYDRATE 500 MG: 50 INJECTION, SOLUTION INTRAVENOUS at 05:38

## 2020-09-29 ASSESSMENT — ENCOUNTER SYMPTOMS
WEAKNESS: 0
MYALGIAS: 0
PND: 0
SHORTNESS OF BREATH: 0
TINGLING: 1
FEVER: 0
ORTHOPNEA: 0
COUGH: 0
SPUTUM PRODUCTION: 0
CHILLS: 0

## 2020-09-29 NOTE — PROGRESS NOTES
Dr. Chaudhry notified of CT results, depression screening of 8 and positive alcohol screening, no further orders at this time.

## 2020-09-29 NOTE — PROGRESS NOTES
Hospital Medicine Daily Progress Note    Date of Service  9/29/2020    Chief Complaint  79 y.o. male admitted 9/28/2020 with nausea and SOB    Hospital Course    79 y.o. male who presented 9/28/2020 with shortness of breath that started about 2 hours prior to his arrival.  Patient has a longstanding history of coronary artery disease, CHF, Mobitz type II heart block, prostate cancer, dyslipidemia.  He is actively being followed as an outpatient by cardiology and urology.  Patient does have a previous left ventricular ejection fraction of 35 to 40%.  His most recent echocardiogram was June 2019.  We will repeat an echocardiogram his BNP is elevated.  He will be diuresed.  He will have a CTA of the chest to evaluate for possible infiltrate versus PE.    Interval Problem Update  9/29 - Pt with LLL infiltrate on CT, no peripheral edema or pleural effusions to suggest volume overload, hold IV Lasix. Pt appears to only be on PRN Lasix at home. Change Azithro to oral with plans for outpatient doxy. To have repeat echo today and will check IVC u/s given abnormalities seen on CT. Given paced EKG and inability to interpret, will check another troponin to ensure no ACS.  May be discharged today if all testing returns normal.    Consultants/Specialty  None    Code Status  DNAR/DNI    Disposition  Home today if further testing normal (echo, IVC u/s)    Review of Systems  Review of Systems   Constitutional: Negative for chills and fever.   Respiratory: Negative for cough, sputum production and shortness of breath.    Cardiovascular: Negative for chest pain, orthopnea, leg swelling and PND.   Musculoskeletal: Negative for myalgias.   Neurological: Positive for tingling (States he has neuropathy ). Negative for weakness.   All other systems reviewed and are negative.       Physical Exam  Temp:  [36.2 °C (97.1 °F)-36.4 °C (97.5 °F)] 36.4 °C (97.5 °F)  Pulse:  [69-71] 69  Resp:  [18] 18  BP: ()/(62-80) 106/63  SpO2:  [92 %-96  %] 92 %    Physical Exam  Constitutional:       Appearance: Normal appearance. He is not ill-appearing.   HENT:      Head: Normocephalic and atraumatic.   Cardiovascular:      Rate and Rhythm: Normal rate and regular rhythm.   Pulmonary:      Effort: Pulmonary effort is normal. No respiratory distress.      Breath sounds: Normal breath sounds. No wheezing, rhonchi or rales.   Abdominal:      General: Abdomen is flat.      Palpations: Abdomen is soft.   Musculoskeletal:         General: No swelling.   Skin:     Coloration: Skin is not jaundiced.   Neurological:      General: No focal deficit present.      Mental Status: He is alert and oriented to person, place, and time.         Fluids    Intake/Output Summary (Last 24 hours) at 9/29/2020 0732  Last data filed at 9/29/2020 0538  Gross per 24 hour   Intake 250 ml   Output 3450 ml   Net -3200 ml       Laboratory  Recent Labs     09/28/20  0745 09/29/20  0354   WBC 8.8 9.4   RBC 5.63 5.44   HEMOGLOBIN 17.2 16.6   HEMATOCRIT 52.4* 50.8   MCV 93.1 93.4   MCH 30.6 30.5   MCHC 32.8* 32.7*   RDW 55.4* 55.4*   PLATELETCT 168 165   MPV 10.6 11.0     Recent Labs     09/28/20  0745 09/29/20  0354   SODIUM 137 138   POTASSIUM 4.4 4.5   CHLORIDE 103 101   CO2 22 24   GLUCOSE 113* 83   BUN 23* 28*   CREATININE 1.32 1.40   CALCIUM 9.2 9.0             Recent Labs     09/29/20  0354   TRIGLYCERIDE 63   HDL 79   LDL 59       Imaging  CT-CTA CHEST PULMONARY ARTERY W/ RECONS   Final Result         1. No CT evidence of pulmonary embolism.      2. Apparent defect in the IVC is likely due to poor contrast mixing. Correlate with abdominal ultrasound.      3. Ill-defined groundglass and nodular opacities in the left lower lobe could relate to early infection.         DX-CHEST-PORTABLE (1 VIEW)   Final Result         1. No pulmonary infiltrates or consolidations are noted.      2. Stable cardiomegaly.      EC-ECHOCARDIOGRAM COMPLETE W/O CONT    (Results Pending)   US-IVC/ILIAC VENOUS DUPLEX  COMPLETE    (Results Pending)        Assessment/Plan  * Community acquired pneumonia  Assessment & Plan  - LLL on CT, pt also with history of chronic bronchitis per Cardiology notes  - Continue Rocephin, convert Azithro to oral given pt complaint of pain with infusion  - Plan for outpatient Doxy  - Is currently on room air and feels well. Recheck on troponin given his nausea, awaiting echo as well. Fefect in the IVC is likely due to poor contrast mixing on CT, will confirm with US today.   - Discharge today if echo unchanged and IVC u/s normal    NYHA class 3 acute on chronic systolic heart failure (HCC)- (present on admission)  Assessment & Plan  - Continue outpatient meds, pt does not appear to be in exacerbation, stop Lasix     Biventricular implantable cardioverter-defibrillator in situ- (present on admission)  Assessment & Plan  Patient has a pacemaker in place was previously had second-degree Mobitz type II heart block.  Not MRI compatible.    Ischemic cardiomyopathy- (present on admission)  Assessment & Plan  History of ischemic cardiomyopathy with the most recent left ventricular ejection fraction 40%.  We will repeat the echocardiogram.  His last echocardiogram is from June 2019.    HTN (hypertension)- (present on admission)  Assessment & Plan  - Blood pressure management optimization keep systolic blood pressure less than 140 diastolic under 90.  - Continue Metoprolol and Entresto, stop Enalapril given the ARB in Entresto    Hypercholesteremia- (present on admission)  Assessment & Plan  Low-fat low-cholesterol diet  Check a fasting lipid panel.  Currently is not on a statin.  Patient is taking Repatha.  Most recent LDL is 54.  Lab Results   Component Value Date/Time    CHOLSTRLTOT 136 07/03/2020 03:30 PM    LDL 54 07/03/2020 03:30 PM    HDL 65 07/03/2020 03:30 PM    TRIGLYCERIDE 87 07/03/2020 03:30 PM              CAD (coronary artery disease)- (present on admission)  Assessment & Plan  Patient has  coronary artery disease.  Continue with chest pain management.  Patient is on metoprolol XL 50 mg daily, Entresto, aspirin and Repatha at home.  Monitor troponin levels initial troponin level is slightly elevated but not in the abnormal range.  EKG I reviewed myself which shows a paced rhythm no further interpretation really is able to be made from the EKG    History of CVA (cerebrovascular accident)  Assessment & Plan  Patient has a history of a CVA, on ASA and Repatha due to statin intolerance.  No neurological deficits noted at this point in time.    CKD (chronic kidney disease) stage 3, GFR 30-59 ml/min (HCC)- (present on admission)  Assessment & Plan  Appears to be at baseline    Alcohol abuse- (present on admission)  Assessment & Plan  Patient has 2 shots every night and sometimes more.  He has been educated on alcohol cessation.    Portal hypertension (HCC)- (present on admission)  Assessment & Plan  Continue with beta-blocker for his portal hypertension.    Prostate cancer (HCC)- (present on admission)  Assessment & Plan  History of prostate cancer.  Followed as an outpatient by urology.       VTE prophylaxis: Heparin

## 2020-09-29 NOTE — PROGRESS NOTES
Rounded with Dr. Kendrick at bedside. Updated MD of patient's hypotension this morning, lasix and vastec have been discontinued. Azithromycin to be changed to PO.

## 2020-09-29 NOTE — PROGRESS NOTES
Report received from Kathleen BHATIA. Plan of care discussed. Patient resting comfortably in bed, denies any needs at this time. Safety precautions in place.

## 2020-09-29 NOTE — PROGRESS NOTES
0800 Assessment completed, patient is alert and oriented x 4, patient states he is ready to go home. Patient denies any pain or discomfort. Awaiting on echo for discharge.     0959 Dr. Kendrick notified of patient's ultrasound results posted and troponin of 21, no further orders at this time.     1330 RN spoke to echo tech regarding when echo can be done. Echo tech stated 2 more outpatient echos are to be done and patient is to be seen first as echo is pending discharge. Patient notified.

## 2020-09-29 NOTE — PROGRESS NOTES
Telemetry Shift Summary    Rhythm Paced w/BBB  HR Range 69  Ectopy rPVCs  Measurements -/.16/.44        Normal Values  Rhythm SR  HR Range    Measurements 0.12-0.20 / 0.06-0.10  / 0.30-0.52

## 2020-09-29 NOTE — RESPIRATORY CARE
COPD EDUCATION by COPD CLINICAL EDUCATOR  9/29/2020 at 12:47 PM by Bonita Adkins, RRT     Patient reviewed by COPD education team. Patient does not have a history or diagnosis of COPD and is a former smoker quit 1970.  Therefore does not qualify for the COPD program.

## 2020-09-29 NOTE — ASSESSMENT & PLAN NOTE
- LLL on CT, pt also with history of chronic bronchitis per Cardiology notes  - Continue Rocephin, convert Azithro to oral given pt complaint of pain with infusion  - Plan for outpatient Doxy  - Is currently on room air and feels well. Recheck on troponin given his nausea, awaiting echo as well. Fefect in the IVC is likely due to poor contrast mixing on CT, will confirm with US today.   - Discharge today if echo unchanged and IVC u/s normal

## 2020-09-29 NOTE — CARE PLAN
Problem: Communication  Goal: The ability to communicate needs accurately and effectively will improve  Outcome: PROGRESSING AS EXPECTED   Pt uses call light to alert staff to his needs.    Problem: Safety  Goal: Will remain free from injury  Outcome: PROGRESSING AS EXPECTED  Goal: Will remain free from falls  Outcome: PROGRESSING AS EXPECTED   Pt verbalized understanding of safety education provided with no further questions at this time. Safety measures in place.

## 2020-09-29 NOTE — CARE PLAN
Problem: Knowledge Deficit:  Goal: Knowledge of the prescribed therapeutic regimen will improve  Outcome: PROGRESSING AS EXPECTED  Discussed plan of care with patient including medications administered, echo and safety precautions. Allowed time for questions, patient agreed and verbalized understanding.     Problem: Respiratory:  Goal: Ability to maintain a clear airway will improve  Outcome: PROGRESSING AS EXPECTED   Patient denies any dizziness or SOB, will continue to monitor.

## 2020-09-29 NOTE — DISCHARGE INSTRUCTIONS
Diet: Diet Order Cardiac, 2 Gram Sodium  Activity: As tolerated  Follow Up: PCP in 1-2 weeks   Disposition:Home  Diagnosis: Community acquired pneumonia    Follow up with your Primary Care Provider Matt Pagan M.D. as scheduled or sooner if your symptoms persist or worsen.  Return to Emergency Room for severe chest pain, shortness of breath, signs of a stroke, or any other emergencies.      Discharge Instructions    Discharged to home by car with relative. Discharged via wheelchair, hospital escort: Yes.  Special equipment needed: Not Applicable    Be sure to schedule a follow-up appointment with your primary care doctor or any specialists as instructed.     Discharge Plan:   Diet Plan: Discussed  Activity Level: Discussed  Confirmed Follow up Appointment: Appointment Scheduled  Confirmed Symptoms Management: Discussed  Medication Reconciliation Updated: Yes  Influenza Vaccine Indication: Indicated: 65 years and older    I understand that a diet low in cholesterol, fat, and sodium is recommended for good health. Unless I have been given specific instructions below for another diet, I accept this instruction as my diet prescription.   Other diet: cardiac, 2 gram sodium     Special Instructions:     HF Patient Discharge Instructions  · Monitor your weight daily, and maintain a weight chart, to track your weight changes.   · Activity as tolerated, unless your Doctor has ordered otherwise. Other activity order: n/a.  · Follow a low fat, low cholesterol, low salt diet unless instructed otherwise by your Doctor. Read the labels on the back of food products and track your intake of fat, cholesterol and salt.   · Fluid Restriction No. If a Fluid Restriction has been ordered by your Doctor, measure fluids with a measuring cup to ensure that you are not exceeding the restriction.   · No smoking.  · Oxygen No. If your Doctor has ordered that you wear Oxygen at home, it is important to wear it as ordered.  · Did you receive  an explanation from staff on the importance of taking each of your medications and why it is necessary to stay on the medications the physician/care provider has ordered? Yes  · Do you have any questions concerning how to manage your heart failure and what to do should you have any increased signs and symptoms after you go home? No  · Do you feel like your heart failure care team involved you in the care treatment plan and allowed you to make decisions regarding your care while in the hospital and addressed any discharge needs you might have? Yes    See the educational handout provided at discharge for more information on monitoring your daily weight, activity and diet. This also explains more about Heart Failure, symptoms of a flare-up and some of the tests that you have undergone.     Warning Signs of a Flare-Up include:  · Swelling in the ankles or lower legs.  · Shortness of breath, while at rest, or while doing normal activities.   · Shortness of breath at night when in bed, or coughing in bed.   · Requiring more pillows to sleep at night, or needing to sit up at night to sleep.  · Feeling weak, dizzy or fatigued.     When to call your Doctor:  · Call [facility] about questions regarding the discharge instructions you were given [phone].  (Discharge Unit Med/Tele)  · Call your Primary Care Physician or Cardiologist if:   1. You experience any pain radiating to your jaw or neck.  2. You have any difficulty breathing.  3. You experience weight gain of 2 lbs in a day or 5 lbs in a week.   4. You feel any palpitations or irregular heartbeats.  5. You become dizzy or lose consciousness.   If you have had an angiogram or had a pacemaker or AICD placed, and experience:  1. Bleeding, drainage or swelling at the surgical / puncture site.  2. Fever greater than 100.0 F  3. Shock from internal defibrillator.  4. Cool and / or numb extremities.      · Is patient discharged on Warfarin /  Coumadin?   No       Pneumonitis    Pneumonitis is inflammation of the lungs. Infection or exposure to certain substances or allergens can cause this condition. Allergens are substances that you are allergic to.  What are the causes?  This condition may be caused by:  · An infection from bacteria or a virus (pneumonia).  · Exposure to certain substances in the workplace. This includes working on farms and in certain industries. Some substances that can cause this condition include asbestos, silica, inhaled acids, or inhaled chlorine gas.  · Repeated exposure to bird feathers, bird feces, or other allergens.  · Medicines such as chemotherapy drugs, certain antibiotic medicines, and some heart medicines.  · Radiation therapy.  · Exposure to mold. A hot tub, sauna, or home humidifier can have mold growing in it, even if it looks clean. You can breathe in the mold through water vapor.  · Breathing in (aspirating) stomach contents, food, or liquids into the lungs.  What are the signs or symptoms?  Symptoms of this condition include:  · Shortness of breath or trouble breathing. This is the most common symptom.  · Cough.  · Fever.  · Decreased energy.  · Decreased appetite.  How is this diagnosed?  This condition may be diagnosed based on:  · Your medical history.  · Physical exam.  · Blood tests.  · Other tests, including:  ? Pulmonary function test (PFT). This measures how well your lungs work.  ? Chest X-ray.  ? CT scan of the lungs.  ? Bronchoscopy. In this procedure, your health care provider looks at your airways through an instrument called a bronchoscope.  ? Lung biopsy. In this procedure, your health care provider takes a small piece of tissue from your lungs to examine it.  How is this treated?  Treatment depends on the cause of the condition. If the cause is exposure to a substance, avoiding further exposure to that substance will help reduce your symptoms. Possible medical treatments for pneumonitis  include:  · Corticosteroid medicine to help decrease inflammation.  · Antibiotic medicine to help fight an infection caused by bacteria.  · Bronchodilators or inhalers to help relax the muscles and make breathing easier.  · Oxygen therapy, if you are having trouble breathing.  Follow these instructions at home:  · Take or use over-the-counter and prescription medicines only as told by your health care provider. This includes any inhaler use.  · Avoid exposure to any substance that caused your pneumonitis. If you need to work with substances that can cause pneumonitis, wear a mask to protect your lungs.  · If you were prescribed an antibiotic, take it as told by your health care provider. Do not stop taking the antibiotic even if you start to feel better.  · If you were prescribed an inhaler, keep it with you at all times.  · Do not use any products that contain nicotine or tobacco, such as cigarettes and e-cigarettes. If you need help quitting, ask your health care provider.  · Keep all follow-up visits as told by your health care provider. This is important.  Contact a health care provider if:  · You have a fever.  · Your symptoms get worse.  Get help right away if:  · You have new or worse shortness of breath.  · You develop a blue color (cyanosis) under your fingernails.  Summary  · Pneumonitis is inflammation of the lungs. This condition can be caused by infection or exposure to certain substances or allergens.  · The most common symptom of this condition is shortness of breath or trouble breathing.  · Treatment depends on the cause of your condition.  This information is not intended to replace advice given to you by your health care provider. Make sure you discuss any questions you have with your health care provider.  Document Released: 06/07/2011 Document Revised: 11/30/2018 Document Reviewed: 11/09/2017  [email or web address] Patient Education © 2020 [email or web address]    Doxycycline delayed-release capsules  What is this  medicine?  DOXYCYCLINE (dox zion cevallos) is a tetracycline antibiotic. It is used to treat certain kinds of bacterial infections, Lyme disease, and malaria. It will not work for colds, flu, or other viral infections.  This medicine may be used for other purposes; ask your health care provider or pharmacist if you have questions.  COMMON BRAND NAME(S): Oracea  What should I tell my health care provider before I take this medicine?  They need to know if you have any of these conditions:  · bowel disease like colitis  · liver disease  · long exposure to sunlight like working outdoors  · an unusual or allergic reaction to doxycycline, tetracycline antibiotics, other medicines, foods, dyes, or preservatives  · pregnant or trying to get pregnant  · breast-feeding  How should I use this medicine?  Take this medicine by mouth with a full glass of water. Follow the directions on the prescription label. Do not crush or chew. The capsules may be opened and the pellets sprinkled on applesauce. Swallow the pellets whole without chewing. Follow with an 8 ounce glass of water to help you swallow all the pellets. Do not prepare a dose and store for later use. The applesauce mixture should be taken immediately after you prepare it. It is best to take this medicine without other food, but if it upsets your stomach take it with food. Take your medicine at regular intervals. Do not take your medicine more often than directed. Take all of your medicine as directed even if you think your are better. Do not skip doses or stop your medicine early.  Talk to your pediatrician regarding the use of this medicine in children. While this drug may be prescribed for selected conditions, precautions do apply.  Overdosage: If you think you have taken too much of this medicine contact a poison control center or emergency room at once.  NOTE: This medicine is only for you. Do not share this medicine with others.  What if I miss a dose?  If you miss a  dose, take it as soon as you can. If it is almost time for your next dose, take only that dose. Do not take double or extra doses.  What may interact with this medicine?  · antacids  · barbiturates  · birth control pills  · bismuth subsalicylate  · carbamazepine  · methoxyflurane  · other antibiotics  · phenytoin  · vitamins that contain iron  · warfarin  This list may not describe all possible interactions. Give your health care provider a list of all the medicines, herbs, non-prescription drugs, or dietary supplements you use. Also tell them if you smoke, drink alcohol, or use illegal drugs. Some items may interact with your medicine.  What should I watch for while using this medicine?  Tell your doctor or health care professional if your symptoms do not improve.  Do not treat diarrhea with over the counter products. Contact your doctor if you have diarrhea that lasts more than 2 days or if it is severe and watery.  Do not take this medicine just before going to bed. It may not dissolve properly when you lay down and can cause pain in your throat. Drink plenty of fluids while taking this medicine to also help reduce irritation in your throat.  This medicine can make you more sensitive to the sun. Keep out of the sun. If you cannot avoid being in the sun, wear protective clothing and use sunscreen. Do not use sun lamps or tanning beds/booths.  If you are being treated for a sexually transmitted infection, avoid sexual contact until you have finished your treatment. Your sexual partner may also need treatment.  Avoid antacids, aluminum, calcium, magnesium, and iron products for 4 hours before and 2 hours after taking a dose of this medicine.  Birth control pills may not work properly while you are taking this medicine. Talk to your doctor about using an extra method of birth control.  If you are using this medicine to prevent malaria, you should still protect yourself from contact with mosquitos. Stay in screened-in  areas, use mosquito nets, keep your body covered, and use an insect repellent.  What side effects may I notice from receiving this medicine?  Side effects that you should report to your doctor or health care professional as soon as possible:  · allergic reactions like skin rash, itching or hives, swelling of the face, lips, or tongue  · difficulty breathing  · fever  · itching in the rectal or genital area  · pain on swallowing  · rash, fever, and swollen lymph nodes  · redness, blistering, peeling or loosening of the skin, including inside the mouth  · severe stomach pain or cramps  · unusual bleeding or bruising  · unusually weak or tired  · yellowing of the eyes or skin  Side effects that usually do not require medical attention (report to your doctor or health care professional if they continue or are bothersome):  · diarrhea  · loss of appetite  · nausea, vomiting  This list may not describe all possible side effects. Call your doctor for medical advice about side effects. You may report side effects to FDA at 6-435-SWJ-2300.  Where should I keep my medicine?  Keep out of the reach of children.  Store at room temperature, below 25 degrees C (77 degrees F). Protect from light. Keep container tightly closed. Throw away any unused medicine after the expiration date. Taking this medicine after the expiration date can make you seriously ill.  NOTE: This sheet is a summary. It may not cover all possible information. If you have questions about this medicine, talk to your doctor, pharmacist, or health care provider.  © 2020 [employer] (2020-03-19 13:23:57)    Depression / Suicide Risk    As you are discharged from this [facility] facility, it is important to learn how to keep safe from harming yourself.    Recognize the warning signs:  · Abrupt changes in personality, positive or negative- including increase in energy   · Giving away possessions  · Change in eating patterns- significant weight changes-   positive or negative  · Change in sleeping patterns- unable to sleep or sleeping all the time   · Unwillingness or inability to communicate  · Depression  · Unusual sadness, discouragement and loneliness  · Talk of wanting to die  · Neglect of personal appearance   · Rebelliousness- reckless behavior  · Withdrawal from people/activities they love  · Confusion- inability to concentrate     If you or a loved one observes any of these behaviors or has concerns about self-harm, here's what you can do:  · Talk about it- your feelings and reasons for harming yourself  · Remove any means that you might use to hurt yourself (examples: pills, rope, extension cords, firearm)  · Get professional help from the community (Mental Health, Substance Abuse, psychological counseling)  · Do not be alone:Call your Safe Contact- someone whom you trust who will be there for you.  · Call your local CRISIS HOTLINE 524-5382 or 477-387-1966  · Call your local Children's Mobile Crisis Response Team [place] (081) 386-5793 or www.[employer]  · Call the toll free National Suicide Prevention Hotlines   · National Suicide Prevention Lifeline 285-475-BJSE (6263)  · National Hope Line Network 800-SUICIDE (337-0424)

## 2020-09-29 NOTE — DISCHARGE SUMMARY
Discharge Summary    CHIEF COMPLAINT ON ADMISSION  Chief Complaint   Patient presents with   • Nausea   • Dizziness   • Shortness of Breath       Reason for Admission  nausea, fatigue, dizziness,      Admission Date  9/28/2020    CODE STATUS  DNAR/DNI    HPI & HOSPITAL COURSE  This is a 79 y.o. male here with    79 y.o. male who presented 9/28/2020 with shortness of breath - echo was largely unchanged, pt did not have effusions or edema to suggest overload, but he did have infiltrate seen on CT suggestive of CAP. We will treat with a week course of antibiotics.  Echo was unchanged, troponins negative, Bps stable, and he is tolerating room air.  Will have him follow up with PCP in 1-2 weeks.       Consultants/Specialty  None    Code Status  DNAR/DNI    Disposition  Home today    Review of Systems  Review of Systems   Constitutional: Negative for chills and fever.   Respiratory: Negative for cough, sputum production and shortness of breath.    Cardiovascular: Negative for chest pain, orthopnea, leg swelling and PND.   Musculoskeletal: Negative for myalgias.   Neurological: Positive for tingling (States he has neuropathy ). Negative for weakness.   All other systems reviewed and are negative.       Physical Exam  Temp:  [36.2 °C (97.1 °F)-36.4 °C (97.5 °F)] 36.4 °C (97.5 °F)  Pulse:  [69-71] 69  Resp:  [18] 18  BP: ()/(62-80) 106/63  SpO2:  [92 %-96 %] 92 %    Physical Exam  Constitutional:       Appearance: Normal appearance. He is not ill-appearing.   HENT:      Head: Normocephalic and atraumatic.   Cardiovascular:      Rate and Rhythm: Normal rate and regular rhythm.   Pulmonary:      Effort: Pulmonary effort is normal. No respiratory distress.      Breath sounds: Normal breath sounds. No wheezing, rhonchi or rales.   Abdominal:      General: Abdomen is flat.      Palpations: Abdomen is soft.   Musculoskeletal:         General: No swelling.   Skin:     Coloration: Skin is not jaundiced.   Neurological:       General: No focal deficit present.      Mental Status: He is alert and oriented to person, place, and time.         Fluids    Intake/Output Summary (Last 24 hours) at 9/29/2020 0732  Last data filed at 9/29/2020 0538  Gross per 24 hour   Intake 250 ml   Output 3450 ml   Net -3200 ml       Laboratory  Recent Labs     09/28/20  0745 09/29/20  0354   WBC 8.8 9.4   RBC 5.63 5.44   HEMOGLOBIN 17.2 16.6   HEMATOCRIT 52.4* 50.8   MCV 93.1 93.4   MCH 30.6 30.5   MCHC 32.8* 32.7*   RDW 55.4* 55.4*   PLATELETCT 168 165   MPV 10.6 11.0     Recent Labs     09/28/20  0745 09/29/20  0354   SODIUM 137 138   POTASSIUM 4.4 4.5   CHLORIDE 103 101   CO2 22 24   GLUCOSE 113* 83   BUN 23* 28*   CREATININE 1.32 1.40   CALCIUM 9.2 9.0             Recent Labs     09/29/20  0354   TRIGLYCERIDE 63   HDL 79   LDL 59       Imaging  CT-CTA CHEST PULMONARY ARTERY W/ RECONS   Final Result         1. No CT evidence of pulmonary embolism.      2. Apparent defect in the IVC is likely due to poor contrast mixing. Correlate with abdominal ultrasound.      3. Ill-defined groundglass and nodular opacities in the left lower lobe could relate to early infection.         DX-CHEST-PORTABLE (1 VIEW)   Final Result         1. No pulmonary infiltrates or consolidations are noted.      2. Stable cardiomegaly.      EC-ECHOCARDIOGRAM COMPLETE W/O CONT    (Results Pending)   US-IVC/ILIAC VENOUS DUPLEX COMPLETE    (Results Pending)        Assessment/Plan  * Community acquired pneumonia  Assessment & Plan  - LLL on CT, pt also with history of chronic bronchitis per Cardiology notes  - Continue Rocephin, convert Azithro to oral given pt complaint of pain with infusion  - Plan for outpatient Doxy  - Is currently on room air and feels well. Recheck on troponin given his nausea, awaiting echo as well. Fefect in the IVC is likely due to poor contrast mixing on CT, will confirm with US today.   - Discharge today if echo unchanged and IVC u/s normal    NYHA class 3 acute  on chronic systolic heart failure (HCC)- (present on admission)  Assessment & Plan  - Continue outpatient meds, pt does not appear to be in exacerbation, stop Lasix     Biventricular implantable cardioverter-defibrillator in situ- (present on admission)  Assessment & Plan  Patient has a pacemaker in place was previously had second-degree Mobitz type II heart block.  Not MRI compatible.    Ischemic cardiomyopathy- (present on admission)  Assessment & Plan  History of ischemic cardiomyopathy with the most recent left ventricular ejection fraction 40%.  We will repeat the echocardiogram.  His last echocardiogram is from June 2019.    HTN (hypertension)- (present on admission)  Assessment & Plan  - Blood pressure management optimization keep systolic blood pressure less than 140 diastolic under 90.  - Continue Metoprolol and Entresto, stop Enalapril given the ARB in Entresto    Hypercholesteremia- (present on admission)  Assessment & Plan  Low-fat low-cholesterol diet  Check a fasting lipid panel.  Currently is not on a statin.  Patient is taking Repatha.  Most recent LDL is 54.  Lab Results   Component Value Date/Time    CHOLSTRLTOT 136 07/03/2020 03:30 PM    LDL 54 07/03/2020 03:30 PM    HDL 65 07/03/2020 03:30 PM    TRIGLYCERIDE 87 07/03/2020 03:30 PM              CAD (coronary artery disease)- (present on admission)  Assessment & Plan  Patient has coronary artery disease.  Continue with chest pain management.  Patient is on metoprolol XL 50 mg daily, Entresto, aspirin and Repatha at home.  Monitor troponin levels initial troponin level is slightly elevated but not in the abnormal range.  EKG I reviewed myself which shows a paced rhythm no further interpretation really is able to be made from the EKG    History of CVA (cerebrovascular accident)  Assessment & Plan  Patient has a history of a CVA, on ASA and Repatha due to statin intolerance.  No neurological deficits noted at this point in time.    CKD (chronic kidney  disease) stage 3, GFR 30-59 ml/min (HCC)- (present on admission)  Assessment & Plan  Appears to be at baseline    Alcohol abuse- (present on admission)  Assessment & Plan  Patient has 2 shots every night and sometimes more.  He has been educated on alcohol cessation.    Portal hypertension (HCC)- (present on admission)  Assessment & Plan  Continue with beta-blocker for his portal hypertension.    Prostate cancer (HCC)- (present on admission)  Assessment & Plan  History of prostate cancer.  Followed as an outpatient by urology.       VTE prophylaxis: Heparin          Therefore, he is discharged in good and stable condition to home with close outpatient follow-up.    The patient recovered much more quickly than anticipated on admission.    Discharge Date  9/29/20    FOLLOW UP ITEMS POST DISCHARGE  None    DISCHARGE DIAGNOSES  Principal Problem (Resolved):    Community acquired pneumonia POA: Unknown  Active Problems:    NYHA class 3 acute on chronic systolic heart failure (HCC) POA: Yes    CAD (coronary artery disease) (Chronic) POA: Yes    Hypercholesteremia (Chronic) POA: Yes    HTN (hypertension) (Chronic) POA: Yes    Ischemic cardiomyopathy (Chronic) POA: Yes    Biventricular implantable cardioverter-defibrillator in situ POA: Yes    Alcohol abuse POA: Yes      Overview: Up to 5 drinks per day    CKD (chronic kidney disease) stage 3, GFR 30-59 ml/min (HCC) POA: Yes    History of CVA (cerebrovascular accident) POA: Unknown    Prostate cancer (HCC) (Chronic) POA: Yes      Overview: IMO load March 2020    Portal hypertension (HCC) POA: Yes      FOLLOW UP  Future Appointments   Date Time Provider Department Center   10/5/2020  1:00 PM MAIN LAB [facility] None   11/12/2020  2:45 PM Ruby Thakur M.D. PULM None   1/5/2021  1:00 PM VASCULAR NURSE PRACTITIONER [facility] None     Matt Pagan M.D.  [address] [place] [address] [phone]  [address]  [facility] [id number]  [phone]    Schedule an appointment as soon as possible for a visit  Please call to  "schedule a hospital follow up with your primary care physician. Thank you!       MEDICATIONS ON DISCHARGE     Medication List      START taking these medications      Instructions   doxycycline 100 MG capsule  Commonly known as: MONODOX   Take 1 Cap by mouth 2 times a day for 5 days.  Dose: 100 mg        CONTINUE taking these medications      Instructions   aspirin 81 MG tablet   Take 81 mg by mouth every day.  Dose: 81 mg     Co Q 10 100 MG Caps   Take 200 mg by mouth every day.  Dose: 200 mg     Entresto  MG Tabs tablet  Generic drug: sacubitril-valsartan   Take 0.5 Tabs by mouth 2 Times a Day. 0.5 tablet = dose , twice daily  Dose: 0.5 Tab     metoprolol SR 50 MG Tb24  Commonly known as: TOPROL XL   TAKE 1 TABLET BY MOUTH EVERY DAY     potassium citrate SR 10 MEQ (1080 MG) Tbcr  Commonly known as: UROCIT-K SR   Take 10 mEq by mouth every 48 hours.  Dose: 10 mEq     Repatha SureClick 140 MG/ML Soaj  Generic drug: Evolocumab (REPATHA)   Inject 1 Each as instructed every 14 days.  Dose: 1 mL     Taltz 80 MG/ML Sosy  Generic drug: Ixekizumab   Inject 80 mg as instructed Q30 DAYS.  Dose: 80 mg            Allergies  Allergies   Allergen Reactions   • Plavix [Clopidogrel Bisulfate] Anaphylaxis   • Spironolactone Unspecified     Hyperkalemia   • Statins [Hmg-Coa-R Inhibitors] Unspecified     Muscles aches     • Ticlid [Ticlopidine Hydrochloride] Unspecified     Pt states \"I'm not sure what happens\".         DIET  Orders Placed This Encounter   Procedures   • Diet Order Cardiac, 2 Gram Sodium     Standing Status:   Standing     Number of Occurrences:   1     Order Specific Question:   Diet:     Answer:   Cardiac [6]     Order Specific Question:   Diet:     Answer:   2 Gram Sodium [7]       ACTIVITY  As tolerated.  Weight bearing as tolerated    CONSULTATIONS  None    PROCEDURES  None    LABORATORY  Lab Results   Component Value Date    SODIUM 138 09/29/2020    POTASSIUM 4.5 09/29/2020    CHLORIDE 101 09/29/2020    " CO2 24 09/29/2020    GLUCOSE 83 09/29/2020    BUN 28 (H) 09/29/2020    CREATININE 1.40 09/29/2020        Lab Results   Component Value Date    WBC 9.4 09/29/2020    HEMOGLOBIN 16.6 09/29/2020    HEMATOCRIT 50.8 09/29/2020    PLATELETCT 165 09/29/2020        Total time of the discharge process exceeds 31 minutes.

## 2020-09-29 NOTE — PROGRESS NOTES
2 RN skin check complete.   Devices in place tele box.  Skin assessed under devices yes.  Confirmed pressure ulcers found on n/a.  New potential pressure ulcers noted on n/a. Wound consult placed n/a.  The following interventions in place ambulate, encouraged turning in bed.

## 2020-09-29 NOTE — PROGRESS NOTES
Telemetry Shift Summary    RHYTHM:100% paced SR  HR RANGE:68-72  ECTOPY:R PVC  MEASUREMENTS:P/0.12/0.48    Normal Measurements  Rhythm SR  HR Range:   Measurements: 0.12-0.20/0.06-0.10/0.30-0.52    Per EMMIE Ackerman    Tele strips reviewed and placed in chart.

## 2020-09-29 NOTE — PROGRESS NOTES
Report received from Kyung BHATIA. Pt asleep at the time of report. Plan of care assumed. Safety precautions in place and call light within reach.

## 2020-09-29 NOTE — PROGRESS NOTES
Report given to Kathleen BHATIA. Plan of care discussed. Patient resting comfortably in bed. Safety precautions in place.

## 2020-09-29 NOTE — CARE PLAN
Problem: Fluid Volume:  Goal: Risk for excess fluid volume will decrease  Outcome: PROGRESSING AS EXPECTED  Strict I & Os, daily weights.     Problem: Respiratory:  Goal: Respiratory status will improve  9/28/2020 1702 by Kyung Mustafa R.N.  Outcome: PROGRESSING AS EXPECTED  9/28/2020 1701 by Kyung Mustafa R.N.  Outcome: PROGRESSING AS EXPECTED   Patient denies any SOB at this time, patient will be on 20 mg of IV lasix bid.

## 2020-09-30 ENCOUNTER — OFFICE VISIT (OUTPATIENT)
Dept: CARDIOLOGY | Facility: MEDICAL CENTER | Age: 80
End: 2020-09-30
Payer: MEDICARE

## 2020-09-30 VITALS
SYSTOLIC BLOOD PRESSURE: 110 MMHG | WEIGHT: 203 LBS | RESPIRATION RATE: 18 BRPM | OXYGEN SATURATION: 93 % | HEART RATE: 70 BPM | BODY MASS INDEX: 28.42 KG/M2 | DIASTOLIC BLOOD PRESSURE: 64 MMHG | HEIGHT: 71 IN

## 2020-09-30 DIAGNOSIS — I44.1 SECOND DEGREE AV BLOCK, MOBITZ TYPE II: Chronic | ICD-10-CM

## 2020-09-30 DIAGNOSIS — Z79.899 HIGH RISK MEDICATION USE: ICD-10-CM

## 2020-09-30 DIAGNOSIS — I25.5 ISCHEMIC CARDIOMYOPATHY: Chronic | ICD-10-CM

## 2020-09-30 DIAGNOSIS — I48.0 PAROXYSMAL ATRIAL FIBRILLATION (HCC): ICD-10-CM

## 2020-09-30 DIAGNOSIS — K76.6 PORTAL HYPERTENSION (HCC): ICD-10-CM

## 2020-09-30 DIAGNOSIS — I10 ESSENTIAL HYPERTENSION: Chronic | ICD-10-CM

## 2020-09-30 DIAGNOSIS — N18.30 CKD (CHRONIC KIDNEY DISEASE) STAGE 3, GFR 30-59 ML/MIN: ICD-10-CM

## 2020-09-30 DIAGNOSIS — R12 HEARTBURN: Chronic | ICD-10-CM

## 2020-09-30 DIAGNOSIS — I51.89 LEFT VENTRICULAR SYSTOLIC DYSFUNCTION, NYHA CLASS 3: ICD-10-CM

## 2020-09-30 DIAGNOSIS — I95.1 ORTHOSTATIC HYPOTENSION: Chronic | ICD-10-CM

## 2020-09-30 DIAGNOSIS — I50.20 SYSTOLIC HEART FAILURE, ACC/AHA STAGE C (HCC): ICD-10-CM

## 2020-09-30 DIAGNOSIS — Z95.1 HX OF CABG: ICD-10-CM

## 2020-09-30 DIAGNOSIS — Z95.810 BIVENTRICULAR IMPLANTABLE CARDIOVERTER-DEFIBRILLATOR IN SITU: ICD-10-CM

## 2020-09-30 DIAGNOSIS — Z98.890 S/P ABLATION OF ATRIAL FIBRILLATION: ICD-10-CM

## 2020-09-30 DIAGNOSIS — I25.2 HISTORY OF MYOCARDIAL INFARCTION: Chronic | ICD-10-CM

## 2020-09-30 DIAGNOSIS — I47.29 PAROXYSMAL VENTRICULAR TACHYCARDIA (HCC): ICD-10-CM

## 2020-09-30 DIAGNOSIS — Z86.79 S/P ABLATION OF ATRIAL FIBRILLATION: ICD-10-CM

## 2020-09-30 DIAGNOSIS — Z86.73 HISTORY OF CVA (CEREBROVASCULAR ACCIDENT): ICD-10-CM

## 2020-09-30 DIAGNOSIS — F10.10 ALCOHOL ABUSE: ICD-10-CM

## 2020-09-30 DIAGNOSIS — E87.5 HYPERKALEMIA: ICD-10-CM

## 2020-09-30 DIAGNOSIS — E78.00 HYPERCHOLESTEREMIA: Chronic | ICD-10-CM

## 2020-09-30 DIAGNOSIS — I50.23 NYHA CLASS 3 ACUTE ON CHRONIC SYSTOLIC HEART FAILURE (HCC): ICD-10-CM

## 2020-09-30 DIAGNOSIS — I25.10 CORONARY ARTERY DISEASE INVOLVING NATIVE CORONARY ARTERY OF NATIVE HEART WITHOUT ANGINA PECTORIS: Chronic | ICD-10-CM

## 2020-09-30 DIAGNOSIS — I63.9 CEREBROVASCULAR ACCIDENT (CVA), UNSPECIFIED MECHANISM (HCC): Chronic | ICD-10-CM

## 2020-09-30 PROCEDURE — 99215 OFFICE O/P EST HI 40 MIN: CPT | Performed by: INTERNAL MEDICINE

## 2020-09-30 RX ORDER — TORSEMIDE 20 MG/1
20 TABLET ORAL DAILY
Qty: 30 TAB | Refills: 11 | Status: SHIPPED | OUTPATIENT
Start: 2020-09-30 | End: 2021-07-16 | Stop reason: SDUPTHER

## 2020-09-30 ASSESSMENT — ENCOUNTER SYMPTOMS
SHORTNESS OF BREATH: 0
ORTHOPNEA: 0
LOSS OF CONSCIOUSNESS: 0
DIZZINESS: 0
CARDIOVASCULAR NEGATIVE: 1
BRUISES/BLEEDS EASILY: 0
CLAUDICATION: 0
COUGH: 0
PALPITATIONS: 0
PND: 0
MUSCULOSKELETAL NEGATIVE: 1
NEUROLOGICAL NEGATIVE: 1
EYES NEGATIVE: 1
STRIDOR: 0
WEAKNESS: 0
RESPIRATORY NEGATIVE: 1
SORE THROAT: 0
SPUTUM PRODUCTION: 0
GASTROINTESTINAL NEGATIVE: 1
CHILLS: 0
CONSTITUTIONAL NEGATIVE: 1
WHEEZING: 0
HEMOPTYSIS: 0
FEVER: 0

## 2020-09-30 ASSESSMENT — FIBROSIS 4 INDEX: FIB4 SCORE: 3.11

## 2020-09-30 NOTE — PROGRESS NOTES
Telemetry Shift Summary    Rhythm Paced w/BBB  HR Range 69-78  Ectopy rPVCs  Measurements -/.16/.38        Normal Values  Rhythm SR  HR Range    Measurements 0.12-0.20 / 0.06-0.10  / 0.30-0.52

## 2020-09-30 NOTE — PROGRESS NOTES
"Chief Complaint   Patient presents with   • Congestive Heart Failure     NYHA class 3 acute on chronic systolic heart failure        Subjective:   Isai Fuentes is a 79 y.o. male who presents today as a follow-up for his heart failure with reduced ejection fraction.  He was recently mid to the hospital for what he said was abdominal bloating.  We did stop his spironolactone due to hyperkalemia he was not taking any diuretics.  He values is taking them as needed.  His blood pressures been running borderline low.  Of note he had an ultrasound of his abdomen in 2015 that showed questions of cirrhosis.    Past Medical History:   Diagnosis Date   • Atrial fibrillation ([facility]) 9/20/2011   • Biventricular implantable cardioverter-defibrillator in situ 8/7/2015   • Breath shortness    • CAD (coronary artery disease) 9/20/2011   • Cancer ([facility]) 2012    prostate   • CARDIOMYOPATHY 9/20/2011   • Congestive heart failure ([facility])    • Fatigue 10/28/2010   • Heartburn 10/23/2008   • History of cardiac catheterization 9/20/2011   • History of myocardial infarction     \"many\"   • HTN (hypertension) 9/20/2011   • Indigestion    • Insomnia 7/8/2010   • Ischemic cardiomyopathy 9/20/2011   • Long term (current) use of anticoagulants 5/13/2011   • Myocardial infarct ([facility]) 2007   • Orthostatic hypotension 5/6/2009   • WILLA (obstructive sleep apnea)     CPAP   • Other drug allergy(995.27) 10/16/2008   • Other specified disorder of intestines 07-28-15    constipation/diarrhea/ reports some bleeding from rectum w/blood clots. Seeing GI   • Pacemaker     [employer]   • Pain 07-28-15    \"chronic\", 0/10   • Pleural effusion 5/18/2009   • Presence of permanent cardiac pacemaker 9/20/2011   • Prostate cancer ([facility]) 5/13/2011   • Second degree AV block, Mobitz type II 11/10/2010   • Snoring    • Stroke ([facility]) 2/3/2009     Past Surgical History:   Procedure Laterality Date   • RECOVERY  10/16/2015    Procedure: CATH LAB LEAD REVISION [facility] R " KENIA;  Surgeon: Recoveryonly Surgery;  Location: SURGERY PRE-POST PROC UNIT [facility];  Service:    • RECOVERY  2015    Procedure:  CATH LAB LEAD EXTRACTION AWILDA [facility] LRG PAULO AQUINO;  Surgeon: Recoveryonly Surgery;  Location: SURGERY PRE-POST PROC UNIT [facility];  Service:    • RECOVERY  2015    Procedure: CATH LAB  LEAD EXTRACTION, UPGRADE TO BIV PM [facility] LR[facility] GRP KENIA ;  Surgeon: Recoveryonly Surgery;  Location: SURGERY PRE-POST PROC UNIT [facility];  Service:    • RECOVERY  2015    Procedure: CATH LAB NEW PM INSERTION DUAL ATRIAL & VENTRICULAR-LV LEAD W/ NEW GENERATOR AQUINO ICD9: 414.8;  Surgeon: Recoveryonly Surgery;  Location: SURG[place] PRE[facility] PROC [facility];  Service:    • PACEMAKER INSERTION  08    [facility]   • MULTIPLE CORONARY ARTERY BYPASS  2007    2 vessel   • ZZZ CARDIAC CATH      has 2 stents     Family History   Problem Relation Age of Onset   • Thyroid Sister      Social History     Socioeconomic History   • Marital status:      Spouse name: Not on file   • Number of children: Not on file   • Years of education: Not on file   • Highest education level: Not on file   Occupational History   • Not on file   Social Needs   • Financial resource strain: Not on file   • Food insecurity     Worry: Not on file     Inability: Not on file   • Transportation needs     Medical: Not on file     Non-medical: Not on file   Tobacco Use   • Smoking status: Former Smoker     Packs/day: 1.50     Years: 3.00     Pack years: 4.50     Types: Cigarettes     Quit date: 1970     Years since quittin.7   • Smokeless tobacco: Former User     Types: Chew     Quit date: 1972   Substance and Sexual Activity   • Alcohol use: No     Comment: 10-14 per week; scotch & wine   • Drug use: No   • Sexual activity: Not on file   Lifestyle   • Physical activity     Days per week: Not on file     Minutes per session: Not on file   • Stress: Not on file   Relationships   • Social connections     Talks on phone: Not on  "file     Gets together: Not on file     Attends Yarsani service: Not on file     Active member of club or organization: Not on file     Attends meetings of clubs or organizations: Not on file     Relationship status: Not on file   • Intimate partner violence     Fear of current or ex partner: Not on file     Emotionally abused: Not on file     Physically abused: Not on file     Forced sexual activity: Not on file   Other Topics Concern   • Not on file   Social History Narrative   • Not on file     Allergies   Allergen Reactions   • Plavix [Clopidogrel Bisulfate] Anaphylaxis   • Spironolactone Unspecified     Hyperkalemia   • Statins [Hmg-Coa-R Inhibitors] Unspecified     Muscles aches     • Ticlid [Ticlopidine Hydrochloride] Unspecified     Pt states \"I'm not sure what happens\".       Outpatient Encounter Medications as of 9/30/2020   Medication Sig Dispense Refill   • torsemide (DEMADEX) 20 MG Tab Take 1 Tab by mouth every day. 30 Tab 11   • doxycycline (MONODOX) 100 MG capsule Take 1 Cap by mouth 2 times a day for 5 days. 10 Cap 0   • sacubitril-valsartan (ENTRESTO)  MG Tab tablet Take 0.5 Tabs by mouth 2 Times a Day. 0.5 tablet = dose , twice daily     • potassium citrate SR (UROCIT-K SR) 10 MEQ (1080 MG) Tab CR Take 10 mEq by mouth every 48 hours.     • Coenzyme Q10 (CO Q 10) 100 MG Cap Take 200 mg by mouth every day.     • Evolocumab, REPATHA, (REPATHA SURECLICK) 140 MG/ML Solution Auto-injector Inject 1 Each as instructed every 14 days. 6 PEN 3   • metoprolol SR (TOPROL XL) 50 MG TABLET SR 24 HR TAKE 1 TABLET BY MOUTH EVERY DAY 90 Tab 2   • Ixekizumab (TALTZ) 80 MG/ML Solution Prefilled Syringe Inject 80 mg as instructed Q30 DAYS.     • aspirin 81 MG tablet Take 81 mg by mouth every day.       No facility-administered encounter medications on file as of 9/30/2020.      Review of Systems   Constitutional: Negative.  Negative for chills, fever and malaise/fatigue.   HENT: Negative.  Negative for sore " "throat.    Eyes: Negative.    Respiratory: Negative.  Negative for cough, hemoptysis, sputum production, shortness of breath, wheezing and stridor.    Cardiovascular: Negative.  Negative for chest pain, palpitations, orthopnea, claudication, leg swelling and PND.   Gastrointestinal: Negative.    Genitourinary: Negative.    Musculoskeletal: Negative.    Skin: Negative.    Neurological: Negative.  Negative for dizziness, loss of consciousness and weakness.   Endo/Heme/Allergies: Negative.  Does not bruise/bleed easily.   All other systems reviewed and are negative.       Objective:   /64 (BP Location: Left arm, Patient Position: Sitting, BP Cuff Size: Adult)   Pulse 70   Resp 18   Ht 1.803 m (5' 11\")   Wt 92.1 kg (203 lb)   SpO2 93%   BMI 28.31 kg/m²     Physical Exam   Constitutional: He appears well-developed and well-nourished. No distress.   HENT:   Head: Normocephalic and atraumatic.   Right Ear: External ear normal.   Left Ear: External ear normal.   Nose: Nose normal.   Mouth/Throat: No oropharyngeal exudate.   Eyes: Pupils are equal, round, and reactive to light. Conjunctivae and EOM are normal. Right eye exhibits no discharge. Left eye exhibits no discharge. No scleral icterus.   Neck: Neck supple. No JVD present.   Cardiovascular: Normal rate, regular rhythm and intact distal pulses. Exam reveals no gallop and no friction rub.   No murmur heard.  Pulmonary/Chest: Effort normal. No stridor. No respiratory distress. He has no wheezes. He has no rales. He exhibits no tenderness.   Abdominal: Soft. He exhibits no distension. There is no guarding.   Musculoskeletal: Normal range of motion.         General: No tenderness, deformity or edema.   Neurological: He is alert. He has normal reflexes. He displays normal reflexes. No cranial nerve deficit. He exhibits normal muscle tone. Coordination normal.   Skin: Skin is warm and dry. No rash noted. He is not diaphoretic. No erythema. No pallor. "   Psychiatric: He has a normal mood and affect. His behavior is normal. Judgment and thought content normal.   Nursing note and vitals reviewed.  Echocardiogram: Dated 9/28/2020 personally interpreted myself showing an EF of 35%.    CTA: Dated 9/28/2020 personally viewed myself showing defect in the IVC.    Sound abdomen 9/28/2015 personally interpreted myself showing possible cirrhosis.    Assessment:     1. Alcohol abuse  US-ABDOMEN COMPLETE SURVEY   2. Paroxysmal atrial fibrillation (HCC)     3. Biventricular implantable cardioverter-defibrillator in situ     4. Coronary artery disease involving native coronary artery of native heart without angina pectoris  US-ABDOMEN COMPLETE SURVEY   5. CKD (chronic kidney disease) stage 3, GFR 30-59 ml/min ([facility])     6. Heartburn     7. High risk medication use  US-ABDOMEN COMPLETE SURVEY   8. History of CVA (cerebrovascular accident)  torsemide (DEMADEX) 20 MG Tab   9. History of myocardial infarction  US-ABDOMEN COMPLETE SURVEY   10. Essential hypertension     11. Hx of CABG  torsemide (DEMADEX) 20 MG Tab   12. Hypercholesteremia  Basic Metabolic Panel   13. Hyperkalemia  US-ABDOMEN COMPLETE SURVEY    torsemide (DEMADEX) 20 MG Tab    Basic Metabolic Panel   14. Ischemic cardiomyopathy     15. Left ventricular systolic dysfunction, NYHA class 3     16. NYHA class 3 acute on chronic systolic heart failure (HCC)     17. Orthostatic hypotension     18. Portal hypertension (HCC)     19. S/P ablation of atrial fibrillation     20. Second degree AV block, Mobitz type II  torsemide (DEMADEX) 20 MG Tab   21. Ventricular tachycardia (paroxysmal) (HCC)     22. Systolic heart failure, ACC/AHA stage C (HCC)  torsemide (DEMADEX) 20 MG Tab    Basic Metabolic Panel   23. Cerebrovascular accident (CVA), unspecified mechanism ([facility])  Basic Metabolic Panel       Medical Decision Making:  Today's Assessment / Status / Plan:     79-year-old male with alcohol use heart failure with reduced  ejection fraction hyperlipidemia and some concern for cirrhosis.  I will get an ultrasound of his abdomen to rule out cirrhosis.  For his coronary disease he will stay on the aspirin.  I will start him on low-dose torsemide twice daily and check labs in 1 week.    1. CAD    - cont repatha    2. CHF s/p CRT    - cont entresto 97/103    - cont metop XL 50    - start torsemide 20    3. CRT    - f/u device clinic    4. ETOH    - U/S Abdomen    5. Edema/Swelling    - start torsemide 20 daily     6. High Risk Meds    - labs in one week

## 2020-10-03 LAB
BACTERIA BLD CULT: NORMAL
BACTERIA BLD CULT: NORMAL
SIGNIFICANT IND 70042: NORMAL
SIGNIFICANT IND 70042: NORMAL
SITE SITE: NORMAL
SITE SITE: NORMAL
SOURCE SOURCE: NORMAL
SOURCE SOURCE: NORMAL

## 2020-10-05 ENCOUNTER — HOSPITAL ENCOUNTER (OUTPATIENT)
Dept: LAB | Facility: MEDICAL CENTER | Age: 80
End: 2020-10-05
Attending: UROLOGY
Payer: MEDICARE

## 2020-10-05 ENCOUNTER — HOSPITAL ENCOUNTER (OUTPATIENT)
Dept: LAB | Facility: MEDICAL CENTER | Age: 80
End: 2020-10-05
Attending: INTERNAL MEDICINE
Payer: MEDICARE

## 2020-10-05 DIAGNOSIS — I50.20 ACC/AHA STAGE C SYSTOLIC HEART FAILURE (HCC): ICD-10-CM

## 2020-10-05 DIAGNOSIS — I50.23 NYHA CLASS 3 ACUTE ON CHRONIC SYSTOLIC HEART FAILURE (HCC): ICD-10-CM

## 2020-10-05 DIAGNOSIS — Z79.899 HIGH RISK MEDICATION USE: ICD-10-CM

## 2020-10-05 DIAGNOSIS — E87.5 HYPERKALEMIA: ICD-10-CM

## 2020-10-05 DIAGNOSIS — R06.09 DYSPNEA ON EXERTION: ICD-10-CM

## 2020-10-05 LAB — PSA SERPL-MCNC: 0.29 NG/ML (ref 0–4)

## 2020-10-05 PROCEDURE — 84153 ASSAY OF PSA TOTAL: CPT

## 2020-10-05 PROCEDURE — 36415 COLL VENOUS BLD VENIPUNCTURE: CPT

## 2020-10-06 ENCOUNTER — HOSPITAL ENCOUNTER (OUTPATIENT)
Facility: MEDICAL CENTER | Age: 80
End: 2020-10-06
Attending: ANESTHESIOLOGY
Payer: MEDICARE

## 2020-10-06 DIAGNOSIS — I50.23 NYHA CLASS 3 ACUTE ON CHRONIC SYSTOLIC HEART FAILURE (HCC): ICD-10-CM

## 2020-10-06 LAB
ANION GAP SERPL CALC-SCNC: 17 MMOL/L (ref 7–16)
BUN SERPL-MCNC: 52 MG/DL (ref 8–22)
CALCIUM SERPL-MCNC: 9.6 MG/DL (ref 8.4–10.2)
CHLORIDE SERPL-SCNC: 96 MMOL/L (ref 96–112)
CO2 SERPL-SCNC: 23 MMOL/L (ref 20–33)
CREAT SERPL-MCNC: 2.06 MG/DL (ref 0.5–1.4)
GLUCOSE SERPL-MCNC: 91 MG/DL (ref 65–99)
POTASSIUM SERPL-SCNC: 4.2 MMOL/L (ref 3.6–5.5)
SODIUM SERPL-SCNC: 136 MMOL/L (ref 135–145)

## 2020-10-06 PROCEDURE — 80048 BASIC METABOLIC PNL TOTAL CA: CPT

## 2020-10-06 PROCEDURE — 36415 COLL VENOUS BLD VENIPUNCTURE: CPT

## 2020-10-06 RX ORDER — SACUBITRIL AND VALSARTAN 97; 103 MG/1; MG/1
TABLET, FILM COATED ORAL
Qty: 60 TAB | Refills: 11 | Status: SHIPPED | OUTPATIENT
Start: 2020-10-06 | End: 2020-12-17 | Stop reason: SDUPTHER

## 2020-10-16 ENCOUNTER — HOSPITAL ENCOUNTER (OUTPATIENT)
Dept: RADIOLOGY | Facility: MEDICAL CENTER | Age: 80
End: 2020-10-16
Attending: INTERNAL MEDICINE
Payer: MEDICARE

## 2020-10-21 ENCOUNTER — TELEPHONE (OUTPATIENT)
Dept: CARDIOLOGY | Facility: MEDICAL CENTER | Age: 80
End: 2020-10-21

## 2020-10-21 NOTE — TELEPHONE ENCOUNTER
Called patient and wife for HF education, discussed patient diagnosis, medications, diet and when to call. Patient is only taking diuretics PRN, patient instructed that he should take his diuretics as prescribed and if he feels like that is too much he should discuss it with their doctor.  Education Narrative  Reviewed anatomy and physiology of heart failure with patient and wife. Went over heart failure worksheet and patient's individual HF diagnosis, EF, risk factors, general medication classes and indications, as well as personal goals.  Goals: Patient's primary goal is not need to take as much diuretics.     Discussed daily weights, sodium restriction, worsening signs and symptoms to report to physician, heart medications, and importance of adherence to medication regimen. Emphasized recommendation from AHA/AAHFN to keep daily sodium intake between 1500mg-2000mg.      Reviewed dietary handouts, advanced care planning classes, and advance directive planning handout. Discussed dietary considerations and reviewed Seven Day Heart Healthy Meal Plan by [employer].     Invited patient and family members/friends to HF support group and encouraged patient to call Heart Failure clinic during normal business hours with any questions.  Heart Failure program card with number given to patient.           Patient states full understanding of all information given.

## 2020-11-12 ENCOUNTER — OFFICE VISIT (OUTPATIENT)
Dept: SLEEP MEDICINE | Facility: MEDICAL CENTER | Age: 80
End: 2020-11-12
Payer: MEDICARE

## 2020-11-12 VITALS
SYSTOLIC BLOOD PRESSURE: 108 MMHG | DIASTOLIC BLOOD PRESSURE: 60 MMHG | BODY MASS INDEX: 28 KG/M2 | HEIGHT: 71 IN | RESPIRATION RATE: 16 BRPM | OXYGEN SATURATION: 94 % | WEIGHT: 200 LBS | HEART RATE: 70 BPM

## 2020-11-12 DIAGNOSIS — G47.33 OSA (OBSTRUCTIVE SLEEP APNEA): ICD-10-CM

## 2020-11-12 DIAGNOSIS — J18.9 PNEUMONIA OF LEFT LOWER LOBE DUE TO INFECTIOUS ORGANISM: ICD-10-CM

## 2020-11-12 DIAGNOSIS — J44.9 CHRONIC OBSTRUCTIVE PULMONARY DISEASE, UNSPECIFIED COPD TYPE (HCC): ICD-10-CM

## 2020-11-12 DIAGNOSIS — I51.89 LEFT VENTRICULAR SYSTOLIC DYSFUNCTION, NYHA CLASS 3: ICD-10-CM

## 2020-11-12 PROCEDURE — 99214 OFFICE O/P EST MOD 30 MIN: CPT | Performed by: INTERNAL MEDICINE

## 2020-11-12 RX ORDER — DOXYCYCLINE 100 MG/1
CAPSULE ORAL
COMMUNITY
End: 2021-06-23

## 2020-11-12 RX ORDER — METOLAZONE 5 MG/1
5 TABLET ORAL DAILY
COMMUNITY
End: 2021-08-16

## 2020-11-12 RX ORDER — SPIRONOLACTONE 25 MG/1
25 TABLET ORAL DAILY
COMMUNITY
End: 2020-12-23 | Stop reason: SDUPTHER

## 2020-11-12 RX ORDER — HYDROCODONE BITARTRATE AND ACETAMINOPHEN 10; 325 MG/1; MG/1
TABLET ORAL
Status: ON HOLD | COMMUNITY
Start: 2020-10-15 | End: 2023-01-24

## 2020-11-12 ASSESSMENT — FIBROSIS 4 INDEX: FIB4 SCORE: 3.11

## 2020-11-12 NOTE — PROGRESS NOTES
"Chief Complaint   Patient presents with   • COPD     Last Seen 01/15/20   • Apnea     AutoCPAP 8-11     HPI: Luis Armando returns for follow-up of WILLA mild COPD. PFTs January 2020 show stage I COPD with an FEV1 2.53 L 88% predicted, FEV1 FVC ratio of 70.  DLCO 74%.  6-minute walk showed adequate saturations on RA.  He rarely uses his Albuterol inhaler.     He was hospitalized September 2020 with ?LLL pneumonia.  Chest CAT scan showed subtle left lower lobe groundglass opacity.  He was treated empirically with with antibiotics.  His BNP level was >1000 pg/ml. Echo showed EF: 35% with global hypokinesis, stable.  His RVSP was 60 mmHg.  He follows closely with cardiology and was restarted on diuretics.  Denies worsening dyspnea, cough, wheezing, chest tightness or edema.  He has severe WILLA, with an AHI of 45.  He is compliant with AutoPap 8 -11 cm H2O with compliance card confirming 100% AutoPap use for nearly 5 hours nightly.  Average AHI is 5.    Sleeps well on CPAP, in fact cannot sleep without it.  Denies daytime somnolence.  Weight has been stable.    Past Medical History:   Diagnosis Date   • Atrial fibrillation (HCC) 9/20/2011   • Biventricular implantable cardioverter-defibrillator in situ 8/7/2015   • Breath shortness    • CAD (coronary artery disease) 9/20/2011   • Cancer (HCC) 2012    prostate   • CARDIOMYOPATHY 9/20/2011   • Congestive heart failure (HCC)    • Fatigue 10/28/2010   • Heartburn 10/23/2008   • History of cardiac catheterization 9/20/2011   • History of myocardial infarction     \"many\"   • HTN (hypertension) 9/20/2011   • Indigestion    • Insomnia 7/8/2010   • Ischemic cardiomyopathy 9/20/2011   • Long term (current) use of anticoagulants 5/13/2011   • Myocardial infarct (HCC) 2007   • Orthostatic hypotension 5/6/2009   • WILLA (obstructive sleep apnea)     CPAP   • Other drug allergy(995.27) 10/16/2008   • Other specified disorder of intestines 07-28-15    constipation/diarrhea/ reports some bleeding " "from rectum w/blood clots. Seeing GI   • Pacemaker     [employer]   • Pain -28-15    \"chronic\", 0/10   • Pleural effusion 2009   • Presence of permanent cardiac pacemaker 2011   • Prostate cancer (HCC) 2011   • Second degree AV block, Mobitz type II 11/10/2010   • Snoring    • Stroke (HCC) 2/3/2009       Social History     Socioeconomic History   • Marital status:      Spouse name: Not on file   • Number of children: Not on file   • Years of education: Not on file   • Highest education level: Not on file   Occupational History   • Not on file   Social Needs   • Financial resource strain: Not on file   • Food insecurity     Worry: Not on file     Inability: Not on file   • Transportation needs     Medical: Not on file     Non-medical: Not on file   Tobacco Use   • Smoking status: Former Smoker     Packs/day: 1.50     Years: 3.00     Pack years: 4.50     Types: Cigarettes     Quit date: 1970     Years since quittin.8   • Smokeless tobacco: Former User     Types: Chew     Quit date: 1972   Substance and Sexual Activity   • Alcohol use: Yes     Comment: 10-14 per week; scotch & wine   • Drug use: No   • Sexual activity: Not on file   Lifestyle   • Physical activity     Days per week: Not on file     Minutes per session: Not on file   • Stress: Not on file   Relationships   • Social connections     Talks on phone: Not on file     Gets together: Not on file     Attends Jehovah's witness service: Not on file     Active member of club or organization: Not on file     Attends meetings of clubs or organizations: Not on file     Relationship status: Not on file   • Intimate partner violence     Fear of current or ex partner: Not on file     Emotionally abused: Not on file     Physically abused: Not on file     Forced sexual activity: Not on file   Other Topics Concern   • Not on file   Social History Narrative   • Not on file       Family History   Problem Relation Age of Onset   • Thyroid " Sister        Current Outpatient Medications on File Prior to Visit   Medication Sig Dispense Refill   • HYDROcodone/acetaminophen (NORCO)  MG Tab TAKE 1 TABLET ORALLY EVERY 4 HOURS AS NEEDED DX M16.0 M54.5 7 DAYS     • Furosemide (LASIX PO) furosemide     • METHOTREXATE SODIUM INJ Inject  as directed. 10mg/ 0.2ml     • metOLazone (ZAROXOLYN) 5 MG Tab Take 5 mg by mouth every day.     • Multiple Vitamins-Minerals (OCUVITE PO) Take  by mouth.     • spironolactone (ALDACTONE) 25 MG Tab Take 25 mg by mouth every day.     • VITAMIN D PO Take  by mouth.     • ENTRESTO  MG Tab tablet TAKE 1 TABLET BY MOUTH TWICE A DAY 60 Tab 11   • potassium citrate SR (UROCIT-K SR) 10 MEQ (1080 MG) Tab CR Take 10 mEq by mouth every 48 hours.     • Coenzyme Q10 (CO Q 10) 100 MG Cap Take 200 mg by mouth every day.     • metoprolol SR (TOPROL XL) 50 MG TABLET SR 24 HR TAKE 1 TABLET BY MOUTH EVERY DAY 90 Tab 2   • aspirin 81 MG tablet Take 81 mg by mouth every day.     • doxycycline (MONODOX) 100 MG capsule doxycycline monohydrate 100 mg capsule     • torsemide (DEMADEX) 20 MG Tab Take 1 Tab by mouth every day. (Patient not taking: Reported on 11/12/2020) 30 Tab 11   • Evolocumab, REPATHA, (REPATHA SURECLICK) 140 MG/ML Solution Auto-injector Inject 1 Each as instructed every 14 days. (Patient not taking: Reported on 11/12/2020) 6 PEN 3   • Ixekizumab (TALTZ) 80 MG/ML Solution Prefilled Syringe Inject 80 mg as instructed Q30 DAYS.       No current facility-administered medications on file prior to visit.        Allergies: Plavix [clopidogrel bisulfate], Spironolactone, Statins [hmg-coa-r inhibitors], and Ticlid [ticlopidine hydrochloride]    ROS:   Constitutional: Denies fevers, chills, night sweats, fatigue or weight loss  Eyes: Denies vision loss, pain, drainage, double vision  Ears, Nose, Throat: Denies earache, difficulty hearing, tinnitus, nasal congestion, hoarseness  Cardiovascular: Denies chest pain, tightness,  "palpitations, orthopnea or edema  Respiratory: Denies shortness of breath, cough, wheezing, hemoptysis  Sleep: Denies daytime sleepiness, snoring, apneas, insomnia, morning headaches  GI: Denies heartburn, dysphagia, nausea, abdominal pain, diarrhea or constipation  : Denies frequent urination, hematuria, discharge or painful urination  Musculoskeletal: Denies back pain, painful joints, sore muscles  Neurological: Denies weakness or headaches  Skin: No rashes    /60 (BP Location: Left arm, Patient Position: Sitting, BP Cuff Size: Adult)   Pulse 70   Resp 16   Ht 1.803 m (5' 11\")   Wt 90.7 kg (200 lb)   SpO2 94%     Physical Exam:  Appearance: Well-nourished, well-developed, in no acute distress  HEENT: Normocephalic, atraumatic, white sclera, PERRLA, masked  Neck: No adenopathy or masses  Respiratory: no intercostal retractions or accessory muscle use  Lungs auscultation: Clear to auscultation bilaterally  Cardiovascular: Regular rate rhythm. No murmurs, rubs or gallops.  No LE edema  Abdomen: soft, nondistended  Gait: Normal  Digits: No clubbing, cyanosis  Motor: No focal deficits  Orientation: Oriented to time, person and place    Diagnosis:  Return in about 6 months (around 5/12/2021).      1. WILLA (obstructive sleep apnea)     2. Chronic obstructive pulmonary disease, unspecified COPD type (HCC)     3. Left ventricular systolic dysfunction, NYHA class 3     4. Pneumonia of left lower lobe due to infectious organism  CT-CHEST (THORAX) W/O       Plan:  Luis Armando shows excellent compliance and response to AutoPap therapy with normal AHI on treatment.  He is benefiting from therapy.  Continue AutoPap: 8 -11 cm of water.    Replace CPAP mask every 3 months.  His recent hospitalization showed left lower lobe groundglass opacity ? infection versus fluid overload.  He has CHF with elevated BNP on hospital admit, was restarted on diuretics,  and completed empiric antibiotics. We will repeat chest CAT scan without " contrast for follow-up.  Continue Albuterol HFA 1 to 2 puffs every 4 hours as needed.    He is up-to-date on influenza and pneumococcal vaccines.

## 2020-11-17 ENCOUNTER — HOSPITAL ENCOUNTER (OUTPATIENT)
Dept: RADIOLOGY | Facility: MEDICAL CENTER | Age: 80
End: 2020-11-17
Attending: INTERNAL MEDICINE
Payer: MEDICARE

## 2020-11-17 DIAGNOSIS — J18.9 PNEUMONIA OF LEFT LOWER LOBE DUE TO INFECTIOUS ORGANISM: ICD-10-CM

## 2020-11-17 PROCEDURE — 71250 CT THORAX DX C-: CPT

## 2020-12-09 ENCOUNTER — HOSPITAL ENCOUNTER (OUTPATIENT)
Dept: LAB | Facility: MEDICAL CENTER | Age: 80
End: 2020-12-09
Attending: INTERNAL MEDICINE
Payer: MEDICARE

## 2020-12-09 DIAGNOSIS — E78.00 HYPERCHOLESTEREMIA: Chronic | ICD-10-CM

## 2020-12-09 DIAGNOSIS — I50.20 SYSTOLIC HEART FAILURE, ACC/AHA STAGE C (HCC): ICD-10-CM

## 2020-12-09 DIAGNOSIS — I63.9 CEREBROVASCULAR ACCIDENT (CVA), UNSPECIFIED MECHANISM (HCC): Chronic | ICD-10-CM

## 2020-12-09 DIAGNOSIS — E87.5 HYPERKALEMIA: ICD-10-CM

## 2020-12-09 LAB
ANION GAP SERPL CALC-SCNC: 8 MMOL/L (ref 7–16)
BUN SERPL-MCNC: 27 MG/DL (ref 8–22)
CALCIUM SERPL-MCNC: 9.2 MG/DL (ref 8.4–10.2)
CHLORIDE SERPL-SCNC: 102 MMOL/L (ref 96–112)
CO2 SERPL-SCNC: 25 MMOL/L (ref 20–33)
CREAT SERPL-MCNC: 1.57 MG/DL (ref 0.5–1.4)
FASTING STATUS PATIENT QL REPORTED: NORMAL
GLUCOSE SERPL-MCNC: 102 MG/DL (ref 65–99)
POTASSIUM SERPL-SCNC: 4.7 MMOL/L (ref 3.6–5.5)
SODIUM SERPL-SCNC: 135 MMOL/L (ref 135–145)

## 2020-12-09 PROCEDURE — 80048 BASIC METABOLIC PNL TOTAL CA: CPT

## 2020-12-09 PROCEDURE — 36415 COLL VENOUS BLD VENIPUNCTURE: CPT

## 2020-12-12 ENCOUNTER — HOSPITAL ENCOUNTER (OUTPATIENT)
Dept: RADIOLOGY | Facility: MEDICAL CENTER | Age: 80
End: 2020-12-12
Attending: NURSE PRACTITIONER
Payer: MEDICARE

## 2020-12-12 ENCOUNTER — HOSPITAL ENCOUNTER (OUTPATIENT)
Dept: RADIOLOGY | Facility: MEDICAL CENTER | Age: 80
End: 2020-12-12
Attending: INTERNAL MEDICINE
Payer: MEDICARE

## 2020-12-12 DIAGNOSIS — I25.2 HISTORY OF MYOCARDIAL INFARCTION: ICD-10-CM

## 2020-12-12 DIAGNOSIS — F10.10 ALCOHOL ABUSE: ICD-10-CM

## 2020-12-12 DIAGNOSIS — M54.16 LUMBAR RADICULOPATHY: ICD-10-CM

## 2020-12-12 DIAGNOSIS — E87.5 HYPERKALEMIA: ICD-10-CM

## 2020-12-12 DIAGNOSIS — I25.10 CORONARY ARTERY DISEASE INVOLVING NATIVE CORONARY ARTERY OF NATIVE HEART WITHOUT ANGINA PECTORIS: ICD-10-CM

## 2020-12-12 DIAGNOSIS — Z79.899 HIGH RISK MEDICATION USE: ICD-10-CM

## 2020-12-12 PROCEDURE — 72131 CT LUMBAR SPINE W/O DYE: CPT

## 2020-12-12 PROCEDURE — 76700 US EXAM ABDOM COMPLETE: CPT

## 2020-12-17 DIAGNOSIS — I50.23 NYHA CLASS 3 ACUTE ON CHRONIC SYSTOLIC HEART FAILURE (HCC): ICD-10-CM

## 2020-12-17 PROCEDURE — RXMED WILLOW AMBULATORY MEDICATION CHARGE: Performed by: INTERNAL MEDICINE

## 2020-12-17 RX ORDER — SACUBITRIL AND VALSARTAN 49; 51 MG/1; MG/1
2 TABLET, FILM COATED ORAL 2 TIMES DAILY
Qty: 120 TAB | Refills: 0 | Status: SHIPPED | OUTPATIENT
Start: 2020-12-17 | End: 2020-12-23 | Stop reason: SDUPTHER

## 2020-12-17 RX ORDER — SACUBITRIL AND VALSARTAN 97; 103 MG/1; MG/1
1 TABLET, FILM COATED ORAL 2 TIMES DAILY
Qty: 60 TAB | Refills: 0 | Status: SHIPPED | OUTPATIENT
Start: 2020-12-17 | End: 2020-12-23

## 2020-12-17 NOTE — PROGRESS NOTES
Received call from pt's spouse rose Langston transfer from [place]. Pt is currently out of Entresto and it will cost over $100 for a 30-day supply to get a refill at this time. Advised script can be sent for samples at [facility]. Address and phone number given to pt for pharmacy. Order placed for 30-day sample supply and and advised pt's spouse to call back office if any questions. Confirmed pt's FV with RO on 12/23/20. Verbalized understanding and she was appreciative.

## 2020-12-17 NOTE — PROGRESS NOTES
Pt needs samples and [facility] currently only has 49-51mg tablets available. Prescription sent to [facility], confirmed with Danae tablets are available.

## 2020-12-18 ENCOUNTER — PHARMACY VISIT (OUTPATIENT)
Dept: PHARMACY | Facility: MEDICAL CENTER | Age: 80
End: 2020-12-18
Payer: COMMERCIAL

## 2020-12-23 ENCOUNTER — OFFICE VISIT (OUTPATIENT)
Dept: CARDIOLOGY | Facility: MEDICAL CENTER | Age: 80
End: 2020-12-23
Payer: MEDICARE

## 2020-12-23 VITALS
HEART RATE: 70 BPM | DIASTOLIC BLOOD PRESSURE: 64 MMHG | SYSTOLIC BLOOD PRESSURE: 104 MMHG | WEIGHT: 204 LBS | OXYGEN SATURATION: 96 % | HEIGHT: 71 IN | BODY MASS INDEX: 28.56 KG/M2

## 2020-12-23 DIAGNOSIS — I48.0 PAROXYSMAL ATRIAL FIBRILLATION (HCC): ICD-10-CM

## 2020-12-23 DIAGNOSIS — I63.9 CEREBROVASCULAR ACCIDENT (CVA), UNSPECIFIED MECHANISM (HCC): Chronic | ICD-10-CM

## 2020-12-23 DIAGNOSIS — Z95.1 HX OF CABG: ICD-10-CM

## 2020-12-23 DIAGNOSIS — Z86.73 HISTORY OF CVA (CEREBROVASCULAR ACCIDENT): ICD-10-CM

## 2020-12-23 DIAGNOSIS — E78.00 HYPERCHOLESTEREMIA: Chronic | ICD-10-CM

## 2020-12-23 DIAGNOSIS — I50.20 SYSTOLIC HEART FAILURE, ACC/AHA STAGE C (HCC): ICD-10-CM

## 2020-12-23 DIAGNOSIS — I10 ESSENTIAL HYPERTENSION: Chronic | ICD-10-CM

## 2020-12-23 DIAGNOSIS — I25.5 ISCHEMIC CARDIOMYOPATHY: Chronic | ICD-10-CM

## 2020-12-23 DIAGNOSIS — Z86.79 S/P ABLATION OF ATRIAL FIBRILLATION: ICD-10-CM

## 2020-12-23 DIAGNOSIS — I47.29 PAROXYSMAL VENTRICULAR TACHYCARDIA (HCC): ICD-10-CM

## 2020-12-23 DIAGNOSIS — I44.1 SECOND DEGREE AV BLOCK, MOBITZ TYPE II: Chronic | ICD-10-CM

## 2020-12-23 DIAGNOSIS — Z79.899 HIGH RISK MEDICATION USE: ICD-10-CM

## 2020-12-23 DIAGNOSIS — I25.2 HISTORY OF MYOCARDIAL INFARCTION: Chronic | ICD-10-CM

## 2020-12-23 DIAGNOSIS — Z98.890 S/P ABLATION OF ATRIAL FIBRILLATION: ICD-10-CM

## 2020-12-23 DIAGNOSIS — I50.20 ACC/AHA STAGE C SYSTOLIC HEART FAILURE (HCC): ICD-10-CM

## 2020-12-23 DIAGNOSIS — I51.89 LEFT VENTRICULAR SYSTOLIC DYSFUNCTION, NYHA CLASS 3: ICD-10-CM

## 2020-12-23 DIAGNOSIS — F10.10 ALCOHOL ABUSE: ICD-10-CM

## 2020-12-23 DIAGNOSIS — G47.33 OBSTRUCTIVE SLEEP APNEA SYNDROME: Chronic | ICD-10-CM

## 2020-12-23 DIAGNOSIS — N18.31 STAGE 3A CHRONIC KIDNEY DISEASE: ICD-10-CM

## 2020-12-23 DIAGNOSIS — K76.6 PORTAL HYPERTENSION (HCC): ICD-10-CM

## 2020-12-23 DIAGNOSIS — Z95.810 BIVENTRICULAR IMPLANTABLE CARDIOVERTER-DEFIBRILLATOR IN SITU: ICD-10-CM

## 2020-12-23 DIAGNOSIS — I95.1 ORTHOSTATIC HYPOTENSION: Chronic | ICD-10-CM

## 2020-12-23 DIAGNOSIS — I50.23 NYHA CLASS 3 ACUTE ON CHRONIC SYSTOLIC HEART FAILURE (HCC): ICD-10-CM

## 2020-12-23 DIAGNOSIS — I25.10 CORONARY ARTERY DISEASE INVOLVING NATIVE CORONARY ARTERY OF NATIVE HEART WITHOUT ANGINA PECTORIS: Chronic | ICD-10-CM

## 2020-12-23 PROCEDURE — 99214 OFFICE O/P EST MOD 30 MIN: CPT | Performed by: INTERNAL MEDICINE

## 2020-12-23 RX ORDER — SPIRONOLACTONE 25 MG/1
25 TABLET ORAL DAILY
Qty: 90 TAB | Refills: 3 | Status: SHIPPED | OUTPATIENT
Start: 2020-12-23 | End: 2021-07-16 | Stop reason: SDUPTHER

## 2020-12-23 RX ORDER — SACUBITRIL AND VALSARTAN 49; 51 MG/1; MG/1
2 TABLET, FILM COATED ORAL 2 TIMES DAILY
Qty: 120 TAB | Refills: 3 | Status: SHIPPED | OUTPATIENT
Start: 2020-12-23 | End: 2021-07-16 | Stop reason: SDUPTHER

## 2020-12-23 RX ORDER — METOPROLOL SUCCINATE 50 MG/1
50 TABLET, EXTENDED RELEASE ORAL
Qty: 90 TAB | Refills: 3 | Status: SHIPPED | OUTPATIENT
Start: 2020-12-23 | End: 2021-07-16 | Stop reason: SDUPTHER

## 2020-12-23 ASSESSMENT — ENCOUNTER SYMPTOMS
PND: 0
LOSS OF CONSCIOUSNESS: 0
COUGH: 0
FEVER: 0
CLAUDICATION: 0
SHORTNESS OF BREATH: 0
CARDIOVASCULAR NEGATIVE: 1
SORE THROAT: 0
WHEEZING: 0
ORTHOPNEA: 0
PALPITATIONS: 0
WEAKNESS: 0
MUSCULOSKELETAL NEGATIVE: 1
DIZZINESS: 0
SPUTUM PRODUCTION: 0
CONSTITUTIONAL NEGATIVE: 1
BRUISES/BLEEDS EASILY: 0
RESPIRATORY NEGATIVE: 1
CHILLS: 0
STRIDOR: 0
EYES NEGATIVE: 1
NEUROLOGICAL NEGATIVE: 1
HEMOPTYSIS: 0
GASTROINTESTINAL NEGATIVE: 1

## 2020-12-23 ASSESSMENT — FIBROSIS 4 INDEX: FIB4 SCORE: 3.11

## 2020-12-24 NOTE — PROGRESS NOTES
"Chief Complaint   Patient presents with   • Cardiomyopathy (Ischemic)     follow up       Subjective:   Isai Fuentes is a 79 y.o. male who presents today as a follow-up for his heart failure with reduced ejection fraction.  Since he was last seen he continues to do well.  He continues to walk for 30 minutes without any functional limitations.  He has no chest pain or shortness of breath.  He is only concerned about the copayment on his Entresto.  He wants to know if there is any generic.  Otherwise his lipids are at goal.  His last ejection fraction was 35%.    Past Medical History:   Diagnosis Date   • Atrial fibrillation ([facility]) 9/20/2011   • Biventricular implantable cardioverter-defibrillator in situ 8/7/2015   • Breath shortness    • CAD (coronary artery disease) 9/20/2011   • Cancer ([facility]) 2012    prostate   • CARDIOMYOPATHY 9/20/2011   • Congestive heart failure ([facility])    • Fatigue 10/28/2010   • Heartburn 10/23/2008   • History of cardiac catheterization 9/20/2011   • History of myocardial infarction     \"many\"   • HTN (hypertension) 9/20/2011   • Indigestion    • Insomnia 7/8/2010   • Ischemic cardiomyopathy 9/20/2011   • Long term (current) use of anticoagulants 5/13/2011   • Myocardial infarct ([facility]) 2007   • Orthostatic hypotension 5/6/2009   • WILLA (obstructive sleep apnea)     CPAP   • Other drug allergy(995.27) 10/16/2008   • Other specified disorder of intestines 07-28-15    constipation/diarrhea/ reports some bleeding from rectum w/blood clots. Seeing GI   • Pacemaker     [employer]   • Pain 07-28-15    \"chronic\", 0/10   • Pleural effusion 5/18/2009   • Presence of permanent cardiac pacemaker 9/20/2011   • Prostate cancer ([facility]) 5/13/2011   • Second degree AV block, Mobitz type II 11/10/2010   • Snoring    • Stroke ([facility]) 2/3/2009     Past Surgical History:   Procedure Laterality Date   • RECOVERY  10/16/2015    Procedure: CATH LAB LEAD REVISION STSIXTO AQUINO;  Surgeon: [facility] Surgery;  " Location: SURGERY PRE-POST PROC UNIT [facility];  Service:    • RECOVERY  2015    Procedure:  CATH LAB LEAD EXTRACTION AWILDA ST.DRE LRG RP KENIA;  Surgeon: Recoveryonly Surgery;  [place] [facility]  Service:    • RECOVERY  2015    Procedure: CATH LAB  LEAD EXTRACTION, UPGRADE TO BIV PM ST.DRE LRG GRP AQUINO ;  Surgeon: Recoveryonly Surgery;  Location: [place] [facility]  Service:    • RECOVERY  2015    Procedure: CATH LAB NEW PM INSERTION DUAL ATRIAL & VENTRICULAR-LV LEAD W/ NEW GENERATOR KENIA ICD9: 414.8;  Surgeon: Recoveryonly Surgery;  [place] [facility]  Service:    • PACEMAKER INSERTION  08    St Dre   • MULTIPLE CORONARY ARTERY BYPASS  2007    2 vessel   • ZZZ CARDIAC CATH      has 2 stents     Family History   Problem Relation Age of Onset   • Thyroid Sister      Social History     Socioeconomic History   • Marital status:      Spouse name: Not on file   • Number of children: Not on file   • Years of education: Not on file   • Highest education level: Not on file   Occupational History   • Not on file   Social Needs   • Financial resource strain: Not on file   • Food insecurity     Worry: Not on file     Inability: Not on file   • Transportation needs     Medical: Not on file     Non-medical: Not on file   Tobacco Use   • Smoking status: Former Smoker     Packs/day: 1.50     Years: 3.00     Pack years: 4.50     Types: Cigarettes     Quit date: 1970     Years since quittin.0   • Smokeless tobacco: Former User     Types: Chew     Quit date: 1972   Substance and Sexual Activity   • Alcohol use: Yes     Comment: 10-14 per week; scotch & wine   • Drug use: No   • Sexual activity: Not on file   Lifestyle   • Physical activity     Days per week: Not on file     Minutes per session: Not on file   • Stress: Not on file   Relationships   • Social connections     Talks on phone: Not on file     Gets together: Not on file      "Attends Druze service: Not on file     Active member of club or organization: Not on file     Attends meetings of clubs or organizations: Not on file     Relationship status: Not on file   • Intimate partner violence     Fear of current or ex partner: Not on file     Emotionally abused: Not on file     Physically abused: Not on file     Forced sexual activity: Not on file   Other Topics Concern   • Not on file   Social History Narrative   • Not on file     Allergies   Allergen Reactions   • Plavix [Clopidogrel Bisulfate] Anaphylaxis   • Spironolactone Unspecified     Hyperkalemia   • Statins [Hmg-Coa-R Inhibitors] Unspecified     Muscles aches     • Ticlid [Ticlopidine Hydrochloride] Unspecified     Pt states \"I'm not sure what happens\".       Outpatient Encounter Medications as of 12/23/2020   Medication Sig Dispense Refill   • metoprolol SR (TOPROL XL) 50 MG TABLET SR 24 HR Take 1 Tab by mouth every day. 90 Tab 3   • sacubitril-valsartan (ENTRESTO) 49-51 MG Tab tablet Take 2 tablets by mouth 2 Times a Day. 120 Tab 3   • spironolactone (ALDACTONE) 25 MG Tab Take 1 Tab by mouth every day. 90 Tab 3   • [DISCONTINUED] sacubitril-valsartan (ENTRESTO)  MG Tab tablet Take 1 Tab by mouth 2 Times a Day. TAKE 1 TABLET BY MOUTH TWICE A DAY 60 Tab 0   • [DISCONTINUED] sacubitril-valsartan (ENTRESTO) 49-51 MG Tab tablet Take 2 tablets by mouth 2 Times a Day. 120 Tab 0   • HYDROcodone/acetaminophen (NORCO)  MG Tab TAKE 1 TABLET ORALLY EVERY 4 HOURS AS NEEDED DX M16.0 M54.5 7 DAYS     • Furosemide (LASIX PO) furosemide     • doxycycline (MONODOX) 100 MG capsule doxycycline monohydrate 100 mg capsule     • METHOTREXATE SODIUM INJ Inject  as directed. 10mg/ 0.2ml     • metOLazone (ZAROXOLYN) 5 MG Tab Take 5 mg by mouth every day.     • Multiple Vitamins-Minerals (OCUVITE PO) Take  by mouth.     • VITAMIN D PO Take  by mouth.     • [DISCONTINUED] spironolactone (ALDACTONE) 25 MG Tab Take 25 mg by mouth every day.   " "  • torsemide (DEMADEX) 20 MG Tab Take 1 Tab by mouth every day. (Patient not taking: Reported on 11/12/2020) 30 Tab 11   • potassium citrate SR (UROCIT-K SR) 10 MEQ (1080 MG) Tab CR Take 10 mEq by mouth every 48 hours.     • Coenzyme Q10 (CO Q 10) 100 MG Cap Take 200 mg by mouth every day.     • Evolocumab, REPATHA, (REPATHA SURECLICK) 140 MG/ML Solution Auto-injector Inject 1 Each as instructed every 14 days. 6 PEN 3   • [DISCONTINUED] metoprolol SR (TOPROL XL) 50 MG TABLET SR 24 HR TAKE 1 TABLET BY MOUTH EVERY DAY 90 Tab 2   • Ixekizumab (TALTZ) 80 MG/ML Solution Prefilled Syringe Inject 80 mg as instructed Q30 DAYS.     • aspirin 81 MG tablet Take 81 mg by mouth every day.       No facility-administered encounter medications on file as of 12/23/2020.      Review of Systems   Constitutional: Negative.  Negative for chills, fever and malaise/fatigue.   HENT: Negative.  Negative for sore throat.    Eyes: Negative.    Respiratory: Negative.  Negative for cough, hemoptysis, sputum production, shortness of breath, wheezing and stridor.    Cardiovascular: Negative.  Negative for chest pain, palpitations, orthopnea, claudication, leg swelling and PND.   Gastrointestinal: Negative.    Genitourinary: Negative.    Musculoskeletal: Negative.    Skin: Negative.    Neurological: Negative.  Negative for dizziness, loss of consciousness and weakness.   Endo/Heme/Allergies: Negative.  Does not bruise/bleed easily.   All other systems reviewed and are negative.       Objective:   /64 (BP Location: Right arm, Patient Position: Sitting)   Pulse 70   Ht 1.803 m (5' 11\")   Wt 92.5 kg (204 lb)   SpO2 96%   BMI 28.45 kg/m²     Physical Exam   Constitutional: He appears well-developed and well-nourished. No distress.   HENT:   Head: Normocephalic and atraumatic.   Right Ear: External ear normal.   Left Ear: External ear normal.   Nose: Nose normal.   Mouth/Throat: No oropharyngeal exudate.   Eyes: Pupils are equal, round, and " reactive to light. Conjunctivae and EOM are normal. Right eye exhibits no discharge. Left eye exhibits no discharge. No scleral icterus.   Neck: Neck supple. No JVD present.   Cardiovascular: Normal rate, regular rhythm and intact distal pulses. Exam reveals no gallop and no friction rub.   No murmur heard.  Pulmonary/Chest: Effort normal. No stridor. No respiratory distress. He has no wheezes. He has no rales. He exhibits no tenderness.   Abdominal: Soft. He exhibits no distension. There is no guarding.   Musculoskeletal: Normal range of motion.         General: No tenderness, deformity or edema.   Neurological: He is alert. He has normal reflexes. He displays normal reflexes. No cranial nerve deficit. He exhibits normal muscle tone. Coordination normal.   Skin: Skin is warm and dry. No rash noted. He is not diaphoretic. No erythema. No pallor.   Psychiatric: He has a normal mood and affect. His behavior is normal. Judgment and thought content normal.   Nursing note and vitals reviewed.  Echocardiogram: Dated 9/28/2020 personally interpreted myself showing an EF of 35%.    CTA: Dated 9/28/2020 personally viewed myself showing defect in the IVC.    Sound abdomen 9/28/2015 personally interpreted myself showing possible cirrhosis.    Assessment:     1. Coronary artery disease involving native coronary artery of native heart without angina pectoris     2. Biventricular implantable cardioverter-defibrillator in situ  spironolactone (ALDACTONE) 25 MG Tab   3. Paroxysmal atrial fibrillation (HCC)     4. Alcohol abuse     5. Stage 3a chronic kidney disease     6. High risk medication use     7. History of CVA (cerebrovascular accident)     8. History of myocardial infarction     9. Essential hypertension  spironolactone (ALDACTONE) 25 MG Tab   10. Hx of CABG  spironolactone (ALDACTONE) 25 MG Tab   11. Hypercholesteremia     12. Ischemic cardiomyopathy  metoprolol SR (TOPROL XL) 50 MG TABLET SR 24 HR   13. Left ventricular  systolic dysfunction, NYHA class 3  metoprolol SR (TOPROL XL) 50 MG TABLET SR 24 HR   14. NYHA class 3 acute on chronic systolic heart failure (HCC)  sacubitril-valsartan (ENTRESTO) 49-51 MG Tab tablet   15. Orthostatic hypotension     16. Portal hypertension (HCC)     17. S/P ablation of atrial fibrillation     18. Second degree AV block, Mobitz type II     19. Obstructive sleep apnea syndrome     20. Cerebrovascular accident (CVA), unspecified mechanism (HCC)     21. Ventricular tachycardia (paroxysmal) (HCC)     22. Systolic heart failure, ACC/AHA stage C (HCC)     23. ACC/AHA stage C systolic heart failure (HCC)  metoprolol SR (TOPROL XL) 50 MG TABLET SR 24 HR       Medical Decision Making:  Today's Assessment / Status / Plan:     79-year-old male with alcohol use heart failure with reduced ejection fraction hyperlipidemia and some concern for cirrhosis.  He does not have any findings of cirrhosis on his ultrasound but does have Cotton.  He is otherwise doing well.  I refilled his medications.  We can see him back in 3 to 6 months.    1. CAD    - cont repatha    2. CHF s/p CRT    - cont entresto 97/103    - cont metop XL 50    - cont torsemide 20    3. CRT    - f/u device clinic    4. ETOH    - U/S Abdomen    5. Edema/Swelling    - cont torsemide 20 daily     6. High Risk Meds    - labs in one week

## 2021-01-05 ENCOUNTER — APPOINTMENT (OUTPATIENT)
Dept: VASCULAR LAB | Facility: MEDICAL CENTER | Age: 81
End: 2021-01-05
Payer: MEDICARE

## 2021-01-12 ENCOUNTER — OFFICE VISIT (OUTPATIENT)
Dept: VASCULAR LAB | Facility: MEDICAL CENTER | Age: 81
End: 2021-01-12
Attending: NURSE PRACTITIONER
Payer: MEDICARE

## 2021-01-12 VITALS
SYSTOLIC BLOOD PRESSURE: 110 MMHG | DIASTOLIC BLOOD PRESSURE: 56 MMHG | HEIGHT: 71 IN | HEART RATE: 70 BPM | BODY MASS INDEX: 28.56 KG/M2 | WEIGHT: 204 LBS

## 2021-01-12 DIAGNOSIS — Z23 NEED FOR VACCINATION: ICD-10-CM

## 2021-01-12 DIAGNOSIS — E78.00 HYPERCHOLESTEREMIA: ICD-10-CM

## 2021-01-12 PROCEDURE — 99214 OFFICE O/P EST MOD 30 MIN: CPT | Performed by: NURSE PRACTITIONER

## 2021-01-12 PROCEDURE — 99212 OFFICE O/P EST SF 10 MIN: CPT | Performed by: NURSE PRACTITIONER

## 2021-01-12 RX ORDER — EVOLOCUMAB 140 MG/ML
1 INJECTION, SOLUTION SUBCUTANEOUS
Qty: 6 EACH | Refills: 3 | Status: SHIPPED | OUTPATIENT
Start: 2021-01-12 | End: 2021-03-02 | Stop reason: SDUPTHER

## 2021-01-12 ASSESSMENT — ENCOUNTER SYMPTOMS
HEADACHES: 0
BACK PAIN: 0
BLOOD IN STOOL: 0
NECK PAIN: 1
NAUSEA: 0
VOMITING: 0
LOSS OF CONSCIOUSNESS: 0
MYALGIAS: 0
SHORTNESS OF BREATH: 0
BRUISES/BLEEDS EASILY: 0
DEPRESSION: 0
HEARTBURN: 0
FOCAL WEAKNESS: 0
SPEECH CHANGE: 0
DIZZINESS: 0
NERVOUS/ANXIOUS: 0
ORTHOPNEA: 0
BLURRED VISION: 0
PALPITATIONS: 0
WEIGHT LOSS: 0
DOUBLE VISION: 0

## 2021-01-12 ASSESSMENT — FIBROSIS 4 INDEX: FIB4 SCORE: 3.15

## 2021-01-12 NOTE — PROGRESS NOTES
[facility] - Follow Up Visit  Date of Service: 1/12/2021    Isai Fuentes has been referred for evaluation and management of dyslipidemia    Referral Source: cardiology  HPI  History of ASCVD: Yes, Details: CAD s/p CABG 2007 had a couple of stents previous  Other Established (non-atherosclerotic) Vascular Disease, if Present:   Heart failure  Pacemaker  Age at Initial Diagnosis of Dyslipidemia: 65  Current Prescription Lipid Lowering Medications - including dose:   Statin: None  Non-Statin: None  Current Lipid Lowering and Related Supplements:   Omega 3s 1000  coq10  Vit D 1000  Any Current Side Effects Potentially Related to Lipid Lowering therapy?   No  Current Adherence to Lipid Lowering Therapies   Complete  Previously Attempted Interventions for Lipids - including outcome  Statin:  Rosuvastatin 40 mg - intractable myalgias (2017)   Pravastatin 40 mg - intractable myalgias 2015   Has tried others that also caused muscle pain  Non-Statin: fenofibrate    Never tried zetia   Any Previous History of Statin Intolerance?   Yes, intolerable myalgias on both rosuvastatin 40 and pravastatin 40 in the past.  Resolved with discontinuation    Baseline Lipids Prior to Treatment:      Ref. Range 4/22/2019 08:06   Cholesterol,Tot Latest Ref Range: 100 - 199 mg/dL 226 (H)   Triglycerides Latest Ref Range: 0 - 149 mg/dL 108   HDL Latest Ref Range: >=40 mg/dL 55   LDL Latest Ref Range: <100 mg/dL 149 (H)     Other Pertinent History:   No known history of thyroid disease  Does have mild chronic kidney disease but no evidence of nephrotic syndrome  History of other CV risk factors:   Blood pressure currently well controlled on his current regimen  No known history of diabetes  Remains on aspirin    CURRENT MEDICATIONS:   Current Outpatient Medications:   •  Repatha SureClick, 1 mL, Subcutaneous, Q14 DAYS  •  metoprolol SR, 50 mg, Oral, QDAY, Taking  •  Entresto, 2 Tab, Oral, BID, Taking  •  spironolactone, 25 mg,  Oral, DAILY, Taking  •  Multiple Vitamins-Minerals (OCUVITE PO), Take  by mouth., Taking  •  VITAMIN D PO, Take  by mouth., Taking  •  torsemide, 20 mg, Oral, DAILY, Taking  •  potassium citrate SR, 10 mEq, Oral, Q48HRS, Taking  •  Co Q 10, 200 mg, Oral, DAILY, Taking  •  Ixekizumab, 80 mg, Subcutaneous, Q30 DAYS, Taking  •  aspirin, 81 mg, Oral, DAILY, Taking  •  HYDROcodone/acetaminophen, TAKE 1 TABLET ORALLY EVERY 4 HOURS AS NEEDED DX M16.0 M54.5 7 DAYS, Not Taking  •  Furosemide (LASIX PO), furosemide, Not Taking  •  doxycycline, doxycycline monohydrate 100 mg capsule, Not Taking  •  METHOTREXATE SODIUM INJ, Inject  as directed. 10mg/ 0.2ml, Not Taking  •  metOLazone, 5 mg, Oral, DAILY, Not Taking    ALLERGIES: Plavix [clopidogrel bisulfate], Spironolactone, Statins [hmg-coa-r inhibitors], and Ticlid [ticlopidine hydrochloride]    FAMILY HISTORY:    Dad - CAD with cabg    SOCIAL HISTORY   Social History     Tobacco Use   Smoking Status Former Smoker   • Packs/day: 1.50   • Years: 3.00   • Pack years: 4.50   • Types: Cigarettes   • Quit date: 1970   • Years since quittin.0   Smokeless Tobacco Former User   • Types: Chew   • Quit date: 1972     Change in weight: stable  Exercise habits: moderate regular exercise program  Diet: low sodium    Review of Systems   Constitutional: Negative for malaise/fatigue (better off spirono) and weight loss.   HENT: Negative for hearing loss, nosebleeds and tinnitus.    Eyes: Negative for blurred vision and double vision.   Respiratory: Negative for shortness of breath.    Cardiovascular: Negative for chest pain, palpitations, orthopnea and leg swelling.   Gastrointestinal: Negative for blood in stool, heartburn, nausea and vomiting.   Genitourinary: Negative.    Musculoskeletal: Positive for joint pain and neck pain. Negative for back pain and myalgias.   Skin: Negative for itching and rash.   Neurological: Negative for dizziness, speech change, focal weakness, loss  of consciousness and headaches.   Endo/Heme/Allergies: Does not bruise/bleed easily.   Psychiatric/Behavioral: Negative for depression. The patient is not nervous/anxious.      Physical Exam   Constitutional: He is oriented to person, place, and time. He appears well-developed.   Cardiovascular: Normal rate and intact distal pulses.   No murmur heard.  Pulmonary/Chest: Effort normal. No respiratory distress.   Musculoskeletal: Normal range of motion.         General: No edema.   Neurological: He is alert and oriented to person, place, and time. Coordination normal.   Skin: Skin is warm and dry. He is not diaphoretic.   Psychiatric: He has a normal mood and affect.     DATA REVIEW:  Most Recent Lipid Panel:   Lab Results   Component Value Date    CHOLSTRLTOT 151 09/29/2020    TRIGLYCERIDE 63 09/29/2020    HDL 79 09/29/2020    LDL 59 09/29/2020     Other Pertinent Blood Work:   Lab Results   Component Value Date    SODIUM 135 12/09/2020    POTASSIUM 4.7 12/09/2020    CHLORIDE 102 12/09/2020    CO2 25 12/09/2020    ANION 8.0 12/09/2020    GLUCOSE 102 (H) 12/09/2020    BUN 27 (H) 12/09/2020    CREATININE 1.57 (H) 12/09/2020    CALCIUM 9.2 12/09/2020    ASTSGOT 26 09/28/2020    ALTSGPT 16 09/28/2020    ALKPHOSPHAT 134 (H) 09/28/2020    TBILIRUBIN 0.5 09/28/2020    ALBUMIN 4.2 09/28/2020    AGRATIO 1.3 09/28/2020    LIPOPROTA 6 10/10/2019    TSHULTRASEN 1.860 09/28/2020    CPKTOTAL 91 10/10/2019     [facility]  GFR is 45, , triglycerides 163, HDL 50, hemoglobin 16    ASSESSMENT AND PLAN  Patient Type, check all that apply:   Secondary Prevention  Established Atherosclerotic Cardiovascular Disease (ASCVD)  Yes, Details: Coronary artery disease status post PCI, MI, and CABG     Other Established (non-atherosclerotic) Vascular Disease, if Present:  Ischemic cardiomyopathy  Pacemaker    Evidence of Heterozygous Familial Hypercholesterolemia (FH):   No     ACC/AHA Indication for Statin Therapy,  padmini all that apply:  Established ASCVD: Indication for High intensity statin   Calculated Risk for ASCVD, if applicable    N/A  Other Significant Risk Markers, if any, padmini all that apply   Family history of premature ASCVD in first degree relative  Goal LDL-C and nonHDL-C based on Clinic Protocol  LDL-C:   <70 mg/dL, close to goal at 72     Lifestyle Recommendations From Today’s Visit:    Eating Plan: Concentrate on  Low sat/Trans fat  Exercise: Continue excellent exercise routine     Statin Therapy Recommendations from Today’s Visit:   Patient is not tolerated pravastatin and rosuvastatin in the past.  He also feels that he has not tolerated other statins but cannot name them.  Is been at least 2 years since he has had another trial of statin.  Trial of atorvastatin 5mg daily for 3 weeks resulted in myalgias.  Stopped and resolved.  Has attempted multiple other statins with the same bothersome side effects and has been unable to continue use.  - HOLD all statins     Non-Statin Medications Recommendations from Today’s Visit:   Did not attempt Zetia as he did not  the prescription.  Unlikely to reach LDL goal <70 on Zetia as monotherapy.    Indication for PCSK9 Inhibitor, if applicable:  ASCVD with suboptimal control of LDL-C despite maximally tolerated statin (Needs new paperwork signed for pt assistance)  - Continue Repatha 140mg subq every 14 days     Supplements Recommended at this visit:   -Continue CoQ10 to twice daily  -Continue vitamin D to 4000 units a day    Recommendations for Other Cardiovascular Risk Factors, padmini all that apply:   -Continue aspirin    Other Issues:    -Ischemic cardiomyopathy, appears well compensated.  Continue current therapy.  Follow-up with cardiology for further management    -Status post PPM -defer all further management to cardiology    CKD G3a/A1, stable  - avoid nephrotoxins  - continue HTN control with RAAS blockade   - monitor lytes/gfr routinely  - eval with  nephrology if worsening over time    Studies Ordered at Todays Visit: None  Blood Work Ordered At Today’s visit: as noted   Follow-Up: 6 months     FINA Kimble.

## 2021-01-19 ENCOUNTER — TELEPHONE (OUTPATIENT)
Dept: VASCULAR LAB | Facility: MEDICAL CENTER | Age: 81
End: 2021-01-19

## 2021-01-20 NOTE — TELEPHONE ENCOUNTER
Pt LVM for clinic, wants to change 06/22/21 APRN appt to 06/23/21. I called pt to attempt to reschedule but has to LVM.

## 2021-02-02 ENCOUNTER — TELEPHONE (OUTPATIENT)
Dept: VASCULAR LAB | Facility: MEDICAL CENTER | Age: 81
End: 2021-02-02

## 2021-02-02 NOTE — TELEPHONE ENCOUNTER
[employer] safety net materials for Repatha signed  MA to submit  Await coverage determination    Michael J Bloch, MD  Certified Clinical Lipid Specialist  Medial Director, [facility]

## 2021-02-15 ENCOUNTER — HOSPITAL ENCOUNTER (EMERGENCY)
Facility: MEDICAL CENTER | Age: 81
End: 2021-02-15
Attending: EMERGENCY MEDICINE
Payer: MEDICARE

## 2021-02-15 ENCOUNTER — APPOINTMENT (OUTPATIENT)
Dept: RADIOLOGY | Facility: MEDICAL CENTER | Age: 81
End: 2021-02-15
Attending: EMERGENCY MEDICINE
Payer: MEDICARE

## 2021-02-15 VITALS
DIASTOLIC BLOOD PRESSURE: 78 MMHG | HEIGHT: 71 IN | HEART RATE: 69 BPM | TEMPERATURE: 96.5 F | OXYGEN SATURATION: 98 % | WEIGHT: 200 LBS | RESPIRATION RATE: 18 BRPM | BODY MASS INDEX: 28 KG/M2 | SYSTOLIC BLOOD PRESSURE: 133 MMHG

## 2021-02-15 DIAGNOSIS — R55 NEAR SYNCOPE: ICD-10-CM

## 2021-02-15 LAB
ALBUMIN SERPL BCP-MCNC: 3.9 G/DL (ref 3.2–4.9)
ALBUMIN/GLOB SERPL: 1.2 G/DL
ALP SERPL-CCNC: 116 U/L (ref 30–99)
ALT SERPL-CCNC: 18 U/L (ref 2–50)
ANION GAP SERPL CALC-SCNC: 13 MMOL/L (ref 7–16)
AST SERPL-CCNC: 29 U/L (ref 12–45)
BASOPHILS # BLD AUTO: 0.8 % (ref 0–1.8)
BASOPHILS # BLD: 0.05 K/UL (ref 0–0.12)
BILIRUB SERPL-MCNC: 0.4 MG/DL (ref 0.1–1.5)
BUN SERPL-MCNC: 30 MG/DL (ref 8–22)
CALCIUM SERPL-MCNC: 9.1 MG/DL (ref 8.5–10.5)
CHLORIDE SERPL-SCNC: 99 MMOL/L (ref 96–112)
CO2 SERPL-SCNC: 22 MMOL/L (ref 20–33)
CREAT SERPL-MCNC: 1.66 MG/DL (ref 0.5–1.4)
EKG IMPRESSION: NORMAL
EKG IMPRESSION: NORMAL
EOSINOPHIL # BLD AUTO: 0.16 K/UL (ref 0–0.51)
EOSINOPHIL NFR BLD: 2.4 % (ref 0–6.9)
ERYTHROCYTE [DISTWIDTH] IN BLOOD BY AUTOMATED COUNT: 53.7 FL (ref 35.9–50)
GLOBULIN SER CALC-MCNC: 3.2 G/DL (ref 1.9–3.5)
GLUCOSE SERPL-MCNC: 112 MG/DL (ref 65–99)
HCT VFR BLD AUTO: 49.5 % (ref 42–52)
HGB BLD-MCNC: 16.3 G/DL (ref 14–18)
IMM GRANULOCYTES # BLD AUTO: 0.1 K/UL (ref 0–0.11)
IMM GRANULOCYTES NFR BLD AUTO: 1.5 % (ref 0–0.9)
LYMPHOCYTES # BLD AUTO: 1.78 K/UL (ref 1–4.8)
LYMPHOCYTES NFR BLD: 26.8 % (ref 22–41)
MCH RBC QN AUTO: 32.4 PG (ref 27–33)
MCHC RBC AUTO-ENTMCNC: 32.9 G/DL (ref 33.7–35.3)
MCV RBC AUTO: 98.4 FL (ref 81.4–97.8)
MONOCYTES # BLD AUTO: 0.7 K/UL (ref 0–0.85)
MONOCYTES NFR BLD AUTO: 10.5 % (ref 0–13.4)
NEUTROPHILS # BLD AUTO: 3.85 K/UL (ref 1.82–7.42)
NEUTROPHILS NFR BLD: 58 % (ref 44–72)
NRBC # BLD AUTO: 0 K/UL
NRBC BLD-RTO: 0 /100 WBC
PLATELET # BLD AUTO: 165 K/UL (ref 164–446)
PMV BLD AUTO: 10.8 FL (ref 9–12.9)
POTASSIUM SERPL-SCNC: 3.9 MMOL/L (ref 3.6–5.5)
PROT SERPL-MCNC: 7.1 G/DL (ref 6–8.2)
RBC # BLD AUTO: 5.03 M/UL (ref 4.7–6.1)
SODIUM SERPL-SCNC: 134 MMOL/L (ref 135–145)
TROPONIN T SERPL-MCNC: 16 NG/L (ref 6–19)
TROPONIN T SERPL-MCNC: 20 NG/L (ref 6–19)
WBC # BLD AUTO: 6.6 K/UL (ref 4.8–10.8)

## 2021-02-15 PROCEDURE — 700111 HCHG RX REV CODE 636 W/ 250 OVERRIDE (IP): Performed by: EMERGENCY MEDICINE

## 2021-02-15 PROCEDURE — 94760 N-INVAS EAR/PLS OXIMETRY 1: CPT

## 2021-02-15 PROCEDURE — 99284 EMERGENCY DEPT VISIT MOD MDM: CPT

## 2021-02-15 PROCEDURE — 71045 X-RAY EXAM CHEST 1 VIEW: CPT

## 2021-02-15 PROCEDURE — 80053 COMPREHEN METABOLIC PANEL: CPT

## 2021-02-15 PROCEDURE — 85025 COMPLETE CBC W/AUTO DIFF WBC: CPT

## 2021-02-15 PROCEDURE — 93005 ELECTROCARDIOGRAM TRACING: CPT | Performed by: EMERGENCY MEDICINE

## 2021-02-15 PROCEDURE — 36415 COLL VENOUS BLD VENIPUNCTURE: CPT

## 2021-02-15 PROCEDURE — 96374 THER/PROPH/DIAG INJ IV PUSH: CPT

## 2021-02-15 PROCEDURE — 84484 ASSAY OF TROPONIN QUANT: CPT | Mod: 91

## 2021-02-15 RX ORDER — ONDANSETRON 2 MG/ML
4 INJECTION INTRAMUSCULAR; INTRAVENOUS ONCE
Status: COMPLETED | OUTPATIENT
Start: 2021-02-15 | End: 2021-02-15

## 2021-02-15 RX ADMIN — ONDANSETRON 4 MG: 2 INJECTION INTRAMUSCULAR; INTRAVENOUS at 07:29

## 2021-02-15 ASSESSMENT — LIFESTYLE VARIABLES
HAVE YOU EVER FELT YOU SHOULD CUT DOWN ON YOUR DRINKING: NO
EVER FELT BAD OR GUILTY ABOUT YOUR DRINKING: NO
TOTAL SCORE: 0
AVERAGE NUMBER OF DAYS PER WEEK YOU HAVE A DRINK CONTAINING ALCOHOL: 7
HAVE PEOPLE ANNOYED YOU BY CRITICIZING YOUR DRINKING: NO
DOES PATIENT WANT TO STOP DRINKING: NO
CONSUMPTION TOTAL: POSITIVE
DO YOU DRINK ALCOHOL: YES
HOW MANY TIMES IN THE PAST YEAR HAVE YOU HAD 5 OR MORE DRINKS IN A DAY: 0
TOTAL SCORE: 0
EVER HAD A DRINK FIRST THING IN THE MORNING TO STEADY YOUR NERVES TO GET RID OF A HANGOVER: NO
TOTAL SCORE: 0
ON A TYPICAL DAY WHEN YOU DRINK ALCOHOL HOW MANY DRINKS DO YOU HAVE: 4

## 2021-02-15 ASSESSMENT — FIBROSIS 4 INDEX: FIB4 SCORE: 3.15

## 2021-02-15 NOTE — ED PROVIDER NOTES
ED Provider Note    CHIEF COMPLAINT  Chief Complaint   Patient presents with   • Near Syncopal     Pt was riding their stationary bike this morning for about 35 minutes and got off because he felt like he was going to fall over and proceeded to feel like he was going to faint as he was walking. Pt also reporting dizziness and nausea; states these are the initial symptoms he felt for all of his previous MIs in the past and called [facility] and took 324 ASA. Pt has a significant cardiac history including having an implanted 3 lead, MIx4, cardiac arrest, stents placed, CABG. Denies SOB and CP at this time.       HPI  Isai Fuentes is a 80 y.o. male who presents to the emergency department by ambulance from home for lightheadedness and nausea.  Patient felt as though he may pass out upon waking this morning.  He try to make himself feel better after eating and drinking, but felt the same.  He then tried to ride a stationary bike without improvement.  Eventually called EMS for ongoing symptoms.  Patient describes similar symptoms, although normally associated with chest pain as well, with previous MIs.  He has AICD, history of MI with stent as well as cardiac arrest and CABG.  Compliant with home medications.    Patient denies chest pain, shortness of breath at all today.  Denies any back pain or abdominal pain.  Denies recent illness, cough or congestion.  Denies vomiting or diarrhea.     REVIEW OF SYSTEMS  See HPI for further details. All other systems are negative.     PAST MEDICAL HISTORY   has a past medical history of Atrial fibrillation (HCC) (9/20/2011), Biventricular implantable cardioverter-defibrillator in situ (8/7/2015), Breath shortness, CAD (coronary artery disease) (9/20/2011), Cancer (HCC) (2012), CARDIOMYOPATHY (9/20/2011), Congestive heart failure (HCC), Fatigue (10/28/2010), Heartburn (10/23/2008), History of cardiac catheterization (9/20/2011), History of myocardial infarction, HTN (hypertension)  (2011), Indigestion, Insomnia (2010), Ischemic cardiomyopathy (2011), Long term (current) use of anticoagulants (2011), Myocardial infarct (HCC) (), Orthostatic hypotension (2009), WILLA (obstructive sleep apnea), Other drug allergy(995.27) (10/16/2008), Other specified disorder of intestines (07-28-15), Pacemaker, Pain (07-28-15), Pleural effusion (2009), Presence of permanent cardiac pacemaker (2011), Prostate cancer ([facility]) (2011), Second degree AV block, Mobitz type II (11/10/2010), Snoring, and Stroke ([facility]) (2/3/2009).    SOCIAL HISTORY  Social History     Tobacco Use   • Smoking status: Former Smoker     Packs/day: 1.50     Years: 3.00     Pack years: 4.50     Types: Cigarettes     Quit date: 1970     Years since quittin.1   • Smokeless tobacco: Former User     Types: Chew     Quit date: 1972   Substance and Sexual Activity   • Alcohol use: Yes     Comment: 10-14 per week; scotch & wine   • Drug use: No   • Sexual activity: Not on file       SURGICAL HISTORY   has a past surgical history that includes zzz cardiac cath; multiple coronary artery bypass (); pacemaker insertion (08); recovery (2015); recovery (2015); recovery (2015); and recovery (10/16/2015).    CURRENT MEDICATIONS  Home Medications     Reviewed by Kayley Toro R.N. (Registered Nurse) on 02/15/21 at 0546  Med List Status: Partial   Medication Last Dose Status   aspirin 81 MG tablet  Active   Coenzyme Q10 (CO Q 10) 100 MG Cap  Active   doxycycline (MONODOX) 100 MG capsule  Active   Evolocumab, REPATHA, (REPATHA SURECLICK) 140 MG/ML Solution Auto-injector  Active   Furosemide (LASIX PO)  Active   HYDROcodone/acetaminophen (NORCO)  MG Tab  Active   Ixekizumab (TALTZ) 80 MG/ML Solution Prefilled Syringe  Active   METHOTREXATE SODIUM INJ  Active   metOLazone (ZAROXOLYN) 5 MG Tab  Active   metoprolol SR (TOPROL XL) 50 MG TABLET SR 24 HR  Active   Multiple  "Vitamins-Minerals (OCUVITE PO)  Active   potassium citrate SR (UROCIT-K SR) 10 MEQ (1080 MG) Tab CR  Active   sacubitril-valsartan (ENTRESTO) 49-51 MG Tab tablet  Active   spironolactone (ALDACTONE) 25 MG Tab  Active   torsemide (DEMADEX) 20 MG Tab  Active   VITAMIN D PO  Active                ALLERGIES  Allergies   Allergen Reactions   • Plavix [Clopidogrel Bisulfate] Anaphylaxis   • Spironolactone Unspecified     Hyperkalemia   • Statins [Hmg-Coa-R Inhibitors] Unspecified     Muscles aches     • Ticlid [Ticlopidine Hydrochloride] Unspecified     Pt states \"I'm not sure what happens\".         PHYSICAL EXAM  VITAL SIGNS: /76   Pulse 69   Temp 35.8 °C (96.5 °F) (Temporal)   Resp 19   Ht 1.803 m (5' 11\")   Wt 90.7 kg (200 lb)   SpO2 97%   BMI 27.89 kg/m²   Pulse ox interpretation: I interpret this pulse ox as normal.  Constitutional: Alert in no apparent distress.  HENT: Normocephalic, atraumatic. Bilateral external ears normal, Nose normal. Moist mucous membranes.    Eyes: Pupils are equal and reactive, Conjunctiva normal.   Neck: Normal range of motion, Supple.   Lymphatic: No lymphadenopathy noted.   Cardiovascular: Regular rate and rhythm, no murmurs. Distal pulses intact.  No peripheral edema.  Thorax & Lungs: Normal breath sounds.  No wheezing/rales/ronchi. No increased work of breathing, clipped speech or retractions.   Abdomen: Soft, non-distended, non-tender to palpation. No palpable or pulsatile masses. No peritoneal signs.   Skin: Warm, Dry, No erythema, No rash.   Musculoskeletal: Good range of motion in all major joints.    Neurologic: Alert and orient x4.  Speech clear and cohesive.  Moves 4 extremities spontaneously.  Psychiatric: Affect normal, Judgment normal, Mood normal.       DIAGNOSTIC STUDIES / PROCEDURES    LABS  Results for orders placed or performed during the hospital encounter of 02/15/21   CBC w/ Differential   Result Value Ref Range    WBC 6.6 4.8 - 10.8 K/uL    RBC 5.03 4.70 " - 6.10 M/uL    Hemoglobin 16.3 14.0 - 18.0 g/dL    Hematocrit 49.5 42.0 - 52.0 %    MCV 98.4 (H) 81.4 - 97.8 fL    MCH 32.4 27.0 - 33.0 pg    MCHC 32.9 (L) 33.7 - 35.3 g/dL    RDW 53.7 (H) 35.9 - 50.0 fL    Platelet Count 165 164 - 446 K/uL    MPV 10.8 9.0 - 12.9 fL    Neutrophils-Polys 58.00 44.00 - 72.00 %    Lymphocytes 26.80 22.00 - 41.00 %    Monocytes 10.50 0.00 - 13.40 %    Eosinophils 2.40 0.00 - 6.90 %    Basophils 0.80 0.00 - 1.80 %    Immature Granulocytes 1.50 (H) 0.00 - 0.90 %    Nucleated RBC 0.00 /100 WBC    Neutrophils (Absolute) 3.85 1.82 - 7.42 K/uL    Lymphs (Absolute) 1.78 1.00 - 4.80 K/uL    Monos (Absolute) 0.70 0.00 - 0.85 K/uL    Eos (Absolute) 0.16 0.00 - 0.51 K/uL    Baso (Absolute) 0.05 0.00 - 0.12 K/uL    Immature Granulocytes (abs) 0.10 0.00 - 0.11 K/uL    NRBC (Absolute) 0.00 K/uL   Complete Metabolic Panel (CMP)   Result Value Ref Range    Sodium 134 (L) 135 - 145 mmol/L    Potassium 3.9 3.6 - 5.5 mmol/L    Chloride 99 96 - 112 mmol/L    Co2 22 20 - 33 mmol/L    Anion Gap 13.0 7.0 - 16.0    Glucose 112 (H) 65 - 99 mg/dL    Bun 30 (H) 8 - 22 mg/dL    Creatinine 1.66 (H) 0.50 - 1.40 mg/dL    Calcium 9.1 8.5 - 10.5 mg/dL    AST(SGOT) 29 12 - 45 U/L    ALT(SGPT) 18 2 - 50 U/L    Alkaline Phosphatase 116 (H) 30 - 99 U/L    Total Bilirubin 0.4 0.1 - 1.5 mg/dL    Albumin 3.9 3.2 - 4.9 g/dL    Total Protein 7.1 6.0 - 8.2 g/dL    Globulin 3.2 1.9 - 3.5 g/dL    A-G Ratio 1.2 g/dL   Troponin STAT   Result Value Ref Range    Troponin T 20 (H) 6 - 19 ng/L   ESTIMATED GFR   Result Value Ref Range    GFR If  48 (A) >60 mL/min/1.73 m 2    GFR If Non African American 40 (A) >60 mL/min/1.73 m 2   TROPONIN   Result Value Ref Range    Troponin T 16 6 - 19 ng/L   EKG   Result Value Ref Range    Report       [facility] Emergency Dept.    Test Date:  2021-02-15  Pt Name:    JESSICA ALBRECHT                 Department: ER  MRN:        [id number]                      Room:         22  Gender:     Male                         Technician: [id number]  :        1940                   Requested By:ER TRIAGE PROTOCOL  Order #:    [id number]                    Reading MD: JASON WELLER DO    Measurements  Intervals                                Axis  Rate:       70                           P:          0  NY:         156                          QRS:        203  QRSD:       154                          T:          89  QT:         444  QTc:        480    Interpretive Statements  A-V DUAL-PACED RHYTHM WITH SOME INHIBITION  NO FURTHER ANALYSIS ATTEMPTED DUE TO PACED RHYTHM  Compared to ECG 2020 07:30:09  Atrial-sensed ventricular-paced complex(es) or rhythm no longer present  Electronically Signed On 2- 10:34:00 PST by JASON WELLER DO     EKG   Result Value Ref Range    Report       [facility] Emergency Dept.    Test Date:  2021-02-15  Pt Name:    JESSICA ALBRECHT                 Department: ER  MRN:        [id number]                      Room:        22  Gender:     Male                         Technician: [id number]  :        1940                   Requested By:JASON WELLER  Order #:    [id number]                    Reading MD: JASON WELLER DO    Measurements  Intervals                                Axis  Rate:       70                           P:          14  NY:         180                          QRS:        204  QRSD:       140                          T:          85  QT:         432  QTc:        467    Interpretive Statements  A-V DUAL-PACED RHYTHM WITH SOME INHIBITION  NO FURTHER ANALYSIS ATTEMPTED DUE TO PACED RHYTHM  Compared to ECG 02/15/2021 05:33:17  No significant changes  Electronically Signed On 2- 10:33:58 PST by JASON WELLER DO           RADIOLOGY  DX-CHEST-PORTABLE (1 VIEW)   Final Result         1.  No acute cardiopulmonary disease.   2.  Cardiomegaly        COURSE & MEDICAL DECISION MAKING  Nursing notes and vital  signs were reviewed. (See chart for details)  The patients records were reviewed, history was obtained from the patient;     ED evaluation for near syncope is unrevealing.  Patient has significant past medical, cardiac history but denies any chest pain today.  He has not had true syncope.  His symptoms had mostly resolved before this evaluation.  Labs are quite unremarkable, no leukocytosis or anemia.  Chronic renal insufficiency without other electrolyte derangement.  Troponin negative twice.  No clinical or radiographic evidence for pneumonia, pneumothorax or thoracic arctic dissection.  EKG twice here in the emergency department, unchanged from previous and without evidence for STEMI or other ischemia.  Continues telemetry with occasional PVCs, otherwise appropriate.  This does not appear to be cardiac or neurogenic in origin today.  He is neurologically intact and nonfocal as well.  Patient is tolerating oral fluids and ambulates independently.  He and his wife are aware of the findings and agreeable for discharge home since he has been feeling so much better.    Patient is stable for discharge at this time, anticipatory guidance provided, continue home medications, close follow-up is encouraged with primary care and cardiology, and strict ED return instructions have been detailed. Patient is agreeable to the disposition and plan.    Patient's blood pressure was elevated in the emergency department, and has been referred to primary care for close monitoring.      FINAL IMPRESSION  (R55) Near syncope      Electronically signed by: Kati Mack D.O., 2/15/2021 10:32 AM      This dictation was created using voice recognition software. The accuracy of the dictation is limited to the abilities of the software. I expect there may be some errors of grammar and possibly content. The nursing notes were reviewed and certain aspects of this information were incorporated into this note.

## 2021-02-15 NOTE — ED NOTES
Pt discharged home as ordered by erp. Pt instructed to follow up with his PCP and return here as needed. Pt verbalized understanding. Wife at bedside and instructed on d/c instructions. Pt left in a wheelchair to his wife.

## 2021-02-15 NOTE — ED TRIAGE NOTES
Isai Fuentes  80 y.o. male  Chief Complaint   Patient presents with   • Near Syncopal     Pt was riding their stationary bike this morning for about 35 minutes and got off because he felt like he was going to fall over and proceeded to feel like he was going to faint as he was walking. Pt also reporting dizziness and nausea; states these are the initial symptoms he felt for all of his previous MIs in the past and called [facility] and took 324 ASA. Pt has a significant cardiac history including having an implanted 3 lead, MIx4, cardiac arrest, stents placed, CABG. Denies SOB and CP at this time.     Pt also took 25 metoprolol and entresto at 0435 per S/O.      /81   Pulse 69   Temp 35.8 °C (96.5 °F) (Temporal)   Resp 16   SpO2 92%

## 2021-02-15 NOTE — DISCHARGE INSTRUCTIONS
Follow-up with primary care 1 to 2 days for reevaluation, medication management and close blood pressure monitoring.    Continue home medications as previously indicated.    Return to the emergency department for persistent or worsening chest pain, syncope, shortness of breath, fever, altered mental status or other new concerns.

## 2021-02-15 NOTE — ED NOTES
"Pt ambulated to restroom. Completes of dizziness and abd \"burning\" pt back to room and on monitor.   "

## 2021-02-22 ENCOUNTER — TELEPHONE (OUTPATIENT)
Dept: VASCULAR LAB | Facility: MEDICAL CENTER | Age: 81
End: 2021-02-22

## 2021-03-02 DIAGNOSIS — E78.00 HYPERCHOLESTEREMIA: ICD-10-CM

## 2021-03-02 RX ORDER — EVOLOCUMAB 140 MG/ML
1 INJECTION, SOLUTION SUBCUTANEOUS
Qty: 6 ML | Refills: 3 | Status: SHIPPED | OUTPATIENT
Start: 2021-03-02 | End: 2021-12-15 | Stop reason: SDUPTHER

## 2021-03-02 NOTE — PROGRESS NOTES
Repatha refill sent to [facility] pharmacy.    Selena GOOD  [facility]

## 2021-03-03 ENCOUNTER — TELEPHONE (OUTPATIENT)
Dept: VASCULAR LAB | Facility: MEDICAL CENTER | Age: 81
End: 2021-03-03

## 2021-03-03 NOTE — TELEPHONE ENCOUNTER
No prior authorization is required for the patient at this time. The patient has a copay of $577.14/ 84 ds . I have contacted the patient at [phone] to offer FA via the [employer].

## 2021-03-05 ENCOUNTER — TELEPHONE (OUTPATIENT)
Dept: VASCULAR LAB | Facility: MEDICAL CENTER | Age: 81
End: 2021-03-05

## 2021-03-18 ENCOUNTER — TELEPHONE (OUTPATIENT)
Dept: VASCULAR LAB | Facility: MEDICAL CENTER | Age: 81
End: 2021-03-18

## 2021-03-18 NOTE — TELEPHONE ENCOUNTER
Aman Cespedes,    I have spoken with Felisa from [employer] regarding the patient's application status. As of today it is still pending a benefit investigation with the patient's plan. Mr. Fuentes missed one question on the application. I have conference both [employer] and the patient on the call to verbally answer the question. The foundation will now proceed with contacting the patient's plan.     Thank you,    Marybeth

## 2021-03-25 ENCOUNTER — TELEPHONE (OUTPATIENT)
Dept: VASCULAR LAB | Facility: MEDICAL CENTER | Age: 81
End: 2021-03-25

## 2021-03-25 NOTE — TELEPHONE ENCOUNTER
I have spoken with [employer] ([phone]). Then foundation states that the patient's application is still pending review. The representative, Felisa, stated to check back in 5-7 days.

## 2021-04-12 ENCOUNTER — TELEPHONE (OUTPATIENT)
Dept: VASCULAR LAB | Facility: MEDICAL CENTER | Age: 81
End: 2021-04-12

## 2021-04-12 NOTE — TELEPHONE ENCOUNTER
I have contacted [employer] ([phone]). [facility] has informed me that the patient was Approved on 4/7/2021 until 12/31/2021. The [facility] has contacted the patient to inform him of his approval, they were unsuccessful in reaching him. I will contact the patient to provide him the approval status and assist in ordering his medication for the first time.

## 2021-04-16 ENCOUNTER — ANTICOAGULATION MONITORING (OUTPATIENT)
Dept: VASCULAR LAB | Facility: MEDICAL CENTER | Age: 81
End: 2021-04-16

## 2021-05-21 ENCOUNTER — HOSPITAL ENCOUNTER (OUTPATIENT)
Dept: LAB | Facility: MEDICAL CENTER | Age: 81
End: 2021-05-21
Attending: FAMILY MEDICINE
Payer: MEDICARE

## 2021-05-21 LAB
BASOPHILS # BLD AUTO: 0.7 % (ref 0–1.8)
BASOPHILS # BLD: 0.06 K/UL (ref 0–0.12)
EOSINOPHIL # BLD AUTO: 0.32 K/UL (ref 0–0.51)
EOSINOPHIL NFR BLD: 3.6 % (ref 0–6.9)
ERYTHROCYTE [DISTWIDTH] IN BLOOD BY AUTOMATED COUNT: 52.4 FL (ref 35.9–50)
HCT VFR BLD AUTO: 52.9 % (ref 42–52)
HGB BLD-MCNC: 16.8 G/DL (ref 14–18)
IMM GRANULOCYTES # BLD AUTO: 0.07 K/UL (ref 0–0.11)
IMM GRANULOCYTES NFR BLD AUTO: 0.8 % (ref 0–0.9)
LYMPHOCYTES # BLD AUTO: 2.44 K/UL (ref 1–4.8)
LYMPHOCYTES NFR BLD: 27.3 % (ref 22–41)
MCH RBC QN AUTO: 31.2 PG (ref 27–33)
MCHC RBC AUTO-ENTMCNC: 31.8 G/DL (ref 33.7–35.3)
MCV RBC AUTO: 98.1 FL (ref 81.4–97.8)
MONOCYTES # BLD AUTO: 0.92 K/UL (ref 0–0.85)
MONOCYTES NFR BLD AUTO: 10.3 % (ref 0–13.4)
NEUTROPHILS # BLD AUTO: 5.12 K/UL (ref 1.82–7.42)
NEUTROPHILS NFR BLD: 57.3 % (ref 44–72)
NRBC # BLD AUTO: 0 K/UL
NRBC BLD-RTO: 0 /100 WBC
PLATELET # BLD AUTO: 215 K/UL (ref 164–446)
PMV BLD AUTO: 10.5 FL (ref 9–12.9)
RBC # BLD AUTO: 5.39 M/UL (ref 4.7–6.1)
WBC # BLD AUTO: 8.9 K/UL (ref 4.8–10.8)

## 2021-05-21 PROCEDURE — 85025 COMPLETE CBC W/AUTO DIFF WBC: CPT

## 2021-05-21 PROCEDURE — 36415 COLL VENOUS BLD VENIPUNCTURE: CPT

## 2021-06-23 ENCOUNTER — OFFICE VISIT (OUTPATIENT)
Dept: VASCULAR LAB | Facility: MEDICAL CENTER | Age: 81
End: 2021-06-23
Attending: INTERNAL MEDICINE
Payer: MEDICARE

## 2021-06-23 VITALS
HEART RATE: 70 BPM | DIASTOLIC BLOOD PRESSURE: 65 MMHG | HEIGHT: 71 IN | BODY MASS INDEX: 28 KG/M2 | SYSTOLIC BLOOD PRESSURE: 101 MMHG | WEIGHT: 200 LBS

## 2021-06-23 DIAGNOSIS — Z86.73 HISTORY OF CVA (CEREBROVASCULAR ACCIDENT): ICD-10-CM

## 2021-06-23 DIAGNOSIS — I25.10 CORONARY ARTERY DISEASE INVOLVING NATIVE CORONARY ARTERY OF NATIVE HEART WITHOUT ANGINA PECTORIS: Chronic | ICD-10-CM

## 2021-06-23 DIAGNOSIS — I10 ESSENTIAL HYPERTENSION: Chronic | ICD-10-CM

## 2021-06-23 DIAGNOSIS — E78.00 HYPERCHOLESTEREMIA: ICD-10-CM

## 2021-06-23 PROCEDURE — 99212 OFFICE O/P EST SF 10 MIN: CPT | Performed by: NURSE PRACTITIONER

## 2021-06-23 PROCEDURE — 99214 OFFICE O/P EST MOD 30 MIN: CPT | Performed by: NURSE PRACTITIONER

## 2021-06-23 ASSESSMENT — ENCOUNTER SYMPTOMS
DIZZINESS: 0
DEPRESSION: 0
WEIGHT LOSS: 0
LOSS OF CONSCIOUSNESS: 0
SPEECH CHANGE: 0
BRUISES/BLEEDS EASILY: 0
MYALGIAS: 0
PALPITATIONS: 0
NAUSEA: 0
HEADACHES: 0
ORTHOPNEA: 0
SHORTNESS OF BREATH: 0
FOCAL WEAKNESS: 0
HEARTBURN: 0
DOUBLE VISION: 0
NERVOUS/ANXIOUS: 0
NECK PAIN: 1
VOMITING: 0
BLOOD IN STOOL: 0
BLURRED VISION: 0
BACK PAIN: 0

## 2021-06-23 ASSESSMENT — FIBROSIS 4 INDEX: FIB4 SCORE: 2.54

## 2021-06-23 NOTE — PROGRESS NOTES
[facility] - Follow Up Visit  Date of Service: 06/23/2021    Isai Fuentes has been referred for evaluation and management of dyslipidemia    Referral Source: cardiology  HPI  History of ASCVD: Yes, Details: CAD s/p CABG 2007 had a couple of stents previous  Other Established (non-atherosclerotic) Vascular Disease, if Present:   Heart failure  Pacemaker  Age at Initial Diagnosis of Dyslipidemia: 65  Current Prescription Lipid Lowering Medications - including dose:   Statin: None  Non-Statin: Repatha 140mg   Current Lipid Lowering and Related Supplements:   Omega 3s 1000  coq10  Vit D 1000  Any Current Side Effects Potentially Related to Lipid Lowering therapy?   No  Current Adherence to Lipid Lowering Therapies   Complete  Previously Attempted Interventions for Lipids - including outcome  Statin:  Rosuvastatin 40 mg - intractable myalgias (2017)   Pravastatin 40 mg - intractable myalgias 2015   Has tried others that also caused muscle pain  Non-Statin: fenofibrate    Never tried zetia   Any Previous History of Statin Intolerance?   Yes, intolerable myalgias on both rosuvastatin 40 and pravastatin 40 in the past.  Resolved with discontinuation    Baseline Lipids Prior to Treatment:      Ref. Range 4/22/2019 08:06   Cholesterol,Tot Latest Ref Range: 100 - 199 mg/dL 226 (H)   Triglycerides Latest Ref Range: 0 - 149 mg/dL 108   HDL Latest Ref Range: >=40 mg/dL 55   LDL Latest Ref Range: <100 mg/dL 149 (H)     Other Pertinent History:   No known history of thyroid disease  Does have mild chronic kidney disease but no evidence of nephrotic syndrome  History of other CV risk factors:   Blood pressure currently well controlled on his current regimen  No known history of diabetes  Remains on aspirin    CURRENT MEDICATIONS:   Current Outpatient Medications:   •  Repatha SureClick, 1 mL, Subcutaneous, Q14 DAYS, Taking  •  metoprolol SR, 50 mg, Oral, QDAY, Taking  •  Entresto, 2 tablet, Oral, BID, Taking  •   spironolactone, 25 mg, Oral, DAILY, Taking  •  HYDROcodone/acetaminophen, TAKE 1 TABLET ORALLY EVERY 4 HOURS AS NEEDED DX M16.0 M54.5 7 DAYS, Taking  •  Furosemide (LASIX PO), furosemide, Taking  •  METHOTREXATE SODIUM INJ, Inject  as directed. 10mg/ 0.2ml, Taking  •  metOLazone, 5 mg, Oral, DAILY, Taking  •  Multiple Vitamins-Minerals (OCUVITE PO), Take  by mouth., Taking  •  VITAMIN D PO, Take  by mouth., Taking  •  torsemide, 20 mg, Oral, DAILY, Taking  •  potassium citrate SR, 10 mEq, Oral, Q48HRS, Taking  •  Co Q 10, 200 mg, Oral, DAILY, Taking  •  Ixekizumab, 80 mg, Subcutaneous, Q30 DAYS, Taking  •  aspirin, 81 mg, Oral, DAILY, Taking  •  doxycycline, doxycycline monohydrate 100 mg capsule, Not Taking    ALLERGIES: Plavix [clopidogrel bisulfate], Spironolactone, Statins [hmg-coa-r inhibitors], and Ticlid [ticlopidine hydrochloride]    FAMILY HISTORY:    Dad - CAD with cabg    SOCIAL HISTORY   Social History     Tobacco Use   Smoking Status Former Smoker   • Packs/day: 1.50   • Years: 3.00   • Pack years: 4.50   • Types: Cigarettes   • Quit date: 1970   • Years since quittin.5   Smokeless Tobacco Former User   • Types: Chew   • Quit date: 1972     Change in weight: stable  Exercise habits: moderate regular exercise program  Diet: low sodium    Review of Systems   Constitutional: Negative for malaise/fatigue (better off spirono) and weight loss.   HENT: Negative for hearing loss, nosebleeds and tinnitus.    Eyes: Negative for blurred vision and double vision.   Respiratory: Negative for shortness of breath.    Cardiovascular: Negative for chest pain, palpitations, orthopnea and leg swelling.   Gastrointestinal: Negative for blood in stool, heartburn, nausea and vomiting.   Genitourinary: Negative.    Musculoskeletal: Positive for joint pain and neck pain. Negative for back pain and myalgias.   Skin: Negative for itching and rash.   Neurological: Negative for dizziness, speech change, focal  weakness, loss of consciousness and headaches.   Endo/Heme/Allergies: Does not bruise/bleed easily.   Psychiatric/Behavioral: Negative for depression. The patient is not nervous/anxious.      Physical Exam  Constitutional:       Appearance: He is well-developed. He is not diaphoretic.   Cardiovascular:      Rate and Rhythm: Normal rate.      Heart sounds: No murmur heard.     Pulmonary:      Effort: Pulmonary effort is normal. No respiratory distress.   Musculoskeletal:         General: Normal range of motion.   Skin:     General: Skin is warm and dry.   Neurological:      Mental Status: He is alert and oriented to person, place, and time.      Coordination: Coordination normal.       DATA REVIEW:  Most Recent Lipid Panel:   Lab Results   Component Value Date    CHOLSTRLTOT 151 09/29/2020    TRIGLYCERIDE 63 09/29/2020    HDL 79 09/29/2020    LDL 59 09/29/2020     Other Pertinent Blood Work:   Lab Results   Component Value Date    SODIUM 134 (L) 02/15/2021    POTASSIUM 3.9 02/15/2021    CHLORIDE 99 02/15/2021    CO2 22 02/15/2021    ANION 13.0 02/15/2021    GLUCOSE 112 (H) 02/15/2021    BUN 30 (H) 02/15/2021    CREATININE 1.66 (H) 02/15/2021    CALCIUM 9.1 02/15/2021    ASTSGOT 29 02/15/2021    ALTSGPT 18 02/15/2021    ALKPHOSPHAT 116 (H) 02/15/2021    TBILIRUBIN 0.4 02/15/2021    ALBUMIN 3.9 02/15/2021    AGRATIO 1.2 02/15/2021    LIPOPROTA 6 10/10/2019    TSHULTRASEN 1.860 09/28/2020    CPKTOTAL 91 10/10/2019     [facility]  GFR is 45, , triglycerides 163, HDL 50, hemoglobin 16    ASSESSMENT AND PLAN  Patient Type, check all that apply:   Secondary Prevention  Established Atherosclerotic Cardiovascular Disease (ASCVD)  Yes, Details: Coronary artery disease status post PCI, MI, and CABG     Other Established (non-atherosclerotic) Vascular Disease, if Present:  Ischemic cardiomyopathy  Pacemaker    Evidence of Heterozygous Familial Hypercholesterolemia (FH):   No     ACC/AHA Indication  for Statin Therapy, padmini all that apply:  Established ASCVD: Indication for High intensity statin   Calculated Risk for ASCVD, if applicable    N/A  Other Significant Risk Markers, if any, padmini all that apply   Family history of premature ASCVD in first degree relative  Goal LDL-C and nonHDL-C based on Clinic Protocol  LDL-C:   <70 mg/dL, close to goal at 72 (no lipid panel drawn with recent labs- given lab slips today for pt to take to [facility]- will call pt with results)    Lifestyle Recommendations From Today’s Visit:    Eating Plan: Concentrate on  Low sat/Trans fat  Exercise: Continue excellent exercise routine     Statin Therapy Recommendations from Today’s Visit:   Patient is not tolerated pravastatin and rosuvastatin in the past.  He also feels that he has not tolerated other statins but cannot name them.  Is been at least 2 years since he has had another trial of statin.  Trial of atorvastatin 5mg daily for 3 weeks resulted in myalgias.  Stopped and resolved.  Has attempted multiple other statins with the same bothersome side effects and has been unable to continue use.  - HOLD all statins     Non-Statin Medications Recommendations from Today’s Visit:   Did not attempt Zetia as he did not  the prescription.  Unlikely to reach LDL goal <70 on Zetia as monotherapy.    Indication for PCSK9 Inhibitor, if applicable:  ASCVD with suboptimal control of LDL-C despite maximally tolerated statin (Needs new paperwork signed for pt assistance)  - Continue Repatha 140mg subq every 14 days     Supplements Recommended at this visit:   -Continue CoQ10 to twice daily  -Continue vitamin D to 4000 units a day    Recommendations for Other Cardiovascular Risk Factors, padmini all that apply:   -Continue aspirin    Other Issues:    -Ischemic cardiomyopathy, appears well compensated.  Continue current therapy.  Follow-up with cardiology for further management    -Status post PPM -defer all further management to  cardiology    CKD G3a/A1, stable  - avoid nephrotoxins  - continue HTN control with RAAS blockade   - monitor lytes/gfr routinely  - eval with nephrology if worsening over time    Studies Ordered at Todays Visit: None  Blood Work Ordered At Today’s visit: as noted   Follow-Up: 6 months     FINA Kimble.

## 2021-07-15 ENCOUNTER — HOSPITAL ENCOUNTER (OUTPATIENT)
Dept: LAB | Facility: MEDICAL CENTER | Age: 81
End: 2021-07-15
Attending: NURSE PRACTITIONER
Payer: MEDICARE

## 2021-07-15 DIAGNOSIS — E78.00 HYPERCHOLESTEREMIA: ICD-10-CM

## 2021-07-15 LAB
ALBUMIN SERPL BCP-MCNC: 4.2 G/DL (ref 3.2–4.9)
ALBUMIN/GLOB SERPL: 1.4 G/DL
ALP SERPL-CCNC: 123 U/L (ref 30–99)
ALT SERPL-CCNC: 11 U/L (ref 2–50)
ANION GAP SERPL CALC-SCNC: 12 MMOL/L (ref 7–16)
AST SERPL-CCNC: 19 U/L (ref 12–45)
BILIRUB SERPL-MCNC: 0.5 MG/DL (ref 0.1–1.5)
BUN SERPL-MCNC: 26 MG/DL (ref 8–22)
CALCIUM SERPL-MCNC: 9 MG/DL (ref 8.4–10.2)
CHLORIDE SERPL-SCNC: 98 MMOL/L (ref 96–112)
CHOLEST SERPL-MCNC: 116 MG/DL (ref 100–199)
CO2 SERPL-SCNC: 25 MMOL/L (ref 20–33)
CREAT SERPL-MCNC: 1.57 MG/DL (ref 0.5–1.4)
FASTING STATUS PATIENT QL REPORTED: NORMAL
GLOBULIN SER CALC-MCNC: 3 G/DL (ref 1.9–3.5)
GLUCOSE SERPL-MCNC: 109 MG/DL (ref 65–99)
HDLC SERPL-MCNC: 60 MG/DL
LDLC SERPL CALC-MCNC: 38 MG/DL
POTASSIUM SERPL-SCNC: 4.4 MMOL/L (ref 3.6–5.5)
PROT SERPL-MCNC: 7.2 G/DL (ref 6–8.2)
SODIUM SERPL-SCNC: 135 MMOL/L (ref 135–145)
TRIGL SERPL-MCNC: 90 MG/DL (ref 0–149)

## 2021-07-15 PROCEDURE — 80061 LIPID PANEL: CPT

## 2021-07-15 PROCEDURE — 80053 COMPREHEN METABOLIC PANEL: CPT

## 2021-07-15 PROCEDURE — 36415 COLL VENOUS BLD VENIPUNCTURE: CPT

## 2021-07-16 ENCOUNTER — OFFICE VISIT (OUTPATIENT)
Dept: CARDIOLOGY | Facility: MEDICAL CENTER | Age: 81
End: 2021-07-16
Payer: MEDICARE

## 2021-07-16 VITALS
HEIGHT: 71 IN | BODY MASS INDEX: 27.92 KG/M2 | DIASTOLIC BLOOD PRESSURE: 60 MMHG | OXYGEN SATURATION: 94 % | HEART RATE: 70 BPM | WEIGHT: 199.4 LBS | RESPIRATION RATE: 13 BRPM | SYSTOLIC BLOOD PRESSURE: 102 MMHG

## 2021-07-16 DIAGNOSIS — I25.2 HISTORY OF MYOCARDIAL INFARCTION: Chronic | ICD-10-CM

## 2021-07-16 DIAGNOSIS — N18.31 STAGE 3A CHRONIC KIDNEY DISEASE: ICD-10-CM

## 2021-07-16 DIAGNOSIS — I25.5 ISCHEMIC CARDIOMYOPATHY: Chronic | ICD-10-CM

## 2021-07-16 DIAGNOSIS — Z86.73 HISTORY OF CVA (CEREBROVASCULAR ACCIDENT): ICD-10-CM

## 2021-07-16 DIAGNOSIS — I51.89 LEFT VENTRICULAR SYSTOLIC DYSFUNCTION, NYHA CLASS 3: ICD-10-CM

## 2021-07-16 DIAGNOSIS — E78.00 HYPERCHOLESTEREMIA: Chronic | ICD-10-CM

## 2021-07-16 DIAGNOSIS — Z95.1 HX OF CABG: ICD-10-CM

## 2021-07-16 DIAGNOSIS — I48.0 PAROXYSMAL ATRIAL FIBRILLATION (HCC): ICD-10-CM

## 2021-07-16 DIAGNOSIS — I50.20 SYSTOLIC HEART FAILURE, ACC/AHA STAGE C (HCC): ICD-10-CM

## 2021-07-16 DIAGNOSIS — E87.5 HYPERKALEMIA: ICD-10-CM

## 2021-07-16 DIAGNOSIS — K76.6 PORTAL HYPERTENSION (HCC): ICD-10-CM

## 2021-07-16 DIAGNOSIS — Z86.79 S/P ABLATION OF ATRIAL FIBRILLATION: ICD-10-CM

## 2021-07-16 DIAGNOSIS — I10 ESSENTIAL HYPERTENSION: Chronic | ICD-10-CM

## 2021-07-16 DIAGNOSIS — I44.1 SECOND DEGREE AV BLOCK, MOBITZ TYPE II: Chronic | ICD-10-CM

## 2021-07-16 DIAGNOSIS — I50.23 NYHA CLASS 3 ACUTE ON CHRONIC SYSTOLIC HEART FAILURE (HCC): ICD-10-CM

## 2021-07-16 DIAGNOSIS — I25.10 CORONARY ARTERY DISEASE INVOLVING NATIVE CORONARY ARTERY OF NATIVE HEART WITHOUT ANGINA PECTORIS: Chronic | ICD-10-CM

## 2021-07-16 DIAGNOSIS — Z98.890 S/P ABLATION OF ATRIAL FIBRILLATION: ICD-10-CM

## 2021-07-16 DIAGNOSIS — I50.20 ACC/AHA STAGE C SYSTOLIC HEART FAILURE (HCC): ICD-10-CM

## 2021-07-16 DIAGNOSIS — F10.10 ALCOHOL ABUSE: ICD-10-CM

## 2021-07-16 DIAGNOSIS — Z79.899 HIGH RISK MEDICATION USE: ICD-10-CM

## 2021-07-16 DIAGNOSIS — I47.29 PAROXYSMAL VENTRICULAR TACHYCARDIA (HCC): ICD-10-CM

## 2021-07-16 DIAGNOSIS — G47.33 OBSTRUCTIVE SLEEP APNEA SYNDROME: Chronic | ICD-10-CM

## 2021-07-16 DIAGNOSIS — Z95.810 BIVENTRICULAR IMPLANTABLE CARDIOVERTER-DEFIBRILLATOR IN SITU: ICD-10-CM

## 2021-07-16 PROCEDURE — 99214 OFFICE O/P EST MOD 30 MIN: CPT | Performed by: INTERNAL MEDICINE

## 2021-07-16 RX ORDER — METOPROLOL SUCCINATE 50 MG/1
50 TABLET, EXTENDED RELEASE ORAL
Qty: 90 TABLET | Refills: 3 | Status: SHIPPED | OUTPATIENT
Start: 2021-07-16 | End: 2021-08-16

## 2021-07-16 RX ORDER — TORSEMIDE 20 MG/1
20 TABLET ORAL DAILY
Qty: 30 TABLET | Refills: 11 | Status: SHIPPED | OUTPATIENT
Start: 2021-07-16 | End: 2022-03-10 | Stop reason: SDUPTHER

## 2021-07-16 RX ORDER — SACUBITRIL AND VALSARTAN 49; 51 MG/1; MG/1
2 TABLET, FILM COATED ORAL 2 TIMES DAILY
Qty: 120 TABLET | Refills: 3 | Status: SHIPPED | OUTPATIENT
Start: 2021-07-16 | End: 2022-01-03

## 2021-07-16 RX ORDER — SPIRONOLACTONE 25 MG/1
25 TABLET ORAL DAILY
Qty: 90 TABLET | Refills: 3 | Status: SHIPPED | OUTPATIENT
Start: 2021-07-16 | End: 2021-08-16

## 2021-07-16 RX ORDER — POTASSIUM CITRATE 10 MEQ/1
10 TABLET, EXTENDED RELEASE ORAL
Qty: 90 TABLET | Refills: 3 | Status: SHIPPED | OUTPATIENT
Start: 2021-07-16 | End: 2022-03-10 | Stop reason: SDUPTHER

## 2021-07-16 ASSESSMENT — ENCOUNTER SYMPTOMS
CONSTITUTIONAL NEGATIVE: 1
SHORTNESS OF BREATH: 0
CARDIOVASCULAR NEGATIVE: 1
NEUROLOGICAL NEGATIVE: 1
LOSS OF CONSCIOUSNESS: 0
MUSCULOSKELETAL NEGATIVE: 1
PALPITATIONS: 0
SORE THROAT: 0
HEMOPTYSIS: 0
WHEEZING: 0
CHILLS: 0
CLAUDICATION: 0
STRIDOR: 0
RESPIRATORY NEGATIVE: 1
DIZZINESS: 0
SPUTUM PRODUCTION: 0
BRUISES/BLEEDS EASILY: 0
PND: 0
ORTHOPNEA: 0
WEAKNESS: 0
COUGH: 0
GASTROINTESTINAL NEGATIVE: 1
EYES NEGATIVE: 1
FEVER: 0

## 2021-07-16 ASSESSMENT — FIBROSIS 4 INDEX: FIB4 SCORE: 2.13

## 2021-07-16 NOTE — PROGRESS NOTES
"Chief Complaint   Patient presents with   • Coronary Artery Disease   • Atrial Fibrillation     F/V Dx: Paroxysmal atrial fibrillation (HCC)       Subjective:   Isai Fuentes is a 79 y.o. male who presents today as a follow-up for his heart failure with reduced ejection fraction.    Since he was last seen he has been doing well.  He still gets short of breath after about 30 minutes of walking.  Blood pressure control.  Most recent labs showed LDL is good control.  He is having no chest pain.    Past Medical History:   Diagnosis Date   • Atrial fibrillation ([facility]) 9/20/2011   • Biventricular implantable cardioverter-defibrillator in situ 8/7/2015   • Breath shortness    • CAD (coronary artery disease) 9/20/2011   • Cancer ([facility]) 2012    prostate   • CARDIOMYOPATHY 9/20/2011   • Congestive heart failure ([facility])    • Fatigue 10/28/2010   • Heartburn 10/23/2008   • History of cardiac catheterization 9/20/2011   • History of myocardial infarction     \"many\"   • HTN (hypertension) 9/20/2011   • Indigestion    • Insomnia 7/8/2010   • Ischemic cardiomyopathy 9/20/2011   • Long term (current) use of anticoagulants 5/13/2011   • Myocardial infarct ([facility]) 2007   • Orthostatic hypotension 5/6/2009   • WILLA (obstructive sleep apnea)     CPAP   • Other drug allergy(995.27) 10/16/2008   • Other specified disorder of intestines 07-28-15    constipation/diarrhea/ reports some bleeding from rectum w/blood clots. Seeing GI   • Pacemaker     [employer]   • Pain 07-28-15    \"chronic\", 0/10   • Pleural effusion 5/18/2009   • Presence of permanent cardiac pacemaker 9/20/2011   • Prostate cancer ([facility]) 5/13/2011   • Second degree AV block, Mobitz type II 11/10/2010   • Snoring    • Stroke ([facility]) 2/3/2009     Past Surgical History:   Procedure Laterality Date   • RECOVERY  10/16/2015    Procedure: CATH LAB LEAD REVISION [facility];  Surgeon: Amy Surgery;  Location: SURGERY PRE-POST PROC UNIT [facility] [place];  Service:    • RECOVERY  " 2015    Procedure:  CATH LAB LEAD EXTRACTION AWILDA [facility] PAUL[facility]INO;  Surgeon: Recoveryonly Surgery;  Location: SURGERY PRE-POST PROC UNIT [facility] [place];  Service:    • RECOVERY  2015    Procedure: CATH LAB  LEAD EXTRACTION, UPGRADE TO BIV PM [facility] GRP KENIA ;  Surgeon: Recoveryonfreddy Surgery;  Location: SURGERY PRE-POST PROC UNIT [facility] [place];  Service:    • RECOVERY  2015    Procedure: CATH LAB NEW PM INSERTION DUAL ATRIAL & VENTRICULAR-LV LEAD W/ NEW GENERATOR KENIA ICD9: 414.8;  Surgeon: Recoveryonfreddy Surgery;  Location: SURGERY PRE-POST PROC UNIT [facility] [place];  Service:    • PACEMAKER INSERTION  08    [facility]   • MULTIPLE CORONARY ARTERY BYPASS  2007    2 vessel   • ZZZ CARDIAC CATH      has 2 stents     Family History   Problem Relation Age of Onset   • Thyroid Sister      Social History     Socioeconomic History   • Marital status:      Spouse name: Not on file   • Number of children: Not on file   • Years of education: Not on file   • Highest education level: Not on file   Occupational History   • Not on file   Tobacco Use   • Smoking status: Former Smoker     Packs/day: 1.50     Years: 3.00     Pack years: 4.50     Types: Cigarettes     Quit date: 1970     Years since quittin.5   • Smokeless tobacco: Former User     Types: Chew     Quit date: 1972   Vaping Use   • Vaping Use: Never used   Substance and Sexual Activity   • Alcohol use: Yes     Comment: 10-14 per week; scotch & wine   • Drug use: No   • Sexual activity: Not on file   Other Topics Concern   • Not on file   Social History Narrative   • Not on file     Social Determinants of Health     Financial Resource Strain:    • Difficulty of Paying Living Expenses:    Food Insecurity:    • Worried About Running Out of Food in the Last Year:    • Ran Out of Food in the Last Year:    Transportation Needs:    • Lack of Transportation (Medical):    • Lack of Transportation (Non-Medical):    Physical Activity:    • Days of Exercise per Week:   "  • Minutes of Exercise per Session:    Stress:    • Feeling of Stress :    Social Connections:    • Frequency of Communication with Friends and Family:    • Frequency of Social Gatherings with Friends and Family:    • Attends Yazdan[employer] Services:    • Active Member of Clubs or Organizations:    • Attends Club or Organization Meetings:    • Marital Status:    Intimate Partner Violence:    • Fear of Current or Ex-Partner:    • Emotionally Abused:    • Physically Abused:    • Sexually Abused:      Allergies   Allergen Reactions   • Plavix [Clopidogrel Bisulfate] Anaphylaxis   • Spironolactone Unspecified     Hyperkalemia   • Statins [Hmg-Coa-R Inhibitors] Unspecified     Muscles aches     • Ticlid [Ticlopidine Hydrochloride] Unspecified     Pt states \"I'm not sure what happens\".       Outpatient Encounter Medications as of 7/16/2021   Medication Sig Dispense Refill   • torsemide (DEMADEX) 20 MG Tab Take 1 tablet by mouth every day. 30 tablet 11   • spironolactone (ALDACTONE) 25 MG Tab Take 1 tablet by mouth every day. 90 tablet 3   • sacubitril-valsartan (ENTRESTO) 49-51 MG Tab tablet Take 2 tablets by mouth 2 Times a Day. 120 tablet 3   • metoprolol SR (TOPROL XL) 50 MG TABLET SR 24 HR Take 1 tablet by mouth every day. 90 tablet 3   • potassium citrate SR (UROCIT-K SR) 10 MEQ (1080 MG) Tab CR Take 1 tablet by mouth every 48 hours. 90 tablet 3   • Evolocumab, REPATHA, (REPATHA SURECLICK) 140 MG/ML Solution Auto-injector Inject contents of 1 pen under the skin every 14 days. 6 mL 3   • HYDROcodone/acetaminophen (NORCO)  MG Tab TAKE 1 TABLET ORALLY EVERY 4 HOURS AS NEEDED DX M16.0 M54.5 7 DAYS     • Furosemide (LASIX PO) furosemide     • METHOTREXATE SODIUM INJ Inject  as directed. 10mg/ 0.2ml     • metOLazone (ZAROXOLYN) 5 MG Tab Take 5 mg by mouth every day.     • Multiple Vitamins-Minerals (OCUVITE PO) Take  by mouth.     • VITAMIN D PO Take  by mouth.     • Coenzyme Q10 (CO Q 10) 100 MG Cap Take 200 mg by mouth " "every day.     • Ixekizumab (TALTZ) 80 MG/ML Solution Prefilled Syringe Inject 80 mg as instructed Q30 DAYS.     • aspirin 81 MG tablet Take 81 mg by mouth every day.     • [DISCONTINUED] metoprolol SR (TOPROL XL) 50 MG TABLET SR 24 HR Take 1 Tab by mouth every day. 90 Tab 3   • [DISCONTINUED] sacubitril-valsartan (ENTRESTO) 49-51 MG Tab tablet Take 2 tablets by mouth 2 Times a Day. 120 Tab 3   • [DISCONTINUED] spironolactone (ALDACTONE) 25 MG Tab Take 1 Tab by mouth every day. 90 Tab 3   • [DISCONTINUED] torsemide (DEMADEX) 20 MG Tab Take 1 Tab by mouth every day. 30 Tab 11   • [DISCONTINUED] potassium citrate SR (UROCIT-K SR) 10 MEQ (1080 MG) Tab CR Take 10 mEq by mouth every 48 hours.       No facility-administered encounter medications on file as of 7/16/2021.     Review of Systems   Constitutional: Negative.  Negative for chills, fever and malaise/fatigue.   HENT: Negative.  Negative for sore throat.    Eyes: Negative.    Respiratory: Negative.  Negative for cough, hemoptysis, sputum production, shortness of breath, wheezing and stridor.    Cardiovascular: Negative.  Negative for chest pain, palpitations, orthopnea, claudication, leg swelling and PND.   Gastrointestinal: Negative.    Genitourinary: Negative.    Musculoskeletal: Negative.    Skin: Negative.    Neurological: Negative.  Negative for dizziness, loss of consciousness and weakness.   Endo/Heme/Allergies: Negative.  Does not bruise/bleed easily.   All other systems reviewed and are negative.       Objective:   /60 (BP Location: Right arm, Patient Position: Sitting, BP Cuff Size: Adult)   Pulse 70   Resp 13   Ht 1.803 m (5' 11\")   Wt 90.4 kg (199 lb 6.4 oz)   SpO2 94%   BMI 27.81 kg/m²     Physical Exam   Constitutional: He appears well-developed. No distress.   HENT:   Head: Normocephalic and atraumatic.   Right Ear: External ear normal.   Left Ear: External ear normal.   Nose: Nose normal.   Mouth/Throat: No oropharyngeal exudate.   Eyes: " Pupils are equal, round, and reactive to light. Conjunctivae are normal. Right eye exhibits no discharge. Left eye exhibits no discharge. No scleral icterus.   Neck: No JVD present.   Cardiovascular: Normal rate and regular rhythm. Exam reveals no gallop and no friction rub.   No murmur heard.  Pulmonary/Chest: Effort normal. No stridor. No respiratory distress. He has no wheezes. He has no rales. He exhibits no tenderness.   Abdominal: Soft. He exhibits no distension. There is no guarding.   Musculoskeletal:         General: No tenderness or deformity. Normal range of motion.      Cervical back: Neck supple.   Neurological: He is alert. He has normal reflexes. He displays normal reflexes. No cranial nerve deficit. He exhibits normal muscle tone. Coordination normal.   Skin: Skin is warm and dry. No rash noted. He is not diaphoretic. No erythema. No pallor.   Psychiatric: His behavior is normal. Judgment and thought content normal.   Nursing note and vitals reviewed.  Echocardiogram: Dated 9/28/2020 personally interpreted myself showing an EF of 35%.    CTA: Dated 9/28/2020 personally viewed myself showing defect in the IVC.    Sound abdomen 9/28/2015 personally interpreted myself showing possible cirrhosis.    Assessment:     1. Coronary artery disease involving native coronary artery of native heart without angina pectoris     2. Biventricular implantable cardioverter-defibrillator in situ  spironolactone (ALDACTONE) 25 MG Tab   3. Paroxysmal atrial fibrillation (HCC)     4. Alcohol abuse     5. Stage 3a chronic kidney disease (HCC)     6. High risk medication use     7. History of CVA (cerebrovascular accident)  torsemide (DEMADEX) 20 MG Tab   8. History of myocardial infarction     9. Essential hypertension  spironolactone (ALDACTONE) 25 MG Tab   10. Hx of CABG  torsemide (DEMADEX) 20 MG Tab    spironolactone (ALDACTONE) 25 MG Tab   11. Hypercholesteremia     12. Hyperkalemia  torsemide (DEMADEX) 20 MG Tab   13.  Ischemic cardiomyopathy  metoprolol SR (TOPROL XL) 50 MG TABLET SR 24 HR   14. Left ventricular systolic dysfunction, NYHA class 3  metoprolol SR (TOPROL XL) 50 MG TABLET SR 24 HR   15. NYHA class 3 acute on chronic systolic heart failure (HCC)  sacubitril-valsartan (ENTRESTO) 49-51 MG Tab tablet   16. Portal hypertension (HCC)     17. S/P ablation of atrial fibrillation     18. Second degree AV block, Mobitz type II  torsemide (DEMADEX) 20 MG Tab   19. Obstructive sleep apnea syndrome     20. Systolic heart failure, ACC/AHA stage C (HCC)  torsemide (DEMADEX) 20 MG Tab   21. Ventricular tachycardia (paroxysmal) (HCC)     22. ACC/AHA stage C systolic heart failure (HCC)  metoprolol SR (TOPROL XL) 50 MG TABLET SR 24 HR       Medical Decision Making:  Today's Assessment / Status / Plan:     79-year-old male with alcohol use heart failure with reduced ejection fraction hyperlipidemia and some concern for cirrhosis.  I will send a note to get him reestablished in the pacemaker clinic.  Otherwise he is doing well.  We will see him back in 6 months.    1. CAD    - cont repatha    2. CHF s/p CRT    - cont entresto 97/103    - cont metop XL 50    - cont torsemide 20    3. CRT    - f/u device clinic    4. ETOH    - U/S Abdomen    5. Edema/Swelling    - cont torsemide 20 daily     6. High Risk Meds    - labs in one week

## 2021-08-16 ENCOUNTER — NON-PROVIDER VISIT (OUTPATIENT)
Dept: CARDIOLOGY | Facility: MEDICAL CENTER | Age: 81
End: 2021-08-16
Payer: MEDICARE

## 2021-08-16 VITALS
HEIGHT: 71 IN | BODY MASS INDEX: 27.58 KG/M2 | SYSTOLIC BLOOD PRESSURE: 112 MMHG | DIASTOLIC BLOOD PRESSURE: 60 MMHG | OXYGEN SATURATION: 94 % | WEIGHT: 197 LBS | HEART RATE: 70 BPM

## 2021-08-16 DIAGNOSIS — I51.89 LEFT VENTRICULAR SYSTOLIC DYSFUNCTION, NYHA CLASS 3: ICD-10-CM

## 2021-08-16 DIAGNOSIS — I48.0 PAROXYSMAL ATRIAL FIBRILLATION (HCC): ICD-10-CM

## 2021-08-16 DIAGNOSIS — I25.10 CORONARY ARTERY DISEASE INVOLVING NATIVE CORONARY ARTERY OF NATIVE HEART WITHOUT ANGINA PECTORIS: Chronic | ICD-10-CM

## 2021-08-16 DIAGNOSIS — I25.5 ISCHEMIC CARDIOMYOPATHY: Chronic | ICD-10-CM

## 2021-08-16 DIAGNOSIS — I44.1 SECOND DEGREE AV BLOCK, MOBITZ TYPE II: Chronic | ICD-10-CM

## 2021-08-16 DIAGNOSIS — Z95.810 BIVENTRICULAR IMPLANTABLE CARDIOVERTER-DEFIBRILLATOR IN SITU: ICD-10-CM

## 2021-08-16 DIAGNOSIS — I47.29 PAROXYSMAL VENTRICULAR TACHYCARDIA (HCC): ICD-10-CM

## 2021-08-16 PROBLEM — R05.9 COUGH: Status: RESOLVED | Noted: 2018-01-16 | Resolved: 2021-08-16

## 2021-08-16 PROBLEM — E87.5 HYPERKALEMIA: Status: RESOLVED | Noted: 2020-09-11 | Resolved: 2021-08-16

## 2021-08-16 PROCEDURE — 93284 PRGRMG EVAL IMPLANTABLE DFB: CPT | Performed by: NURSE PRACTITIONER

## 2021-08-16 ASSESSMENT — FIBROSIS 4 INDEX: FIB4 SCORE: 2.13

## 2021-08-16 NOTE — PROGRESS NOTES
Patient is here today for overdue CRT-D interrogation.    Device is working normally.  No device therapy.  63 mode switching episodes (all 5-30 seconds, <1% of total time).  Normal sensing and capture of RA, RV and LV leads; stable impedances. Battery charge time is 10.3 seconds; battery longevity is 1.6 years.     Performed QuickOpt today, and programmed the following changes:  Paced AV delay 180ms  Sense AV delay 130ms    FU in 6 months for next AICD check with me.

## 2021-10-15 ENCOUNTER — HOSPITAL ENCOUNTER (OUTPATIENT)
Dept: LAB | Facility: MEDICAL CENTER | Age: 81
End: 2021-10-15
Attending: PHYSICIAN ASSISTANT
Payer: MEDICARE

## 2021-10-15 LAB — PSA SERPL-MCNC: 0.41 NG/ML (ref 0–4)

## 2021-10-15 PROCEDURE — 84153 ASSAY OF PSA TOTAL: CPT

## 2021-10-15 PROCEDURE — 36415 COLL VENOUS BLD VENIPUNCTURE: CPT

## 2021-10-18 ENCOUNTER — TELEPHONE (OUTPATIENT)
Dept: CARDIOLOGY | Facility: MEDICAL CENTER | Age: 81
End: 2021-10-18

## 2021-10-18 NOTE — TELEPHONE ENCOUNTER
"EDILBERTO Pierce explained how Entresto is out and they need the refill. Dr. Lee \"waved it though our pharmacy at [facility] and gave them an approval for enough to get through January 1st, and hope they can do it again.\" \"Given in a 60 quality, takes two times a day, has been out for 2 days. They do not have $300 for the refill.\"     [phone] incase the primary phone doesn't answer, Ivelisse his wife likes to deal with the medication.     S/W pt's wife, aware that [facility] pharmacy does not have samples available. They cannot afford the $300 dollars for the refill. Pt is in the donut hole.     To RO  "

## 2021-10-19 NOTE — TELEPHONE ENCOUNTER
You  Severo Lee M.D. 21 hours ago (11:28 AM)     To MIK, please advise, do you want to prescribe anything for BP management since out of Entresto, no samples, and can't afford current RX? Thanks      Severo Lee M.D.  You 1 hour ago (7:34 AM)     Losartan 50 mg once daily      S/W wife Ivelisse, pt was able to borrow money from family to pay for RX. However, RX sent 7/16/21 was for 49-51, and OV note from 7/16/21 states pt should take .     Advised pt to continue RX as purchased. Will clarify future script.    To MIK

## 2021-10-22 ENCOUNTER — TELEPHONE (OUTPATIENT)
Dept: SLEEP MEDICINE | Facility: MEDICAL CENTER | Age: 81
End: 2021-10-22

## 2021-10-22 DIAGNOSIS — J18.9 PNEUMONIA OF LEFT LOWER LOBE DUE TO INFECTIOUS ORGANISM: ICD-10-CM

## 2021-10-22 DIAGNOSIS — J41.0 SIMPLE CHRONIC BRONCHITIS (HCC): ICD-10-CM

## 2021-10-22 DIAGNOSIS — J44.9 CHRONIC OBSTRUCTIVE PULMONARY DISEASE, UNSPECIFIED COPD TYPE (HCC): ICD-10-CM

## 2021-10-22 NOTE — TELEPHONE ENCOUNTER
I spoke with patient's wife Ivelisse and she asked that her  Luis Armando be schedule for CT , PFT and follow up. Last seen with Dr Thakur on 11/12/20. States they always get PFT and new CT done prior to follow up BUT got behind last year due to the pandemic.     They are scheduled to see Margo GLASS on 1/11/22 and would like testing first. Orders needed if agreed to.     Message routed to provider.

## 2021-11-08 NOTE — TELEPHONE ENCOUNTER
I have contact the [employer]. I have spoken with the representative, Alex. I informed him that the patient's plan ([employer]) does not require a prior authorization at this time. [employer] will complete a verbal benefit investigation with the patient's plan. The estimated TAT is 2-3 business. The patient and the clinic will receive notification of the decision by the foundation.     
No

## 2021-11-22 RX ORDER — LOSARTAN POTASSIUM 50 MG/1
50 TABLET ORAL DAILY
Qty: 90 TABLET | Refills: 0 | Status: SHIPPED | OUTPATIENT
Start: 2021-11-22 | End: 2022-01-03 | Stop reason: CLARIF

## 2021-11-22 NOTE — TELEPHONE ENCOUNTER
Pt's wife walked in asking about Entresto samples. Walk in form     [facility] pharmacy does not have any available, as per previous discussion with RO.     Changing prescription as below    October 19, 2021  Severo Lee M.D.to Me      7:34 AM  Losartan 50 mg once daily     S/W pt's wife, aware of new RX and directions to NOT take both. Finish the Entresto and then start the Losartan.

## 2021-12-15 ENCOUNTER — OFFICE VISIT (OUTPATIENT)
Dept: VASCULAR LAB | Facility: MEDICAL CENTER | Age: 81
End: 2021-12-15
Attending: INTERNAL MEDICINE
Payer: MEDICARE

## 2021-12-15 ENCOUNTER — TELEPHONE (OUTPATIENT)
Dept: VASCULAR LAB | Facility: MEDICAL CENTER | Age: 81
End: 2021-12-15

## 2021-12-15 VITALS
WEIGHT: 204 LBS | HEIGHT: 71 IN | SYSTOLIC BLOOD PRESSURE: 117 MMHG | HEART RATE: 87 BPM | BODY MASS INDEX: 28.56 KG/M2 | DIASTOLIC BLOOD PRESSURE: 74 MMHG

## 2021-12-15 DIAGNOSIS — E78.49 OTHER HYPERLIPIDEMIA: ICD-10-CM

## 2021-12-15 DIAGNOSIS — E78.00 HYPERCHOLESTEREMIA: ICD-10-CM

## 2021-12-15 PROCEDURE — 99212 OFFICE O/P EST SF 10 MIN: CPT

## 2021-12-15 PROCEDURE — 99214 OFFICE O/P EST MOD 30 MIN: CPT | Performed by: NURSE PRACTITIONER

## 2021-12-15 RX ORDER — EVOLOCUMAB 140 MG/ML
1 INJECTION, SOLUTION SUBCUTANEOUS
Qty: 6 EACH | Refills: 3 | Status: SHIPPED | OUTPATIENT
Start: 2021-12-15 | End: 2022-11-15 | Stop reason: SDUPTHER

## 2021-12-15 ASSESSMENT — ENCOUNTER SYMPTOMS
BRUISES/BLEEDS EASILY: 0
VOMITING: 0
SHORTNESS OF BREATH: 0
FOCAL WEAKNESS: 0
HEADACHES: 0
DIZZINESS: 0
BLURRED VISION: 0
LOSS OF CONSCIOUSNESS: 0
HEARTBURN: 0
SPEECH CHANGE: 0
DOUBLE VISION: 0
PALPITATIONS: 0
DEPRESSION: 0
BLOOD IN STOOL: 0
WEIGHT LOSS: 0
MYALGIAS: 0
NAUSEA: 0
ORTHOPNEA: 0
BACK PAIN: 0
NERVOUS/ANXIOUS: 0
NECK PAIN: 1

## 2021-12-15 ASSESSMENT — FIBROSIS 4 INDEX: FIB4 SCORE: 2.13

## 2021-12-15 NOTE — PROGRESS NOTES
[facility] - Follow Up Visit  Date of Service: 12/15/2021    Isai Fuentes has been referred for evaluation and management of dyslipidemia    Referral Source: cardiology  HPI  History of ASCVD: Yes, Details: CAD s/p CABG 2007 had a couple of stents previous  Other Established (non-atherosclerotic) Vascular Disease, if Present:   Heart failure  Pacemaker  Age at Initial Diagnosis of Dyslipidemia: 65  Current Prescription Lipid Lowering Medications - including dose:   Statin: None  Non-Statin: Repatha 140mg   Current Lipid Lowering and Related Supplements:   Omega 3s 1000  coq10  Vit D 1000  Any Current Side Effects Potentially Related to Lipid Lowering therapy?   No  Current Adherence to Lipid Lowering Therapies   Complete  Previously Attempted Interventions for Lipids - including outcome  Statin:  Rosuvastatin 40 mg - intractable myalgias (2017)   Pravastatin 40 mg - intractable myalgias 2015   Has tried others that also caused muscle pain  Non-Statin: fenofibrate    Never tried zetia   Any Previous History of Statin Intolerance?   Yes, intolerable myalgias on both rosuvastatin 40 and pravastatin 40 in the past.  Resolved with discontinuation    Baseline Lipids Prior to Treatment:      Ref. Range 4/22/2019 08:06   Cholesterol,Tot Latest Ref Range: 100 - 199 mg/dL 226 (H)   Triglycerides Latest Ref Range: 0 - 149 mg/dL 108   HDL Latest Ref Range: >=40 mg/dL 55   LDL Latest Ref Range: <100 mg/dL 149 (H)     Other Pertinent History:   No known history of thyroid disease  Does have mild chronic kidney disease but no evidence of nephrotic syndrome  History of other CV risk factors:   Blood pressure currently well controlled on his current regimen  No known history of diabetes  Remains on aspirin    CURRENT MEDICATIONS:   Current Outpatient Medications:   •  losartan, 50 mg, Oral, DAILY, Taking  •  torsemide, 20 mg, Oral, DAILY, Taking  •  Entresto, 2 Tablet, Oral, BID, Taking  •  potassium citrate SR, 10  mEq, Oral, Q48HRS, Taking  •  Repatha SureClick, 1 mL, Subcutaneous, Q14 DAYS, Taking  •  HYDROcodone/acetaminophen, TAKE 1 TABLET ORALLY EVERY 4 HOURS AS NEEDED DX M16.0 M54.5 7 DAYS, Taking  •  Multiple Vitamins-Minerals (OCUVITE PO), Take  by mouth., Taking  •  VITAMIN D PO, Take  by mouth., Taking  •  Co Q 10, 200 mg, Oral, DAILY, Taking  •  Ixekizumab, 80 mg, Subcutaneous, Q30 DAYS, Taking  •  aspirin, 81 mg, Oral, DAILY, Taking    ALLERGIES: Plavix [clopidogrel bisulfate], Spironolactone, Statins [hmg-coa-r inhibitors], and Ticlid [ticlopidine hydrochloride]    FAMILY HISTORY:    Dad - CAD with cabg    SOCIAL HISTORY   Social History     Tobacco Use   Smoking Status Former Smoker   • Packs/day: 1.50   • Years: 3.00   • Pack years: 4.50   • Types: Cigarettes   • Quit date: 1970   • Years since quittin.9   Smokeless Tobacco Former User   • Types: Chew   • Quit date: 1972     Change in weight: stable  Exercise habits: moderate regular exercise program  Diet: low sodium    Review of Systems   Constitutional: Negative for malaise/fatigue (better off spirono) and weight loss.   HENT: Negative for hearing loss, nosebleeds and tinnitus.    Eyes: Negative for blurred vision and double vision.   Respiratory: Negative for shortness of breath.    Cardiovascular: Negative for chest pain, palpitations, orthopnea and leg swelling.   Gastrointestinal: Negative for blood in stool, heartburn, nausea and vomiting.   Genitourinary: Negative.    Musculoskeletal: Positive for joint pain and neck pain. Negative for back pain and myalgias.   Skin: Negative for itching and rash.   Neurological: Negative for dizziness, speech change, focal weakness, loss of consciousness and headaches.   Endo/Heme/Allergies: Does not bruise/bleed easily.   Psychiatric/Behavioral: Negative for depression. The patient is not nervous/anxious.      Physical Exam  Constitutional:       Appearance: He is well-developed. He is not diaphoretic.    Cardiovascular:      Rate and Rhythm: Normal rate.      Heart sounds: No murmur heard.      Pulmonary:      Effort: Pulmonary effort is normal. No respiratory distress.   Musculoskeletal:         General: Normal range of motion.   Skin:     General: Skin is warm and dry.   Neurological:      Mental Status: He is alert and oriented to person, place, and time.      Coordination: Coordination normal.       DATA REVIEW:  Most Recent Lipid Panel:   Lab Results   Component Value Date    CHOLSTRLTOT 116 07/15/2021    TRIGLYCERIDE 90 07/15/2021    HDL 60 07/15/2021    LDL 38 07/15/2021     Other Pertinent Blood Work:   Lab Results   Component Value Date    SODIUM 135 07/15/2021    POTASSIUM 4.4 07/15/2021    CHLORIDE 98 07/15/2021    CO2 25 07/15/2021    ANION 12.0 07/15/2021    GLUCOSE 109 (H) 07/15/2021    BUN 26 (H) 07/15/2021    CREATININE 1.57 (H) 07/15/2021    CALCIUM 9.0 07/15/2021    ASTSGOT 19 07/15/2021    ALTSGPT 11 07/15/2021    ALKPHOSPHAT 123 (H) 07/15/2021    TBILIRUBIN 0.5 07/15/2021    ALBUMIN 4.2 07/15/2021    AGRATIO 1.4 07/15/2021    TSHULTRASEN 1.860 09/28/2020     [facility]  GFR is 45, , triglycerides 163, HDL 50, hemoglobin 16    ASSESSMENT AND PLAN  Patient Type, check all that apply:   Secondary Prevention  Established Atherosclerotic Cardiovascular Disease (ASCVD)  Yes, Details: Coronary artery disease status post PCI, MI, and CABG     Other Established (non-atherosclerotic) Vascular Disease, if Present:  Ischemic cardiomyopathy  Pacemaker    Evidence of Heterozygous Familial Hypercholesterolemia (FH):   No     ACC/AHA Indication for Statin Therapy, padmini all that apply:  Established ASCVD: Indication for High intensity statin   Calculated Risk for ASCVD, if applicable    N/A  Other Significant Risk Markers, if any, padmini all that apply   Family history of premature ASCVD in first degree relative  Goal LDL-C and nonHDL-C based on Clinic Protocol  LDL-C:   <70 mg/dL,  close to goal at 72 (no lipid panel drawn with recent labs- given lab slips today for pt to take to [facility]- will call pt with results)    Lifestyle Recommendations From Today’s Visit:    Eating Plan: Concentrate on  Low sat/Trans fat  Exercise: Continue excellent exercise routine     Statin Therapy Recommendations from Today’s Visit:   Patient is not tolerated pravastatin and rosuvastatin in the past.  He also feels that he has not tolerated other statins but cannot name them.  Is been at least 2 years since he has had another trial of statin.  Trial of atorvastatin 5mg daily for 3 weeks resulted in myalgias.  Stopped and resolved.  Has attempted multiple other statins with the same bothersome side effects and has been unable to continue use.  - HOLD all statins     Non-Statin Medications Recommendations from Today’s Visit:   Did not attempt Zetia as he did not  the prescription.  Unlikely to reach LDL goal <70 on Zetia as monotherapy.    Indication for PCSK9 Inhibitor, if applicable:  ASCVD with suboptimal control of LDL-C despite maximally tolerated statin (Needs new paperwork signed for pt assistance)  - Continue Repatha 140mg subq every 14 days     Supplements Recommended at this visit:   -Continue CoQ10 to twice daily  -Continue vitamin D to 4000 units a day    Recommendations for Other Cardiovascular Risk Factors, padmini all that apply:   -Continue aspirin    Other Issues:    -Ischemic cardiomyopathy, appears well compensated.  Continue current therapy.  Defer to cardiology for further management    -Status post PPM -defer all further management to cardiology    CKD G3a/A1, stable  - avoid nephrotoxins  - continue HTN control with RAAS blockade   - monitor lytes/gfr routinely  - eval with nephrology if worsening over time    Studies Ordered at Todays Visit: None  Blood Work Ordered At Today’s visit: as noted   Follow-Up: 6 months     FINA Kimble.

## 2021-12-15 NOTE — TELEPHONE ENCOUNTER
I have contacted the patient and was able to leave a voicemail at [phone] regarding his Repatha prescription.     Copay for 28 days will be $132.48.     I have also contacted the patient at [phone], the line is busy.      Marybeth Davis  Pharmacy Coordinator   [phone]

## 2021-12-20 ENCOUNTER — TELEPHONE (OUTPATIENT)
Dept: PHARMACY | Facility: MEDICAL CENTER | Age: 81
End: 2021-12-20

## 2021-12-20 DIAGNOSIS — E78.00 HYPERCHOLESTEREMIA: ICD-10-CM

## 2021-12-20 PROCEDURE — RXMED WILLOW AMBULATORY MEDICATION CHARGE: Performed by: NURSE PRACTITIONER

## 2021-12-20 NOTE — TELEPHONE ENCOUNTER
Hello All,    The patient's has a Repatha Prescription that must be provided to the [facility].   The patient currently has one syringe.       [phone]  Monday - Friday 8am to 8pm ET  [facility]  Fax: [phone]

## 2021-12-21 NOTE — TELEPHONE ENCOUNTER
Called hereO - pt has one refill left. Pt needs to call hereO to set up the shipment.    CC [employer]

## 2022-01-03 ENCOUNTER — HOSPITAL ENCOUNTER (OUTPATIENT)
Dept: RADIOLOGY | Facility: MEDICAL CENTER | Age: 82
End: 2022-01-03
Attending: PHYSICIAN ASSISTANT
Payer: MEDICARE

## 2022-01-03 ENCOUNTER — TELEPHONE (OUTPATIENT)
Dept: CARDIOLOGY | Facility: MEDICAL CENTER | Age: 82
End: 2022-01-03

## 2022-01-03 DIAGNOSIS — J18.9 PNEUMONIA OF LEFT LOWER LOBE DUE TO INFECTIOUS ORGANISM: ICD-10-CM

## 2022-01-03 PROCEDURE — 71250 CT THORAX DX C-: CPT | Mod: MG

## 2022-01-03 RX ORDER — SACUBITRIL AND VALSARTAN 97; 103 MG/1; MG/1
1 TABLET, FILM COATED ORAL 2 TIMES DAILY
Qty: 60 TABLET | Refills: 11 | Status: SHIPPED | OUTPATIENT
Start: 2022-01-03 | End: 2022-11-14 | Stop reason: SDUPTHER

## 2022-01-03 NOTE — TELEPHONE ENCOUNTER
MIK Langston (spouse) called, Luis Armando is needing refill for:   sacubitril-valsartan (ENTRESTO) 49-51 MG Tab tablet [[id number]].  Luis Armando is our of this medication.      Ivis - [phone]    Thank you,   Homa STUB[employer]

## 2022-01-03 NOTE — TELEPHONE ENCOUNTER
Per chart review, dose should be  1 tablet twice daily. Looks like at one point samples were provided of 49-51 take 2 twice daily to make the dose as close as possible.     Pt was prescribed Losartan for end of year coverage.     S/W [facility], aware of new RX and reminded to no longer have patient take Losartan once Entresto is picked up.

## 2022-01-11 ENCOUNTER — NON-PROVIDER VISIT (OUTPATIENT)
Dept: SLEEP MEDICINE | Facility: MEDICAL CENTER | Age: 82
End: 2022-01-11
Attending: PHYSICIAN ASSISTANT
Payer: MEDICARE

## 2022-01-11 ENCOUNTER — APPOINTMENT (OUTPATIENT)
Dept: SLEEP MEDICINE | Facility: MEDICAL CENTER | Age: 82
End: 2022-01-11
Payer: MEDICARE

## 2022-01-11 VITALS — BODY MASS INDEX: 27.62 KG/M2 | WEIGHT: 198 LBS

## 2022-01-11 DIAGNOSIS — J41.0 SIMPLE CHRONIC BRONCHITIS (HCC): ICD-10-CM

## 2022-01-11 PROCEDURE — 94726 PLETHYSMOGRAPHY LUNG VOLUMES: CPT | Performed by: INTERNAL MEDICINE

## 2022-01-11 PROCEDURE — 94060 EVALUATION OF WHEEZING: CPT | Performed by: INTERNAL MEDICINE

## 2022-01-11 PROCEDURE — 94729 DIFFUSING CAPACITY: CPT | Performed by: INTERNAL MEDICINE

## 2022-01-11 ASSESSMENT — PULMONARY FUNCTION TESTS
FEV1: 2.23
FEV1_PREDICTED: 2.8
FEV1/FVC: 70
FEV1/FVC_PERCENT_PREDICTED: 94
FVC_PREDICTED: 3.79
FVC: 3.17
FEV1: 2.46
FEV1/FVC_PERCENT_CHANGE: 111
FEV1/FVC_PERCENT_PREDICTED: 74
FEV1/FVC: 70.49
FEV1_PERCENT_PREDICTED: 79
FVC_PERCENT_PREDICTED: 91
FEV1/FVC_PERCENT_PREDICTED: 95
FEV1/FVC: 70
FEV1/FVC_PREDICTED: 75
FEV1_PERCENT_PREDICTED: 87
FEV1/FVC_PERCENT_PREDICTED: 94
FEV1/FVC: 71
FEV1/FVC_PERCENT_LLN: 62
FEV1/FVC_PERCENT_CHANGE: 0
FVC_LLN: 3.17
FVC_PERCENT_PREDICTED: 83
FEV1_PERCENT_CHANGE: 10
FEV1_PERCENT_CHANGE: 9
FEV1_LLN: 2.34
FEV1/FVC_PERCENT_PREDICTED: 96
FVC: 3.49

## 2022-01-11 ASSESSMENT — FIBROSIS 4 INDEX: FIB4 SCORE: 2.16

## 2022-01-11 NOTE — PROCEDURES
Technician: MARY uDarte    Technician Comment:  Good patient effort & cooperation.  The results of this test meet the ATS/ERS standards for acceptability & reproducibility.  Test was performed on the [employer] Body Plethysmograph-Elite DX system.  Predicted values were GLI-2012 for spirometry, GLI-2017 for DLCO, ITS for Lung Volumes.  The DLCO was uncorrected for Hgb.  A bronchodilator of Ventolin HFA -2puffs via spacer administered.  DLCO performed during dilation period.      Pulmonary function test    Patient: Isai Fuentes  Referring physician: Matt Pagan  Interpreting physician: Maris Bucio  Reason for study: COPD  Date of study: 1/11/2022    Results:  Spirometry:  FVC is 3.17 which is 83% of predicted without significant change postbronchodilator  FEV1 is 2.23 which is 79% of predicted without significant change postbronchodilator  FEV1/FVC is 70    Lung volumes:  TLC is 6.07 which is 87% of predicted  RV is 2.60 which is 97% of predicted    Diffusion capacity:  DLCO is 17.63 which is 73% of predicted  DL/VA is 3 which is 92% of predicted    Interpretation:  1.  Spirometry is normal  2.  There is no significant change postbronchodilator  3.  Flow volume loop is consistent with the spirometry data  4.  Lung volumes are normal  5.  Diffusion capacity is mildly decreased suggestive of possible early ILD or pulmonary vascular disease  6.  In comparison to prior study from 1/13/2020 there has been a mild decrease in the TLC from 91 to 87, however the DLCO is unchanged.    Maris Bucio MD RD  Pulmonary and Critical Care    Available on [facility]

## 2022-02-10 ENCOUNTER — PHARMACY VISIT (OUTPATIENT)
Dept: PHARMACY | Facility: MEDICAL CENTER | Age: 82
End: 2022-02-10
Payer: MEDICARE

## 2022-02-11 ENCOUNTER — OFFICE VISIT (OUTPATIENT)
Dept: SLEEP MEDICINE | Facility: MEDICAL CENTER | Age: 82
End: 2022-02-11
Payer: MEDICARE

## 2022-02-11 VITALS
OXYGEN SATURATION: 94 % | SYSTOLIC BLOOD PRESSURE: 116 MMHG | DIASTOLIC BLOOD PRESSURE: 70 MMHG | HEART RATE: 70 BPM | BODY MASS INDEX: 27.3 KG/M2 | RESPIRATION RATE: 16 BRPM | WEIGHT: 195 LBS | HEIGHT: 71 IN

## 2022-02-11 DIAGNOSIS — R93.89 ABNORMAL CHEST CT: ICD-10-CM

## 2022-02-11 DIAGNOSIS — G47.33 OSA (OBSTRUCTIVE SLEEP APNEA): ICD-10-CM

## 2022-02-11 DIAGNOSIS — J44.1 CHRONIC OBSTRUCTIVE PULMONARY DISEASE WITH ACUTE EXACERBATION (HCC): ICD-10-CM

## 2022-02-11 PROCEDURE — 99214 OFFICE O/P EST MOD 30 MIN: CPT | Performed by: PHYSICIAN ASSISTANT

## 2022-02-11 RX ORDER — PREDNISONE 10 MG/1
TABLET ORAL
Qty: 18 TABLET | Refills: 0 | Status: SHIPPED | OUTPATIENT
Start: 2022-02-11 | End: 2022-05-09

## 2022-02-11 RX ORDER — AZITHROMYCIN 250 MG/1
TABLET, FILM COATED ORAL
Qty: 6 TABLET | Refills: 0 | Status: SHIPPED | OUTPATIENT
Start: 2022-02-11 | End: 2022-03-10

## 2022-02-11 RX ORDER — METOPROLOL SUCCINATE 50 MG/1
TABLET, EXTENDED RELEASE ORAL
COMMUNITY
Start: 2021-11-24 | End: 2022-06-29 | Stop reason: SDUPTHER

## 2022-02-11 ASSESSMENT — ENCOUNTER SYMPTOMS
PALPITATIONS: 0
COUGH: 1
DIZZINESS: 1
ROS GI COMMENTS: NO DENTURES, NO SWALLOWING ISSUES
SHORTNESS OF BREATH: 1
ORTHOPNEA: 1
SPUTUM PRODUCTION: 1
WEIGHT LOSS: 0
CHILLS: 1
FEVER: 0
INSOMNIA: 1
HEARTBURN: 1
WHEEZING: 1
HEADACHES: 1
TREMORS: 0
SORE THROAT: 1
SINUS PAIN: 1

## 2022-02-11 ASSESSMENT — FIBROSIS 4 INDEX: FIB4 SCORE: 2.16

## 2022-02-11 NOTE — PROGRESS NOTES
CC: Needs supplies for CPAP    HPI:  Isai Fuentes is a 81 y.o. year old male here today for follow-up on sleep apnea.  Patient is accompanied by his wife who is also a clinic patient.  He was last seen in clinic 11/12/2021 by Dr. Thakur.  He has a reported brief less than 5-pack-year history of smoking with quit date in 1970.  He did also use chewing tobacco with reported quit date 1972.    Pertinent past medical history includes acid indigestion, EtOH abuse, chronic kidney disease stage III, portal hypertension, prostate cancer 2012, chronic bronchitis, coronary artery disease AICD 2015, Mobitz type II second-degree heart block, stroke 2009, CABG 2007, chronic systolic heart failure stage C.    Reviewed in clinic vitals including /70, HR 70, O2 sat 94% on room air and BMI of 27.2 kg/m².    Reviewed home medication regimen including torsemide, he did just use his antibiotic and steroid 2 months ago for bronchitis flare and is requesting replacement.    Reviewed most recent imaging including CT scan obtained 1/3/2022 demonstrating right apical blebs unchanged, scattered reticular nodular opacities within left lower lobe similar to the prior exam, new dependent groundglass opacities both upper and lower lobes measuring up to 9.2 mm posterior right lung base and 10.1 mm medial left lower lobe likely atelectasis/mild pneumonitis.  Calcified granulomas.  Mild mediastinal lymphadenopathy since previous.  Follow-up CT recommended in 3 to 6 months.    Chest x-ray obtained 2/15/2021 demonstrated cardiomegaly and no acute cardiopulmonary process.    Echocardiogram 9/29/2020 demonstrated normal left ventricular wall thickness mildly dilated left ventricle, moderately reduced left ventricular systolic function, LVEF estimated at 35%, global hypokinesis with apical akinesis, grade 2 diastolic dysfunction, normal right ventricular size with reduced right ventricular systolic function, pacer ICD wire seen in right  "ventricle, mildly dilated left atrium, mild agitation, mild aortic insufficiency and aortic sclerosis without stenosis, mild tricuspid regurgitation with estimated RVSP of 70 mmHg.    Per Dr. Thakur's note in 2016 patient sleep study confirmed severe WILLA with AHI of 45.  Unable to retrieve copy of sleep study in electronic record.  Wife and patient reports he does well with \"no mask\" style nasal pillows.  Last compliance demonstrated nearly 5 hours nightly use, average AHI of 5.  Denies daytime somnolence.  Unable to retrieve compliance data today wirelessly, patient reports no chip for his machine, will bring in unit.    Pulmonary function testing obtained 1/11/2022 demonstrated an FEV1 of 2.23 L or 79% predicted, VC 3.17 L or 83% predicted, FEV1/FVC ratio of 70, residual volume 97% predicted, TLC 87% predicted, DLCO 73% predicted. Per pulmonologist interpretation normal spirometry, no significant change postbronchodilator, flow volume loop consistent with spirometry, normal lung volumes, DLCO mildly decreased suggestive of possible early ILD or pulmonary vascular disease    Review of Systems   Constitutional: Positive for chills and malaise/fatigue (MODERATE). Negative for fever and weight loss.   HENT: Positive for congestion, hearing loss, sinus pain, sore throat and tinnitus (CHRONIC ). Negative for nosebleeds.    Respiratory: Positive for cough, sputum production (GREEN ), shortness of breath (WITH WALKING ) and wheezing.    Cardiovascular: Positive for orthopnea. Negative for chest pain and palpitations.   Gastrointestinal: Positive for heartburn (ACID INDIGESTION ).        No dentures, no swallowing issues   Neurological: Positive for dizziness (mild, foggy feeling ) and headaches. Negative for tremors.   Psychiatric/Behavioral: The patient has insomnia.        Past Medical History:   Diagnosis Date   • Atrial fibrillation (HCC) 9/20/2011   • Biventricular implantable cardioverter-defibrillator in situ " "8/7/2015   • Breath shortness    • CAD (coronary artery disease) 9/20/2011   • Cancer ([facility]) 2012    prostate   • CARDIOMYOPATHY 9/20/2011   • Congestive heart failure ([facility])    • Fatigue 10/28/2010   • Heartburn 10/23/2008   • History of cardiac catheterization 9/20/2011   • History of myocardial infarction     \"many\"   • HTN (hypertension) 9/20/2011   • Indigestion    • Insomnia 7/8/2010   • Ischemic cardiomyopathy 9/20/2011   • Long term (current) use of anticoagulants 5/13/2011   • Myocardial infarct ([facility]) 2007   • Orthostatic hypotension 5/6/2009   • WILLA (obstructive sleep apnea)     CPAP   • Other drug allergy(995.27) 10/16/2008   • Other specified disorder of intestines 07-28-15    constipation/diarrhea/ reports some bleeding from rectum w/blood clots. Seeing GI   • Pacemaker     [employer]   • Pain 07-28-15    \"chronic\", 0/10   • Pleural effusion 5/18/2009   • Presence of permanent cardiac pacemaker 9/20/2011   • Prostate cancer ([facility]) 5/13/2011   • Second degree AV block, Mobitz type II 11/10/2010   • Snoring    • Stroke ([facility]) 2/3/2009       Past Surgical History:   Procedure Laterality Date   • RECOVERY  10/16/2015    Procedure: CATH LAB LEAD REVISION [facility]JUDE [facility];  Surgeon: Amy Surgery;  Location: SURGERY PRE-POST PROC UNIT [facility] [place] [facility];  Service:    • RECOVERY  8/14/2015    Procedure:  CATH LAB LEAD EXTRACTION AWILDA [facility] R JAS AQU[facility];  Surgeon: Recoveryonly Surgery;  Location: SURGERY PRE-POST PROC UNIT [facility] [place];  Service:    • RECOVERY  7/30/2015    Procedure: CATH LAB  LEAD EXTRACTION, UPGRADE TO BIV PM [facility] R BIPING MAGGIE AQUINO ;  Surgeon: Recoveryonfreddy Surgery;  Location: SURGERY PRE-POST PROC UNIT [facility] [place];  Service:    • RECOVERY  7/29/2015    Procedure: CATH LAB NEW PM INSERTION DUAL ATRIAL & VENTRICULAR-LV LEAD W/ NEW GENERATOR KENIA ICD9: 414.8;  Surgeon: Recoveryonfreddy Surgery;  Location: SURGERY PRE-POST PROC UNIT [facility] [place];  Service:    • PACEMAKER INSERTION  11/24/08    [facility]   • MULTIPLE CORONARY ARTERY " BYPASS      2 vessel   • ZZZ CARDIAC CATH      has 2 stents       Family History   Problem Relation Age of Onset   • Thyroid Sister        Social History     Socioeconomic History   • Marital status:      Spouse name: Not on file   • Number of children: Not on file   • Years of education: Not on file   • Highest education level: Not on file   Occupational History   • Not on file   Tobacco Use   • Smoking status: Former Smoker     Packs/day: 1.50     Years: 3.00     Pack years: 4.50     Types: Cigarettes     Quit date: 1970     Years since quittin.1   • Smokeless tobacco: Former User     Types: Chew     Quit date: 1972   Vaping Use   • Vaping Use: Never used   Substance and Sexual Activity   • Alcohol use: Yes     Comment: 10-14 per week; scotch & wine   • Drug use: No   • Sexual activity: Not on file   Other Topics Concern   • Not on file   Social History Narrative   • Not on file     Social Determinants of Health     Financial Resource Strain:    • Difficulty of Paying Living Expenses: Not on file   Food Insecurity:    • Worried About Running Out of Food in the Last Year: Not on file   • Ran Out of Food in the Last Year: Not on file   Transportation Needs:    • Lack of Transportation (Medical): Not on file   • Lack of Transportation (Non-Medical): Not on file   Physical Activity:    • Days of Exercise per Week: Not on file   • Minutes of Exercise per Session: Not on file   Stress:    • Feeling of Stress : Not on file   Social Connections:    • Frequency of Communication with Friends and Family: Not on file   • Frequency of Social Gatherings with Friends and Family: Not on file   • Attends Cheondoism Services: Not on file   • Active Member of Clubs or Organizations: Not on file   • Attends Club or Organization Meetings: Not on file   • Marital Status: Not on file   Intimate Partner Violence:    • Fear of Current or Ex-Partner: Not on file   • Emotionally Abused: Not on file   • Physically  "Abused: Not on file   • Sexually Abused: Not on file   Housing Stability:    • Unable to Pay for Housing in the Last Year: Not on file   • Number of Places Lived in the Last Year: Not on file   • Unstable Housing in the Last Year: Not on file       Allergies as of 02/11/2022 - Reviewed 02/11/2022   Allergen Reaction Noted   • Plavix [clopidogrel bisulfate] Anaphylaxis 05/19/2011   • Spironolactone Unspecified 09/11/2020   • Statins [hmg-coa-r inhibitors] Unspecified 01/07/2011   • Ticlid [ticlopidine hydrochloride] Unspecified 02/03/2009        @Vital signs for this encounter:  /70   Pulse 70   Resp 16   Ht 1.803 m (5' 11\")   Wt 88.5 kg (195 lb)   SpO2 94%     Current medications as of today   Current Outpatient Medications   Medication Sig Dispense Refill   • metoprolol SR (TOPROL XL) 50 MG TABLET SR 24 HR      • sacubitril-valsartan (ENTRESTO)  MG Tab tablet Take 1 Tablet by mouth 2 times a day. 60 Tablet 11   • Evolocumab, REPATHA, (REPATHA SURECLICK) 140 MG/ML Solution Auto-injector Inject contents of 1 pen under the skin every 14 days. 6 Each 3   • torsemide (DEMADEX) 20 MG Tab Take 1 tablet by mouth every day. 30 tablet 11   • potassium citrate SR (UROCIT-K SR) 10 MEQ (1080 MG) Tab CR Take 1 tablet by mouth every 48 hours. 90 tablet 3   • HYDROcodone/acetaminophen (NORCO)  MG Tab TAKE 1 TABLET ORALLY EVERY 4 HOURS AS NEEDED DX M16.0 M54.5 7 DAYS     • Multiple Vitamins-Minerals (OCUVITE PO) Take  by mouth.     • VITAMIN D PO Take  by mouth.     • Coenzyme Q10 (CO Q 10) 100 MG Cap Take 200 mg by mouth every day.     • Ixekizumab (TALTZ) 80 MG/ML Solution Prefilled Syringe Inject 80 mg as instructed Q30 DAYS.     • aspirin 81 MG tablet Take 81 mg by mouth every day.       No current facility-administered medications for this visit.         Physical Exam:   Gen:           Alert and oriented, No apparent distress. Mood and affect appropriate, normal interaction with provider.  Eyes:        "   sclere white, conjunctive moist.  Hearing:     Grossly intact.  Dentition:    Fair dentition.  Oropharynx:   Tongue normal, posterior pharynx without erythema or exudate.  Neck:        Supple, trachea midline, no masses.  Respiratory Effort: No intercostal retractions or use of accessory muscles.   Lung Auscultation:      diminished; left lower lobe rales, rhonchi or wheezing.  CV:            Regular rate and rhythm.  Trace left lower extremity edema edema. No murmurs, rubs or gallops.  Digits, Nails, Ext: No clubbing, cyanosis, petechiae, or nodes.   Skin:        No rashes, lesions or ulcers noted on exposed skin surfaces.                     Assessment:  1. Chronic obstructive pulmonary disease with acute exacerbation (HCC)  azithromycin (ZITHROMAX) 250 MG Tab    predniSONE (DELTASONE) 10 MG Tab   2. WILLA (obstructive sleep apnea)  DME Mask and Supplies   3. Abnormal chest CT  CT-CHEST (THORAX) W/O       Immunizations:    Flu: 9/29/20  Pneumovax 23: 11/30/14  Prevnar 13: 12/15/16  SARS CoV2 vaccine:  declined     Plan:    81 y.o. year old male here today for follow-up on sleep apnea.  Patient is accompanied by his wife who is also a clinic patient.  He was last seen in clinic 11/12/2021 by Dr. Thakur.  He has a reported brief less than 5-pack-year history of smoking with quit date in 1970.  He did also use chewing tobacco with reported quit date 1972.    Remote smoking history: Patient quit smoking many years ago and remains abstinent of tobacco.    Pertinent past medical history includes acid indigestion, EtOH abuse, chronic kidney disease stage III, portal hypertension, prostate cancer 2012, chronic bronchitis, coronary artery disease AICD 2015, Mobitz type II second-degree heart block, stroke 2009, CABG 2007, chronic systolic heart failure stage C.    Reviewed in clinic vitals including /70, HR 70, O2 sat 94% on room air and BMI of 27.2 kg/m².    Reviewed home medication regimen including torsemide, he did  just use his antibiotic and steroid 2 months ago for bronchitis flare and is requesting replacement.    Abnormal chest CT:  Reviewed CT results. Follow up chest CT in MAY.     COPD with exacerbation: New onset symptoms, Covid negative about 9 days ago. Provided with antibiotics and steroids for current bronchitis flare.  Not requiring maintenance or rescue inhaler, however this would be recommended if continues to experience flares.     WILLA: patient requesting chart notes, copy of last sleep study and new order for DME mask and supplies sent to [employer] [facility].  Will attempt to download compliance or have them bring in machine for compliance data.     Follow up in 4 months.     This dictation was created using voice recognition software. The accuracy of the dictation is limited to the abilities of the software. I expect there may be some errors of grammar and possibly content.

## 2022-02-11 NOTE — PATIENT INSTRUCTIONS
1-current flare up  2-azithromycin and prednisone ordered  3-sleep supply order placed  4-will try to obtain compliance data  5-abnormal chest CT  6-unable to retrieve data  7-follow up CT in 3 months  8-follow up appointment 4 months

## 2022-02-15 ENCOUNTER — APPOINTMENT (OUTPATIENT)
Dept: CARDIOLOGY | Facility: MEDICAL CENTER | Age: 82
End: 2022-02-15
Payer: MEDICARE

## 2022-02-16 ENCOUNTER — TELEPHONE (OUTPATIENT)
Dept: SLEEP MEDICINE | Facility: MEDICAL CENTER | Age: 82
End: 2022-02-16
Payer: MEDICARE

## 2022-03-07 ENCOUNTER — TELEPHONE (OUTPATIENT)
Dept: CARDIOLOGY | Facility: MEDICAL CENTER | Age: 82
End: 2022-03-07
Payer: MEDICARE

## 2022-03-07 DIAGNOSIS — Z79.899 HIGH RISK MEDICATION USE: ICD-10-CM

## 2022-03-07 DIAGNOSIS — E78.00 HYPERCHOLESTEREMIA: ICD-10-CM

## 2022-03-07 NOTE — TELEPHONE ENCOUNTER
AB    Patient's wife Ivis called wanting to know if patient needed labs before his upcoming appointment with Dr. Lee.  Wife will answer on the home phone number listed on chart. [phone]    Patient's wife stated he has been very sick for two months. He got COVID in January and then pneumonia and an infection in the lungs. He just came out of this sickness about 3 days ago. He is on a breathing treatment 3 times a day from a pulmonary doctor.

## 2022-03-08 ENCOUNTER — HOSPITAL ENCOUNTER (OUTPATIENT)
Dept: LAB | Facility: MEDICAL CENTER | Age: 82
End: 2022-03-08
Attending: INTERNAL MEDICINE
Payer: MEDICARE

## 2022-03-08 DIAGNOSIS — Z79.899 HIGH RISK MEDICATION USE: ICD-10-CM

## 2022-03-08 DIAGNOSIS — E78.00 HYPERCHOLESTEREMIA: ICD-10-CM

## 2022-03-08 LAB
ALBUMIN SERPL BCP-MCNC: 4 G/DL (ref 3.2–4.9)
ALBUMIN/GLOB SERPL: 1.8 G/DL
ALP SERPL-CCNC: 112 U/L (ref 30–99)
ALT SERPL-CCNC: 26 U/L (ref 2–50)
ANION GAP SERPL CALC-SCNC: 11 MMOL/L (ref 7–16)
AST SERPL-CCNC: 24 U/L (ref 12–45)
BILIRUB SERPL-MCNC: 0.7 MG/DL (ref 0.1–1.5)
BUN SERPL-MCNC: 27 MG/DL (ref 8–22)
CALCIUM SERPL-MCNC: 9.3 MG/DL (ref 8.4–10.2)
CHLORIDE SERPL-SCNC: 104 MMOL/L (ref 96–112)
CHOLEST SERPL-MCNC: 199 MG/DL (ref 100–199)
CO2 SERPL-SCNC: 24 MMOL/L (ref 20–33)
CREAT SERPL-MCNC: 1.16 MG/DL (ref 0.5–1.4)
FASTING STATUS PATIENT QL REPORTED: NORMAL
GLOBULIN SER CALC-MCNC: 2.2 G/DL (ref 1.9–3.5)
GLUCOSE SERPL-MCNC: 101 MG/DL (ref 65–99)
HDLC SERPL-MCNC: 73 MG/DL
LDLC SERPL CALC-MCNC: 105 MG/DL
POTASSIUM SERPL-SCNC: 4.7 MMOL/L (ref 3.6–5.5)
PROT SERPL-MCNC: 6.2 G/DL (ref 6–8.2)
SODIUM SERPL-SCNC: 139 MMOL/L (ref 135–145)
TRIGL SERPL-MCNC: 103 MG/DL (ref 0–149)

## 2022-03-08 PROCEDURE — 36415 COLL VENOUS BLD VENIPUNCTURE: CPT

## 2022-03-08 PROCEDURE — 80061 LIPID PANEL: CPT

## 2022-03-08 PROCEDURE — 80053 COMPREHEN METABOLIC PANEL: CPT

## 2022-03-10 ENCOUNTER — OFFICE VISIT (OUTPATIENT)
Dept: CARDIOLOGY | Facility: MEDICAL CENTER | Age: 82
End: 2022-03-10
Payer: MEDICARE

## 2022-03-10 VITALS
RESPIRATION RATE: 14 BRPM | HEIGHT: 71 IN | BODY MASS INDEX: 27.5 KG/M2 | DIASTOLIC BLOOD PRESSURE: 60 MMHG | SYSTOLIC BLOOD PRESSURE: 110 MMHG | OXYGEN SATURATION: 95 % | HEART RATE: 70 BPM | WEIGHT: 196.4 LBS

## 2022-03-10 DIAGNOSIS — Z95.1 HX OF CABG: ICD-10-CM

## 2022-03-10 DIAGNOSIS — I50.20 SYSTOLIC HEART FAILURE, ACC/AHA STAGE C (HCC): ICD-10-CM

## 2022-03-10 DIAGNOSIS — Z86.73 HISTORY OF CVA (CEREBROVASCULAR ACCIDENT): ICD-10-CM

## 2022-03-10 DIAGNOSIS — K76.6 PORTAL HYPERTENSION (HCC): ICD-10-CM

## 2022-03-10 DIAGNOSIS — N18.31 STAGE 3A CHRONIC KIDNEY DISEASE: ICD-10-CM

## 2022-03-10 DIAGNOSIS — I44.1 SECOND DEGREE AV BLOCK, MOBITZ TYPE II: Chronic | ICD-10-CM

## 2022-03-10 DIAGNOSIS — Z86.79 S/P ABLATION OF ATRIAL FIBRILLATION: ICD-10-CM

## 2022-03-10 DIAGNOSIS — I25.10 CORONARY ARTERY DISEASE INVOLVING NATIVE CORONARY ARTERY OF NATIVE HEART WITHOUT ANGINA PECTORIS: Chronic | ICD-10-CM

## 2022-03-10 DIAGNOSIS — I48.0 PAROXYSMAL ATRIAL FIBRILLATION (HCC): ICD-10-CM

## 2022-03-10 DIAGNOSIS — I51.89 LEFT VENTRICULAR SYSTOLIC DYSFUNCTION, NYHA CLASS 3: ICD-10-CM

## 2022-03-10 DIAGNOSIS — I63.9 CEREBROVASCULAR ACCIDENT (CVA), UNSPECIFIED MECHANISM (HCC): Chronic | ICD-10-CM

## 2022-03-10 DIAGNOSIS — F10.10 ALCOHOL ABUSE: ICD-10-CM

## 2022-03-10 DIAGNOSIS — I47.29 PAROXYSMAL VENTRICULAR TACHYCARDIA (HCC): ICD-10-CM

## 2022-03-10 DIAGNOSIS — E78.00 HYPERCHOLESTEREMIA: Chronic | ICD-10-CM

## 2022-03-10 DIAGNOSIS — Z98.890 S/P ABLATION OF ATRIAL FIBRILLATION: ICD-10-CM

## 2022-03-10 DIAGNOSIS — Z79.899 HIGH RISK MEDICATION USE: ICD-10-CM

## 2022-03-10 DIAGNOSIS — E87.5 HYPERKALEMIA: ICD-10-CM

## 2022-03-10 DIAGNOSIS — G47.33 OBSTRUCTIVE SLEEP APNEA SYNDROME: Chronic | ICD-10-CM

## 2022-03-10 DIAGNOSIS — Z95.810 BIVENTRICULAR IMPLANTABLE CARDIOVERTER-DEFIBRILLATOR IN SITU: ICD-10-CM

## 2022-03-10 DIAGNOSIS — I25.2 HISTORY OF MYOCARDIAL INFARCTION: Chronic | ICD-10-CM

## 2022-03-10 DIAGNOSIS — I50.23 NYHA CLASS 3 ACUTE ON CHRONIC SYSTOLIC HEART FAILURE (HCC): ICD-10-CM

## 2022-03-10 DIAGNOSIS — I25.5 ISCHEMIC CARDIOMYOPATHY: Chronic | ICD-10-CM

## 2022-03-10 DIAGNOSIS — I10 PRIMARY HYPERTENSION: Chronic | ICD-10-CM

## 2022-03-10 PROCEDURE — 99215 OFFICE O/P EST HI 40 MIN: CPT | Performed by: INTERNAL MEDICINE

## 2022-03-10 RX ORDER — POTASSIUM CITRATE 10 MEQ/1
10 TABLET, EXTENDED RELEASE ORAL
Qty: 90 TABLET | Refills: 3 | Status: SHIPPED | OUTPATIENT
Start: 2022-03-10 | End: 2022-11-14 | Stop reason: SDUPTHER

## 2022-03-10 RX ORDER — TORSEMIDE 20 MG/1
20 TABLET ORAL DAILY
Qty: 30 TABLET | Refills: 11 | Status: SHIPPED | OUTPATIENT
Start: 2022-03-10 | End: 2022-11-14 | Stop reason: SDUPTHER

## 2022-03-10 RX ORDER — ALBUTEROL SULFATE 2.5 MG/3ML
SOLUTION RESPIRATORY (INHALATION)
COMMUNITY
Start: 2022-03-03 | End: 2022-11-14

## 2022-03-10 RX ORDER — METHYLPREDNISOLONE 4 MG/1
TABLET ORAL
COMMUNITY
Start: 2022-02-18 | End: 2022-03-10

## 2022-03-10 ASSESSMENT — ENCOUNTER SYMPTOMS
CLAUDICATION: 0
NEUROLOGICAL NEGATIVE: 1
SHORTNESS OF BREATH: 0
FEVER: 0
PND: 0
MUSCULOSKELETAL NEGATIVE: 1
COUGH: 1
CARDIOVASCULAR NEGATIVE: 1
STRIDOR: 0
BRUISES/BLEEDS EASILY: 0
PALPITATIONS: 0
EYES NEGATIVE: 1
WHEEZING: 0
SORE THROAT: 0
CONSTITUTIONAL NEGATIVE: 1
WEAKNESS: 0
SPUTUM PRODUCTION: 0
HEMOPTYSIS: 0
GASTROINTESTINAL NEGATIVE: 1
DIZZINESS: 0
ORTHOPNEA: 0
CHILLS: 0
LOSS OF CONSCIOUSNESS: 0

## 2022-03-10 ASSESSMENT — FIBROSIS 4 INDEX: FIB4 SCORE: 1.77

## 2022-03-10 NOTE — PROGRESS NOTES
"Chief Complaint   Patient presents with   • Coronary Artery Disease     F/V DX: Coronary artery disease involving native coronary artery of native heart without angina pectoris       Subjective:   Isai Fuentes is a 79 y.o. male who presents today as a follow-up for his heart failure with reduced ejection fraction.    Since he was last seen he has been seen by pulmonary due to this residual cough.  His wife reports that he sits up in bed to cough last.  He had a CT scan which was nonspecific.  He has no chest pain.  He does occasionally get lower extremity edema.  Unbeknownst to us he was taking torsemide on a as needed basis only last echocardiogram 2020 showed grade 2 diastolic dysfunction.    Past Medical History:   Diagnosis Date   • Atrial fibrillation ([facility]) 9/20/2011   • Biventricular implantable cardioverter-defibrillator in situ 8/7/2015   • Breath shortness    • CAD (coronary artery disease) 9/20/2011   • Cancer ([facility]) 2012    prostate   • CARDIOMYOPATHY 9/20/2011   • Congestive heart failure ([facility])    • Fatigue 10/28/2010   • Heartburn 10/23/2008   • History of cardiac catheterization 9/20/2011   • History of myocardial infarction     \"many\"   • HTN (hypertension) 9/20/2011   • Indigestion    • Insomnia 7/8/2010   • Ischemic cardiomyopathy 9/20/2011   • Long term (current) use of anticoagulants 5/13/2011   • Myocardial infarct ([facility]) 2007   • Orthostatic hypotension 5/6/2009   • WILLA (obstructive sleep apnea)     CPAP   • Other drug allergy(995.27) 10/16/2008   • Other specified disorder of intestines 07-28-15    constipation/diarrhea/ reports some bleeding from rectum w/blood clots. Seeing GI   • Pacemaker     [employer]   • Pain 07-28-15    \"chronic\", 0/10   • Pleural effusion 5/18/2009   • Presence of permanent cardiac pacemaker 9/20/2011   • Prostate cancer ([facility]) 5/13/2011   • Second degree AV block, Mobitz type II 11/10/2010   • Snoring    • Stroke ([facility]) 2/3/2009     Past Surgical History: "   Procedure Laterality Date   • RECOVERY  10/16/2015    Procedure: CATH LAB LEAD REVISION .MARIA R AQUINO;  Surgeon: Recoveryonly Surgery;  Location: SURGERY PRE-POST PROC UNIT [facility];  Service:    • RECOVERY  2015    Procedure:  CATH LAB LEAD EXTRACTION AWILDA [facility] R BIPING PAULO AQUINO;  Surgeon: Recoveryonly Surgery;  Location: SURGERY PRE-POST PROC UNIT [facility];  Service:    • RECOVERY  2015    Procedure: CATH LAB  LEAD EXTRACTION, UPGRADE TO BIV PM [facility] R JAS AQUINO ;  Surgeon: Recoveryonly Surgery;  Location: SURGERY PRE-POST PROC UNIT [facility];  Service:    • RECOVERY  2015    Procedure: CATH LAB NEW PM INSERTION DUAL ATRIAL & VENTRICULAR-LV LEAD W/ NEW GENERATOR AQUINO ICD9: 414.8;  Surgeon: Recoveryonfreddy Surgery;  Location: SURGERY PRE-POST PROC UN[facility];  Service:    • PACEMAKER INSERTION  08    [facility]   • MULTIPLE CORONARY ARTERY BYPASS  2007    2 vessel   • ZZZ CARDIAC CATH      has 2 stents     Family History   Problem Relation Age of Onset   • Thyroid Sister      Social History     Socioeconomic History   • Marital status:      Spouse name: Not on file   • Number of children: Not on file   • Years of education: Not on file   • Highest education level: Not on file   Occupational History   • Not on file   Tobacco Use   • Smoking status: Former Smoker     Packs/day: 1.50     Years: 3.00     Pack years: 4.50     Types: Cigarettes     Quit date: 1970     Years since quittin.2   • Smokeless tobacco: Former User     Types: Chew     Quit date: 1972   Vaping Use   • Vaping Use: Never used   Substance and Sexual Activity   • Alcohol use: Yes     Comment: 10-14 per week; scotch & wine   • Drug use: No   • Sexual activity: Not on file   Other Topics Concern   • Not on file   Social History Narrative   • Not on file     Social Determinants of Health     Financial Resource Strain: Not on file   Food Insecurity: Not on file   Transportation Needs: Not on file   Physical Activity: Not on file  "  Stress: Not on file   Social Connections: Not on file   Intimate Partner Violence: Not on file   Housing Stability: Not on file     Allergies   Allergen Reactions   • Plavix [Clopidogrel Bisulfate] Anaphylaxis   • Spironolactone Unspecified     Hyperkalemia   • Statins [Hmg-Coa-R Inhibitors] Unspecified     Muscles aches     • Atorvastatin    • Ticlid [Ticlopidine Hydrochloride] Unspecified     Pt states \"I'm not sure what happens\".       Outpatient Encounter Medications as of 3/10/2022   Medication Sig Dispense Refill   • albuterol (PROVENTIL) 2.5mg/3ml Nebu Soln solution for nebulization INHALE 3 MLS (1 VIAL) VIA NEBULIZER EVERY 6 HOURS AS NEEDED:FOR WHEEZING/SOB     • torsemide (DEMADEX) 20 MG Tab Take 1 Tablet by mouth every day. 30 Tablet 11   • potassium citrate SR (UROCIT-K SR) 10 MEQ (1080 MG) Tab CR Take 1 Tablet by mouth every 48 hours. 90 Tablet 3   • metoprolol SR (TOPROL XL) 50 MG TABLET SR 24 HR      • predniSONE (DELTASONE) 10 MG Tab Take 30mg x 3 days, then take 20mg x 3 days, then take 10mg x 3 days, with food, then discontinue. 18 Tablet 0   • sacubitril-valsartan (ENTRESTO)  MG Tab tablet Take 1 Tablet by mouth 2 times a day. 60 Tablet 11   • Evolocumab, REPATHA, (REPATHA SURECLICK) 140 MG/ML Solution Auto-injector Inject contents of 1 pen under the skin every 14 days. 6 Each 3   • HYDROcodone/acetaminophen (NORCO)  MG Tab TAKE 1 TABLET ORALLY EVERY 4 HOURS AS NEEDED DX M16.0 M54.5 7 DAYS     • Multiple Vitamins-Minerals (OCUVITE PO) Take  by mouth.     • VITAMIN D PO Take  by mouth.     • Coenzyme Q10 (CO Q 10) 100 MG Cap Take 200 mg by mouth every day.     • Ixekizumab 80 MG/ML Solution Prefilled Syringe Inject 80 mg as instructed Q30 DAYS.     • aspirin 81 MG tablet Take 81 mg by mouth every day.     • [DISCONTINUED] methylPREDNISolone (MEDROL DOSEPAK) 4 MG Tablet Therapy Pack TAKE 6 TABLETS ON DAY 1 AS DIRECTED ON PACKAGE AND DECREASE BY 1 TAB EACH DAY FOR A TOTAL OF 6 DAYS " "(Patient not taking: Reported on 3/10/2022)     • [DISCONTINUED] azithromycin (ZITHROMAX) 250 MG Tab Take 2 tablets on day 1, then take 1 tablet a day for 4 days. (Patient not taking: Reported on 3/10/2022) 6 Tablet 0   • [DISCONTINUED] torsemide (DEMADEX) 20 MG Tab Take 1 tablet by mouth every day. 30 tablet 11   • [DISCONTINUED] potassium citrate SR (UROCIT-K SR) 10 MEQ (1080 MG) Tab CR Take 1 tablet by mouth every 48 hours. 90 tablet 3     No facility-administered encounter medications on file as of 3/10/2022.     Review of Systems   Constitutional: Negative.  Negative for chills, fever and malaise/fatigue.   HENT: Negative.  Negative for sore throat.    Eyes: Negative.    Respiratory: Positive for cough. Negative for hemoptysis, sputum production, shortness of breath, wheezing and stridor.    Cardiovascular: Negative.  Negative for chest pain, palpitations, orthopnea, claudication, leg swelling and PND.   Gastrointestinal: Negative.    Genitourinary: Negative.    Musculoskeletal: Negative.    Skin: Negative.    Neurological: Negative.  Negative for dizziness, loss of consciousness and weakness.   Endo/Heme/Allergies: Negative.  Does not bruise/bleed easily.   All other systems reviewed and are negative.       Objective:   /60 (BP Location: Left arm, Patient Position: Sitting, BP Cuff Size: Adult)   Pulse 70   Resp 14   Ht 1.803 m (5' 11\")   Wt 89.1 kg (196 lb 6.4 oz)   SpO2 95%   BMI 27.39 kg/m²     Physical Exam  Vitals and nursing note reviewed.   Constitutional:       General: He is not in acute distress.     Appearance: He is well-developed. He is not diaphoretic.   HENT:      Head: Normocephalic and atraumatic.      Right Ear: External ear normal.      Left Ear: External ear normal.      Nose: Nose normal.      Mouth/Throat:      Pharynx: No oropharyngeal exudate.   Eyes:      General: No scleral icterus.        Right eye: No discharge.         Left eye: No discharge.      Conjunctiva/sclera: " Conjunctivae normal.      Pupils: Pupils are equal, round, and reactive to light.   Neck:      Vascular: No JVD.   Cardiovascular:      Rate and Rhythm: Normal rate and regular rhythm.      Heart sounds: No murmur heard.    No friction rub. No gallop.   Pulmonary:      Effort: Pulmonary effort is normal. No respiratory distress.      Breath sounds: No stridor. No wheezing or rales.   Chest:      Chest wall: No tenderness.   Abdominal:      General: There is no distension.      Palpations: Abdomen is soft.      Tenderness: There is no guarding.   Musculoskeletal:         General: No tenderness or deformity. Normal range of motion.      Cervical back: Neck supple.   Skin:     General: Skin is warm and dry.      Coloration: Skin is not pale.      Findings: No erythema or rash.   Neurological:      Mental Status: He is alert.      Cranial Nerves: No cranial nerve deficit.      Motor: No abnormal muscle tone.      Coordination: Coordination normal.      Deep Tendon Reflexes: Reflexes are normal and symmetric. Reflexes normal.   Psychiatric:         Behavior: Behavior normal.         Thought Content: Thought content normal.         Judgment: Judgment normal.     Echocardiogram: Dated 9/28/2020 personally interpreted myself showing an EF of 35%.    CTA: Dated 9/28/2020 personally viewed myself showing defect in the IVC.    Sound abdomen 9/28/2015 personally interpreted myself showing possible cirrhosis.    Assessment:     1. Coronary artery disease involving native coronary artery of native heart without angina pectoris     2. Ischemic cardiomyopathy     3. Second degree AV block, Mobitz type II  torsemide (DEMADEX) 20 MG Tab    Basic Metabolic Panel    EC-ECHOCARDIOGRAM COMPLETE W/O CONT    potassium citrate SR (UROCIT-K SR) 10 MEQ (1080 MG) Tab CR   4. Hypercholesteremia  Basic Metabolic Panel    EC-ECHOCARDIOGRAM COMPLETE W/O CONT    potassium citrate SR (UROCIT-K SR) 10 MEQ (1080 MG) Tab CR   5. Primary hypertension   Basic Metabolic Panel    EC-ECHOCARDIOGRAM COMPLETE W/O CONT    potassium citrate SR (UROCIT-K SR) 10 MEQ (1080 MG) Tab CR   6. History of myocardial infarction  Basic Metabolic Panel    EC-ECHOCARDIOGRAM COMPLETE W/O CONT    potassium citrate SR (UROCIT-K SR) 10 MEQ (1080 MG) Tab CR   7. Obstructive sleep apnea syndrome  Basic Metabolic Panel    EC-ECHOCARDIOGRAM COMPLETE W/O CONT   8. Cerebrovascular accident (CVA), unspecified mechanism (HCC)  Basic Metabolic Panel   9. Paroxysmal atrial fibrillation (HCC)     10. NYHA class 3 acute on chronic systolic heart failure (HCC)     11. Systolic heart failure, ACC/AHA stage C (HCC)  torsemide (DEMADEX) 20 MG Tab   12. Left ventricular systolic dysfunction, NYHA class 3     13. Ventricular tachycardia (paroxysmal) (HCC)     14. Hx of CABG  torsemide (DEMADEX) 20 MG Tab   15. S/P ablation of atrial fibrillation     16. Biventricular implantable cardioverter-defibrillator in situ     17. Alcohol abuse     18. Stage 3a chronic kidney disease (HCC)     19. High risk medication use     20. Portal hypertension (HCC)     21. History of CVA (cerebrovascular accident)  torsemide (DEMADEX) 20 MG Tab   22. Hyperkalemia  torsemide (DEMADEX) 20 MG Tab       Medical Decision Making:  Today's Assessment / Status / Plan:     79-year-old male with alcohol use heart failure with reduced ejection fraction hyperlipidemia and some concern for cirrhosis.  I think that his cough is probably fluid retention.  I will have him start torsemide 20 mg daily.  We will check labs in a week.  I would like to repeat an echocardiogram to make sure that his grade 2 diastolic dysfunction is still present.  We will check labs in 1 week.  I will see him back in 4 weeks.    1. CAD    - cont repatha    2. CHF s/p CRT    - cont entresto 97/103    - cont metop XL 50    - cont torsemide 20    3. CRT    - f/u device clinic    4. ETOH    - U/S Abdomen    5. Edema/Swelling    - cont torsemide 20 daily     6. High  Risk Meds    - labs in one week

## 2022-03-18 ENCOUNTER — TELEPHONE (OUTPATIENT)
Dept: CARDIOLOGY | Facility: MEDICAL CENTER | Age: 82
End: 2022-03-18

## 2022-03-18 ENCOUNTER — HOSPITAL ENCOUNTER (OUTPATIENT)
Dept: LAB | Facility: MEDICAL CENTER | Age: 82
End: 2022-03-18
Attending: INTERNAL MEDICINE
Payer: MEDICARE

## 2022-03-18 DIAGNOSIS — I63.9 CEREBROVASCULAR ACCIDENT (CVA), UNSPECIFIED MECHANISM (HCC): Chronic | ICD-10-CM

## 2022-03-18 DIAGNOSIS — I25.2 HISTORY OF MYOCARDIAL INFARCTION: Chronic | ICD-10-CM

## 2022-03-18 DIAGNOSIS — G47.33 OBSTRUCTIVE SLEEP APNEA SYNDROME: Chronic | ICD-10-CM

## 2022-03-18 DIAGNOSIS — E78.00 HYPERCHOLESTEREMIA: Chronic | ICD-10-CM

## 2022-03-18 DIAGNOSIS — Z79.899 HIGH RISK MEDICATION USE: ICD-10-CM

## 2022-03-18 DIAGNOSIS — I10 PRIMARY HYPERTENSION: Chronic | ICD-10-CM

## 2022-03-18 DIAGNOSIS — I44.1 SECOND DEGREE AV BLOCK, MOBITZ TYPE II: Chronic | ICD-10-CM

## 2022-03-18 LAB
ANION GAP SERPL CALC-SCNC: 13 MMOL/L (ref 7–16)
BUN SERPL-MCNC: 42 MG/DL (ref 8–22)
CALCIUM SERPL-MCNC: 9.6 MG/DL (ref 8.4–10.2)
CHLORIDE SERPL-SCNC: 100 MMOL/L (ref 96–112)
CO2 SERPL-SCNC: 27 MMOL/L (ref 20–33)
CREAT SERPL-MCNC: 1.69 MG/DL (ref 0.5–1.4)
GFR SERPLBLD CREATININE-BSD FMLA CKD-EPI: 40 ML/MIN/1.73 M 2
GLUCOSE SERPL-MCNC: 80 MG/DL (ref 65–99)
POTASSIUM SERPL-SCNC: 4.5 MMOL/L (ref 3.6–5.5)
SODIUM SERPL-SCNC: 140 MMOL/L (ref 135–145)

## 2022-03-18 PROCEDURE — 80048 BASIC METABOLIC PNL TOTAL CA: CPT

## 2022-03-18 PROCEDURE — 36415 COLL VENOUS BLD VENIPUNCTURE: CPT

## 2022-03-19 NOTE — TELEPHONE ENCOUNTER
Chidi Jackson R.N.   3/18/2022  5:13 PM PDT Back to Top        To RO,      S/W pt, pt drinks plenty of water per spouse. Likely 4-5 tumblers of 24oz each. Currently cough and swelling have resolved. Advised pt to take torsemide as needed only for swelling or cough and to limit fluid to 2.5L daily 83oz. [facility] Ordered repeat non fasting BMP for completion next week.

## 2022-04-05 ENCOUNTER — NON-PROVIDER VISIT (OUTPATIENT)
Dept: CARDIOLOGY | Facility: MEDICAL CENTER | Age: 82
End: 2022-04-05
Payer: MEDICARE

## 2022-04-05 VITALS
HEIGHT: 71 IN | RESPIRATION RATE: 18 BRPM | WEIGHT: 198 LBS | DIASTOLIC BLOOD PRESSURE: 62 MMHG | OXYGEN SATURATION: 97 % | BODY MASS INDEX: 27.72 KG/M2 | SYSTOLIC BLOOD PRESSURE: 110 MMHG | HEART RATE: 70 BPM

## 2022-04-05 DIAGNOSIS — I25.10 CORONARY ARTERY DISEASE INVOLVING NATIVE CORONARY ARTERY OF NATIVE HEART WITHOUT ANGINA PECTORIS: Chronic | ICD-10-CM

## 2022-04-05 DIAGNOSIS — I48.0 PAROXYSMAL ATRIAL FIBRILLATION (HCC): ICD-10-CM

## 2022-04-05 DIAGNOSIS — Z95.810 BIVENTRICULAR IMPLANTABLE CARDIOVERTER-DEFIBRILLATOR IN SITU: ICD-10-CM

## 2022-04-05 DIAGNOSIS — I51.89 LEFT VENTRICULAR SYSTOLIC DYSFUNCTION, NYHA CLASS 3: ICD-10-CM

## 2022-04-05 DIAGNOSIS — I25.5 ISCHEMIC CARDIOMYOPATHY: Chronic | ICD-10-CM

## 2022-04-05 PROCEDURE — 93284 PRGRMG EVAL IMPLANTABLE DFB: CPT | Performed by: NURSE PRACTITIONER

## 2022-04-05 RX ORDER — INDOMETHACIN 50 MG/1
50 CAPSULE ORAL DAILY
COMMUNITY

## 2022-04-05 ASSESSMENT — FIBROSIS 4 INDEX: FIB4 SCORE: 1.77

## 2022-04-05 NOTE — PROGRESS NOTES
Device is working normally.  No device therapy.  52 mode switching episodes (all <10 seconds, <1% of total time).  Normal sensing and capture of RA, RV and LV leads; stable impedances. Battery charge time is 10.6 seconds; battery longevity is 1.2 years.  No changes are made today.    Follow-up in 6 months for next AICD check with me.

## 2022-04-12 ENCOUNTER — HOSPITAL ENCOUNTER (OUTPATIENT)
Dept: LAB | Facility: MEDICAL CENTER | Age: 82
End: 2022-04-12
Attending: NURSE PRACTITIONER
Payer: MEDICARE

## 2022-04-12 ENCOUNTER — HOSPITAL ENCOUNTER (OUTPATIENT)
Dept: LAB | Facility: MEDICAL CENTER | Age: 82
End: 2022-04-12
Attending: INTERNAL MEDICINE
Payer: MEDICARE

## 2022-04-12 DIAGNOSIS — Z79.899 HIGH RISK MEDICATION USE: ICD-10-CM

## 2022-04-12 LAB
ANION GAP SERPL CALC-SCNC: 11 MMOL/L (ref 7–16)
BUN SERPL-MCNC: 35 MG/DL (ref 8–22)
CALCIUM SERPL-MCNC: 9.5 MG/DL (ref 8.4–10.2)
CHLORIDE SERPL-SCNC: 104 MMOL/L (ref 96–112)
CO2 SERPL-SCNC: 25 MMOL/L (ref 20–33)
CREAT SERPL-MCNC: 1.4 MG/DL (ref 0.5–1.4)
GFR SERPLBLD CREATININE-BSD FMLA CKD-EPI: 50 ML/MIN/1.73 M 2
GLUCOSE SERPL-MCNC: 94 MG/DL (ref 65–99)
POTASSIUM SERPL-SCNC: 4.3 MMOL/L (ref 3.6–5.5)
PSA SERPL-MCNC: 0.44 NG/ML (ref 0–4)
SODIUM SERPL-SCNC: 140 MMOL/L (ref 135–145)

## 2022-04-12 PROCEDURE — 84153 ASSAY OF PSA TOTAL: CPT

## 2022-04-12 PROCEDURE — 80048 BASIC METABOLIC PNL TOTAL CA: CPT

## 2022-04-12 PROCEDURE — 36415 COLL VENOUS BLD VENIPUNCTURE: CPT

## 2022-05-09 ENCOUNTER — OFFICE VISIT (OUTPATIENT)
Dept: CARDIOLOGY | Facility: MEDICAL CENTER | Age: 82
End: 2022-05-09
Payer: MEDICARE

## 2022-05-09 VITALS
OXYGEN SATURATION: 93 % | HEART RATE: 70 BPM | DIASTOLIC BLOOD PRESSURE: 68 MMHG | BODY MASS INDEX: 29.26 KG/M2 | WEIGHT: 209 LBS | HEIGHT: 71 IN | SYSTOLIC BLOOD PRESSURE: 112 MMHG | RESPIRATION RATE: 16 BRPM

## 2022-05-09 DIAGNOSIS — Z95.1 HX OF CABG: ICD-10-CM

## 2022-05-09 DIAGNOSIS — I25.2 HISTORY OF MYOCARDIAL INFARCTION: Chronic | ICD-10-CM

## 2022-05-09 DIAGNOSIS — I47.29 PAROXYSMAL VENTRICULAR TACHYCARDIA (HCC): ICD-10-CM

## 2022-05-09 DIAGNOSIS — Z98.890 S/P ABLATION OF ATRIAL FIBRILLATION: ICD-10-CM

## 2022-05-09 DIAGNOSIS — F10.10 ALCOHOL ABUSE: ICD-10-CM

## 2022-05-09 DIAGNOSIS — Z86.79 S/P ABLATION OF ATRIAL FIBRILLATION: ICD-10-CM

## 2022-05-09 DIAGNOSIS — I25.5 ISCHEMIC CARDIOMYOPATHY: Chronic | ICD-10-CM

## 2022-05-09 DIAGNOSIS — I50.23 NYHA CLASS 3 ACUTE ON CHRONIC SYSTOLIC HEART FAILURE (HCC): ICD-10-CM

## 2022-05-09 DIAGNOSIS — G47.33 OBSTRUCTIVE SLEEP APNEA SYNDROME: Chronic | ICD-10-CM

## 2022-05-09 DIAGNOSIS — Z79.899 HIGH RISK MEDICATION USE: ICD-10-CM

## 2022-05-09 DIAGNOSIS — I51.89 LEFT VENTRICULAR SYSTOLIC DYSFUNCTION, NYHA CLASS 3: ICD-10-CM

## 2022-05-09 DIAGNOSIS — E78.00 HYPERCHOLESTEREMIA: Chronic | ICD-10-CM

## 2022-05-09 DIAGNOSIS — I95.1 ORTHOSTATIC HYPOTENSION: Chronic | ICD-10-CM

## 2022-05-09 DIAGNOSIS — Z95.810 BIVENTRICULAR IMPLANTABLE CARDIOVERTER-DEFIBRILLATOR IN SITU: ICD-10-CM

## 2022-05-09 DIAGNOSIS — I44.1 SECOND DEGREE AV BLOCK, MOBITZ TYPE II: Chronic | ICD-10-CM

## 2022-05-09 DIAGNOSIS — I63.9 CEREBROVASCULAR ACCIDENT (CVA), UNSPECIFIED MECHANISM (HCC): Chronic | ICD-10-CM

## 2022-05-09 DIAGNOSIS — N18.31 STAGE 3A CHRONIC KIDNEY DISEASE: ICD-10-CM

## 2022-05-09 DIAGNOSIS — I48.0 PAROXYSMAL ATRIAL FIBRILLATION (HCC): ICD-10-CM

## 2022-05-09 DIAGNOSIS — I50.20 SYSTOLIC HEART FAILURE, ACC/AHA STAGE C (HCC): ICD-10-CM

## 2022-05-09 DIAGNOSIS — I25.10 CORONARY ARTERY DISEASE INVOLVING NATIVE CORONARY ARTERY OF NATIVE HEART WITHOUT ANGINA PECTORIS: Chronic | ICD-10-CM

## 2022-05-09 DIAGNOSIS — K76.6 PORTAL HYPERTENSION (HCC): ICD-10-CM

## 2022-05-09 DIAGNOSIS — I10 PRIMARY HYPERTENSION: Chronic | ICD-10-CM

## 2022-05-09 PROCEDURE — 99214 OFFICE O/P EST MOD 30 MIN: CPT | Performed by: INTERNAL MEDICINE

## 2022-05-09 ASSESSMENT — MINNESOTA LIVING WITH HEART FAILURE QUESTIONNAIRE (MLHF)
DIFFICULTY TO CONCENTRATE OR REMEMBERING THINGS: 3
DIFFICULTY SLEEPING WELL AT NIGHT: 3
DIFFICULTY GOING AWAY FROM HOME: 3
DIFFICULTY WORKING TO EARN A LIVING: 5
COSTING YOU MONEY FOR MEDICAL CARE: 2
MAKING YOU SHORT OF BREATH: 3
TOTAL_SCORE: 67
GIVING YOU SIDE EFFECTS FROM TREATMENTS: 2
WALKING ABOUT OR CLIMBING STAIRS DIFFICULT: 3
MAKING YOU FEEL DEPRESSED: 3
MAKING YOU WORRY: 4
DIFFICULTY WITH RECREATIONAL PASTIMES, SPORTS, HOBBIES: 4
DIFFICULTY WITH SEXUAL ACTIVITIES: 4
MAKING YOU STAY IN A HOSPITAL: 0
LOSS OF SELF CONTROL IN YOUR LIFE: 4
WORKING AROUND THE HOUSE OR YARD DIFFICULT: 4
DIFFICULTY SOCIALIZING WITH FAMILY OR FRIENDS: 3
FEELING LIKE A BURDEN TO FAMILY AND FRIENDS: 4
EATING LESS FOODS YOU LIKE: 3
HAVING TO SIT OR LIE DOWN DURING THE DAY: 3
TIRED, FATIGUED OR LOW ON ENERGY: 5
SWELLING IN ANKLES OR LEGS: 2

## 2022-05-09 ASSESSMENT — ENCOUNTER SYMPTOMS
ORTHOPNEA: 0
STRIDOR: 0
BRUISES/BLEEDS EASILY: 0
WHEEZING: 0
SORE THROAT: 0
EYES NEGATIVE: 1
CLAUDICATION: 0
LOSS OF CONSCIOUSNESS: 0
NEUROLOGICAL NEGATIVE: 1
CONSTITUTIONAL NEGATIVE: 1
HEMOPTYSIS: 0
SPUTUM PRODUCTION: 0
PND: 0
PALPITATIONS: 0
COUGH: 1
CARDIOVASCULAR NEGATIVE: 1
MUSCULOSKELETAL NEGATIVE: 1
FEVER: 0
DIZZINESS: 0
WEAKNESS: 0
CHILLS: 0
GASTROINTESTINAL NEGATIVE: 1
SHORTNESS OF BREATH: 0

## 2022-05-09 ASSESSMENT — FIBROSIS 4 INDEX: FIB4 SCORE: 1.77

## 2022-05-09 NOTE — PROGRESS NOTES
"Chief Complaint   Patient presents with   • Coronary Artery Disease     Dx: Coronary artery disease involving native coronary artery of native heart without angina pectoris   • Cardiomyopathy (Ischemic)       Subjective:   Isai Fuentes is a 79 y.o. male who presents today as a follow-up for his heart failure with reduced ejection fraction.      Since he was last seen he has been seen by pulmonary due to this residual cough.  He was started on steroids and medications.  His cough is since gone away.  He continues to use the torsemide as needed.  We started him on this but then he developed a mild creatinine bump.  He is never taking it daily.  His repeat labs were unremarkable.      Past Medical History:   Diagnosis Date   • Atrial fibrillation ([facility]) 9/20/2011   • Biventricular implantable cardioverter-defibrillator in situ 8/7/2015   • Breath shortness    • CAD (coronary artery disease) 9/20/2011   • Cancer ([facility]) 2012    prostate   • CARDIOMYOPATHY 9/20/2011   • Congestive heart failure ([facility])    • Fatigue 10/28/2010   • Heartburn 10/23/2008   • History of cardiac catheterization 9/20/2011   • History of myocardial infarction     \"many\"   • HTN (hypertension) 9/20/2011   • Indigestion    • Insomnia 7/8/2010   • Ischemic cardiomyopathy 9/20/2011   • Long term (current) use of anticoagulants 5/13/2011   • Myocardial infarct ([facility]) 2007   • Orthostatic hypotension 5/6/2009   • WILLA (obstructive sleep apnea)     CPAP   • Other drug allergy(995.27) 10/16/2008   • Other specified disorder of intestines 07-28-15    constipation/diarrhea/ reports some bleeding from rectum w/blood clots. Seeing GI   • Pacemaker     [employer]   • Pain 07-28-15    \"chronic\", 0/10   • Pleural effusion 5/18/2009   • Presence of permanent cardiac pacemaker 9/20/2011   • Prostate cancer ([facility]) 5/13/2011   • Second degree AV block, Mobitz type II 11/10/2010   • Snoring    • Stroke ([facility]) 2/3/2009     Past Surgical History:   Procedure " Laterality Date   • RECOVERY  10/16/2015    Procedure: CATH LAB LEAD REVISION .MARIA R AQUINO;  Surgeon: Recoveryonly Surgery;  Location: SURGERY PRE-POST PROC UNIT [facility];  Service:    • RECOVERY  2015    Procedure:  CATH LAB LEAD EXTRACTION AWILDA ST.MARIA R BIPING PAULO AQUINO;  Surgeon: Recoveryonly Surgery;  Location: SURGERY PRE-POST PROC UNIT [facility];  Service:    • RECOVERY  2015    Procedure: CATH LAB  LEAD EXTRACTION, UPGRADE TO BIV PM ST.MARIA R JAS AQUINO ;  Surgeon: Recoveryonly Surgery;  Location: SURGERY PRE-POST PROC UNIT [facility];  Service:    • RECOVERY  2015    Procedure: CATH LAB NEW PM INSERTION DUAL ATRIAL & VENTRICULAR-LV LEAD W/ NEW GENERATOR AQUINO ICD9: 414.8;  Surgeon: Recoveryonfreddy Surgery;  Location: SURGERY PRE-POST PROC UNIT [facility];  Service:    • PACEMAKER INSERTION  08    [facility]   • MULTIPLE CORONARY ARTERY BYPASS  2007    2 vessel   • ZZZ CARDIAC CATH      has 2 stents     Family History   Problem Relation Age of Onset   • Thyroid Sister      Social History     Socioeconomic History   • Marital status:      Spouse name: Not on file   • Number of children: Not on file   • Years of education: Not on file   • Highest education level: Not on file   Occupational History   • Not on file   Tobacco Use   • Smoking status: Former Smoker     Packs/day: 1.50     Years: 3.00     Pack years: 4.50     Types: Cigarettes     Quit date: 1970     Years since quittin.3   • Smokeless tobacco: Former User     Types: Chew     Quit date: 1972   Vaping Use   • Vaping Use: Never used   Substance and Sexual Activity   • Alcohol use: Not Currently     Alcohol/week: 6.0 - 8.4 oz     Types: 10 - 14 Standard drinks or equivalent per week     Comment: 10-14 per week; scotch & wine   • Drug use: No   • Sexual activity: Not on file   Other Topics Concern   • Not on file   Social History Narrative   • Not on file     Social Determinants of Health     Financial Resource Strain: Not on file   Food  "Insecurity: Not on file   Transportation Needs: Not on file   Physical Activity: Not on file   Stress: Not on file   Social Connections: Not on file   Intimate Partner Violence: Not on file   Housing Stability: Not on file     Allergies   Allergen Reactions   • Plavix [Clopidogrel Bisulfate] Anaphylaxis   • Spironolactone Unspecified     Hyperkalemia   • Statins [Hmg-Coa-R Inhibitors] Unspecified     Muscles aches     • Atorvastatin    • Other Drug    • Ticlid [Ticlopidine Hydrochloride] Unspecified     Pt states \"I'm not sure what happens\".       Outpatient Encounter Medications as of 5/9/2022   Medication Sig Dispense Refill   • indomethacin (INDOCIN) 50 MG Cap indomethacin 50 mg capsule     • albuterol (PROVENTIL) 2.5mg/3ml Nebu Soln solution for nebulization INHALE 3 MLS (1 VIAL) VIA NEBULIZER EVERY 6 HOURS AS NEEDED:FOR WHEEZING/SOB     • torsemide (DEMADEX) 20 MG Tab Take 1 Tablet by mouth every day. 30 Tablet 11   • potassium citrate SR (UROCIT-K SR) 10 MEQ (1080 MG) Tab CR Take 1 Tablet by mouth every 48 hours. 90 Tablet 3   • metoprolol SR (TOPROL XL) 50 MG TABLET SR 24 HR      • sacubitril-valsartan (ENTRESTO)  MG Tab tablet Take 1 Tablet by mouth 2 times a day. 60 Tablet 11   • Evolocumab, REPATHA, (REPATHA SURECLICK) 140 MG/ML Solution Auto-injector Inject contents of 1 pen under the skin every 14 days. 6 Each 3   • Multiple Vitamins-Minerals (OCUVITE PO) Take  by mouth.     • VITAMIN D PO Take  by mouth.     • Coenzyme Q10 (CO Q 10) 100 MG Cap Take 200 mg by mouth every day.     • aspirin 81 MG tablet Take 81 mg by mouth every day.     • Hydrocod Polst-CPM Polst ER (TUSSIONEX) 10-8 MG/5ML Suspension Extended Release 5 ML ORALLY EVERY 12 HOURS AS NEEDED DX J06.9 MAX 10 ML A DAY DAYS SUPPLY 5 (Patient not taking: Reported on 5/9/2022)     • [DISCONTINUED] predniSONE (DELTASONE) 10 MG Tab Take 30mg x 3 days, then take 20mg x 3 days, then take 10mg x 3 days, with food, then discontinue. (Patient not " "taking: Reported on 5/9/2022) 18 Tablet 0   • HYDROcodone/acetaminophen (NORCO)  MG Tab TAKE 1 TABLET ORALLY EVERY 4 HOURS AS NEEDED DX M16.0 M54.5 7 DAYS (Patient not taking: Reported on 5/9/2022)     • Ixekizumab 80 MG/ML Solution Prefilled Syringe Inject 80 mg as instructed Q30 DAYS. (Patient not taking: Reported on 5/9/2022)       No facility-administered encounter medications on file as of 5/9/2022.     Review of Systems   Constitutional: Negative.  Negative for chills, fever and malaise/fatigue.   HENT: Negative.  Negative for sore throat.    Eyes: Negative.    Respiratory: Positive for cough. Negative for hemoptysis, sputum production, shortness of breath, wheezing and stridor.    Cardiovascular: Negative.  Negative for chest pain, palpitations, orthopnea, claudication, leg swelling and PND.   Gastrointestinal: Negative.    Genitourinary: Negative.    Musculoskeletal: Negative.    Skin: Negative.    Neurological: Negative.  Negative for dizziness, loss of consciousness and weakness.   Endo/Heme/Allergies: Negative.  Does not bruise/bleed easily.   All other systems reviewed and are negative.       Objective:   /68 (BP Location: Left arm, Patient Position: Sitting, BP Cuff Size: Adult)   Pulse 70   Resp 16   Ht 1.803 m (5' 11\")   Wt 94.8 kg (209 lb)   SpO2 93%   BMI 29.15 kg/m²     Physical Exam  Vitals and nursing note reviewed.   Constitutional:       General: He is not in acute distress.     Appearance: He is well-developed. He is not diaphoretic.   HENT:      Head: Normocephalic and atraumatic.      Right Ear: External ear normal.      Left Ear: External ear normal.      Nose: Nose normal.      Mouth/Throat:      Pharynx: No oropharyngeal exudate.   Eyes:      General: No scleral icterus.        Right eye: No discharge.         Left eye: No discharge.      Conjunctiva/sclera: Conjunctivae normal.      Pupils: Pupils are equal, round, and reactive to light.   Neck:      Vascular: No JVD. "   Cardiovascular:      Rate and Rhythm: Normal rate and regular rhythm.      Heart sounds: No murmur heard.    No friction rub. No gallop.   Pulmonary:      Effort: Pulmonary effort is normal. No respiratory distress.      Breath sounds: No stridor. No wheezing or rales.   Chest:      Chest wall: No tenderness.   Abdominal:      General: There is no distension.      Palpations: Abdomen is soft.      Tenderness: There is no guarding.   Musculoskeletal:         General: No tenderness or deformity. Normal range of motion.      Cervical back: Neck supple.   Skin:     General: Skin is warm and dry.      Coloration: Skin is not pale.      Findings: No erythema or rash.   Neurological:      Mental Status: He is alert.      Cranial Nerves: No cranial nerve deficit.      Motor: No abnormal muscle tone.      Coordination: Coordination normal.      Deep Tendon Reflexes: Reflexes are normal and symmetric. Reflexes normal.   Psychiatric:         Behavior: Behavior normal.         Thought Content: Thought content normal.         Judgment: Judgment normal.     Echocardiogram: Dated 9/28/2020 personally interpreted myself showing an EF of 35%.    CTA: Dated 9/28/2020 personally viewed myself showing defect in the IVC.    Sound abdomen 9/28/2015 personally interpreted myself showing possible cirrhosis.    Assessment:     1. Coronary artery disease involving native coronary artery of native heart without angina pectoris     2. Ischemic cardiomyopathy     3. Second degree AV block, Mobitz type II     4. Hypercholesteremia     5. Primary hypertension     6. History of myocardial infarction     7. Cerebrovascular accident (CVA), unspecified mechanism ([facility])     8. Obstructive sleep apnea syndrome     9. Paroxysmal atrial fibrillation (HCC)     10. NYHA class 3 acute on chronic systolic heart failure (HCC)     11. Biventricular implantable cardioverter-defibrillator in situ     12. Systolic heart failure, ACC/AHA stage C (HCC)     13.  Left ventricular systolic dysfunction, NYHA class 3     14. Ventricular tachycardia (paroxysmal) (HCC)     15. Hx of CABG     16. S/P ablation of atrial fibrillation     17. Alcohol abuse     18. Stage 3a chronic kidney disease (HCC)     19. High risk medication use     20. Portal hypertension (HCC)     21. Orthostatic hypotension         Medical Decision Making:  Today's Assessment / Status / Plan:     79-year-old male with alcohol use heart failure with reduced ejection fraction hyperlipidemia and some concern for cirrhosis.  At this point since his cough is resolved we will keep on the same medications with no changes.  I will see him back in 6 months.    1. CAD    - cont repatha    2. CHF s/p CRT    - cont entresto 97/103    - cont metop XL 50    - cont torsemide 20 PRN    3. CRT    - f/u device clinic    4. ETOH    - U/S Abdomen    5. Edema/Swelling    - cont torsemide 20 daily     6. High Risk Meds    - labs in one week

## 2022-06-29 ENCOUNTER — TELEPHONE (OUTPATIENT)
Dept: CARDIOLOGY | Facility: MEDICAL CENTER | Age: 82
End: 2022-06-29
Payer: MEDICARE

## 2022-06-29 RX ORDER — METOPROLOL SUCCINATE 50 MG/1
50 TABLET, EXTENDED RELEASE ORAL DAILY
Qty: 90 TABLET | Refills: 3 | Status: SHIPPED | OUTPATIENT
Start: 2022-06-29 | End: 2023-07-17 | Stop reason: SDUPTHER

## 2022-06-29 NOTE — TELEPHONE ENCOUNTER
MIK    Caller: Luis Armando    Medication Name and Dosage: metoprolol SR (TOPROL XL) 50 MG TABLET SR 24 HR    Did patient contact pharmacy (If no, please call pharmacy first): Yes    Medication amount left: 1 tablet    Preferred Pharmacy:     [facility]/pharmacy #[id number] -   [address] [address]   Phone:  [phone]  Fax:  [phone]   NATAN #:  [id number]    Other questions (Topic): N/A    Callback Number (Will only call for issues): [phone]

## 2022-10-11 ENCOUNTER — NON-PROVIDER VISIT (OUTPATIENT)
Dept: CARDIOLOGY | Facility: MEDICAL CENTER | Age: 82
End: 2022-10-11
Payer: MEDICARE

## 2022-10-11 VITALS
HEIGHT: 71 IN | SYSTOLIC BLOOD PRESSURE: 104 MMHG | RESPIRATION RATE: 16 BRPM | DIASTOLIC BLOOD PRESSURE: 62 MMHG | HEART RATE: 70 BPM | WEIGHT: 201.4 LBS | BODY MASS INDEX: 28.19 KG/M2 | OXYGEN SATURATION: 94 %

## 2022-10-11 DIAGNOSIS — I47.29 PAROXYSMAL VENTRICULAR TACHYCARDIA (HCC): ICD-10-CM

## 2022-10-11 DIAGNOSIS — I25.5 ISCHEMIC CARDIOMYOPATHY: Chronic | ICD-10-CM

## 2022-10-11 DIAGNOSIS — I48.0 PAROXYSMAL ATRIAL FIBRILLATION (HCC): ICD-10-CM

## 2022-10-11 DIAGNOSIS — I25.10 CORONARY ARTERY DISEASE INVOLVING NATIVE CORONARY ARTERY OF NATIVE HEART WITHOUT ANGINA PECTORIS: Chronic | ICD-10-CM

## 2022-10-11 DIAGNOSIS — Z95.810 BIVENTRICULAR IMPLANTABLE CARDIOVERTER-DEFIBRILLATOR IN SITU: ICD-10-CM

## 2022-10-11 PROCEDURE — 93284 PRGRMG EVAL IMPLANTABLE DFB: CPT | Performed by: NURSE PRACTITIONER

## 2022-10-11 RX ORDER — LOSARTAN POTASSIUM 50 MG/1
TABLET ORAL
COMMUNITY
End: 2022-10-11

## 2022-10-11 ASSESSMENT — FIBROSIS 4 INDEX: FIB4 SCORE: 1.77

## 2022-10-11 NOTE — PROGRESS NOTES
Device is working normally.  No device therapy.  31 mode switching episodes (all <10 seconds, <1% of total time).  Normal sensing of RA lead; unable to measure R waves. Stable capture of RA, RV and LV leads; stable impedances. Battery charge time is 10.9 seconds; battery longevity is 8.2 months.  No changes are made today.    Follow-up in 2 months for next AICD check with me, as battery is nearing TONYA.    He does have follow-up with Dr. Lee next month.

## 2022-11-14 ENCOUNTER — OFFICE VISIT (OUTPATIENT)
Dept: CARDIOLOGY | Facility: MEDICAL CENTER | Age: 82
End: 2022-11-14
Payer: MEDICARE

## 2022-11-14 VITALS
BODY MASS INDEX: 28.5 KG/M2 | SYSTOLIC BLOOD PRESSURE: 100 MMHG | OXYGEN SATURATION: 94 % | RESPIRATION RATE: 16 BRPM | HEART RATE: 70 BPM | DIASTOLIC BLOOD PRESSURE: 70 MMHG | WEIGHT: 203.6 LBS | HEIGHT: 71 IN

## 2022-11-14 DIAGNOSIS — K76.6 PORTAL HYPERTENSION (HCC): ICD-10-CM

## 2022-11-14 DIAGNOSIS — Z86.79 S/P ABLATION OF ATRIAL FIBRILLATION: ICD-10-CM

## 2022-11-14 DIAGNOSIS — E78.00 HYPERCHOLESTEREMIA: Chronic | ICD-10-CM

## 2022-11-14 DIAGNOSIS — Z95.1 HX OF CABG: ICD-10-CM

## 2022-11-14 DIAGNOSIS — J42 CHRONIC BRONCHITIS, UNSPECIFIED CHRONIC BRONCHITIS TYPE (HCC): ICD-10-CM

## 2022-11-14 DIAGNOSIS — I44.1 SECOND DEGREE AV BLOCK, MOBITZ TYPE II: Chronic | ICD-10-CM

## 2022-11-14 DIAGNOSIS — I25.10 CORONARY ARTERY DISEASE INVOLVING NATIVE CORONARY ARTERY OF NATIVE HEART WITHOUT ANGINA PECTORIS: Chronic | ICD-10-CM

## 2022-11-14 DIAGNOSIS — Z95.810 BIVENTRICULAR IMPLANTABLE CARDIOVERTER-DEFIBRILLATOR IN SITU: ICD-10-CM

## 2022-11-14 DIAGNOSIS — Z79.899 HIGH RISK MEDICATION USE: ICD-10-CM

## 2022-11-14 DIAGNOSIS — I47.29 PAROXYSMAL VENTRICULAR TACHYCARDIA (HCC): ICD-10-CM

## 2022-11-14 DIAGNOSIS — Z86.73 HISTORY OF CVA (CEREBROVASCULAR ACCIDENT): ICD-10-CM

## 2022-11-14 DIAGNOSIS — I50.20 SYSTOLIC HEART FAILURE, ACC/AHA STAGE C (HCC): ICD-10-CM

## 2022-11-14 DIAGNOSIS — I95.1 ORTHOSTATIC HYPOTENSION: Chronic | ICD-10-CM

## 2022-11-14 DIAGNOSIS — N18.31 STAGE 3A CHRONIC KIDNEY DISEASE: ICD-10-CM

## 2022-11-14 DIAGNOSIS — I25.2 HISTORY OF MYOCARDIAL INFARCTION: Chronic | ICD-10-CM

## 2022-11-14 DIAGNOSIS — E87.5 HYPERKALEMIA: ICD-10-CM

## 2022-11-14 DIAGNOSIS — Z98.890 S/P ABLATION OF ATRIAL FIBRILLATION: ICD-10-CM

## 2022-11-14 DIAGNOSIS — I63.9 CEREBROVASCULAR ACCIDENT (CVA), UNSPECIFIED MECHANISM (HCC): Chronic | ICD-10-CM

## 2022-11-14 DIAGNOSIS — I50.23 NYHA CLASS 3 ACUTE ON CHRONIC SYSTOLIC HEART FAILURE (HCC): ICD-10-CM

## 2022-11-14 DIAGNOSIS — I10 PRIMARY HYPERTENSION: Chronic | ICD-10-CM

## 2022-11-14 DIAGNOSIS — I48.0 PAROXYSMAL ATRIAL FIBRILLATION (HCC): ICD-10-CM

## 2022-11-14 DIAGNOSIS — I25.5 ISCHEMIC CARDIOMYOPATHY: Chronic | ICD-10-CM

## 2022-11-14 DIAGNOSIS — F10.10 ALCOHOL ABUSE: ICD-10-CM

## 2022-11-14 DIAGNOSIS — I51.89 LEFT VENTRICULAR SYSTOLIC DYSFUNCTION, NYHA CLASS 3: ICD-10-CM

## 2022-11-14 PROCEDURE — 99214 OFFICE O/P EST MOD 30 MIN: CPT | Performed by: INTERNAL MEDICINE

## 2022-11-14 RX ORDER — TORSEMIDE 20 MG/1
20 TABLET ORAL DAILY
Qty: 30 TABLET | Refills: 11 | Status: SHIPPED | OUTPATIENT
Start: 2022-11-14 | End: 2022-12-02 | Stop reason: SDUPTHER

## 2022-11-14 RX ORDER — SACUBITRIL AND VALSARTAN 97; 103 MG/1; MG/1
1 TABLET, FILM COATED ORAL 2 TIMES DAILY
Qty: 60 TABLET | Refills: 11 | Status: SHIPPED | OUTPATIENT
Start: 2022-11-14 | End: 2023-01-25 | Stop reason: SDUPTHER

## 2022-11-14 RX ORDER — POTASSIUM CITRATE 10 MEQ/1
10 TABLET, EXTENDED RELEASE ORAL
Qty: 90 TABLET | Refills: 3 | Status: ON HOLD | OUTPATIENT
Start: 2022-11-14 | End: 2023-01-24

## 2022-11-14 ASSESSMENT — ENCOUNTER SYMPTOMS
DIZZINESS: 0
CHILLS: 0
BRUISES/BLEEDS EASILY: 0
SORE THROAT: 0
PND: 0
CLAUDICATION: 0
COUGH: 0
SPUTUM PRODUCTION: 0
GASTROINTESTINAL NEGATIVE: 1
SHORTNESS OF BREATH: 0
PALPITATIONS: 0
CARDIOVASCULAR NEGATIVE: 1
WEAKNESS: 0
CONSTITUTIONAL NEGATIVE: 1
STRIDOR: 0
EYES NEGATIVE: 1
ORTHOPNEA: 0
HEMOPTYSIS: 0
FEVER: 0
MUSCULOSKELETAL NEGATIVE: 1
LOSS OF CONSCIOUSNESS: 0
WHEEZING: 0
NEUROLOGICAL NEGATIVE: 1

## 2022-11-14 ASSESSMENT — FIBROSIS 4 INDEX: FIB4 SCORE: 1.77

## 2022-11-14 NOTE — PROGRESS NOTES
"Chief Complaint   Patient presents with    Coronary Artery Disease     F/V Dx: Coronary artery disease involving native coronary artery of native heart without angina pectoris    Cardiomyopathy (Ischemic)    Atrial Fibrillation     F/V Dx: Paroxysmal atrial fibrillation (HCC)       Subjective:   Isai Fuentes is a 79 y.o. male who presents today as a follow-up for his heart failure with reduced ejection fraction.      Since he was last seen he has been stable with no changes.  His cough self resolved.  He continues on Entresto with no problems.  He has no lower extremity edema.  He is having trouble affording the Entresto.  He is off of the PCSK9 inhibitor because of a miscommunication with the pharmacy.  He has not called them back.      Past Medical History:   Diagnosis Date    Atrial fibrillation (HCC) 9/20/2011    Biventricular implantable cardioverter-defibrillator in situ 8/7/2015    Breath shortness     CAD (coronary artery disease) 9/20/2011    Cancer ([facility]) 2012    prostate    CARDIOMYOPATHY 9/20/2011    Congestive heart failure (HCC)     Fatigue 10/28/2010    Heartburn 10/23/2008    History of cardiac catheterization 9/20/2011    History of myocardial infarction     \"many\"    HTN (hypertension) 9/20/2011    Indigestion     Insomnia 7/8/2010    Ischemic cardiomyopathy 9/20/2011    Long term (current) use of anticoagulants 5/13/2011    Myocardial infarct ([facility]) 2007    Orthostatic hypotension 5/6/2009    WILLA (obstructive sleep apnea)     CPAP    Other drug allergy(995.27) 10/16/2008    Other specified disorder of intestines 07-28-15    constipation/diarrhea/ reports some bleeding from rectum w/blood clots. Seeing GI    Pacemaker     [employer]    Pain 07-28-15    \"chronic\", 0/10    Pleural effusion 5/18/2009    Presence of permanent cardiac pacemaker 9/20/2011    Prostate cancer (HCC) 5/13/2011    Second degree AV block, Mobitz type II 11/10/2010    Snoring     Stroke ([facility]) 2/3/2009     Past " Surgical History:   Procedure Laterality Date    RECOVERY  10/16/2015    Procedure: CATH LAB LEAD REVISION .MARIA R AQUINO;  Surgeon: Recoveryonly Surgery;  Location: SURGERY PRE[facility]POST PROC [facility];  Service:     RECOVERY  2015    Procedure:  CATH LAB LEAD EXTRACTION AWILDA [facility].MAR[facility] AR LRG PAULO AQUINO;  Surgeon: Recoveryonly Surgery;  Location: SURGERY PRE-POST PROC UNIT [facility];  Service:     RECOVERY  2015    Procedure: CATH LAB  LEAD EXTRACTION, UPGRADE TO BIV PM ST.MARIA R BIPING MAGGIE AQUINO ;  Surgeon: Recoveryonfreddy Surgery;  Location: SURGERY PRE-POST PROC [facility];  Service:     RECOVERY  2015    Procedure: CATH LAB NEW PM INSERTION DUAL ATRIAL & VENTRICULAR-LV LEAD W/ NEW GENERATOR KENIA ICD9: 414.8;  Surgeon: Recoveryonfreddy Surgery;  Location: SURGERY PRE-POST PROC UNIT [facility];  Service:     PACEMAKER INSERTION  08    [facility]    MULTIPLE CORONARY ARTERY BYPASS      2 vessel    ZZZ CARDIAC CATH      has 2 stents     Family History   Problem Relation Age of Onset    Thyroid Sister      Social History     Socioeconomic History    Marital status:      Spouse name: Not on file    Number of children: Not on file    Years of education: Not on file    Highest education level: Not on file   Occupational History    Not on file   Tobacco Use    Smoking status: Former     Packs/day: 1.50     Years: 3.00     Pack years: 4.50     Types: Cigarettes     Quit date: 1970     Years since quittin.9    Smokeless tobacco: Former     Types: Chew     Quit date: 1972   Vaping Use    Vaping Use: Never used   Substance and Sexual Activity    Alcohol use: Yes     Alcohol/week: 6.0 - 8.4 oz     Types: 10 - 14 Standard drinks or equivalent per week     Comment: 10-14 per week; scotch & wine    Drug use: No    Sexual activity: Not on file   Other Topics Concern    Not on file   Social History Narrative    Not on file     Social Determinants of Health     Financial Resource Strain: Not on file   Food Insecurity: Not on file  "  Transportation Needs: Not on file   Physical Activity: Not on file   Stress: Not on file   Social Connections: Not on file   Intimate Partner Violence: Not on file   Housing Stability: Not on file     Allergies   Allergen Reactions    Plavix [Clopidogrel Bisulfate] Anaphylaxis    Spironolactone Unspecified     Hyperkalemia    Statins [Hmg-Coa-R Inhibitors] Unspecified     Muscles aches      Atorvastatin     Other Drug     Ticlid [Ticlopidine Hydrochloride] Unspecified     Pt states \"I'm not sure what happens\".       Outpatient Encounter Medications as of 11/14/2022   Medication Sig Dispense Refill    sacubitril-valsartan (ENTRESTO)  MG Tab Take 1 Tablet by mouth 2 times a day. 60 Tablet 11    potassium citrate SR (UROCIT-K SR) 10 MEQ (1080 MG) Tab CR Take 1 Tablet by mouth every 48 hours. 90 Tablet 3    torsemide (DEMADEX) 20 MG Tab Take 1 Tablet by mouth every day. 30 Tablet 11    metoprolol SR (TOPROL XL) 50 MG TABLET SR 24 HR Take 1 Tablet by mouth every day. 90 Tablet 3    Evolocumab, REPATHA, (REPATHA SURECLICK) 140 MG/ML Solution Auto-injector Inject contents of 1 pen under the skin every 14 days. (Patient taking differently: Inject 1 Each under the skin. Indications: Monthly) 6 Each 3    HYDROcodone/acetaminophen (NORCO)  MG Tab       VITAMIN D PO Take  by mouth.      Coenzyme Q10 (CO Q 10) 100 MG Cap Take 200 mg by mouth every day.      aspirin 81 MG tablet Take 1 Tablet by mouth every day.      Hydrocod Polst-CPM Polst ER (TUSSIONEX) 10-8 MG/5ML Suspension Extended Release       indomethacin (INDOCIN) 50 MG Cap indomethacin 50 mg capsule      [DISCONTINUED] albuterol (PROVENTIL) 2.5mg/3ml Nebu Soln solution for nebulization INHALE 3 MLS (1 VIAL) VIA NEBULIZER EVERY 6 HOURS AS NEEDED:FOR WHEEZING/SOB (Patient not taking: Reported on 11/14/2022)      [DISCONTINUED] torsemide (DEMADEX) 20 MG Tab Take 1 Tablet by mouth every day. 30 Tablet 11    [DISCONTINUED] potassium citrate SR (UROCIT-K SR) 10 " "MEQ (1080 MG) Tab CR Take 1 Tablet by mouth every 48 hours. (Patient taking differently: Take 1 Tablet by mouth as needed.) 90 Tablet 3    [DISCONTINUED] sacubitril-valsartan (ENTRESTO)  MG Tab tablet Take 1 Tablet by mouth 2 times a day. 60 Tablet 11    [DISCONTINUED] Multiple Vitamins-Minerals (OCUVITE PO) Take  by mouth. (Patient not taking: Reported on 11/14/2022)      Ixekizumab 80 MG/ML Solution Prefilled Syringe Inject 80 mg under the skin Q30 DAYS.       No facility-administered encounter medications on file as of 11/14/2022.     Review of Systems   Constitutional: Negative.  Negative for chills, fever and malaise/fatigue.   HENT: Negative.  Negative for sore throat.    Eyes: Negative.    Respiratory:  Negative for cough, hemoptysis, sputum production, shortness of breath, wheezing and stridor.    Cardiovascular: Negative.  Negative for chest pain, palpitations, orthopnea, claudication, leg swelling and PND.   Gastrointestinal: Negative.    Genitourinary: Negative.    Musculoskeletal: Negative.    Skin: Negative.    Neurological: Negative.  Negative for dizziness, loss of consciousness and weakness.   Endo/Heme/Allergies: Negative.  Does not bruise/bleed easily.   All other systems reviewed and are negative.     Objective:   /70 (BP Location: Left arm, Patient Position: Sitting, BP Cuff Size: Adult)   Pulse 70   Resp 16   Ht 1.803 m (5' 11\")   Wt 92.4 kg (203 lb 9.6 oz)   SpO2 94%   BMI 28.40 kg/m²     Physical Exam  Vitals and nursing note reviewed.   Constitutional:       General: He is not in acute distress.     Appearance: He is well-developed. He is not diaphoretic.   HENT:      Head: Normocephalic and atraumatic.      Right Ear: External ear normal.      Left Ear: External ear normal.      Nose: Nose normal.      Mouth/Throat:      Pharynx: No oropharyngeal exudate.   Eyes:      General: No scleral icterus.        Right eye: No discharge.         Left eye: No discharge.      " Conjunctiva/sclera: Conjunctivae normal.      Pupils: Pupils are equal, round, and reactive to light.   Neck:      Vascular: No JVD.   Cardiovascular:      Rate and Rhythm: Normal rate and regular rhythm.      Heart sounds: No murmur heard.    No friction rub. No gallop.   Pulmonary:      Effort: Pulmonary effort is normal. No respiratory distress.      Breath sounds: No stridor. No wheezing or rales.   Chest:      Chest wall: No tenderness.   Abdominal:      General: There is no distension.      Palpations: Abdomen is soft.      Tenderness: There is no guarding.   Musculoskeletal:         General: No tenderness or deformity. Normal range of motion.      Cervical back: Neck supple.   Skin:     General: Skin is warm and dry.      Coloration: Skin is not pale.      Findings: No erythema or rash.   Neurological:      Mental Status: He is alert.      Cranial Nerves: No cranial nerve deficit.      Motor: No abnormal muscle tone.      Coordination: Coordination normal.      Deep Tendon Reflexes: Reflexes are normal and symmetric. Reflexes normal.   Psychiatric:         Behavior: Behavior normal.         Thought Content: Thought content normal.         Judgment: Judgment normal.   Echocardiogram: Dated 9/28/2020 personally interpreted myself showing an EF of 35%.    CTA: Dated 9/28/2020 personally viewed myself showing defect in the IVC.    Sound abdomen 9/28/2015 personally interpreted myself showing possible cirrhosis.    Assessment:     1. Coronary artery disease involving native coronary artery of native heart without angina pectoris        2. Ischemic cardiomyopathy        3. Second degree AV block, Mobitz type II  potassium citrate SR (UROCIT-K SR) 10 MEQ (1080 MG) Tab CR    torsemide (DEMADEX) 20 MG Tab      4. Hypercholesteremia  potassium citrate SR (UROCIT-K SR) 10 MEQ (1080 MG) Tab CR      5. Primary hypertension  potassium citrate SR (UROCIT-K SR) 10 MEQ (1080 MG) Tab CR      6. History of myocardial infarction   potassium citrate SR (UROCIT-K SR) 10 MEQ (1080 MG) Tab CR      7. Orthostatic hypotension        8. Cerebrovascular accident (CVA), unspecified mechanism (HCC)        9. Paroxysmal atrial fibrillation (HCC)        10. NYHA class 3 acute on chronic systolic heart failure (HCC)  sacubitril-valsartan (ENTRESTO)  MG Tab      11. Biventricular implantable cardioverter-defibrillator in situ        12. Systolic heart failure, ACC/AHA stage C (HCC)  torsemide (DEMADEX) 20 MG Tab      13. Left ventricular systolic dysfunction, NYHA class 3        14. Ventricular tachycardia (paroxysmal) (HCC)  sacubitril-valsartan (ENTRESTO)  MG Tab      15. Hx of CABG  torsemide (DEMADEX) 20 MG Tab      16. S/P ablation of atrial fibrillation        17. Alcohol abuse        18. Stage 3a chronic kidney disease (HCC)        19. High risk medication use        20. Chronic bronchitis, unspecified chronic bronchitis type (HCC)        21. Portal hypertension (HCC)        22. History of CVA (cerebrovascular accident)  torsemide (DEMADEX) 20 MG Tab      23. Hyperkalemia  torsemide (DEMADEX) 20 MG Tab          Medical Decision Making:  Today's Assessment / Status / Plan:     79-year-old male with alcohol use heart failure with reduced ejection fraction hyperlipidemia and some concern for cirrhosis.  I have given a patient handout for the patient assistance program for Entresto.  I refilled this today.  I also send a note to the lipid clinic for them to recontact him.  Otherwise I will see him back in 6 months.    1. CAD    - cont repatha    2. CHF s/p CRT    - cont entresto 97/103    - cont metop XL 50    - cont torsemide 20 PRN    3. CRT    - f/u device clinic    4. ETOH    - U/S Abdomen    5. Edema/Swelling    - cont torsemide 20 daily     6. High Risk Meds    - labs in one week

## 2022-11-14 NOTE — PATIENT INSTRUCTIONS
[email or web address]    
intact

## 2022-11-15 ENCOUNTER — TELEPHONE (OUTPATIENT)
Dept: VASCULAR LAB | Facility: MEDICAL CENTER | Age: 82
End: 2022-11-15
Payer: MEDICARE

## 2022-11-15 ENCOUNTER — DOCUMENTATION (OUTPATIENT)
Dept: VASCULAR LAB | Facility: MEDICAL CENTER | Age: 82
End: 2022-11-15
Payer: MEDICARE

## 2022-11-15 DIAGNOSIS — E78.00 HYPERCHOLESTEREMIA: ICD-10-CM

## 2022-11-15 RX ORDER — EVOLOCUMAB 140 MG/ML
1 INJECTION, SOLUTION SUBCUTANEOUS
Qty: 6 EACH | Refills: 3 | Status: SHIPPED | OUTPATIENT
Start: 2022-11-15 | End: 2023-12-19 | Stop reason: SDUPTHER

## 2022-11-15 NOTE — TELEPHONE ENCOUNTER
Contacted patient at [phone] to discuss [facility] and services/benefits offered. No answer, left voicemail.    Mamie Anand  Rx Coordinator   [phone]

## 2022-11-15 NOTE — PROGRESS NOTES
Called patient to speak with him regarding his PCSK9i and schedule f/u appt. Patient did not answer and does not have vm set up. Will try again at a later time.

## 2022-11-21 ENCOUNTER — DOCUMENTATION (OUTPATIENT)
Dept: VASCULAR LAB | Facility: MEDICAL CENTER | Age: 82
End: 2022-11-21
Payer: MEDICARE

## 2022-11-28 DIAGNOSIS — E87.5 HYPERKALEMIA: ICD-10-CM

## 2022-11-28 DIAGNOSIS — Z86.73 HISTORY OF CVA (CEREBROVASCULAR ACCIDENT): ICD-10-CM

## 2022-11-28 DIAGNOSIS — Z95.1 HX OF CABG: ICD-10-CM

## 2022-11-28 DIAGNOSIS — I50.20 SYSTOLIC HEART FAILURE, ACC/AHA STAGE C (HCC): ICD-10-CM

## 2022-11-28 DIAGNOSIS — I44.1 SECOND DEGREE AV BLOCK, MOBITZ TYPE II: Chronic | ICD-10-CM

## 2022-12-01 NOTE — TELEPHONE ENCOUNTER
Is the patient due for a refill? Yes    Was the patient seen the past year? Yes    Date of last office visit: 11/14/2022    Does the patient have an upcoming appointment?  Yes   If yes, When? 12/13/2022    Provider to refill:[facility]K    Does the patients insurance require a 100 day supply?  No

## 2022-12-02 DIAGNOSIS — Z95.1 HX OF CABG: ICD-10-CM

## 2022-12-02 DIAGNOSIS — I44.1 SECOND DEGREE AV BLOCK, MOBITZ TYPE II: Chronic | ICD-10-CM

## 2022-12-02 DIAGNOSIS — I50.20 SYSTOLIC HEART FAILURE, ACC/AHA STAGE C (HCC): ICD-10-CM

## 2022-12-02 DIAGNOSIS — E87.5 HYPERKALEMIA: ICD-10-CM

## 2022-12-02 DIAGNOSIS — Z86.73 HISTORY OF CVA (CEREBROVASCULAR ACCIDENT): ICD-10-CM

## 2022-12-02 RX ORDER — TORSEMIDE 20 MG/1
20 TABLET ORAL DAILY
Qty: 30 TABLET | Refills: 11 | Status: ON HOLD | OUTPATIENT
Start: 2022-12-02 | End: 2023-01-24

## 2022-12-06 RX ORDER — TORSEMIDE 20 MG/1
20 TABLET ORAL DAILY
Qty: 90 TABLET | Refills: 3 | Status: SHIPPED | OUTPATIENT
Start: 2022-12-06 | End: 2022-12-13

## 2022-12-13 ENCOUNTER — NON-PROVIDER VISIT (OUTPATIENT)
Dept: CARDIOLOGY | Facility: MEDICAL CENTER | Age: 82
End: 2022-12-13
Payer: MEDICARE

## 2022-12-13 VITALS
DIASTOLIC BLOOD PRESSURE: 62 MMHG | RESPIRATION RATE: 18 BRPM | HEIGHT: 71 IN | OXYGEN SATURATION: 97 % | HEART RATE: 70 BPM | WEIGHT: 197 LBS | SYSTOLIC BLOOD PRESSURE: 110 MMHG | BODY MASS INDEX: 27.58 KG/M2

## 2022-12-13 DIAGNOSIS — I48.0 PAROXYSMAL ATRIAL FIBRILLATION (HCC): ICD-10-CM

## 2022-12-13 DIAGNOSIS — I25.10 CORONARY ARTERY DISEASE INVOLVING NATIVE CORONARY ARTERY OF NATIVE HEART WITHOUT ANGINA PECTORIS: Chronic | ICD-10-CM

## 2022-12-13 DIAGNOSIS — I50.23 NYHA CLASS 3 ACUTE ON CHRONIC SYSTOLIC HEART FAILURE (HCC): ICD-10-CM

## 2022-12-13 DIAGNOSIS — Z95.810 BIVENTRICULAR IMPLANTABLE CARDIOVERTER-DEFIBRILLATOR IN SITU: ICD-10-CM

## 2022-12-13 DIAGNOSIS — I25.5 ISCHEMIC CARDIOMYOPATHY: Chronic | ICD-10-CM

## 2022-12-13 DIAGNOSIS — I44.1 SECOND DEGREE AV BLOCK, MOBITZ TYPE II: Chronic | ICD-10-CM

## 2022-12-13 PROCEDURE — 93284 PRGRMG EVAL IMPLANTABLE DFB: CPT | Performed by: NURSE PRACTITIONER

## 2022-12-13 PROCEDURE — RXMED WILLOW AMBULATORY MEDICATION CHARGE: Performed by: NURSE PRACTITIONER

## 2022-12-13 ASSESSMENT — FIBROSIS 4 INDEX: FIB4 SCORE: 1.77

## 2022-12-14 ENCOUNTER — TELEPHONE (OUTPATIENT)
Dept: CARDIOLOGY | Facility: MEDICAL CENTER | Age: 82
End: 2022-12-14
Payer: MEDICARE

## 2022-12-14 NOTE — TELEPHONE ENCOUNTER
----- Message from PRATEEK Sevilla sent at 12/13/2022  4:04 PM PST -----  Regarding: CRT-D gen change  Please call Luis Armando to schedule a CRT-D gen change - order is in Epic.    He knows to expect your call. Thanks, AB

## 2022-12-14 NOTE — TELEPHONE ENCOUNTER
Patient scheduled for BiV ICD gen change on 1-24-23 with Dr. Figueredo. Patient has been instructed to check in at 6:00 for 7:30 case time. Message sent to authorpat Esteban with [employer] notified.

## 2022-12-14 NOTE — PROGRESS NOTES
We are checking device more frequently as battery is nearing TONYA.    Device is working normally.  No device therapy.  No mode switching episodes.  Normal sensing and capture of RA, RV and LV leads; stable impedances. Battery charge time is 11.3 seconds; battery longevity is 5.1 months.  No changes are made today.    He will be scheduled for a generator change.    Follow-up 1 week following procedure for wound check.

## 2022-12-16 ENCOUNTER — PHARMACY VISIT (OUTPATIENT)
Dept: PHARMACY | Facility: MEDICAL CENTER | Age: 82
End: 2022-12-16
Payer: MEDICARE

## 2023-01-04 ENCOUNTER — TELEPHONE (OUTPATIENT)
Dept: CARDIOLOGY | Facility: MEDICAL CENTER | Age: 83
End: 2023-01-04
Payer: MEDICARE

## 2023-01-04 NOTE — TELEPHONE ENCOUNTER
Ajay Paper work completed by [facility]. Patient Assistance form faxed to [phone]. Scanned into [employer] with fax confirmation.

## 2023-01-12 NOTE — TELEPHONE ENCOUNTER
MIK    Caller: Ivis (Spouse)    Topic/issue: Pt's spouse called and stated that [employer] received the paperwork, but didn't have the sign and filled out page for RO to fill out and an rx order be sent to them as well. She is asking if if can be re faxed over to them at the fax below. She said to call her if you have any questions about what they stated.     Callback Number: [phone] (Home)

## 2023-01-13 NOTE — TELEPHONE ENCOUNTER
[facility] paperwork recompleted and refaxed to [phone]. Scanned into [employer] with confirmation.

## 2023-01-23 ENCOUNTER — PRE-ADMISSION TESTING (OUTPATIENT)
Dept: ADMISSIONS | Facility: MEDICAL CENTER | Age: 83
End: 2023-01-23
Attending: INTERNAL MEDICINE
Payer: MEDICARE

## 2023-01-23 DIAGNOSIS — Z01.812 PRE-OPERATIVE LABORATORY EXAMINATION: ICD-10-CM

## 2023-01-23 DIAGNOSIS — Z01.810 PRE-OPERATIVE CARDIOVASCULAR EXAMINATION: ICD-10-CM

## 2023-01-23 LAB
ALBUMIN SERPL BCP-MCNC: 4.3 G/DL (ref 3.2–4.9)
ALBUMIN/GLOB SERPL: 1.4 G/DL
ALP SERPL-CCNC: 125 U/L (ref 30–99)
ALT SERPL-CCNC: 13 U/L (ref 2–50)
ANION GAP SERPL CALC-SCNC: 12 MMOL/L (ref 7–16)
AST SERPL-CCNC: 21 U/L (ref 12–45)
BILIRUB SERPL-MCNC: 0.9 MG/DL (ref 0.1–1.5)
BUN SERPL-MCNC: 25 MG/DL (ref 8–22)
CALCIUM ALBUM COR SERPL-MCNC: 9.1 MG/DL (ref 8.5–10.5)
CALCIUM SERPL-MCNC: 9.3 MG/DL (ref 8.5–10.5)
CHLORIDE SERPL-SCNC: 102 MMOL/L (ref 96–112)
CO2 SERPL-SCNC: 23 MMOL/L (ref 20–33)
CREAT SERPL-MCNC: 1.24 MG/DL (ref 0.5–1.4)
EKG IMPRESSION: NORMAL
ERYTHROCYTE [DISTWIDTH] IN BLOOD BY AUTOMATED COUNT: 56.9 FL (ref 35.9–50)
GFR SERPLBLD CREATININE-BSD FMLA CKD-EPI: 58 ML/MIN/1.73 M 2
GLOBULIN SER CALC-MCNC: 3 G/DL (ref 1.9–3.5)
GLUCOSE SERPL-MCNC: 85 MG/DL (ref 65–99)
HCT VFR BLD AUTO: 50.4 % (ref 42–52)
HGB BLD-MCNC: 16.8 G/DL (ref 14–18)
INR PPP: 0.92 (ref 0.87–1.13)
MCH RBC QN AUTO: 32.9 PG (ref 27–33)
MCHC RBC AUTO-ENTMCNC: 33.3 G/DL (ref 33.7–35.3)
MCV RBC AUTO: 98.6 FL (ref 81.4–97.8)
PLATELET # BLD AUTO: 178 K/UL (ref 164–446)
PMV BLD AUTO: 11 FL (ref 9–12.9)
POTASSIUM SERPL-SCNC: 4.7 MMOL/L (ref 3.6–5.5)
PROT SERPL-MCNC: 7.3 G/DL (ref 6–8.2)
PROTHROMBIN TIME: 12.3 SEC (ref 12–14.6)
RBC # BLD AUTO: 5.11 M/UL (ref 4.7–6.1)
SODIUM SERPL-SCNC: 137 MMOL/L (ref 135–145)
WBC # BLD AUTO: 8 K/UL (ref 4.8–10.8)

## 2023-01-23 PROCEDURE — 93005 ELECTROCARDIOGRAM TRACING: CPT

## 2023-01-23 PROCEDURE — 85610 PROTHROMBIN TIME: CPT

## 2023-01-23 PROCEDURE — 80053 COMPREHEN METABOLIC PANEL: CPT

## 2023-01-23 PROCEDURE — 36415 COLL VENOUS BLD VENIPUNCTURE: CPT

## 2023-01-23 PROCEDURE — 93010 ELECTROCARDIOGRAM REPORT: CPT | Performed by: INTERNAL MEDICINE

## 2023-01-23 PROCEDURE — 85027 COMPLETE CBC AUTOMATED: CPT

## 2023-01-23 RX ORDER — ALBUTEROL SULFATE 90 UG/1
AEROSOL, METERED RESPIRATORY (INHALATION)
Status: ON HOLD | COMMUNITY
Start: 2023-01-17 | End: 2023-01-24

## 2023-01-23 ASSESSMENT — FIBROSIS 4 INDEX: FIB4 SCORE: 1.8

## 2023-01-24 ENCOUNTER — HOSPITAL ENCOUNTER (OUTPATIENT)
Facility: MEDICAL CENTER | Age: 83
End: 2023-01-24
Attending: INTERNAL MEDICINE | Admitting: INTERNAL MEDICINE
Payer: MEDICARE

## 2023-01-24 ENCOUNTER — APPOINTMENT (OUTPATIENT)
Dept: CARDIOLOGY | Facility: MEDICAL CENTER | Age: 83
End: 2023-01-24
Attending: NURSE PRACTITIONER
Payer: MEDICARE

## 2023-01-24 VITALS
HEIGHT: 71 IN | OXYGEN SATURATION: 94 % | HEART RATE: 68 BPM | RESPIRATION RATE: 17 BRPM | BODY MASS INDEX: 28.83 KG/M2 | SYSTOLIC BLOOD PRESSURE: 122 MMHG | DIASTOLIC BLOOD PRESSURE: 78 MMHG | TEMPERATURE: 97.7 F | WEIGHT: 205.91 LBS

## 2023-01-24 DIAGNOSIS — I48.0 PAROXYSMAL ATRIAL FIBRILLATION (HCC): ICD-10-CM

## 2023-01-24 DIAGNOSIS — I25.5 ISCHEMIC CARDIOMYOPATHY: Chronic | ICD-10-CM

## 2023-01-24 DIAGNOSIS — Z95.810 BIVENTRICULAR IMPLANTABLE CARDIOVERTER-DEFIBRILLATOR IN SITU: ICD-10-CM

## 2023-01-24 DIAGNOSIS — I44.1 SECOND DEGREE AV BLOCK, MOBITZ TYPE II: Chronic | ICD-10-CM

## 2023-01-24 DIAGNOSIS — I25.10 CORONARY ARTERY DISEASE INVOLVING NATIVE CORONARY ARTERY OF NATIVE HEART WITHOUT ANGINA PECTORIS: Chronic | ICD-10-CM

## 2023-01-24 DIAGNOSIS — I50.23 NYHA CLASS 3 ACUTE ON CHRONIC SYSTOLIC HEART FAILURE (HCC): ICD-10-CM

## 2023-01-24 PROCEDURE — 700101 HCHG RX REV CODE 250

## 2023-01-24 PROCEDURE — 99152 MOD SED SAME PHYS/QHP 5/>YRS: CPT | Performed by: INTERNAL MEDICINE

## 2023-01-24 PROCEDURE — 700111 HCHG RX REV CODE 636 W/ 250 OVERRIDE (IP)

## 2023-01-24 PROCEDURE — 160002 HCHG RECOVERY MINUTES (STAT)

## 2023-01-24 PROCEDURE — 33264 RMVL & RPLCMT DFB GEN MLT LD: CPT | Performed by: INTERNAL MEDICINE

## 2023-01-24 PROCEDURE — 99153 MOD SED SAME PHYS/QHP EA: CPT

## 2023-01-24 PROCEDURE — 160035 HCHG PACU - 1ST 60 MINS PHASE I

## 2023-01-24 PROCEDURE — 160046 HCHG PACU - 1ST 60 MINS PHASE II

## 2023-01-24 RX ORDER — MIDAZOLAM HYDROCHLORIDE 1 MG/ML
INJECTION INTRAMUSCULAR; INTRAVENOUS
Status: COMPLETED
Start: 2023-01-24 | End: 2023-01-24

## 2023-01-24 RX ORDER — CEFAZOLIN SODIUM 1 G/3ML
INJECTION, POWDER, FOR SOLUTION INTRAMUSCULAR; INTRAVENOUS
Status: COMPLETED
Start: 2023-01-24 | End: 2023-01-24

## 2023-01-24 RX ORDER — DOXYCYCLINE 100 MG/1
100 CAPSULE ORAL 2 TIMES DAILY
Qty: 8 CAPSULE | Refills: 0 | Status: SHIPPED | OUTPATIENT
Start: 2023-01-24

## 2023-01-24 RX ORDER — LIDOCAINE HYDROCHLORIDE 20 MG/ML
INJECTION, SOLUTION INFILTRATION; PERINEURAL
Status: COMPLETED
Start: 2023-01-24 | End: 2023-01-24

## 2023-01-24 RX ORDER — TORSEMIDE 20 MG/1
20 TABLET ORAL
COMMUNITY

## 2023-01-24 RX ADMIN — MIDAZOLAM HYDROCHLORIDE 0.5 MG: 1 INJECTION, SOLUTION INTRAMUSCULAR; INTRAVENOUS at 08:29

## 2023-01-24 RX ADMIN — LIDOCAINE HYDROCHLORIDE: 20 INJECTION, SOLUTION INFILTRATION; PERINEURAL at 07:56

## 2023-01-24 RX ADMIN — FENTANYL CITRATE 100 MCG: 50 INJECTION, SOLUTION INTRAMUSCULAR; INTRAVENOUS at 08:06

## 2023-01-24 RX ADMIN — CEFAZOLIN 3000 MG: 330 INJECTION, POWDER, FOR SOLUTION INTRAMUSCULAR; INTRAVENOUS at 07:56

## 2023-01-24 RX ADMIN — MIDAZOLAM HYDROCHLORIDE 2 MG: 1 INJECTION, SOLUTION INTRAMUSCULAR; INTRAVENOUS at 08:06

## 2023-01-24 ASSESSMENT — FIBROSIS 4 INDEX: FIB4 SCORE: 2.68

## 2023-01-24 NOTE — OR NURSING
0841: Pt to recovery room from cath lab post generator change. L chest sight CDI with pressure dressing on.     0930: Pressure dressing removed; gauze and tegaderm to sight; CDI.     0955: Report to JANNETTE Romero. Pt to phase II.

## 2023-01-24 NOTE — SENIOR ADMIT NOTE
Discharge information reviewed with patient and responsible adult. No questions or concerns at this time.     IV discontinued.     See vital sign flowsheets  for discharge details.     Instructed pt and family to  abx at pharmacy, they verbalized understanding

## 2023-01-24 NOTE — PROGRESS NOTES
Med rec complete per pt at desk.  Interviewed pt with family at desk with permission from pt  Allergies reviewed and updated.

## 2023-01-24 NOTE — OP REPORT
Electrophysiology Procedure Note  [facility]    PROCEDURE: CRT-D generator change,   moderate sedation administered by RN and supervised by physician    : Angel Luis Figueredo M.D.    ANESTHESIA: Moderate sedation,  start time 752, stop time 829  the moderate sedation document has been reviewed, signed and scanned into media     ESTIMATED BLOOD LOSS: 20 cc.    SPECIMENS: None.    COMPLICATIONS: None    INDICATION: TONYA, CRT-D    PRE-PROCEDURE ECG: Sinus rhythm biventricular pacing    POST-PROCEDURE ECG: Sinus rhythm with biventricular pacing    DESCRIPTION OF PROCEDURE: After informed written consent, the patient was brought to the electrophysiology lab in the fasting, unsedated state. The patient was prepped and draped in the usual sterile fashion. The procedure was performed under moderate sedation with local anesthetic. An incision was made with a scalpel along the old scar. Access to the device pocket was made using a combination of blunt dissection and electrocautery. The old generator and leads were freed from adhesions and the generator disconnected from the leads. Leads tested fine.  The pocket was irrigated with antibiotic solution, and the leads were attached to new Generator and inserted back in the pocket. The wound was closed with three layers of absorbable sutures and covered with Steri-Strips.   I personally supervised the administration of moderate sedation by the RN and observed the level of consciousness and physiologic status throughout the procedure.  Following recovery from sedation, the patient was transferred to a monitored bed.    IMPLANTED DEVICE INFORMATION:    Pulse generator is a [employer] model [id number]  Serial number [id number]  LEAD INFORMATION:  1. Right atrial lead is a [employer] model 1688TC/52, serial number [id number], P wave 1.9 millivolts, threshold 0.9 volts, pacing impedance 490 ohms, implant date 8/14/2015  2. Right ventricular lead is a [employer] model  7122Q/58, serial number [id number], R wave 11.8 millivolts, threshold 0.75 volts, pacing impedance 690 ohms, implant date 7/30/2015  3. Coronary sinus lead is a [employer] model 1458Q/86, serial number [id number], R wave paced millivolts, threshold 1.5 volts, pacing impedance 1200 ohms, implant date 10/16/2015    DEVICE PROGRAMMING:    As previous programmed     IMPRESSIONS:  1. Successful CRT-D generator change.    RECOMMENDATIONS:  1. Transfer to PPU.  2. Follow-up in device clinic for wound check and device interrogation.

## 2023-01-24 NOTE — H&P
Physician H&P    Patient ID:  Isai Fuentes  [id number]  82 y.o. male  1940    History:  Primary Diagnosis: CRT-D at [facility] for generator change.  [employer] device    HPI:  Left-sided device.  No constitutional symptoms.  Ischemic cardiomyopathy.  No chest pain.  No shortness of breath.    Past Medical History:  has a past medical history of Atrial fibrillation ([facility]) (9/20/2011), Biventricular implantable cardioverter-defibrillator in situ (8/7/2015), Breath shortness, CAD (coronary artery disease) (9/20/2011), Cancer ([facility]) (2012), CARDIOMYOPATHY (9/20/2011), Congestive heart failure ([facility]), Fatigue (10/28/2010), Heartburn (10/23/2008), History of cardiac catheterization (9/20/2011), History of myocardial infarction, HTN (hypertension) (9/20/2011), Indigestion, Insomnia (7/8/2010), Ischemic cardiomyopathy (9/20/2011), Long term (current) use of anticoagulants (5/13/2011), Myocardial infarct ([facility]) (2007), Orthostatic hypotension (5/6/2009), WILLA (obstructive sleep apnea), Other drug allergy(995.27) (10/16/2008), Other specified disorder of intestines (07-28-15), Pacemaker, Pain (07-28-15), Pleural effusion (5/18/2009), Presence of permanent cardiac pacemaker (9/20/2011), Prostate cancer ([facility]) (5/13/2011), Second degree AV block, Mobitz type II (11/10/2010), Snoring, and Stroke ([facility]) (2/3/2009).    He has no past medical history of COPD (chronic obstructive pulmonary disease) ([facility]).  Past Surgical History:  has a past surgical history that includes zzz cardiac cath; multiple coronary artery bypass (2007); pacemaker insertion (11/24/08); recovery (7/30/2015); recovery (7/29/2015); recovery (8/14/2015); and recovery (10/16/2015).  Past Social History:  reports that he quit smoking about 53 years ago. His smoking use included cigarettes. He has a 4.50 pack-year smoking history. He quit smokeless tobacco use about 51 years ago.  His smokeless tobacco use included chew. He reports current alcohol use of about 8.4 oz  "per week. He reports that he does not use drugs.  Past Family History:   Family History   Problem Relation Age of Onset    Thyroid Sister      Allergies: Plavix [clopidogrel bisulfate], Spironolactone, Statins [hmg-coa-r inhibitors], Atorvastatin, Other drug, and Ticlid [ticlopidine hydrochloride]    Current Medications:  Prior to Admission medications    Medication Sig Start Date End Date Taking? Authorizing Provider   albuterol (PROVENTIL) 2.5mg/0.5ml Nebu Soln Take 2.5 mg by nebulization 2 times a day as needed for Shortness of Breath.   Yes Physician Outpatient   torsemide (DEMADEX) 20 MG Tab Take 20 mg by mouth every 48 hours. 3 x week   Yes Physician Outpatient   Evolocumab, REPATHA, (REPATHA SURECLICK) 140 MG/ML Solution Auto-injector Inject contents of 1 pen under the skin every 14 days. 11/15/22   PRATEEK Owens   sacubitril-valsartan (ENTRESTO)  MG Tab Take 1 Tablet by mouth 2 times a day. 11/14/22   Severo Lee M.D.   metoprolol SR (TOPROL XL) 50 MG TABLET SR 24 HR Take 1 Tablet by mouth every day. 6/29/22   Severo Lee M.D.   indomethacin (INDOCIN) 50 MG Cap Take 50 mg by mouth every day.    Physician Outpatient   VITAMIN D PO Take 1 Tablet by mouth every day.    Physician Outpatient   Coenzyme Q10 (CO Q 10) 100 MG Cap Take 200 mg by mouth every day.    Physician Outpatient   Ixekizumab 80 MG/ML Solution Prefilled Syringe Inject 80 mg under the skin Q30 DAYS.    Physician Outpatient   aspirin (ASA) 81 MG Chew Tab chewable tablet Chew 81 mg every evening.    Physician Outpatient       Review of Systems:  ROS  /72   Pulse 70   Temp 36.6 °C (97.8 °F) (Temporal)   Resp 14   Ht 1.803 m (5' 11\")   Wt 93.4 kg (205 lb 14.6 oz)   SpO2 95%     Physical Examination:  Physical Exam  Vitals reviewed.   Constitutional:       General: He is not in acute distress.     Appearance: He is well-developed. He is not diaphoretic.   Eyes:      Conjunctiva/sclera: Conjunctivae " normal.   Neck:      Thyroid: No thyroid mass or thyromegaly.      Vascular: No JVD.      Trachea: No tracheal deviation.   Cardiovascular:      Rate and Rhythm: Normal rate and regular rhythm.      Heart sounds: No murmur heard.  Pulmonary:      Effort: Pulmonary effort is normal. No respiratory distress.      Breath sounds: Normal breath sounds.   Chest:      Chest wall: No tenderness.   Abdominal:      Palpations: Abdomen is soft.      Tenderness: There is no abdominal tenderness.   Musculoskeletal:         General: Normal range of motion.   Skin:     General: Skin is warm and dry.   Neurological:      Mental Status: He is alert and oriented to person, place, and time.      Motor: No tremor.   Psychiatric:         Behavior: Behavior normal.       Impression:  1.  CRT-D at [facility] for generator change.  The risks, benefits, and alternatives to cardiac resynchronization therapy defibrillator (CRT-D) replacement were discussed in great detail, specific risks mentioned include bleeding, infection, cardiac perforation with possible tamponade possibly requiring pericardiocentesis or open heart surgery. In addition the possibility of lead dislodgment (2-3%), inappropriate shocks, pneumothorax (3%), hemothorax were discussed. The following specific CRT procedural complications were also mentioned including the possibility of diaphragmatic stimulation necessitating lead repositioning, and a  possibility of being unable to place a left ventricular lead in 10% of patient's. Also mentioned were the possibilities of death, stroke, and myocardial infarction. The patient verbalized understanding of these potential complications and wishes to proceed with the procedure.

## 2023-01-24 NOTE — DISCHARGE INSTRUCTIONS
HOME CARE INSTRUCTIONS    ACTIVITY: Rest and take it easy for the first 24 hours.  A responsible adult is recommended to remain with you during that time.  It is normal to feel sleepy.  We encourage you to not do anything that requires balance, judgment or coordination.    FOR 24 HOURS DO NOT:  Drive, operate machinery or run household appliances.  Drink beer or alcoholic beverages.  Make important decisions or sign legal documents.    SPECIAL INSTRUCTIONS:     Pacemaker Battery Change, Care After  This sheet gives you information about how to care for yourself after your procedure. Your health care provider may also give you more specific instructions. If you have problems or questions, contact your health care provider.  What can I expect after the procedure?  After your procedure, it is common to have:  Pain or soreness at the site where the pacemaker was inserted.  Swelling at the site where the pacemaker was inserted.  Follow these instructions at home:  Incision care    Keep the incision clean and dry.  Do not take baths, swim, or use a hot tub until your health care provider approves.  You may shower the day after your procedure, or as directed by your health care provider.  Pat the area dry with a clean towel. Do not rub the area. This may cause bleeding.  Follow instructions from your health care provider about how to take care of your incision. Make sure you:  Wash your hands with soap and water before you change your bandage (dressing). If soap and water are not available, use hand .  Change your dressing as told by your health care provider.  Leave stitches (sutures), skin glue, or adhesive strips in place. These skin closures may need to stay in place for 2 weeks or longer. If adhesive strip edges start to loosen and curl up, you may trim the loose edges. Do not remove adhesive strips completely unless your health care provider tells you to do that.  Check your incision area every day for signs  of infection. Check for:  More redness, swelling, or pain.  More fluid or blood.  Warmth.  Pus or a bad smell.  Activity  Do not lift anything that is heavier than 10 lb (4.5 kg) until your health care provider says it is okay to do so.  For the first 2 weeks, or as long as told by your health care provider:  Avoid lifting your left arm higher than your shoulder.  Be gentle when you move your arms over your head. It is okay to raise your arm to comb your hair.  Avoid strenuous exercise.  Ask your health care provider when it is okay to:  Resume your normal activities.  Return to work or school.  Resume sexual activity.  Eating and drinking  Eat a heart-healthy diet. This should include plenty of fresh fruits and vegetables, whole grains, low-fat dairy products, and lean protein like chicken and fish.  Limit alcohol intake to no more than 1 drink a day for non-pregnant women and 2 drinks a day for men. One drink equals 12 oz of beer, 5 oz of wine, or 1½ oz of hard liquor.  Check ingredients and nutrition facts on packaged foods and beverages. Avoid the following types of food:  Food that is high in salt (sodium).  Food that is high in saturated fat, like full-fat dairy or red meat.  Food that is high in trans fat, like fried food.  Food and drinks that are high in sugar.  Lifestyle  Do not use any products that contain nicotine or tobacco, such as cigarettes and e-cigarettes. If you need help quitting, ask your health care provider.  Take steps to manage and control your weight.  Get regular exercise. Aim for 150 minutes of moderate-intensity exercise (such as walking or yoga) or 75 minutes of vigorous exercise (such as running or swimming) each week.  Manage other health problems, such as diabetes or high blood pressure. Ask your health care provider how you can manage these conditions.  General instructions  Do not drive for 24 hours after your procedure if you were given a medicine to help you relax  (sedative).  Take over-the-counter and prescription medicines only as told by your health care provider.  Avoid putting pressure on the area where the pacemaker was placed.  If you need an MRI after your pacemaker has been placed, be sure to tell the health care provider who orders the MRI that you have a pacemaker.  Avoid close and prolonged exposure to electrical devices that have strong magnetic fields. These include:  Cell phones. Avoid keeping them in a pocket near the pacemaker, and try using the ear opposite the pacemaker.  [employer] players.  Household appliances, like microwaves.  Metal detectors.  Electric generators.  High-tension wires.  Keep all follow-up visits as directed by your health care provider. This is important.  Contact a health care provider if:  You have pain at the incision site that is not relieved by over-the-counter or prescription medicines.  You have any of these around your incision site or coming from it:  More redness, swelling, or pain.  Fluid or blood.  Warmth to the touch.  Pus or a bad smell.  You have a fever.  You feel brief, occasional palpitations, light-headedness, or any symptoms that you think might be related to your heart.  Get help right away if:  You experience chest pain that is different from the pain at the pacemaker site.  You develop a red streak that extends above or below the incision site.  You experience shortness of breath.  You have palpitations or an irregular heartbeat.  You have light-headedness that does not go away quickly.  You faint or have dizzy spells.  Your pulse suddenly drops or increases rapidly and does not return to normal.  You begin to gain weight and your legs and ankles swell.  Summary  After your procedure, it is common to have pain, soreness, and some swelling where the pacemaker was inserted.  Make sure to keep your incision clean and dry. Follow instructions from your health care provider about how to take care of your incision.  Check  your incision every day for signs of infection, such as more pain or swelling, pus or a bad smell, warmth, or leaking fluid and blood.  Avoid strenuous exercise and lifting your left arm higher than your shoulder for 2 weeks, or as long as told by your health care provider.  This information is not intended to replace advice given to you by your health care provider. Make sure you discuss any questions you have with your health care provider.      DIET: To avoid nausea, slowly advance diet as tolerated, avoiding spicy or greasy foods for the first day.  Add more substantial food to your diet according to your physician's instructions.  Babies can be fed formula or breast milk as soon as they are hungry.  INCREASE FLUIDS AND FIBER TO AVOID CONSTIPATION.    SURGICAL DRESSING/BATHING: May remove dressing in 7 days. Keep dressing clean and dry.    MEDICATIONS: Resume taking daily medication.  Take prescribed pain medication with food.  If no medication is prescribed, you may take non-aspirin pain medication if needed.  PAIN MEDICATION CAN BE VERY CONSTIPATING.  Take a stool softener or laxative such as senokot, pericolace, or milk of magnesia if needed.    Pain medication may be taken any time.    A follow-up appointment should be arranged with your doctor in 1-2 weeks; call to schedule.    You should CALL YOUR PHYSICIAN if you develop:  Fever greater than 101 degrees F.  Pain not relieved by medication, or persistent nausea or vomiting.  Excessive bleeding (blood soaking through dressing) or unexpected drainage from the wound.  Extreme redness or swelling around the incision site, drainage of pus or foul smelling drainage.  Inability to urinate or empty your bladder within 8 hours.  Problems with breathing or chest pain.    You should call 911 if you develop problems with breathing or chest pain.  If you are unable to contact your doctor or surgical center, you should go to the nearest emergency room or urgent care  center.  Physician's telephone #: [phone]    MILD FLU-LIKE SYMPTOMS ARE NORMAL.  YOU MAY EXPERIENCE GENERALIZED MUSCLE ACHES, THROAT IRRITATION, HEADACHE AND/OR SOME NAUSEA.    If any questions arise, call your doctor.  If your doctor is not available, please feel free to call the Surgical Center at [phone].  The Center is open Monday through Friday from 7AM to 7PM.      A registered nurse may call you a few days after your surgery to see how you are doing after your procedure.    You may also receive a survey in the mail within the next two weeks and we ask that you take a few moments to complete the survey and return it to us.  Our goal is to provide you with very good care and we value your comments.     Depression / Suicide Risk    As you are discharged from this [facility] facility, it is important to learn how to keep safe from harming yourself.    Recognize the warning signs:  Abrupt changes in personality, positive or negative- including increase in energy   Giving away possessions  Change in eating patterns- significant weight changes-  positive or negative  Change in sleeping patterns- unable to sleep or sleeping all the time   Unwillingness or inability to communicate  Depression  Unusual sadness, discouragement and loneliness  Talk of wanting to die  Neglect of personal appearance   Rebelliousness- reckless behavior  Withdrawal from people/activities they love  Confusion- inability to concentrate     If you or a loved one observes any of these behaviors or has concerns about self-harm, here's what you can do:  Talk about it- your feelings and reasons for harming yourself  Remove any means that you might use to hurt yourself (examples: pills, rope, extension cords, firearm)  Get professional help from the community (Mental Health, Substance Abuse, psychological counseling)  Do not be alone:Call your Safe Contact- someone whom you trust who will be there for you.  Call your local CRISIS  HOTLINE 484-5310 or 627-590-0909  Call your local Children's Mobile Crisis Response Team [place] (001) 173-1495 or www.Dishcrawl  Call the toll free National Suicide Prevention Hotlines   National Suicide Prevention Lifeline 001-066-NJVO (8769)  National CBIT A/S Line Network 800-SUICIDE (675-2683)    I acknowledge receipt and understanding of these Home Care instructions.

## 2023-01-25 ENCOUNTER — TELEPHONE (OUTPATIENT)
Dept: CARDIOLOGY | Facility: MEDICAL CENTER | Age: 83
End: 2023-01-25
Payer: MEDICARE

## 2023-01-25 DIAGNOSIS — I50.23 NYHA CLASS 3 ACUTE ON CHRONIC SYSTOLIC HEART FAILURE (HCC): ICD-10-CM

## 2023-01-25 DIAGNOSIS — I47.29 PAROXYSMAL VENTRICULAR TACHYCARDIA (HCC): ICD-10-CM

## 2023-01-25 RX ORDER — SACUBITRIL AND VALSARTAN 97; 103 MG/1; MG/1
1 TABLET, FILM COATED ORAL 2 TIMES DAILY
Qty: 60 TABLET | Refills: 11 | Status: SHIPPED
Start: 2023-01-25 | End: 2023-02-17 | Stop reason: SDUPTHER

## 2023-01-25 RX ORDER — SACUBITRIL AND VALSARTAN 97; 103 MG/1; MG/1
1 TABLET, FILM COATED ORAL 2 TIMES DAILY
Qty: 60 TABLET | Refills: 11 | Status: SHIPPED
Start: 2023-01-25 | End: 2023-01-25

## 2023-01-25 NOTE — TELEPHONE ENCOUNTER
PUMP LINE-    PT WAS APPROVED FOR FINANCIAL ASSISTANCE. NEED STAND ALONE RX ORDER FOR BuilkO.      BEST NUMBER TO CALL BACK -     [phone] - [place]   [phone] - [place]     [phone] - FAX FOR FINANCIAL ASSISTANCE

## 2023-01-31 ENCOUNTER — TELEPHONE (OUTPATIENT)
Dept: CARDIOLOGY | Facility: MEDICAL CENTER | Age: 83
End: 2023-01-31

## 2023-01-31 ENCOUNTER — NON-PROVIDER VISIT (OUTPATIENT)
Dept: CARDIOLOGY | Facility: MEDICAL CENTER | Age: 83
End: 2023-01-31
Payer: MEDICARE

## 2023-01-31 ENCOUNTER — NON-PROVIDER VISIT (OUTPATIENT)
Dept: CARDIOLOGY | Facility: MEDICAL CENTER | Age: 83
End: 2023-01-31

## 2023-01-31 DIAGNOSIS — I25.10 CORONARY ARTERY DISEASE INVOLVING NATIVE CORONARY ARTERY OF NATIVE HEART WITHOUT ANGINA PECTORIS: Chronic | ICD-10-CM

## 2023-01-31 DIAGNOSIS — I25.5 ISCHEMIC CARDIOMYOPATHY: Chronic | ICD-10-CM

## 2023-01-31 DIAGNOSIS — Z95.810 BIVENTRICULAR IMPLANTABLE CARDIOVERTER-DEFIBRILLATOR IN SITU: ICD-10-CM

## 2023-01-31 PROCEDURE — 93284 PRGRMG EVAL IMPLANTABLE DFB: CPT | Performed by: INTERNAL MEDICINE

## 2023-01-31 NOTE — TELEPHONE ENCOUNTER
RO    Pt came into office, inquiring about the medication Entresto and stated that RO hasnt sent it in, pt last saw RO in November.   If rx can be sent, or if authorization is needed, please inform pt of the process of the medication.    Callback number [phone]    Thank you,  Mei LI

## 2023-01-31 NOTE — NON-PROVIDER
Wound site is healing well. Pt advised to watch for increased redness, swelling, oozing or fever. Pt verbalized understanding.

## 2023-02-01 NOTE — TELEPHONE ENCOUNTER
Phone Number Called: [phone]    Call outcome:  Spoke to pharmacist    Message: Called to inquire if patient received medication. Per Pharmacist, patient picked up medication on 1/13/23. No further questions at this time.

## 2023-02-17 ENCOUNTER — PHARMACY VISIT (OUTPATIENT)
Dept: PHARMACY | Facility: MEDICAL CENTER | Age: 83
End: 2023-02-17
Payer: COMMERCIAL

## 2023-02-17 ENCOUNTER — TELEPHONE (OUTPATIENT)
Dept: CARDIOLOGY | Facility: MEDICAL CENTER | Age: 83
End: 2023-02-17
Payer: MEDICARE

## 2023-02-17 DIAGNOSIS — I50.23 NYHA CLASS 3 ACUTE ON CHRONIC SYSTOLIC HEART FAILURE (HCC): ICD-10-CM

## 2023-02-17 DIAGNOSIS — I47.29 PAROXYSMAL VENTRICULAR TACHYCARDIA (HCC): ICD-10-CM

## 2023-02-17 PROCEDURE — RXMED WILLOW AMBULATORY MEDICATION CHARGE: Performed by: INTERNAL MEDICINE

## 2023-02-17 RX ORDER — SACUBITRIL AND VALSARTAN 97; 103 MG/1; MG/1
1 TABLET, FILM COATED ORAL 2 TIMES DAILY
Qty: 60 TABLET | Refills: 11 | Status: SHIPPED | OUTPATIENT
Start: 2023-02-17 | End: 2023-02-22

## 2023-02-17 RX ORDER — SACUBITRIL AND VALSARTAN 49; 51 MG/1; MG/1
2 TABLET, FILM COATED ORAL 2 TIMES DAILY
Qty: 120 TABLET | Refills: 0 | Status: SHIPPED | OUTPATIENT
Start: 2023-02-17 | End: 2023-03-08 | Stop reason: SDUPTHER

## 2023-02-17 NOTE — TELEPHONE ENCOUNTER
----- Message from Patricia Johnston Med Ass't sent at 2/15/2023 11:59 AM PST -----  Regarding: RX Issues  Aman Richmond,    PT's wife called and stated that she just got off the phone with the people from [employer] and they swore to her they never received the RX you sent over that was signed by RO. She said she is just worries her  is running out of his medication and she is just trying to get this taken care of the best she can with the financial assistance because the medication is expensive for them. She asked that you call her back whenever you get the chance but she will be leaving her house for a little for her own doctor appointment around 2:15.     Thank you!

## 2023-02-17 NOTE — TELEPHONE ENCOUNTER
Phone Number Called: [phone]    Call outcome:  Spoke to patient's wife - Ivis    Message: Called to return phone call regarding medication.     RN to send samples of Entresto to Pharmacy on [facility], verbal received to end Entresto 49-51 two tablets twice daily until patient can received prescription.     Patient's wife provided fax number for prescription to be sent to electronically to [facility].     No further questions at this time.

## 2023-02-22 ENCOUNTER — OFFICE VISIT (OUTPATIENT)
Dept: CARDIOLOGY | Facility: MEDICAL CENTER | Age: 83
End: 2023-02-22
Payer: MEDICARE

## 2023-02-22 VITALS
RESPIRATION RATE: 16 BRPM | WEIGHT: 205 LBS | HEART RATE: 72 BPM | HEIGHT: 71 IN | BODY MASS INDEX: 28.7 KG/M2 | DIASTOLIC BLOOD PRESSURE: 56 MMHG | OXYGEN SATURATION: 93 % | SYSTOLIC BLOOD PRESSURE: 86 MMHG

## 2023-02-22 DIAGNOSIS — I44.1 SECOND DEGREE AV BLOCK, MOBITZ TYPE II: Chronic | ICD-10-CM

## 2023-02-22 DIAGNOSIS — I25.10 CORONARY ARTERY DISEASE INVOLVING NATIVE CORONARY ARTERY OF NATIVE HEART WITHOUT ANGINA PECTORIS: Chronic | ICD-10-CM

## 2023-02-22 DIAGNOSIS — Z95.810 BIVENTRICULAR IMPLANTABLE CARDIOVERTER-DEFIBRILLATOR IN SITU: ICD-10-CM

## 2023-02-22 DIAGNOSIS — I50.23 NYHA CLASS 3 ACUTE ON CHRONIC SYSTOLIC HEART FAILURE (HCC): ICD-10-CM

## 2023-02-22 DIAGNOSIS — Z79.899 HIGH RISK MEDICATION USE: ICD-10-CM

## 2023-02-22 DIAGNOSIS — Z98.890 S/P ABLATION OF ATRIAL FIBRILLATION: ICD-10-CM

## 2023-02-22 DIAGNOSIS — I50.20 SYSTOLIC HEART FAILURE, ACC/AHA STAGE C (HCC): ICD-10-CM

## 2023-02-22 DIAGNOSIS — I25.2 HISTORY OF MYOCARDIAL INFARCTION: Chronic | ICD-10-CM

## 2023-02-22 DIAGNOSIS — I63.9 CEREBROVASCULAR ACCIDENT (CVA), UNSPECIFIED MECHANISM (HCC): Chronic | ICD-10-CM

## 2023-02-22 DIAGNOSIS — Z86.79 S/P ABLATION OF ATRIAL FIBRILLATION: ICD-10-CM

## 2023-02-22 DIAGNOSIS — I95.1 ORTHOSTATIC HYPOTENSION: Chronic | ICD-10-CM

## 2023-02-22 DIAGNOSIS — F10.10 ALCOHOL ABUSE: ICD-10-CM

## 2023-02-22 DIAGNOSIS — I25.5 ISCHEMIC CARDIOMYOPATHY: Chronic | ICD-10-CM

## 2023-02-22 DIAGNOSIS — Z95.1 HX OF CABG: ICD-10-CM

## 2023-02-22 DIAGNOSIS — I10 PRIMARY HYPERTENSION: Chronic | ICD-10-CM

## 2023-02-22 DIAGNOSIS — N18.31 STAGE 3A CHRONIC KIDNEY DISEASE: ICD-10-CM

## 2023-02-22 DIAGNOSIS — I51.89 LEFT VENTRICULAR SYSTOLIC DYSFUNCTION, NYHA CLASS 3: ICD-10-CM

## 2023-02-22 DIAGNOSIS — I48.0 PAROXYSMAL ATRIAL FIBRILLATION (HCC): ICD-10-CM

## 2023-02-22 DIAGNOSIS — R12 HEARTBURN: Chronic | ICD-10-CM

## 2023-02-22 DIAGNOSIS — E78.00 HYPERCHOLESTEREMIA: Chronic | ICD-10-CM

## 2023-02-22 DIAGNOSIS — G47.33 OBSTRUCTIVE SLEEP APNEA SYNDROME: Chronic | ICD-10-CM

## 2023-02-22 DIAGNOSIS — I47.29 PAROXYSMAL VENTRICULAR TACHYCARDIA (HCC): ICD-10-CM

## 2023-02-22 PROCEDURE — 99214 OFFICE O/P EST MOD 30 MIN: CPT | Performed by: INTERNAL MEDICINE

## 2023-02-22 RX ORDER — ALBUTEROL SULFATE 90 UG/1
AEROSOL, METERED RESPIRATORY (INHALATION)
COMMUNITY
Start: 2023-02-15

## 2023-02-22 ASSESSMENT — ENCOUNTER SYMPTOMS
COUGH: 0
PND: 0
HEMOPTYSIS: 0
BRUISES/BLEEDS EASILY: 0
WHEEZING: 0
FEVER: 0
CARDIOVASCULAR NEGATIVE: 1
WEAKNESS: 0
CLAUDICATION: 0
GASTROINTESTINAL NEGATIVE: 1
SORE THROAT: 0
SHORTNESS OF BREATH: 0
MUSCULOSKELETAL NEGATIVE: 1
PALPITATIONS: 0
SPUTUM PRODUCTION: 0
STRIDOR: 0
CHILLS: 0
DIZZINESS: 0
ORTHOPNEA: 0
EYES NEGATIVE: 1
CONSTITUTIONAL NEGATIVE: 1
LOSS OF CONSCIOUSNESS: 0
NEUROLOGICAL NEGATIVE: 1

## 2023-02-22 ASSESSMENT — FIBROSIS 4 INDEX: FIB4 SCORE: 2.68

## 2023-02-22 NOTE — PROGRESS NOTES
"Chief Complaint   Patient presents with    Coronary Artery Disease     F/V Dx: Coronary artery disease involving native coronary artery of native heart without angina pectoris    Cardiomyopathy (Ischemic)    CHF (Systolic)     F/V Dx: NYHA class 3 acute on chronic systolic heart failure (HCC)       Subjective:   Isai Fuentes is a 79 y.o. male who presents today as a follow-up for his heart failure with reduced ejection fraction.      Since he was last seen he had a CRT replaced.  Is otherwise been doing well.  Is been having issues with fatigue.  His blood pressure is running low and he does not check it at home.  He is taking 2 of his 4951 Entresto twice a day.      Past Medical History:   Diagnosis Date    Atrial fibrillation ([facility]) 9/20/2011    Biventricular implantable cardioverter-defibrillator in situ 8/7/2015    Breath shortness     CAD (coronary artery disease) 9/20/2011    Cancer (Formerly [facility]) 2012    prostate    CARDIOMYOPATHY 9/20/2011    Congestive heart failure (HCC)     Fatigue 10/28/2010    Heartburn 10/23/2008    History of cardiac catheterization 9/20/2011    History of myocardial infarction     \"many\"    HTN (hypertension) 9/20/2011    Indigestion     Insomnia 7/8/2010    Ischemic cardiomyopathy 9/20/2011    Long term (current) use of anticoagulants 5/13/2011    Myocardial infarct ([facility]) 2007    Orthostatic hypotension 5/6/2009    WILLA (obstructive sleep apnea)     CPAP    Other drug allergy(995.27) 10/16/2008    Other specified disorder of intestines 07-28-15    constipation/diarrhea/ reports some bleeding from rectum w/blood clots. Seeing GI    Pacemaker     [employer]    Pain 07-28-15    \"chronic\", 0/10    Pleural effusion 5/18/2009    Presence of permanent cardiac pacemaker 9/20/2011    Prostate cancer ([facility]) 5/13/2011    Second degree AV block, Mobitz type II 11/10/2010    Snoring     Stroke ([facility]) 2/3/2009     Past Surgical History:   Procedure Laterality Date    RECOVERY  10/16/2015    " Procedure: CATH LAB LEAD REVISION .MARIA RJOSI AQUINO;  Surgeon: Recoveryonly Surgery;  [place] [facility] [place]  Service:     RECOVERY  2015    Procedure:  CATH LAB LEAD EXTRACTION AWILDA ST.MARIA R LRG PAULO AQUINO;  Surgeon: Recoveryonly Surgery;  Location: [place] [facility] [place]  Service:     RECOVERY  2015    Procedure: CATH LAB  LEAD EXTRACTION, UPGRADE TO BIV PM ST.MARIA R BIPING GRP KENIA ;  Surgeon: Recoveryonly Surgery;  [place]: [place] [facility] [place]  Service:     RECOVERY  2015    Procedure: CATH LAB NEW PM INSERTION DUAL ATRIAL & VENTRICULAR-LV LEAD W/ NEW GENERATOR AQUION ICD9: 414.8;  Surgeon: Recoveryonly Surgery;  [place]: [place] PRE[place] [facility] [place];  Service:     PACEMAKER INSERTION  08    [facility]    MULTIPLE CORONARY ARTERY BYPASS      2 vessel    ZZZ CARDIAC CATH      has 2 stents     Family History   Problem Relation Age of Onset    Thyroid Sister      Social History     Socioeconomic History    Marital status:      Spouse name: Not on file    Number of children: Not on file    Years of education: Not on file    Highest education level: Not on file   Occupational History    Not on file   Tobacco Use    Smoking status: Former     Packs/day: 1.50     Years: 3.00     Pack years: 4.50     Types: Cigarettes     Quit date: 1970     Years since quittin.1    Smokeless tobacco: Former     Types: Chew     Quit date: 1972   Vaping Use    Vaping Use: Never used   Substance and Sexual Activity    Alcohol use: Not Currently     Alcohol/week: 8.4 oz     Types: 14 Standard drinks or equivalent per week     Comment: 10-14 per week; scotch & wine    Drug use: No    Sexual activity: Not on file   Other Topics Concern    Not on file   Social History Narrative    Not on file     Social Determinants of Health     Financial Resource Strain: Not on file   Food Insecurity: Not on file   Transportation Needs: Not on file   Physical Activity: Not on file  "  Stress: Not on file   Social Connections: Not on file   Intimate Partner Violence: Not on file   Housing Stability: Not on file     Allergies   Allergen Reactions    Plavix [Clopidogrel Bisulfate] Anaphylaxis    Spironolactone Unspecified     Hyperkalemia    Statins [Hmg-Coa-R Inhibitors] Unspecified     Muscles aches      Atorvastatin     Other Drug     Ticlid [Ticlopidine Hydrochloride] Unspecified     Pt states \"I'm not sure what happens\".       Outpatient Encounter Medications as of 2/22/2023   Medication Sig Dispense Refill    albuterol 108 (90 Base) MCG/ACT Aero Soln inhalation aerosol       sacubitril-valsartan (ENTRESTO) 49-51 MG Tab Take 2 Tablets by mouth 2 times a day. 120 Tablet 0    albuterol (PROVENTIL) 2.5mg/0.5ml Nebu Soln Take 2.5 mg by nebulization 2 times a day as needed for Shortness of Breath.      torsemide (DEMADEX) 20 MG Tab Take 20 mg by mouth every 48 hours. 3 x week      Evolocumab, REPATHA, (REPATHA SURECLICK) 140 MG/ML Solution Auto-injector Inject contents of 1 pen under the skin every 14 days. 6 Each 3    metoprolol SR (TOPROL XL) 50 MG TABLET SR 24 HR Take 1 Tablet by mouth every day. 90 Tablet 3    indomethacin (INDOCIN) 50 MG Cap Take 50 mg by mouth every day.      VITAMIN D PO Take 1 Tablet by mouth every day.      Coenzyme Q10 (CO Q 10) 100 MG Cap Take 200 mg by mouth every day.      Ixekizumab 80 MG/ML Solution Prefilled Syringe Inject 80 mg under the skin Q30 DAYS.      aspirin (ASA) 81 MG Chew Tab chewable tablet Chew 81 mg every evening.      [DISCONTINUED] sacubitril-valsartan (ENTRESTO)  MG Tab Take 1 Tablet by mouth 2 times a day. (Patient not taking: Reported on 2/22/2023) 60 Tablet 11    doxycycline (MONODOX) 100 MG capsule Take 1 Capsule by mouth 2 times a day. (Patient not taking: Reported on 2/22/2023) 8 Capsule 0     No facility-administered encounter medications on file as of 2/22/2023.     Review of Systems   Constitutional: Negative.  Negative for chills, " "fever and malaise/fatigue.   HENT: Negative.  Negative for sore throat.    Eyes: Negative.    Respiratory:  Negative for cough, hemoptysis, sputum production, shortness of breath, wheezing and stridor.    Cardiovascular: Negative.  Negative for chest pain, palpitations, orthopnea, claudication, leg swelling and PND.   Gastrointestinal: Negative.    Genitourinary: Negative.    Musculoskeletal: Negative.    Skin: Negative.    Neurological: Negative.  Negative for dizziness, loss of consciousness and weakness.   Endo/Heme/Allergies: Negative.  Does not bruise/bleed easily.   All other systems reviewed and are negative.     Objective:   BP (!) 86/56 (BP Location: Left arm, Patient Position: Sitting, BP Cuff Size: Adult)   Pulse 72   Resp 16   Ht 1.803 m (5' 11\")   Wt 93 kg (205 lb)   SpO2 93%   BMI 28.59 kg/m²     Physical Exam  Vitals and nursing note reviewed.   Constitutional:       General: He is not in acute distress.     Appearance: He is well-developed. He is not diaphoretic.   HENT:      Head: Normocephalic and atraumatic.      Right Ear: External ear normal.      Left Ear: External ear normal.      Nose: Nose normal.      Mouth/Throat:      Pharynx: No oropharyngeal exudate.   Eyes:      General: No scleral icterus.        Right eye: No discharge.         Left eye: No discharge.      Conjunctiva/sclera: Conjunctivae normal.      Pupils: Pupils are equal, round, and reactive to light.   Neck:      Vascular: No JVD.   Cardiovascular:      Rate and Rhythm: Normal rate and regular rhythm.      Heart sounds: No murmur heard.    No friction rub. No gallop.   Pulmonary:      Effort: Pulmonary effort is normal. No respiratory distress.      Breath sounds: No stridor. No wheezing or rales.   Chest:      Chest wall: No tenderness.   Abdominal:      General: There is no distension.      Palpations: Abdomen is soft.      Tenderness: There is no guarding.   Musculoskeletal:         General: No tenderness or deformity. " Normal range of motion.      Cervical back: Neck supple.   Skin:     General: Skin is warm and dry.      Coloration: Skin is not pale.      Findings: No erythema or rash.   Neurological:      Mental Status: He is alert.      Cranial Nerves: No cranial nerve deficit.      Motor: No abnormal muscle tone.      Coordination: Coordination normal.      Deep Tendon Reflexes: Reflexes are normal and symmetric. Reflexes normal.   Psychiatric:         Behavior: Behavior normal.         Thought Content: Thought content normal.         Judgment: Judgment normal.   Echocardiogram: Dated 9/28/2020 personally interpreted myself showing an EF of 35%.    CTA: Dated 9/28/2020 personally viewed myself showing defect in the IVC.    Sound abdomen 9/28/2015 personally interpreted myself showing possible cirrhosis.    Assessment:     1. Coronary artery disease involving native coronary artery of native heart without angina pectoris        2. Second degree AV block, Mobitz type II        3. Ischemic cardiomyopathy        4. Hypercholesteremia        5. Primary hypertension        6. History of myocardial infarction        7. Heartburn        8. Orthostatic hypotension        9. Obstructive sleep apnea syndrome        10. Cerebrovascular accident (CVA), unspecified mechanism (HCC)        11. Paroxysmal atrial fibrillation (HCC)        12. NYHA class 3 acute on chronic systolic heart failure (HCC)        13. Biventricular implantable cardioverter-defibrillator in situ        14. Systolic heart failure, ACC/AHA stage C (HCC)        15. Left ventricular systolic dysfunction, NYHA class 3        16. Ventricular tachycardia (paroxysmal) (HCC)        17. Hx of CABG        18. S/P ablation of atrial fibrillation        19. Alcohol abuse        20. Stage 3a chronic kidney disease (HCC)        21. High risk medication use            Medical Decision Making:  Today's Assessment / Status / Plan:     79-year-old male with alcohol use heart failure with  reduced ejection fraction hyperlipidemia and some concern for cirrhosis.  Based upon his fatigue and low blood pressure I will have him reduce his Entresto by endocrine down to the moderate dose to see if his increased blood pressure improves his symptoms.  We will see him back in 6 months.    1. CAD    - cont repatha    2. CHF s/p CRT    -Reduce entresto to 49/51    - cont metop XL 50    - cont torsemide 20 PRN    3. CRT    - f/u device clinic    4. ETOH    - U/S Abdomen    5. Edema/Swelling    - cont torsemide 20 daily     6. High Risk Meds    - labs in one week

## 2023-03-08 ENCOUNTER — TELEPHONE (OUTPATIENT)
Dept: CARDIOLOGY | Facility: MEDICAL CENTER | Age: 83
End: 2023-03-08
Payer: MEDICARE

## 2023-03-08 DIAGNOSIS — I50.23 NYHA CLASS 3 ACUTE ON CHRONIC SYSTOLIC HEART FAILURE (HCC): ICD-10-CM

## 2023-03-08 RX ORDER — SACUBITRIL AND VALSARTAN 49; 51 MG/1; MG/1
1 TABLET, FILM COATED ORAL 2 TIMES DAILY
Qty: 180 TABLET | Refills: 3 | Status: SHIPPED
Start: 2023-03-08 | End: 2023-03-20 | Stop reason: SDUPTHER

## 2023-03-08 NOTE — TELEPHONE ENCOUNTER
Per RO OV note, Entresto reduced to 49-51 due to hypotension and fatigue.     New RX printed and signed under JJAMIE as member of care team in RO absence. Faxed to [facility] [phone]    Transmission complete

## 2023-03-13 ENCOUNTER — TELEPHONE (OUTPATIENT)
Dept: CARDIOLOGY | Facility: MEDICAL CENTER | Age: 83
End: 2023-03-13
Payer: MEDICARE

## 2023-03-13 NOTE — TELEPHONE ENCOUNTER
RO    3/13 WIFE (DAVE) CALLED IN WANTING TO TALK TO SAV. WIFE CALLED [facility] ON 3/9 AND THEY STILL DID NOT HAVE THE ENTRESTO SCRIPT. WIFE IS GETTING REALLY UPSET BECAUSE THEY HAVE BEEN TRYING TO DEAL WITH THIS SINCE JAN. WOULD LIKE SAV TO CALL [facility] [phone] PT ID:[id number].   WIFE (DAVE) WOULD ALSO LIKE A CB WITH THE OUTCOME.     *I LET DAVE KNOW SAV FAXED IT TO [facility] ON 3/8 AND IT WAS ONLY FOR THE ENTRESTO, DID NOT INCLUDE ANY OTHER MEDICATIONS.     Thanks!

## 2023-03-16 ENCOUNTER — TELEPHONE (OUTPATIENT)
Dept: CARDIOLOGY | Facility: MEDICAL CENTER | Age: 83
End: 2023-03-16
Payer: MEDICARE

## 2023-03-16 NOTE — TELEPHONE ENCOUNTER
MIK          Caller: Ivis(pt's wife)    Topic/issue: Patient's wife was calling about her husbands entresto medication and she was asking for a call back to follow up on it    Callback Number: [phone]    Thank you    -Chris DAVIS

## 2023-03-17 ENCOUNTER — TELEPHONE (OUTPATIENT)
Dept: CARDIOLOGY | Facility: MEDICAL CENTER | Age: 83
End: 2023-03-17
Payer: MEDICARE

## 2023-03-17 DIAGNOSIS — I25.5 ISCHEMIC CARDIOMYOPATHY: ICD-10-CM

## 2023-03-17 DIAGNOSIS — I50.23 NYHA CLASS 3 ACUTE ON CHRONIC SYSTOLIC HEART FAILURE (HCC): ICD-10-CM

## 2023-03-17 PROCEDURE — RXMED WILLOW AMBULATORY MEDICATION CHARGE: Performed by: NURSE PRACTITIONER

## 2023-03-17 RX ORDER — SACUBITRIL AND VALSARTAN 24; 26 MG/1; MG/1
2 TABLET, FILM COATED ORAL 2 TIMES DAILY
Qty: 120 TABLET | Refills: 0 | Status: SHIPPED | OUTPATIENT
Start: 2023-03-17 | End: 2023-08-09

## 2023-03-17 NOTE — TELEPHONE ENCOUNTER
S/W Ivelisse, after speaking with [employer]- they claim they do not have the copy of the paperwork sent on 3/8/23.     Will have to send a repeat copy of RX.     Samples ordered to [facility].     Will get RX resent by Monday at the latest.

## 2023-03-17 NOTE — TELEPHONE ENCOUNTER
DR RIBEIRO    Pt wife Ivelisse was returning Chidi's call. Would like a cb from Chidi (re: pts meds) on her house phone [phone]    Thank you!

## 2023-03-20 RX ORDER — SACUBITRIL AND VALSARTAN 49; 51 MG/1; MG/1
1 TABLET, FILM COATED ORAL 2 TIMES DAILY
Qty: 180 TABLET | Refills: 3 | Status: SHIPPED
Start: 2023-03-20 | End: 2023-08-09

## 2023-03-20 NOTE — TELEPHONE ENCOUNTER
New RX and prescriber form completed and signed by ELY. Faxed to [phone] transmission complete

## 2023-03-21 ENCOUNTER — PHARMACY VISIT (OUTPATIENT)
Dept: PHARMACY | Facility: MEDICAL CENTER | Age: 83
End: 2023-03-21
Payer: COMMERCIAL

## 2023-03-31 ENCOUNTER — TELEPHONE (OUTPATIENT)
Dept: ENDOCRINOLOGY | Facility: MEDICAL CENTER | Age: 83
End: 2023-03-31
Payer: MEDICARE

## 2023-03-31 NOTE — TELEPHONE ENCOUNTER
Renewal  Requesting Financial/Copay assistance for repatha sureclick 140    FA Outcome approved     Dates in effect, from 1/11/23 through 1/10/24   Source and Phone Number Backup Circle # [phone]  Final Copay: 0.00

## 2023-04-07 ENCOUNTER — DOCUMENTATION (OUTPATIENT)
Dept: PHARMACY | Facility: MEDICAL CENTER | Age: 83
End: 2023-04-07
Payer: MEDICARE

## 2023-04-07 NOTE — PROGRESS NOTES
PHARMACIST PRE SCREEN - **New/TRF Onboarding**  Diagnosis: Hypercholesteremia (E78.00)      Drug & Non-Drug Allergies: EMR Reviewed. No concerning drug or non-drug allergies.                                                                                          Drug Therapy (name/formulation/dose/route of admin/frequency):  Repatha 140mg/mL SureClick auto-injector, 1 pen SC Q 14 days   EMR Reviewed   - Dose Appropriateness (Y/N):  Y   - Renal or Hepatic Adjustments (Y/N):  N   - Any comorbidities, PMH, precautions, or contraindications that pose a medication safety concern? (Y/N):  N   - Any relevant lab work needed that affects the initial start date? (Y/N):      n/a, patient previously on med   - List Past Treatments: rosuvastatin, pravastatin, fenofibrate, omega-3, CoQ10, Vit D    - List DDI’s: no clinically significant DDIs     - Patient’s ability to self-administer medication:  No issues identified per EMR  List Goals of Therapy:    Achieve goal LDL levels (<  mg/dL) to reduce risk of cardiovascular events    Implement lifestyle modifications: diet, exercise, weight loss, and smoking cessation      Patient pre-emptively opt out of clinical services as of 4/7/23

## 2023-04-11 PROCEDURE — RXMED WILLOW AMBULATORY MEDICATION CHARGE: Performed by: NURSE PRACTITIONER

## 2023-04-12 ENCOUNTER — PHARMACY VISIT (OUTPATIENT)
Dept: PHARMACY | Facility: MEDICAL CENTER | Age: 83
End: 2023-04-12
Payer: MEDICARE

## 2023-04-13 ENCOUNTER — HOSPITAL ENCOUNTER (OUTPATIENT)
Facility: MEDICAL CENTER | Age: 83
End: 2023-04-13
Attending: PHYSICIAN ASSISTANT
Payer: MEDICARE

## 2023-04-13 PROCEDURE — 84153 ASSAY OF PSA TOTAL: CPT

## 2023-04-14 LAB — PSA SERPL-MCNC: 0.64 NG/ML (ref 0–4)

## 2023-06-13 NOTE — PROGRESS NOTES
Pt arrived from ER via gurney to room 314-2. Family at bedside.     4 = No assist / stand by assistance

## 2023-07-12 PROCEDURE — RXMED WILLOW AMBULATORY MEDICATION CHARGE: Performed by: NURSE PRACTITIONER

## 2023-07-13 ENCOUNTER — DOCUMENTATION (OUTPATIENT)
Dept: PHARMACY | Facility: MEDICAL CENTER | Age: 83
End: 2023-07-13
Payer: MEDICARE

## 2023-07-13 NOTE — PROGRESS NOTES
07/12/23: Spoke with Luis Armando's wife, Ivis. She reports he is doing well on the Repatha SureClick 140mg/mL. She reports no side effects to the medication and no new allergies. She reports 0 missed doses and has 1 dose on hand, with next dose due 07/15. Sending delivery for 07/18 via , afternoon delivery. $0 copay.

## 2023-07-17 DIAGNOSIS — I25.5 ISCHEMIC CARDIOMYOPATHY: ICD-10-CM

## 2023-07-17 RX ORDER — METOPROLOL SUCCINATE 50 MG/1
50 TABLET, EXTENDED RELEASE ORAL DAILY
Qty: 90 TABLET | Refills: 1 | Status: SHIPPED | OUTPATIENT
Start: 2023-07-17 | End: 2023-08-09

## 2023-07-17 NOTE — TELEPHONE ENCOUNTER
Is the patient due for a refill? Yes    Was the patient seen the past year? Yes    Date of last office visit: 2/22/2023    Does the patient have an upcoming appointment?  No   If yes, When?     Provider to refill:DAMARIS, ADD for RO    Does the patients insurance require a 100 day supply?  No

## 2023-07-18 ENCOUNTER — PHARMACY VISIT (OUTPATIENT)
Dept: PHARMACY | Facility: MEDICAL CENTER | Age: 83
End: 2023-07-18
Payer: MEDICARE

## 2023-08-09 ENCOUNTER — TELEPHONE (OUTPATIENT)
Dept: CARDIOLOGY | Facility: MEDICAL CENTER | Age: 83
End: 2023-08-09
Payer: MEDICARE

## 2023-08-09 DIAGNOSIS — I25.5 ISCHEMIC CARDIOMYOPATHY: ICD-10-CM

## 2023-08-09 DIAGNOSIS — I50.23 NYHA CLASS 3 ACUTE ON CHRONIC SYSTOLIC HEART FAILURE (HCC): ICD-10-CM

## 2023-08-09 RX ORDER — METOPROLOL SUCCINATE 50 MG/1
25 TABLET, EXTENDED RELEASE ORAL DAILY
Qty: 90 TABLET | Refills: 1 | COMMUNITY
Start: 2023-08-09

## 2023-08-09 NOTE — TELEPHONE ENCOUNTER
S/W Ivelisse, based on communication from pt and ,he reduced his Entresto down to half tablet BID a couple of months ago and then about 3 weeks ago stopped it all together. Upon discussion, dizziness only occurs during times of getting up and moving. Denies any recurrence of AFIB. Has had some swelling. Noted patient is consuming less than 2000mg sodium per day but consuming 120oz of water daily. Advised to reduce water intake to 83-96oz. Ok to increase sodium with small salty snacks when BP is low and dizziness occurs.     Additionally, patient to trial reducing metoprolol to 25mg QHS and will montior BP daily. Advised to look out for elevated HR/BP with lower dose.     Per chart review, does not appear pt has has an Echo for three years, hasn't seen DS in 4 years. Advised pt should be seen for follow up but DS does not manage HF. Offered appt with RO care team but pt wants to see RO but was told they needed a referral. Referral placed under AL as ADD. Ivelisse will call [facility] to get placed on schedule while referral is processed due to wait time for appt.     Ivelisse will call back with continued concerns until pt can be assessed by RO.    Med rec updated    Total time on call 27 min

## 2023-08-09 NOTE — TELEPHONE ENCOUNTER
MIK    Caller: Ivis Fuentes (Spouse)    Topic/issue: Patient is struggling with low BP such as 88/54. This has been going on for months but the BP keeps just getting lower and lower. Patient is also experiencing dizziness, fatigue, and lightheadedness. He would also like to only see Dr. Figueredo but last OV with him was 07-.    Callback Number: [phone] (home) or [phone] (spouse's call)    Thank you,  -Ruby DAVIS

## 2023-08-11 ENCOUNTER — TELEPHONE (OUTPATIENT)
Dept: CARDIOLOGY | Facility: MEDICAL CENTER | Age: 83
End: 2023-08-11
Payer: MEDICARE

## 2023-08-11 NOTE — TELEPHONE ENCOUNTER
"Last OV: 2/22/23  Proposed Surgery: Colonoscopy  Surgery Date: TBD  Requesting Office Name: [facility]  Fax Number: [phone]  Preference of Location (default is surgery center unless specified by Cardiologist or JOSY)  Prior Clearance Addressed: No      Anticoags/Antiplatelets: Aspirin  Outstanding Cardiac Imaging : No  Stent, Cardiac Devices, or Catheterization: Yes  Date : 1/24/23   Ablation, TAVR/Valve (including open heart), Cardioversion: No  Recent Cardiac Hospitalization: Yes  Date:  1/24/23            When: Greater than 6 months since hospitalization.   History (cardiac history):   Past Medical History:   Diagnosis Date    Atrial fibrillation (HCC) 9/20/2011    Biventricular implantable cardioverter-defibrillator in situ 8/7/2015    Breath shortness     CAD (coronary artery disease) 9/20/2011    Cancer ([facility]) 2012    prostate    CARDIOMYOPATHY 9/20/2011    Congestive heart failure (HCC)     Fatigue 10/28/2010    Heartburn 10/23/2008    History of cardiac catheterization 9/20/2011    History of myocardial infarction     \"many\"    HTN (hypertension) 9/20/2011    Indigestion     Insomnia 7/8/2010    Ischemic cardiomyopathy 9/20/2011    Long term (current) use of anticoagulants 5/13/2011    Myocardial infarct ([facility]) 2007    Orthostatic hypotension 5/6/2009    WILLA (obstructive sleep apnea)     CPAP    Other drug allergy(995.27) 10/16/2008    Other specified disorder of intestines 07-28-15    constipation/diarrhea/ reports some bleeding from rectum w/blood clots. Seeing GI    Pacemaker     [employer]    Pain 07-28-15    \"chronic\", 0/10    Pleural effusion 5/18/2009    Presence of permanent cardiac pacemaker 9/20/2011    Prostate cancer (HCC) 5/13/2011    Second degree AV block, Mobitz type II 11/10/2010    Snoring     Stroke ([facility]) 2/3/2009             Surgical Clearance Letter Sent: YES   **Scan clearance request letter into [employer].**  "

## 2023-09-06 ENCOUNTER — APPOINTMENT (OUTPATIENT)
Dept: LAB | Facility: MEDICAL CENTER | Age: 83
End: 2023-09-06
Attending: PHYSICIAN ASSISTANT
Payer: MEDICARE

## 2023-09-12 ENCOUNTER — HOSPITAL ENCOUNTER (OUTPATIENT)
Dept: LAB | Facility: MEDICAL CENTER | Age: 83
End: 2023-09-12
Attending: INTERNAL MEDICINE
Payer: MEDICARE

## 2023-09-12 LAB
DIGOXIN SERPL-MCNC: 0.6 NG/ML (ref 0.8–2)
ERYTHROCYTE [DISTWIDTH] IN BLOOD BY AUTOMATED COUNT: 51.4 FL (ref 35.9–50)
HCT VFR BLD AUTO: 46.3 % (ref 42–52)
HGB BLD-MCNC: 15.4 G/DL (ref 14–18)
MCH RBC QN AUTO: 30.7 PG (ref 27–33)
MCHC RBC AUTO-ENTMCNC: 33.3 G/DL (ref 32.3–36.5)
MCV RBC AUTO: 92.2 FL (ref 81.4–97.8)
NT-PROBNP SERPL IA-MCNC: 1914 PG/ML (ref 0–125)
PLATELET # BLD AUTO: 153 K/UL (ref 164–446)
PMV BLD AUTO: 10.9 FL (ref 9–12.9)
RBC # BLD AUTO: 5.02 M/UL (ref 4.7–6.1)
WBC # BLD AUTO: 8.1 K/UL (ref 4.8–10.8)

## 2023-09-12 PROCEDURE — 36415 COLL VENOUS BLD VENIPUNCTURE: CPT

## 2023-09-12 PROCEDURE — 80162 ASSAY OF DIGOXIN TOTAL: CPT

## 2023-09-12 PROCEDURE — 83880 ASSAY OF NATRIURETIC PEPTIDE: CPT

## 2023-09-12 PROCEDURE — 85027 COMPLETE CBC AUTOMATED: CPT

## 2023-10-12 ENCOUNTER — TELEPHONE (OUTPATIENT)
Dept: PHARMACY | Facility: MEDICAL CENTER | Age: 83
End: 2023-10-12
Payer: MEDICARE

## 2023-10-12 PROCEDURE — RXMED WILLOW AMBULATORY MEDICATION CHARGE: Performed by: NURSE PRACTITIONER

## 2023-10-12 NOTE — TELEPHONE ENCOUNTER
Contact:  [id number]   Luis Armando Fuentes        Phone number: [phone]    Name of person spoken with and relationship to patient: Ivis (spouse)   Patient’s Adherence:            How patient is doing on medication: very well     How many missed doses and reason: 0    Any new medications: no    Any new conditions: no    Any new allergies: no    Any new side effects: no     Any new diagnoses: no     How many doses remainin    Did patient want to speak with pharmacist: no  Delivery:            Delivery date and method: 10/13      Needs by Date:  10/13    Signature required:  no    Any additional details for :  may leave at door if no answer  Teach Appointment Date:  na  Shipping Address:  [address] [place] [id number]  Medication(name, strength and dose):  Repatha SureClick 140 MG/ML SubQ Inject every 14 days  Copay: $0  Payment Method: na  Supplies:  na  Additional info:  SW patient's wife Ivis. She stated he is doing very well on the medication. So well, she wishes she could be on Repatha instead of a statin. No adverse effects. No further questions/concerns at this time

## 2023-10-13 ENCOUNTER — PHARMACY VISIT (OUTPATIENT)
Dept: PHARMACY | Facility: MEDICAL CENTER | Age: 83
End: 2023-10-13
Payer: MEDICARE

## 2023-11-30 ENCOUNTER — TELEPHONE (OUTPATIENT)
Dept: CARDIOLOGY | Facility: MEDICAL CENTER | Age: 83
End: 2023-11-30
Payer: MEDICARE

## 2023-11-30 NOTE — TELEPHONE ENCOUNTER
Pt spouse came in with change of Ins forms requiring a signature form DS.  Pts last visit was with CR. Todays schedule has HK is ADD for today and we are to take forms to a different provider. I have downloaded the Walk in Form and forms requiring a signature into [employer].  I have left the hard copy on your desk.

## 2023-12-12 NOTE — TELEPHONE ENCOUNTER
Forms signed & faxed to [employer] at [phone], confirmation received, scanned to [employer].

## 2023-12-19 ENCOUNTER — TELEPHONE (OUTPATIENT)
Dept: VASCULAR LAB | Facility: MEDICAL CENTER | Age: 83
End: 2023-12-19
Payer: MEDICARE

## 2023-12-19 DIAGNOSIS — E78.00 HYPERCHOLESTEREMIA: ICD-10-CM

## 2023-12-19 RX ORDER — EVOLOCUMAB 140 MG/ML
140 INJECTION, SOLUTION SUBCUTANEOUS
Qty: 6 EACH | Refills: 0 | Status: SHIPPED | OUTPATIENT
Start: 2023-12-19

## 2023-12-19 NOTE — TELEPHONE ENCOUNTER
Medication: REPATHA SURECLICK 140MG/ML SOLUTION AUTO INJECTOR   Type of Insurance: Government funded (Medicare/Medicare Advantage)  Type of Financial assistance requested Foundation  Source: [employer]   Source Phone #: [phone]  Outcome: APPROVED   Effective dates: 01/11/2024-01/10/2025  Details/Billing Information:   BIN: [id number]  PCN: [id number]  GRP: [id number]  ID: [id number]  MAX AMOUNT AWARD: $2500    Final Copay: $0    DRU Kolb, PhT  Pharmacy Liaison (Rx Coordinator)  P: [phone]  12/19/2023 9:42 AM

## 2023-12-19 NOTE — TELEPHONE ENCOUNTER
Patient Phone Number: [phone]  Medication: REPATHA SURECLICK 140MG/ML SOLUTION AUTO INJECTOR  (Quantity 6 mls, Day Supply 84)  Copay: $478.71  Household size: 2  Annual Household Adjusted Gross Income: $ 56,974  Additional information/consent to FA: YES    Pt's wife, Ivelisse, called and requested to re-apply for the [employer] kerrie, since the existing copay card will  on 2024.     This liaison initiated the process and Pt is approved for another full calendar year through the foundation.     DRU Kolb, PhT  Pharmacy Liaison (Rx Coordinator)  P: [phone]  2023 9:40 AM

## 2023-12-21 ENCOUNTER — OFFICE VISIT (OUTPATIENT)
Dept: VASCULAR LAB | Facility: MEDICAL CENTER | Age: 83
End: 2023-12-21
Attending: NURSE PRACTITIONER
Payer: MEDICARE

## 2023-12-21 VITALS — SYSTOLIC BLOOD PRESSURE: 128 MMHG | HEART RATE: 74 BPM | DIASTOLIC BLOOD PRESSURE: 77 MMHG

## 2023-12-21 DIAGNOSIS — E78.00 HYPERCHOLESTEREMIA: ICD-10-CM

## 2023-12-21 PROCEDURE — 3078F DIAST BP <80 MM HG: CPT | Performed by: NURSE PRACTITIONER

## 2023-12-21 PROCEDURE — 3074F SYST BP LT 130 MM HG: CPT | Performed by: NURSE PRACTITIONER

## 2023-12-21 PROCEDURE — 99214 OFFICE O/P EST MOD 30 MIN: CPT | Performed by: NURSE PRACTITIONER

## 2023-12-21 PROCEDURE — 99212 OFFICE O/P EST SF 10 MIN: CPT

## 2023-12-21 RX ORDER — HYDROCODONE BITARTRATE AND ACETAMINOPHEN 10; 325 MG/1; MG/1
1 TABLET ORAL EVERY 4 HOURS PRN
COMMUNITY

## 2023-12-21 RX ORDER — DIGOXIN 125 MCG
125 TABLET ORAL DAILY
COMMUNITY
Start: 2023-12-14

## 2023-12-21 ASSESSMENT — ENCOUNTER SYMPTOMS
WEIGHT LOSS: 0
NERVOUS/ANXIOUS: 0
BLURRED VISION: 0
BLOOD IN STOOL: 0
HEADACHES: 0
NAUSEA: 0
HEARTBURN: 0
LOSS OF CONSCIOUSNESS: 0
ORTHOPNEA: 0
NECK PAIN: 1
SPEECH CHANGE: 0
FOCAL WEAKNESS: 0
VOMITING: 0
SHORTNESS OF BREATH: 0
MYALGIAS: 0
DOUBLE VISION: 0
DEPRESSION: 0
BRUISES/BLEEDS EASILY: 0
DIZZINESS: 0
BACK PAIN: 0
PALPITATIONS: 0

## 2023-12-21 NOTE — PROGRESS NOTES
[facility] - Follow Up Visit  Date of Service: 12/21/2023    Isai Fuentes has been referred for evaluation and management of dyslipidemia  Has not been seen in 2 years     Referral Source: cardiology  HPI  History of ASCVD: Yes, Details: CAD s/p CABG 2007  had a couple of stents previous  Other Established (non-atherosclerotic) Vascular Disease, if Present:   Heart failure  Pacemaker  Age at Initial Diagnosis of Dyslipidemia: 65  Current Prescription Lipid Lowering Medications - including dose:   Statin: None  Non-Statin: Repatha 140mg   Current Lipid Lowering and Related Supplements:   Omega 3s 1000  coq10  Vit D 1000  Any Current Side Effects Potentially Related to Lipid Lowering therapy?   No  Current Adherence to Lipid Lowering Therapies   Complete  Previously Attempted Interventions for Lipids - including outcome  Statin:  Rosuvastatin 40 mg - intractable myalgias (2017)   Pravastatin 40 mg - intractable myalgias 2015   Has tried others that also caused muscle pain  Non-Statin: fenofibrate    Never tried zetia   Any Previous History of Statin Intolerance?   Yes, intolerable myalgias on both rosuvastatin 40 and pravastatin 40 in the past.  Resolved with discontinuation    Baseline Lipids Prior to Treatment:      Ref. Range 4/22/2019 08:06   Cholesterol,Tot Latest Ref Range: 100 - 199 mg/dL 226 (H)   Triglycerides Latest Ref Range: 0 - 149 mg/dL 108   HDL Latest Ref Range: >=40 mg/dL 55   LDL Latest Ref Range: <100 mg/dL 149 (H)     Other Pertinent History:   No known history of thyroid disease  Does have mild chronic kidney disease but no evidence of nephrotic syndrome  History of other CV risk factors:   Blood pressure currently well controlled on his current regimen  No known history of diabetes  Remains on aspirin    CURRENT MEDICATIONS:   Current Outpatient Medications:     HYDROcodone/acetaminophen, 1 Tablet, Oral, Q4HRS PRN, Taking    digoxin, 125 mcg, Oral, DAILY, Taking    Repatha  SureClick, 140 mg, Subcutaneous, Q14 DAYS, Taking    metoprolol SR, 25 mg, Oral, DAILY, Taking    albuterol, , Taking    albuterol, 2.5 mg, Nebulization, BID PRN, Taking    torsemide, 20 mg, Oral, Q48HRS, Taking    doxycycline, 100 mg, Oral, BID, Taking    indomethacin, 50 mg, Oral, DAILY, Taking    VITAMIN D PO, 1 Tablet, Oral, DAILY, Taking    Co Q 10, 200 mg, Oral, DAILY, Taking    Ixekizumab, 80 mg, Subcutaneous, Q30 DAYS, Taking    aspirin, 81 mg, Oral, Q EVENING, Taking    ALLERGIES: Plavix [clopidogrel bisulfate], Spironolactone, Statins [hmg-coa-r inhibitors], Atorvastatin, Other drug, and Ticlid [ticlopidine hydrochloride]    FAMILY HISTORY:    Dad - CAD with cabg    SOCIAL HISTORY   Social History     Tobacco Use   Smoking Status Former    Current packs/day: 0.00    Average packs/day: 1.5 packs/day for 3.0 years (4.5 ttl pk-yrs)    Types: Cigarettes    Start date: 1967    Quit date: 1970    Years since quittin.0   Smokeless Tobacco Former    Types: Chew    Quit date: 1972     Change in weight: stable  Exercise habits: moderate regular exercise program  Diet: low sodium    Review of Systems   Constitutional:  Negative for malaise/fatigue (better off spirono) and weight loss.   HENT:  Negative for hearing loss, nosebleeds and tinnitus.    Eyes:  Negative for blurred vision and double vision.   Respiratory:  Negative for shortness of breath.    Cardiovascular:  Negative for chest pain, palpitations, orthopnea and leg swelling.   Gastrointestinal:  Negative for blood in stool, heartburn, nausea and vomiting.   Genitourinary: Negative.    Musculoskeletal:  Positive for joint pain and neck pain. Negative for back pain and myalgias.   Skin:  Negative for itching and rash.   Neurological:  Negative for dizziness, speech change, focal weakness, loss of consciousness and headaches.   Endo/Heme/Allergies:  Does not bruise/bleed easily.   Psychiatric/Behavioral:  Negative for depression. The patient  is not nervous/anxious.      Physical Exam  Constitutional:       Appearance: He is well-developed. He is not diaphoretic.   Cardiovascular:      Rate and Rhythm: Normal rate.      Heart sounds: No murmur heard.  Pulmonary:      Effort: Pulmonary effort is normal. No respiratory distress.   Musculoskeletal:         General: Normal range of motion.   Skin:     General: Skin is warm and dry.   Neurological:      Mental Status: He is alert and oriented to person, place, and time.      Coordination: Coordination normal.       DATA REVIEW:  Most Recent Lipid Panel:   Lab Results   Component Value Date    CHOLSTRLTOT 199 03/08/2022    TRIGLYCERIDE 103 03/08/2022    HDL 73 03/08/2022     (H) 03/08/2022     Other Pertinent Blood Work:   Lab Results   Component Value Date    SODIUM 137 01/23/2023    POTASSIUM 4.7 01/23/2023    CHLORIDE 102 01/23/2023    CO2 23 01/23/2023    ANION 12.0 01/23/2023    GLUCOSE 85 01/23/2023    BUN 25 (H) 01/23/2023    CREATININE 1.24 01/23/2023    CALCIUM 9.3 01/23/2023    ASTSGOT 21 01/23/2023    ALTSGPT 13 01/23/2023    ALKPHOSPHAT 125 (H) 01/23/2023    TBILIRUBIN 0.9 01/23/2023    ALBUMIN 4.3 01/23/2023    AGRATIO 1.4 01/23/2023     [facility]  GFR is 45, , triglycerides 163, HDL 50, hemoglobin 16    ASSESSMENT AND PLAN  Patient Type, check all that apply:   Secondary Prevention  Established Atherosclerotic Cardiovascular Disease (ASCVD)  Yes, Details: Coronary artery disease status post PCI, MI, and CABG      Other Established (non-atherosclerotic) Vascular Disease, if Present:  Ischemic cardiomyopathy  Pacemaker    Evidence of Heterozygous Familial Hypercholesterolemia (FH):   No     ACC/AHA Indication for Statin Therapy, padmini all that apply:  Established ASCVD: Indication for High intensity statin   Calculated Risk for ASCVD, if applicable    N/A  Other Significant Risk Markers, if any, padmini all that apply   Family history of premature ASCVD in first  degree relative  Goal LDL-C and nonHDL-C based on Clinic Protocol  LDL-C:   <70 mg/dL, no recent labs     Lifestyle Recommendations From Today’s Visit:    Eating Plan: Concentrate on  Low sat/Trans fat  Exercise: Continue excellent exercise routine      Statin Therapy Recommendations from Today’s Visit:   Patient is not tolerated pravastatin and rosuvastatin in the past.  He also feels that he has not tolerated other statins but cannot name them.  Is been at least 2 years since he has had another trial of statin.  Trial of atorvastatin 5mg daily for 3 weeks resulted in myalgias.  Stopped and resolved.  Has attempted multiple other statins with the same bothersome side effects and has been unable to continue use.  - HOLD all statins     Non-Statin Medications Recommendations from Today’s Visit:   Did not attempt Zetia as he did not  the prescription.  Unlikely to reach LDL goal <70 on Zetia as monotherapy.    Indication for PCSK9 Inhibitor, if applicable:  ASCVD with suboptimal control of LDL-C despite maximally tolerated statin (Needs new paperwork signed for pt assistance)  - Continue Repatha 140mg subq every 14 days     Supplements Recommended at this visit:   - Continue CoQ10 twice daily  - Continue vitamin D to 4000 units a day    Recommendations for Other Cardiovascular Risk Factors, padmini all that apply:   - Continue aspirin    Other Issues:    -Ischemic cardiomyopathy, appears well compensated.  Continue current therapy.  Defer to cardiology (Long) for further management    -Status post PPM -defer all further management to cardiology    CKD G3a/A1, stable  - avoid nephrotoxins  - continue HTN control with RAAS blockade   - monitor lytes/gfr routinely  - eval with nephrology if worsening over time    Studies Ordered at Todays Visit: None  Blood Work Ordered At Today’s visit: as noted   Follow-Up: 3 months, then annually     PRATEEK Owens

## 2023-12-27 ENCOUNTER — APPOINTMENT (OUTPATIENT)
Dept: LAB | Facility: MEDICAL CENTER | Age: 83
End: 2023-12-27
Payer: MEDICARE

## 2023-12-28 ENCOUNTER — HOSPITAL ENCOUNTER (OUTPATIENT)
Dept: LAB | Facility: MEDICAL CENTER | Age: 83
End: 2023-12-28
Attending: NURSE PRACTITIONER
Payer: MEDICARE

## 2023-12-28 DIAGNOSIS — E78.00 HYPERCHOLESTEREMIA: ICD-10-CM

## 2023-12-28 LAB
ALBUMIN SERPL BCP-MCNC: 4 G/DL (ref 3.2–4.9)
ALBUMIN/GLOB SERPL: 1.4 G/DL
ALP SERPL-CCNC: 133 U/L (ref 30–99)
ALT SERPL-CCNC: 37 U/L (ref 2–50)
ANION GAP SERPL CALC-SCNC: 12 MMOL/L (ref 7–16)
AST SERPL-CCNC: 25 U/L (ref 12–45)
BILIRUB SERPL-MCNC: 0.8 MG/DL (ref 0.1–1.5)
BUN SERPL-MCNC: 18 MG/DL (ref 8–22)
CALCIUM ALBUM COR SERPL-MCNC: 9 MG/DL (ref 8.5–10.5)
CALCIUM SERPL-MCNC: 9 MG/DL (ref 8.4–10.2)
CHLORIDE SERPL-SCNC: 101 MMOL/L (ref 96–112)
CHOLEST SERPL-MCNC: 89 MG/DL (ref 100–199)
CO2 SERPL-SCNC: 26 MMOL/L (ref 20–33)
CREAT SERPL-MCNC: 1.35 MG/DL (ref 0.5–1.4)
FASTING STATUS PATIENT QL REPORTED: NORMAL
GFR SERPLBLD CREATININE-BSD FMLA CKD-EPI: 52 ML/MIN/1.73 M 2
GLOBULIN SER CALC-MCNC: 2.8 G/DL (ref 1.9–3.5)
GLUCOSE SERPL-MCNC: 104 MG/DL (ref 65–99)
HDLC SERPL-MCNC: 55 MG/DL
LDLC SERPL CALC-MCNC: 19 MG/DL
POTASSIUM SERPL-SCNC: 3.8 MMOL/L (ref 3.6–5.5)
PROT SERPL-MCNC: 6.8 G/DL (ref 6–8.2)
SODIUM SERPL-SCNC: 139 MMOL/L (ref 135–145)
TRIGL SERPL-MCNC: 76 MG/DL (ref 0–149)

## 2023-12-28 PROCEDURE — 80053 COMPREHEN METABOLIC PANEL: CPT

## 2023-12-28 PROCEDURE — 80061 LIPID PANEL: CPT

## 2023-12-28 PROCEDURE — 36415 COLL VENOUS BLD VENIPUNCTURE: CPT

## 2024-01-05 PROCEDURE — RXMED WILLOW AMBULATORY MEDICATION CHARGE: Performed by: NURSE PRACTITIONER

## 2024-01-05 NOTE — TELEPHONE ENCOUNTER
Received a follow up message regarding with the status refill update for Repatha. Per Pharmacy, Pt's medication is set for delivery, however, it comes with a copay of $65.90/84DS, with the copay assistance card.     Pt's copay assistance through [employer] had already been renewed since December, but it won't take effect until 1/11/2024. Pt is still eligible for a 1 time replenishment through his old copay assistance with the [facility] foundation. Will contact [employer] tomorrow 1/5, to request for extension of funds.     DRU Kolb, PhT  Pharmacy Liaison (Rx Coordinator)  P: [phone]  1/4/2024 4:12 PM

## 2024-01-08 ENCOUNTER — TELEPHONE (OUTPATIENT)
Dept: PHARMACY | Facility: MEDICAL CENTER | Age: 84
End: 2024-01-08
Payer: MEDICARE

## 2024-01-08 NOTE — TELEPHONE ENCOUNTER
Contact:  [phone]     Luis Armando Fuentes      Phone number: [phone]    Name of person spoken with and relationship to patient: Ivis, spouse   Patient’s Adherence:            How patient is doing on medication:  very well     How many missed doses and reason: 0    Any new medications: no    Any new conditions: no    Any new allergies: no    Any new side effects: no     Any new diagnoses: no     How many doses remainin    Did patient want to speak with pharmacist: no  Delivery:            Delivery date and method:       Needs by Date:      Signature required:  no    Any additional details for :  may leave at door if no answer  Teach Appointment Date:  na  Shipping Address:  [facility] [place] [address]  Medication(name, strength and dose):   Repatha SureClick 140 MG/ML SubQ Inject every 14 days  Copay: $0  Payment Method: na  Supplies:  na  Additional info:  IVAN Langston, spouse. She stated he is doing very well on the medication and not having any side effects. He has plenty extra on hand but  will still have delivered for this fill and will just push next call back date out. Per her request , patient likes to have at least one on hand when making next order so bumped up next call back date to  per her request . No further questions/concerns at this time

## 2024-01-16 ENCOUNTER — PHARMACY VISIT (OUTPATIENT)
Dept: PHARMACY | Facility: MEDICAL CENTER | Age: 84
End: 2024-01-16
Payer: COMMERCIAL

## 2024-01-24 ENCOUNTER — TELEPHONE (OUTPATIENT)
Dept: PHARMACY | Facility: MEDICAL CENTER | Age: 84
End: 2024-01-24
Payer: MEDICARE

## 2024-01-24 NOTE — TELEPHONE ENCOUNTER
Contact:             Phone number: [phone]     Name of person spoken with and relationship to patient: JUAQUIN ALBRECHT - WIFE TODD  Medication:   Patient’s Adherence:             How patient is doing on medication: well     How many missed doses and reason: 0     Any new medications: no     Any new conditions: no     Any new allergies: no     Any new side effects: no      Any new diagnoses: no      How many doses remainin     Did patient want to speak with pharmacist: no   Delivery:             Delivery date and method:        Needs by Date: RECEIVED      Signature required: no     Any additional details for : CALLUM   Teach Appointment Date: NA   Shipping Address: [address] [place] [address] [place] [address]   Medication(name, strength and dose): REPATHA DS $0   Copay: $0   Payment Method: NA   Supplies:    Additional Information:  JUST DELIVERED 3 DAYS AGO. DS $0 . JANET BROOKS . WE TALKED THRU AQ'S . HE IS DOING GREAT ON MEDS

## 2024-03-30 DIAGNOSIS — E78.00 HYPERCHOLESTEREMIA: ICD-10-CM

## 2024-04-01 ENCOUNTER — TELEPHONE (OUTPATIENT)
Dept: PHARMACY | Facility: MEDICAL CENTER | Age: 84
End: 2024-04-01
Payer: MEDICARE

## 2024-04-01 PROCEDURE — RXMED WILLOW AMBULATORY MEDICATION CHARGE: Performed by: INTERNAL MEDICINE

## 2024-04-01 RX ORDER — EVOLOCUMAB 140 MG/ML
140 INJECTION, SOLUTION SUBCUTANEOUS
Qty: 6 EACH | Refills: 0 | Status: SHIPPED | OUTPATIENT
Start: 2024-04-01

## 2024-04-01 NOTE — TELEPHONE ENCOUNTER
Evolocumab (REPATHA SURECLICK) 140 MG/ML Solution Auto-injector     Received Renewal PA request via [employer]  for Repatha. (Quantity:6ml, Day Supply:84)     Insurance: Medicare  Member ID:  [id number]  BIN: [id number]  PCN: [id number]  Group: [id number]     Ran Test claim via [employer] [facility] Pays for a $70 copay. Will outreach to patient to offer specialty pharmacy services and or release to preferred pharmacy    $35. qty 2ml/28DS.

## 2024-04-02 NOTE — TELEPHONE ENCOUNTER
Contact:  Phone number:[phone] (mobile)    Name of person spoken with and relationship to patient: SELF   Patient’s Adherence:  How patient is doing on medication: Very Well    How many missed doses and reason: 0 N/A    Any new medications: no    Any new conditions: no    Any new allergies: no    Any new side effects: no    Any new diagnoses: no    How many doses remainin    Did patient want to speak with pharmacist: No   Delivery:  Delivery date and method: 2024 via     Next Injection Date: 2024    Signature required: No     Any additional details for :  LEAVE AT FRONT DOOR    Teach Appointment Date:  2023   Shipping Address:  [address] [place] [address] [place] [address]   Medication(name,strength and dose):  REPATHA SURECLICK 140MG/ML SOLUTION AUTO INJECTOR    Copay:  $0   Payment Method:  n/a $0 copay   Supplies:  SHARPS CONTAINER   Additional Information:  Called and spoke to Pt's wife, Ivis, and would like to authorize the refill for pt's medication, Repatha. Pt's wife verified pt's name and  alongside with his medication doses on hand. Per Ivis, she is unsure when does pt schedule his injection dates, but is aware that he should be able to initiate it every 2 weeks. Pt's wife also reports that pt still has atleast 5 more pens remaining on hand and declines any missed doses. Pt's wife and I agreed to initiate the calculation based on today's date, and move forward with the future estimated refill call due date for . Pt's wife verbalized understanding and has no further questions at this time. Advised pt's wife to make sure to always request for any supplies needed for pt when he's out of the medication. Pt's wife verbalized understanding. Medication will be shipped out via  on . Estimated refill call date 2024.       DRU Kolb, PhT  Vascular Pharmacy Liaison (Rx Coordinator)  P: [phone]  2024 5:24 PM

## 2024-04-08 ENCOUNTER — PHARMACY VISIT (OUTPATIENT)
Dept: PHARMACY | Facility: MEDICAL CENTER | Age: 84
End: 2024-04-08
Payer: COMMERCIAL

## 2024-08-05 DIAGNOSIS — E78.00 HYPERCHOLESTEREMIA: ICD-10-CM

## 2024-08-06 ENCOUNTER — DOCUMENTATION (OUTPATIENT)
Dept: VASCULAR LAB | Facility: MEDICAL CENTER | Age: 84
End: 2024-08-06
Payer: MEDICARE

## 2024-08-06 PROCEDURE — RXMED WILLOW AMBULATORY MEDICATION CHARGE

## 2024-08-06 RX ORDER — EVOLOCUMAB 140 MG/ML
140 INJECTION, SOLUTION SUBCUTANEOUS
Qty: 6 EACH | Refills: 3 | Status: SHIPPED | OUTPATIENT
Start: 2024-08-06

## 2024-08-06 NOTE — PROGRESS NOTES
Called patient to schedule overdue vascular f/u. No answer and patient does not have vm set up. Will try again next week..               Delroy Ramey, Medical Assistant   [facility]   Ph: [phone]  Fx: [phone]

## 2024-08-12 ENCOUNTER — PHARMACY VISIT (OUTPATIENT)
Dept: PHARMACY | Facility: MEDICAL CENTER | Age: 84
End: 2024-08-12
Payer: COMMERCIAL

## 2024-08-13 ENCOUNTER — DOCUMENTATION (OUTPATIENT)
Dept: CARDIOLOGY | Facility: MEDICAL CENTER | Age: 84
End: 2024-08-13
Payer: MEDICARE

## 2024-08-13 ENCOUNTER — DOCUMENTATION (OUTPATIENT)
Dept: VASCULAR LAB | Facility: MEDICAL CENTER | Age: 84
End: 2024-08-13
Payer: MEDICARE

## 2024-08-13 NOTE — PROGRESS NOTES
Spoke with patients wife to schedule Vascular follow up. Patients wife states they changed to [employer] insurance the beginning of the year. States they are seeing Dr. Peguero at [facility].               Delroy Ramey, Medical Assistant   [facility]   Ph: [phone]  Fx: [phone]

## 2024-08-13 NOTE — PROGRESS NOTES
Above noted.   Will defer all further cv care to Dr. Peguero at patient request pending further patient contact.    Michael Bloch, MD  Vascular Care    Cc:   Dr. Sheriff Dr. Peguero

## 2024-12-03 PROCEDURE — RXMED WILLOW AMBULATORY MEDICATION CHARGE

## 2024-12-16 ENCOUNTER — PHARMACY VISIT (OUTPATIENT)
Dept: PHARMACY | Facility: MEDICAL CENTER | Age: 84
End: 2024-12-16
Payer: COMMERCIAL

## 2025-03-19 ENCOUNTER — TELEPHONE (OUTPATIENT)
Dept: VASCULAR LAB | Facility: MEDICAL CENTER | Age: 85
End: 2025-03-19

## 2025-03-19 NOTE — TELEPHONE ENCOUNTER
Prior Authorization for REPATHA SURECLICK 140MG/ML SOLUTION AUTO INJECTOR (Quantity: 6 mls, Days: 84) has been submitted via Cover My Meds: Key ([id number])    Insurance: MEDIMPACT D ([employer])    Will follow up in 24-48 business hours.     DRU Kolb, PhT  Vascular Pharmacy Liaison (Rx Coordinator)  P: [phone]  3/19/2025 10:19 AM

## 2025-03-19 NOTE — TELEPHONE ENCOUNTER
PA renewal submitted for REPATHA SURECLICK 140MG/ML SOLUTION AUTO INJECTOR  has been approved for a quantity of 6 mls , day supply 84    PA reference number: [id number]  Insurance: MEDIMPACT D ([employer])  Effective dates: 03/19/2025-03/18/2026  Copay: $90     Is patient eligible to fill with [facility]? Yes    Next Steps:  pt is currently been followed with RXC services from [facility]. Will outreach to pt to schedule medication delivery. Pt is also overdue for FA renewal kerrie.     DRU Kolb, PhT  Vascular Pharmacy Liaison (Rx Coordinator)  P: [phone]  3/19/2025 4:32 PM

## 2025-03-20 NOTE — TELEPHONE ENCOUNTER
Medication: REPATHA SURECLICK 140MG/ML SOLUTION AUTO INJECTOR   Type of Insurance: Government funded (Medicare/Medicare Advantage)  Type of Financial assistance requested Foundation  Source: [employer]   Source Phone #: [phone]  Outcome: APPROVED   Effective dates: 02/18/2025-02/17/2026  Details/Billing Information:   BIN: [id number]  PCN: [id number]   GRP: [id number]  ID: [id number]  MAX AMOUNT AWARD: $2500/YEAR     Final Copay: $0    DRU Kolb, PhT  Vascular Pharmacy Liaison (Rx Coordinator)  P: [phone]  3/20/2025 1:23 PM

## 2025-03-20 NOTE — TELEPHONE ENCOUNTER
Patient Phone Number: [phone]  Medication: REPATHA SURECLICK 140MG/ML SOLUTION AUTO INJECTOR (Quantity 6 mls, Day Supply 84)  Copay: $ 90  Household size:2  Annual Household Adjusted Gross Income: $15,000  Additional information/consent to FA: yes!     DRU Kolb, PhT  Vascular Pharmacy Liaison (Rx Coordinator)  P: [phone]  3/20/2025 1:22 PM

## 2025-05-07 PROCEDURE — RXMED WILLOW AMBULATORY MEDICATION CHARGE

## 2025-05-15 ENCOUNTER — PHARMACY VISIT (OUTPATIENT)
Dept: PHARMACY | Facility: MEDICAL CENTER | Age: 85
End: 2025-05-15
Payer: COMMERCIAL

## 2025-07-31 ENCOUNTER — DOCUMENTATION (OUTPATIENT)
Dept: VASCULAR LAB | Facility: MEDICAL CENTER | Age: 85
End: 2025-07-31

## 2025-07-31 NOTE — PROGRESS NOTES
Received verbal message from RUDY Cotter that she received a call back from pt's wife stating that pt had passed away since 7/16.     Will d/c pt out for refill management and RXC call services.     Notified and updated provider to defer pt out for follow up.     DRU Kolb, PhT  Vascular Pharmacy Liaison (Rx Coordinator)  P: [phone]  7/31/2025 11:37 AM